# Patient Record
Sex: MALE | Race: WHITE | NOT HISPANIC OR LATINO | Employment: UNEMPLOYED | URBAN - METROPOLITAN AREA
[De-identification: names, ages, dates, MRNs, and addresses within clinical notes are randomized per-mention and may not be internally consistent; named-entity substitution may affect disease eponyms.]

---

## 2021-02-18 PROBLEM — G43.909 MIGRAINE: Status: ACTIVE | Noted: 2021-02-18

## 2021-02-18 PROBLEM — R41.82 ALTERED MENTAL STATUS: Status: ACTIVE | Noted: 2021-02-18

## 2021-02-18 PROBLEM — Z87.898 HISTORY OF SEIZURE: Status: ACTIVE | Noted: 2021-02-18

## 2021-02-18 PROBLEM — D72.829 LEUKOCYTOSIS: Status: ACTIVE | Noted: 2021-02-18

## 2021-02-19 PROBLEM — D72.829 LEUKOCYTOSIS: Status: RESOLVED | Noted: 2021-02-18 | Resolved: 2021-02-19

## 2022-04-04 ENCOUNTER — HOSPITAL ENCOUNTER (OUTPATIENT)
Facility: HOSPITAL | Age: 52
Discharge: HOME | End: 2022-04-05
Attending: EMERGENCY MEDICINE | Admitting: STUDENT IN AN ORGANIZED HEALTH CARE EDUCATION/TRAINING PROGRAM
Payer: MEDICAID

## 2022-04-04 DIAGNOSIS — R07.9 CHEST PAIN: ICD-10-CM

## 2022-04-04 DIAGNOSIS — K56.609 SBO (SMALL BOWEL OBSTRUCTION): ICD-10-CM

## 2022-04-04 DIAGNOSIS — R18.8 ASCITES: ICD-10-CM

## 2022-04-04 DIAGNOSIS — C43.9 METASTATIC MELANOMA: Primary | ICD-10-CM

## 2022-04-04 PROBLEM — E87.1 HYPONATREMIA: Status: ACTIVE | Noted: 2022-04-04

## 2022-04-04 PROBLEM — N17.9 AKI (ACUTE KIDNEY INJURY): Status: ACTIVE | Noted: 2022-04-04

## 2022-04-04 LAB
ALBUMIN SERPL BCP-MCNC: 3.4 G/DL (ref 3.5–5.2)
ALP SERPL-CCNC: 61 U/L (ref 55–135)
ALT SERPL W/O P-5'-P-CCNC: 15 U/L (ref 10–44)
ANION GAP SERPL CALC-SCNC: 15 MMOL/L (ref 8–16)
AST SERPL-CCNC: 40 U/L (ref 10–40)
BASOPHILS # BLD AUTO: 0.12 K/UL (ref 0–0.2)
BASOPHILS NFR BLD: 0.7 % (ref 0–1.9)
BILIRUB SERPL-MCNC: 0.8 MG/DL (ref 0.1–1)
BUN SERPL-MCNC: 27 MG/DL (ref 6–20)
CALCIUM SERPL-MCNC: 9.7 MG/DL (ref 8.7–10.5)
CHLORIDE SERPL-SCNC: 89 MMOL/L (ref 95–110)
CO2 SERPL-SCNC: 24 MMOL/L (ref 23–29)
CREAT SERPL-MCNC: 1.6 MG/DL (ref 0.5–1.4)
CRP SERPL-MCNC: 123.1 MG/L (ref 0–8.2)
CTP QC/QA: YES
DIFFERENTIAL METHOD: ABNORMAL
EOSINOPHIL # BLD AUTO: 0 K/UL (ref 0–0.5)
EOSINOPHIL NFR BLD: 0.2 % (ref 0–8)
ERYTHROCYTE [DISTWIDTH] IN BLOOD BY AUTOMATED COUNT: 14 % (ref 11.5–14.5)
EST. GFR  (AFRICAN AMERICAN): 56.4 ML/MIN/1.73 M^2
EST. GFR  (NON AFRICAN AMERICAN): 48.8 ML/MIN/1.73 M^2
GLUCOSE SERPL-MCNC: 120 MG/DL (ref 70–110)
HCT VFR BLD AUTO: 43.9 % (ref 40–54)
HGB BLD-MCNC: 15.4 G/DL (ref 14–18)
IMM GRANULOCYTES # BLD AUTO: 0.14 K/UL (ref 0–0.04)
IMM GRANULOCYTES NFR BLD AUTO: 0.8 % (ref 0–0.5)
INR PPP: 1 (ref 0.8–1.2)
INR PPP: 1.1 (ref 0.8–1.2)
LYMPHOCYTES # BLD AUTO: 1 K/UL (ref 1–4.8)
LYMPHOCYTES NFR BLD: 5.5 % (ref 18–48)
MCH RBC QN AUTO: 26.8 PG (ref 27–31)
MCHC RBC AUTO-ENTMCNC: 35.1 G/DL (ref 32–36)
MCV RBC AUTO: 77 FL (ref 82–98)
MONOCYTES # BLD AUTO: 1 K/UL (ref 0.3–1)
MONOCYTES NFR BLD: 5.5 % (ref 4–15)
NEUTROPHILS # BLD AUTO: 15.7 K/UL (ref 1.8–7.7)
NEUTROPHILS NFR BLD: 87.3 % (ref 38–73)
NRBC BLD-RTO: 0 /100 WBC
PLATELET # BLD AUTO: 638 K/UL (ref 150–450)
PMV BLD AUTO: 9.9 FL (ref 9.2–12.9)
POTASSIUM SERPL-SCNC: 5.4 MMOL/L (ref 3.5–5.1)
PROCALCITONIN SERPL IA-MCNC: 0.67 NG/ML
PROT SERPL-MCNC: 6.3 G/DL (ref 6–8.4)
PROTHROMBIN TIME: 10.8 SEC (ref 9–12.5)
PROTHROMBIN TIME: 11.4 SEC (ref 9–12.5)
RBC # BLD AUTO: 5.74 M/UL (ref 4.6–6.2)
SARS-COV-2 RDRP RESP QL NAA+PROBE: NEGATIVE
SODIUM SERPL-SCNC: 128 MMOL/L (ref 136–145)
WBC # BLD AUTO: 17.91 K/UL (ref 3.9–12.7)

## 2022-04-04 PROCEDURE — 63600175 PHARM REV CODE 636 W HCPCS

## 2022-04-04 PROCEDURE — 86140 C-REACTIVE PROTEIN: CPT

## 2022-04-04 PROCEDURE — 36415 COLL VENOUS BLD VENIPUNCTURE: CPT | Performed by: STUDENT IN AN ORGANIZED HEALTH CARE EDUCATION/TRAINING PROGRAM

## 2022-04-04 PROCEDURE — 76604 PR US CHEST / UPPER BACK: ICD-10-PCS | Mod: 26,,, | Performed by: EMERGENCY MEDICINE

## 2022-04-04 PROCEDURE — 85025 COMPLETE CBC W/AUTO DIFF WBC: CPT | Performed by: STUDENT IN AN ORGANIZED HEALTH CARE EDUCATION/TRAINING PROGRAM

## 2022-04-04 PROCEDURE — U0002 COVID-19 LAB TEST NON-CDC: HCPCS | Performed by: STUDENT IN AN ORGANIZED HEALTH CARE EDUCATION/TRAINING PROGRAM

## 2022-04-04 PROCEDURE — 99285 EMERGENCY DEPT VISIT HI MDM: CPT | Mod: 25

## 2022-04-04 PROCEDURE — 63600175 PHARM REV CODE 636 W HCPCS: Performed by: STUDENT IN AN ORGANIZED HEALTH CARE EDUCATION/TRAINING PROGRAM

## 2022-04-04 PROCEDURE — 84145 PROCALCITONIN (PCT): CPT | Performed by: STUDENT IN AN ORGANIZED HEALTH CARE EDUCATION/TRAINING PROGRAM

## 2022-04-04 PROCEDURE — 63600175 PHARM REV CODE 636 W HCPCS: Performed by: EMERGENCY MEDICINE

## 2022-04-04 PROCEDURE — 93308 TTE F-UP OR LMTD: CPT | Mod: 26,,, | Performed by: EMERGENCY MEDICINE

## 2022-04-04 PROCEDURE — 80053 COMPREHEN METABOLIC PANEL: CPT | Performed by: STUDENT IN AN ORGANIZED HEALTH CARE EDUCATION/TRAINING PROGRAM

## 2022-04-04 PROCEDURE — 96365 THER/PROPH/DIAG IV INF INIT: CPT

## 2022-04-04 PROCEDURE — 96372 THER/PROPH/DIAG INJ SC/IM: CPT | Mod: 59

## 2022-04-04 PROCEDURE — 96361 HYDRATE IV INFUSION ADD-ON: CPT

## 2022-04-04 PROCEDURE — 76604 US EXAM CHEST: CPT | Mod: 26,,, | Performed by: EMERGENCY MEDICINE

## 2022-04-04 PROCEDURE — 96375 TX/PRO/DX INJ NEW DRUG ADDON: CPT

## 2022-04-04 PROCEDURE — G0378 HOSPITAL OBSERVATION PER HR: HCPCS

## 2022-04-04 PROCEDURE — 93308 PR ECHO HEART XTHORACIC,LIMITED: ICD-10-PCS | Mod: 26,,, | Performed by: EMERGENCY MEDICINE

## 2022-04-04 PROCEDURE — 99285 PR EMERGENCY DEPT VISIT,LEVEL V: ICD-10-PCS | Mod: 25,CS,, | Performed by: EMERGENCY MEDICINE

## 2022-04-04 PROCEDURE — 25000003 PHARM REV CODE 250: Performed by: INTERNAL MEDICINE

## 2022-04-04 PROCEDURE — 87040 BLOOD CULTURE FOR BACTERIA: CPT | Performed by: STUDENT IN AN ORGANIZED HEALTH CARE EDUCATION/TRAINING PROGRAM

## 2022-04-04 PROCEDURE — 99219 PR INITIAL OBSERVATION CARE,LEVL II: CPT | Mod: ,,, | Performed by: STUDENT IN AN ORGANIZED HEALTH CARE EDUCATION/TRAINING PROGRAM

## 2022-04-04 PROCEDURE — 99219 PR INITIAL OBSERVATION CARE,LEVL II: ICD-10-PCS | Mod: ,,, | Performed by: STUDENT IN AN ORGANIZED HEALTH CARE EDUCATION/TRAINING PROGRAM

## 2022-04-04 PROCEDURE — 85610 PROTHROMBIN TIME: CPT | Mod: 91

## 2022-04-04 PROCEDURE — 63600175 PHARM REV CODE 636 W HCPCS: Performed by: INTERNAL MEDICINE

## 2022-04-04 PROCEDURE — 76705 ECHO EXAM OF ABDOMEN: CPT | Mod: 26,,, | Performed by: EMERGENCY MEDICINE

## 2022-04-04 PROCEDURE — 76705 PR US, ABDOMEN LIMITED: ICD-10-PCS | Mod: 26,,, | Performed by: EMERGENCY MEDICINE

## 2022-04-04 PROCEDURE — 99285 EMERGENCY DEPT VISIT HI MDM: CPT | Mod: 25,CS,, | Performed by: EMERGENCY MEDICINE

## 2022-04-04 PROCEDURE — 85610 PROTHROMBIN TIME: CPT | Performed by: STUDENT IN AN ORGANIZED HEALTH CARE EDUCATION/TRAINING PROGRAM

## 2022-04-04 RX ORDER — HYDROMORPHONE HYDROCHLORIDE 1 MG/ML
1 INJECTION, SOLUTION INTRAMUSCULAR; INTRAVENOUS; SUBCUTANEOUS
Status: DISPENSED | OUTPATIENT
Start: 2022-04-04 | End: 2022-04-05

## 2022-04-04 RX ORDER — ENOXAPARIN SODIUM 100 MG/ML
40 INJECTION SUBCUTANEOUS EVERY 24 HOURS
Status: DISCONTINUED | OUTPATIENT
Start: 2022-04-04 | End: 2022-04-05 | Stop reason: HOSPADM

## 2022-04-04 RX ORDER — NALOXONE HCL 0.4 MG/ML
0.02 VIAL (ML) INJECTION
Status: DISCONTINUED | OUTPATIENT
Start: 2022-04-04 | End: 2022-04-05 | Stop reason: HOSPADM

## 2022-04-04 RX ORDER — ACETAMINOPHEN 325 MG/1
650 TABLET ORAL EVERY 8 HOURS PRN
Status: DISCONTINUED | OUTPATIENT
Start: 2022-04-04 | End: 2022-04-05 | Stop reason: HOSPADM

## 2022-04-04 RX ORDER — IBUPROFEN 200 MG
24 TABLET ORAL
Status: DISCONTINUED | OUTPATIENT
Start: 2022-04-04 | End: 2022-04-05 | Stop reason: HOSPADM

## 2022-04-04 RX ORDER — OXYCODONE HYDROCHLORIDE 10 MG/1
10 TABLET ORAL EVERY 6 HOURS PRN
Status: DISCONTINUED | OUTPATIENT
Start: 2022-04-04 | End: 2022-04-05 | Stop reason: HOSPADM

## 2022-04-04 RX ORDER — MORPHINE SULFATE 4 MG/ML
8 INJECTION, SOLUTION INTRAMUSCULAR; INTRAVENOUS
Status: COMPLETED | OUTPATIENT
Start: 2022-04-04 | End: 2022-04-04

## 2022-04-04 RX ORDER — ONDANSETRON 8 MG/1
8 TABLET, ORALLY DISINTEGRATING ORAL EVERY 8 HOURS PRN
Status: DISCONTINUED | OUTPATIENT
Start: 2022-04-04 | End: 2022-04-05 | Stop reason: HOSPADM

## 2022-04-04 RX ORDER — IBUPROFEN 200 MG
16 TABLET ORAL
Status: DISCONTINUED | OUTPATIENT
Start: 2022-04-04 | End: 2022-04-05 | Stop reason: HOSPADM

## 2022-04-04 RX ORDER — LORAZEPAM 2 MG/ML
1 INJECTION INTRAMUSCULAR
Status: COMPLETED | OUTPATIENT
Start: 2022-04-04 | End: 2022-04-04

## 2022-04-04 RX ORDER — SODIUM CHLORIDE 0.9 % (FLUSH) 0.9 %
10 SYRINGE (ML) INJECTION EVERY 12 HOURS PRN
Status: DISCONTINUED | OUTPATIENT
Start: 2022-04-04 | End: 2022-04-05 | Stop reason: HOSPADM

## 2022-04-04 RX ORDER — TALC
6 POWDER (GRAM) TOPICAL NIGHTLY PRN
Status: DISCONTINUED | OUTPATIENT
Start: 2022-04-04 | End: 2022-04-05 | Stop reason: HOSPADM

## 2022-04-04 RX ORDER — GLUCAGON 1 MG
1 KIT INJECTION
Status: DISCONTINUED | OUTPATIENT
Start: 2022-04-04 | End: 2022-04-05 | Stop reason: HOSPADM

## 2022-04-04 RX ORDER — HYDROMORPHONE HYDROCHLORIDE 1 MG/ML
1 INJECTION, SOLUTION INTRAMUSCULAR; INTRAVENOUS; SUBCUTANEOUS EVERY 8 HOURS PRN
Status: DISCONTINUED | OUTPATIENT
Start: 2022-04-04 | End: 2022-04-05 | Stop reason: HOSPADM

## 2022-04-04 RX ORDER — ONDANSETRON 2 MG/ML
4 INJECTION INTRAMUSCULAR; INTRAVENOUS
Status: COMPLETED | OUTPATIENT
Start: 2022-04-04 | End: 2022-04-04

## 2022-04-04 RX ADMIN — HYDROMORPHONE HYDROCHLORIDE 1 MG: 1 INJECTION, SOLUTION INTRAMUSCULAR; INTRAVENOUS; SUBCUTANEOUS at 07:04

## 2022-04-04 RX ADMIN — LORAZEPAM 1 MG: 2 INJECTION INTRAMUSCULAR; INTRAVENOUS at 01:04

## 2022-04-04 RX ADMIN — ONDANSETRON 4 MG: 2 INJECTION INTRAMUSCULAR; INTRAVENOUS at 01:04

## 2022-04-04 RX ADMIN — ENOXAPARIN SODIUM 40 MG: 100 INJECTION SUBCUTANEOUS at 07:04

## 2022-04-04 RX ADMIN — MORPHINE SULFATE 8 MG: 4 INJECTION INTRAVENOUS at 01:04

## 2022-04-04 RX ADMIN — MORPHINE SULFATE 45 MG: 30 TABLET, FILM COATED, EXTENDED RELEASE ORAL at 10:04

## 2022-04-04 RX ADMIN — CEFTRIAXONE 2 G: 2 INJECTION, SOLUTION INTRAVENOUS at 10:04

## 2022-04-04 RX ADMIN — SODIUM CHLORIDE, SODIUM LACTATE, POTASSIUM CHLORIDE, AND CALCIUM CHLORIDE 1000 ML: .6; .31; .03; .02 INJECTION, SOLUTION INTRAVENOUS at 02:04

## 2022-04-04 NOTE — ASSESSMENT & PLAN NOTE
Likely 2/2 poor perfusion, concerned for HRS    - treat primary problem  - daily CMP  - strict I/O  - renal dosing medications  - avoid nephrotoxic agents

## 2022-04-04 NOTE — ED PROVIDER NOTES
"Encounter Date: 4/4/2022       History     Chief Complaint   Patient presents with    Abdominal Pain     Abd pain.  Pt with "fluid on stomach".  Pt with hx melanoma with mets to abd and liver on treatment at [facility].  Pt states pain increasing and abd more swollen.      50-year-old male with a known past medical history of malignant melanoma with metastases  to right axillary lymph nodes, bilateral lungs, and liver with unknown primary lesion and seizure disorder presenting to the ED with complaint of ascites. Patient states that he was [place] [facility] [place] but is very unhappy with his care and would like to establish care at [facility]. He reports he has needed paracentesis multiple times including 4 days ago for abdominal ascites. He was [place] [facility] [place] ago but reports they did not discharge him with any pain meds despite saying they would. He was supposed to be on a pain regimen of dilaudid, oxycodone and fentanyl patches. He had a paracentesis appt scheduled today but reports that when he went [place] [facility] they couldn't find the appt and he was refused. He was also told that he would have his immunotherapy switched but was not told when or which medications he would be switched to. Patient reports worsening ascites and significant abdominal pain. He reports nausea and vomiting as a result of the pain. He also endorses constipation. He denies fevers, chills, erythema, hematuria, hematemesis, chest pain, SOB, unilateral weakness, numbness.         Review of patient's allergies indicates:  No Known Allergies  Past Medical History:   Diagnosis Date    Seizures      No past surgical history on file.  No family history on file.  Social History     Tobacco Use    Smoking status: Current Every Day Smoker    Smokeless tobacco: Never Used   Substance Use Topics    Alcohol use: Yes    Drug use: Yes     Types: Methamphetamines, Marijuana     Review of Systems   Constitutional: " Negative for chills and fever.   HENT: Negative for congestion and rhinorrhea.    Eyes: Negative for pain and visual disturbance.   Respiratory: Negative for cough and shortness of breath.    Cardiovascular: Negative for chest pain and palpitations.   Gastrointestinal: Positive for abdominal distention, abdominal pain, constipation, nausea and vomiting. Negative for blood in stool.   Genitourinary: Negative for difficulty urinating and dysuria.   Musculoskeletal: Negative for gait problem and joint swelling.   Skin: Negative for color change and rash.   Neurological: Negative for weakness, numbness and headaches.       Physical Exam     Initial Vitals [04/04/22 1150]   BP Pulse Resp Temp SpO2   121/74 82 (!) 22 97.4 °F (36.3 °C) 96 %      MAP       --         Physical Exam    Nursing note and vitals reviewed.  Constitutional: He appears well-nourished. He is not diaphoretic. No distress.   HENT:   Head: Normocephalic and atraumatic.   Mouth/Throat: Oropharynx is clear and moist.   Eyes: Conjunctivae and EOM are normal.   Neck: Neck supple.   Normal range of motion.  Cardiovascular: Normal rate, regular rhythm and normal heart sounds.   Pulmonary/Chest: Breath sounds normal. He has no wheezes. He has no rhonchi. He has no rales.   Abdominal: He exhibits distension. There is abdominal tenderness.   Diffuse tenderness. +Fluid wave. Non-peritonitic.     Musculoskeletal:         General: No tenderness. Normal range of motion.      Cervical back: Normal range of motion and neck supple.     Neurological: He is alert and oriented to person, place, and time. He has normal strength. No sensory deficit.   Skin: Skin is warm and dry. Capillary refill takes less than 2 seconds.         ED Course   Procedures  Labs Reviewed   CBC W/ AUTO DIFFERENTIAL - Abnormal; Notable for the following components:       Result Value    WBC 17.91 (*)     MCV 77 (*)     MCH 26.8 (*)     Platelets 638 (*)     Immature Granulocytes 0.8 (*)     Gran  # (ANC) 15.7 (*)     Immature Grans (Abs) 0.14 (*)     Gran % 87.3 (*)     Lymph % 5.5 (*)     All other components within normal limits   COMPREHENSIVE METABOLIC PANEL - Abnormal; Notable for the following components:    Sodium 128 (*)     Potassium 5.4 (*)     Chloride 89 (*)     Glucose 120 (*)     BUN 27 (*)     Creatinine 1.6 (*)     Albumin 3.4 (*)     eGFR if  56.4 (*)     eGFR if non  48.8 (*)     All other components within normal limits   PROTIME-INR   URINALYSIS, REFLEX TO URINE CULTURE   SARS-COV-2 RDRP GENE    Narrative:     This test utilizes isothermal nucleic acid amplification   technology to detect the SARS-CoV-2 RdRp nucleic acid segment.   The analytical sensitivity (limit of detection) is 125 genome   equivalents/mL.   A POSITIVE result implies infection with the SARS-CoV-2 virus;   the patient is presumed to be contagious.     A NEGATIVE result means that SARS-CoV-2 nucleic acids are not   present above the limit of detection. A NEGATIVE result should be   treated as presumptive. It does not rule out the possibility of   COVID-19 and should not be the sole basis for treatment decisions.   If COVID-19 is strongly suspected based on clinical and exposure   history, re-testing using an alternate molecular assay should be   considered.   This test is only for use under the Food and Drug   Administration s Emergency Use Authorization (EUA).   Commercial kits are provided by [employer].   Performance characteristics of the EUA have been independently   verified by [facility] Department of   Pathology and Laboratory Medicine.   _________________________________________________________________   The authorized Fact Sheet for Healthcare Providers and the authorized Fact   Sheet for Patients of the ID NOW COVID-19 are available on the FDA   website:     https://www.fda.gov/media/777954/download  https://www.fda.gov/media/110362/download                  Imaging Results    None          Medications   lactated ringers bolus 1,000 mL (1,000 mLs Intravenous New Bag 4/4/22 1455)   HYDROmorphone injection 1 mg (has no administration in time range)   ondansetron injection 4 mg (4 mg Intravenous Given 4/4/22 1303)   morphine injection 8 mg (8 mg Intravenous Given 4/4/22 1303)   lorazepam injection 1 mg (1 mg Intravenous Given 4/4/22 1318)     Medical Decision Making:   History:   Old Medical Records: I decided to obtain old medical records.  Initial Assessment:   50-year-old male with a known past medical history of malignant melanoma with metastases  to right axillary lymph nodes, bilateral lungs, and liver with unknown primary lesion and seizure disorder presenting to the ED with complaint of ascites.   Differential Diagnosis:   Ascites 2/2 malignancy vs cirrhosis  Electrolyte derangement  Clinical Tests:   Lab Tests: Ordered and Reviewed  ED Management:  Patient is hemodynamically stable. Morphine and Zofran given for symptom management. On reassessment, patient is in significant pain. Labs notable for PAULA, hyponatremia, mild hyperkalemia and leukocytosis. 1L IVF bolus given. With further chart review, labs are all similar to patient's labs two days ago when he was discharged home OSH. Hem-onc consulted and agree to see patient. KUB ordered to evaluate stool burden given patient's history of constipation. Patient will be admitted to hem-onc service.             Attending Attestation:   Physician Attestation Statement for Resident:  As the supervising MD   Physician Attestation Statement: I have personally seen and examined this patient.   I agree with the above history. -: Patient with a recent diagnosis of January 21st of the year with a malignant melanoma.  Initially worked up at [facility].  According to the mother in a melanoma has metastasized to the lung and abdomen.  They are quite frustrated and wanted transfer care here to [facility].  The  patient presents for evaluation of abdominal pain.  He has had no fever he has had some vomiting he has had bowel movements but occasionally is constipated.  He has had multiple paracentesis.   As the supervising MD I agree with the above PE.   -: Alert, very frustrated.  Anxious and hyperventilating.  He is directable when I asked him to quit raising his voice.  I told him that and use that that he is frustrated with the lack of coordination of his care as he perceives in however we are here to help him.  He is tachycardic on my exam at a heart rate of 1 away.  His abdominal exam shows hyperactive bowel sounds tympanic to percussion.  Will go ahead and repeat some lab work on him him give him a IV analgesic medication as well as some Ativan.  Will consult Heme-Onc.   As the supervising MD I agree with the above treatment, course, plan, and disposition.                ED Course as of 04/04/22 1733   Mon Apr 04, 2022   1443 Hemoglobin: 15.4 [DS]      ED Course User Index  [DS] Caryn Castillo MD             Clinical Impression:   Final diagnoses:  [K56.609] SBO (small bowel obstruction)  [R18.8] Ascites          ED Disposition Condition    Observation               Caryn Castillo MD  Resident  04/04/22 4058

## 2022-04-04 NOTE — ASSESSMENT & PLAN NOTE
Malignant melanoma with metastases to right axillary lymph nodes, bilateral lungs, and liver. Has followed up at [facility] for immunotherapy.     - f/u with primary oncologist at [facility]

## 2022-04-04 NOTE — ASSESSMENT & PLAN NOTE
Likely 2/2 metastatic melanoma. Has had multiple paracentesis with [facility]. Last one was on 4/1. At ED, VSS, afebrile, u/s at bedside showed accumulated peritoneal fluid. Total bili, AST/ALT, ALP and PT/INR were all normal. Chance of SBP is low but will r/o    - consulted IR for paracentesis  - f/u body fluid study  - f/u CRP and procal  - daily CMP   - pain control

## 2022-04-04 NOTE — H&P
[facility] Dept  Hematology/Oncology  H&P    Patient Name: Joseluis Garner  MRN: [id number]  Admission Date: 4/4/2022  Code Status: Full Code   Attending Provider: Barron Sharif DO  Primary Care Physician: Primary Doctor No  Principal Problem:Ascites    Subjective:     HPI: 51 yo M with PMHx of malignant melanoma with metastases to right axillary lymph nodes, bilateral lungs, and liver with unknown primary lesion and seizure disorder presenting with ascites associated with severe abdominal pain. Patient stated he needed paracentesis multiple times with the last one was on 4/1/22 and had 6L out. Pt has been treated for cancer with immunotherapy at [facility] but would like to establish care at [facility] instead. He stated was discharged from [facility] on 4/2/22 and did not receive any pain meds despite being told to. He was supposed to be on a pain regimen of dilaudid, oxycodone and fentanyl patches. He had a paracentesis appt scheduled today with [facility] but was refused because the appt was not found. He stated was told that his immunotherapy would be switched but was not told when or which medications he would be switched to. Patient reported worsening ascites and significant abdominal pain associated with nausea and vomiting. He also complained about constipation. He denied fevers, chills, erythema, hematuria, hematemesis, chest pain, SOB, unilateral weakness, or numbness.     At ED, his VSS, WBC 17.91, Na 128, K 5.4, Cr 1.6 (baseline ~0.8), bedside abdominal u/s showed intraperitoneal fluid. He received IVF, morphine, zofran and ativan at ED.       Oncology Treatment Plan:   [Could not find a treatment plan. This SmartLink may be configured incorrectly. Contact a  for help.]    Medications:  Continuous Infusions:  Scheduled Meds:   enoxaparin  40 mg Subcutaneous Daily    HYDROmorphone  1 mg Intravenous ED 1 Time    morphine  45 mg Oral Q12H     PRN Meds:acetaminophen, dextrose 10%, dextrose  10%, glucagon (human recombinant), glucose, glucose, HYDROmorphone, melatonin, naloxone, ondansetron, oxyCODONE, sodium chloride 0.9%     Review of patient's allergies indicates:  No Known Allergies     Past Medical History:   Diagnosis Date    Seizures      No past surgical history on file.  Family History    None       Tobacco Use    Smoking status: Current Every Day Smoker    Smokeless tobacco: Never Used   Substance and Sexual Activity    Alcohol use: Yes    Drug use: Yes     Types: Methamphetamines, Marijuana    Sexual activity: Not on file       Review of Systems   Constitutional:  Negative for chills and fever.   HENT:  Negative for trouble swallowing.    Respiratory:  Negative for cough and shortness of breath.    Cardiovascular:  Negative for chest pain.   Gastrointestinal:  Positive for abdominal distention, abdominal pain, constipation and nausea.   Genitourinary:  Negative for hematuria.   Neurological:  Negative for weakness, numbness and headaches.   Objective:     Vital Signs (Most Recent):  Temp: 97.4 °F (36.3 °C) (04/04/22 1150)  Pulse: 83 (04/04/22 1617)  Resp: (!) 26 (04/04/22 1617)  BP: 123/83 (04/04/22 1617)  SpO2: 96 % (04/04/22 1617)   Vital Signs (24h Range):  Temp:  [97.4 °F (36.3 °C)] 97.4 °F (36.3 °C)  Pulse:  [82-92] 83  Resp:  [18-26] 26  SpO2:  [91 %-100 %] 96 %  BP: (119-162)/(74-88) 123/83     Weight: 70.3 kg (155 lb)  Body mass index is 21.02 kg/m².  Body surface area is 1.89 meters squared.    No intake or output data in the 24 hours ending 04/04/22 1658    Physical Exam  Vitals and nursing note reviewed.   Constitutional:       Appearance: He is ill-appearing.   Eyes:      Extraocular Movements: Extraocular movements intact.      Pupils: Pupils are equal, round, and reactive to light.   Cardiovascular:      Rate and Rhythm: Normal rate and regular rhythm.      Heart sounds: Normal heart sounds.   Pulmonary:      Effort: Pulmonary effort is normal.      Breath sounds: Normal  breath sounds.   Abdominal:      General: There is distension.      Tenderness: There is abdominal tenderness.   Musculoskeletal:         General: Swelling present.   Neurological:      General: No focal deficit present.      Mental Status: He is alert and oriented to person, place, and time.   Psychiatric:      Comments: agitated       Significant Labs:   All pertinent labs from the last 24 hours have been reviewed.    Diagnostic Results:  I have reviewed all pertinent imaging results/findings within the past 24 hours.    Assessment/Plan:     * Ascites  Likely 2/2 metastatic melanoma. Has had multiple paracentesis with [facility] [place]. Last one was on 4/1. At ED, VSS, afebrile, u/s at bedside showed accumulated peritoneal fluid. Total bili, AST/ALT, ALP and PT/INR were all normal. Chance of SBP is low but will r/o    - consulted IR for paracentesis  - f/u body fluid study  - f/u CRP and procal  - daily CMP   - pain control        PAULA (acute kidney injury)  Likely 2/2 poor perfusion, concerned for HRS    - treat primary problem  - daily CMP  - strict I/O  - renal dosing medications  - avoid nephrotoxic agents    Hyponatremia  - daily CMP  - IVF as needed      Metastatic melanoma  Malignant melanoma with metastases to right axillary lymph nodes, bilateral lungs, and liver. Has followed up at [facility] for immunotherapy.     - f/u with primary oncologist at [facility]        Sammi Brown MD PhD  Hematology/Oncology  [facility] Dept

## 2022-04-04 NOTE — SUBJECTIVE & OBJECTIVE
Oncology Treatment Plan:   [Could not find a treatment plan. This SmartLink may be configured incorrectly. Contact a  for help.]    Medications:  Continuous Infusions:  Scheduled Meds:   enoxaparin  40 mg Subcutaneous Daily    HYDROmorphone  1 mg Intravenous ED 1 Time    morphine  45 mg Oral Q12H     PRN Meds:acetaminophen, dextrose 10%, dextrose 10%, glucagon (human recombinant), glucose, glucose, HYDROmorphone, melatonin, naloxone, ondansetron, oxyCODONE, sodium chloride 0.9%     Review of patient's allergies indicates:  No Known Allergies     Past Medical History:   Diagnosis Date    Seizures      No past surgical history on file.  Family History    None       Tobacco Use    Smoking status: Current Every Day Smoker    Smokeless tobacco: Never Used   Substance and Sexual Activity    Alcohol use: Yes    Drug use: Yes     Types: Methamphetamines, Marijuana    Sexual activity: Not on file       Review of Systems   Constitutional:  Negative for chills and fever.   HENT:  Negative for trouble swallowing.    Respiratory:  Negative for cough and shortness of breath.    Cardiovascular:  Negative for chest pain.   Gastrointestinal:  Positive for abdominal distention, abdominal pain, constipation and nausea.   Genitourinary:  Negative for hematuria.   Neurological:  Negative for weakness, numbness and headaches.   Objective:     Vital Signs (Most Recent):  Temp: 97.4 °F (36.3 °C) (04/04/22 1150)  Pulse: 83 (04/04/22 1617)  Resp: (!) 26 (04/04/22 1617)  BP: 123/83 (04/04/22 1617)  SpO2: 96 % (04/04/22 1617)   Vital Signs (24h Range):  Temp:  [97.4 °F (36.3 °C)] 97.4 °F (36.3 °C)  Pulse:  [82-92] 83  Resp:  [18-26] 26  SpO2:  [91 %-100 %] 96 %  BP: (119-162)/(74-88) 123/83     Weight: 70.3 kg (155 lb)  Body mass index is 21.02 kg/m².  Body surface area is 1.89 meters squared.    No intake or output data in the 24 hours ending 04/04/22 1658    Physical Exam  Vitals and nursing note reviewed.    Constitutional:       Appearance: He is ill-appearing.   Eyes:      Extraocular Movements: Extraocular movements intact.      Pupils: Pupils are equal, round, and reactive to light.   Cardiovascular:      Rate and Rhythm: Normal rate and regular rhythm.      Heart sounds: Normal heart sounds.   Pulmonary:      Effort: Pulmonary effort is normal.      Breath sounds: Normal breath sounds.   Abdominal:      General: There is distension.      Tenderness: There is abdominal tenderness.   Musculoskeletal:         General: Swelling present.   Neurological:      General: No focal deficit present.      Mental Status: He is alert and oriented to person, place, and time.   Psychiatric:      Comments: agitated       Significant Labs:   All pertinent labs from the last 24 hours have been reviewed.    Diagnostic Results:  I have reviewed all pertinent imaging results/findings within the past 24 hours.

## 2022-04-05 VITALS
HEIGHT: 72 IN | DIASTOLIC BLOOD PRESSURE: 78 MMHG | OXYGEN SATURATION: 96 % | SYSTOLIC BLOOD PRESSURE: 130 MMHG | BODY MASS INDEX: 20.99 KG/M2 | RESPIRATION RATE: 18 BRPM | TEMPERATURE: 98 F | WEIGHT: 155 LBS | HEART RATE: 85 BPM

## 2022-04-05 DIAGNOSIS — C43.9 METASTATIC MELANOMA: Primary | ICD-10-CM

## 2022-04-05 LAB
ALBUMIN FLD-MCNC: 2.6 G/DL
ALBUMIN SERPL BCP-MCNC: 2.8 G/DL (ref 3.5–5.2)
ALP SERPL-CCNC: 62 U/L (ref 55–135)
ALT SERPL W/O P-5'-P-CCNC: 11 U/L (ref 10–44)
ANION GAP SERPL CALC-SCNC: 9 MMOL/L (ref 8–16)
APPEARANCE FLD: ABNORMAL
AST SERPL-CCNC: 28 U/L (ref 10–40)
BASOPHILS # BLD AUTO: 0.13 K/UL (ref 0–0.2)
BASOPHILS NFR BLD: 0.8 % (ref 0–1.9)
BILIRUB DIRECT SERPL-MCNC: 0.2 MG/DL (ref 0.1–0.3)
BILIRUB SERPL-MCNC: 0.5 MG/DL (ref 0.1–1)
BILIRUB UR QL STRIP: NEGATIVE
BODY FLD TYPE: ABNORMAL
BODY FLUID SOURCE, LDH: NORMAL
BUN SERPL-MCNC: 22 MG/DL (ref 6–20)
CALCIUM SERPL-MCNC: 9 MG/DL (ref 8.7–10.5)
CHLORIDE SERPL-SCNC: 90 MMOL/L (ref 95–110)
CLARITY UR REFRACT.AUTO: ABNORMAL
CO2 SERPL-SCNC: 28 MMOL/L (ref 23–29)
COLOR FLD: YELLOW
COLOR UR AUTO: YELLOW
CREAT SERPL-MCNC: 1 MG/DL (ref 0.5–1.4)
DIFFERENTIAL METHOD: ABNORMAL
EOSINOPHIL # BLD AUTO: 0.1 K/UL (ref 0–0.5)
EOSINOPHIL NFR BLD: 0.7 % (ref 0–8)
ERYTHROCYTE [DISTWIDTH] IN BLOOD BY AUTOMATED COUNT: 14.1 % (ref 11.5–14.5)
EST. GFR  (AFRICAN AMERICAN): >60 ML/MIN/1.73 M^2
EST. GFR  (NON AFRICAN AMERICAN): >60 ML/MIN/1.73 M^2
GLUCOSE SERPL-MCNC: 112 MG/DL (ref 70–110)
GLUCOSE UR QL STRIP: NEGATIVE
GRAM STN SPEC: NORMAL
GRAM STN SPEC: NORMAL
HCT VFR BLD AUTO: 44.7 % (ref 40–54)
HGB BLD-MCNC: 15 G/DL (ref 14–18)
HGB UR QL STRIP: NEGATIVE
IMM GRANULOCYTES # BLD AUTO: 0.14 K/UL (ref 0–0.04)
IMM GRANULOCYTES NFR BLD AUTO: 0.8 % (ref 0–0.5)
KETONES UR QL STRIP: ABNORMAL
LDH FLD L TO P-CCNC: 1264 U/L
LEUKOCYTE ESTERASE UR QL STRIP: NEGATIVE
LYMPHOCYTES # BLD AUTO: 1.3 K/UL (ref 1–4.8)
LYMPHOCYTES NFR BLD: 7.5 % (ref 18–48)
LYMPHOCYTES NFR FLD MANUAL: 26 %
MAGNESIUM SERPL-MCNC: 2.2 MG/DL (ref 1.6–2.6)
MCH RBC QN AUTO: 26.7 PG (ref 27–31)
MCHC RBC AUTO-ENTMCNC: 33.6 G/DL (ref 32–36)
MCV RBC AUTO: 80 FL (ref 82–98)
MONOCYTES # BLD AUTO: 1.1 K/UL (ref 0.3–1)
MONOCYTES NFR BLD: 6.2 % (ref 4–15)
MONOS+MACROS NFR FLD MANUAL: 14 %
NEUTROPHILS # BLD AUTO: 14.4 K/UL (ref 1.8–7.7)
NEUTROPHILS NFR BLD: 84 % (ref 38–73)
NEUTROPHILS NFR FLD MANUAL: 25 %
NITRITE UR QL STRIP: NEGATIVE
NRBC BLD-RTO: 0 /100 WBC
OTHER CELLS FLD MANUAL: 35 %
PH UR STRIP: 5 [PH] (ref 5–8)
PHOSPHATE SERPL-MCNC: 3.9 MG/DL (ref 2.7–4.5)
PLATELET # BLD AUTO: 537 K/UL (ref 150–450)
PMV BLD AUTO: 9.9 FL (ref 9.2–12.9)
POTASSIUM SERPL-SCNC: 4.6 MMOL/L (ref 3.5–5.1)
PROT FLD-MCNC: 4 G/DL
PROT SERPL-MCNC: 5.5 G/DL (ref 6–8.4)
PROT UR QL STRIP: NEGATIVE
RBC # BLD AUTO: 5.61 M/UL (ref 4.6–6.2)
SODIUM SERPL-SCNC: 127 MMOL/L (ref 136–145)
SP GR UR STRIP: 1.01 (ref 1–1.03)
SPECIMEN SOURCE: NORMAL
SPECIMEN SOURCE: NORMAL
URN SPEC COLLECT METH UR: ABNORMAL
WBC # BLD AUTO: 17.13 K/UL (ref 3.9–12.7)
WBC # FLD: 1036 /CU MM

## 2022-04-05 PROCEDURE — 88305 TISSUE EXAM BY PATHOLOGIST: CPT | Mod: 26,,, | Performed by: PATHOLOGY

## 2022-04-05 PROCEDURE — 25000003 PHARM REV CODE 250

## 2022-04-05 PROCEDURE — 96376 TX/PRO/DX INJ SAME DRUG ADON: CPT

## 2022-04-05 PROCEDURE — 87205 SMEAR GRAM STAIN: CPT | Performed by: STUDENT IN AN ORGANIZED HEALTH CARE EDUCATION/TRAINING PROGRAM

## 2022-04-05 PROCEDURE — 88341 IMHCHEM/IMCYTCHM EA ADD ANTB: CPT | Performed by: PATHOLOGY

## 2022-04-05 PROCEDURE — 88341 PR IHC OR ICC EACH ADD'L SINGLE ANTIBODY  STAINPR: ICD-10-PCS | Mod: 26,,, | Performed by: PATHOLOGY

## 2022-04-05 PROCEDURE — 80053 COMPREHEN METABOLIC PANEL: CPT

## 2022-04-05 PROCEDURE — 84100 ASSAY OF PHOSPHORUS: CPT

## 2022-04-05 PROCEDURE — 82248 BILIRUBIN DIRECT: CPT

## 2022-04-05 PROCEDURE — 85025 COMPLETE CBC W/AUTO DIFF WBC: CPT

## 2022-04-05 PROCEDURE — 88342 IMHCHEM/IMCYTCHM 1ST ANTB: CPT | Performed by: PATHOLOGY

## 2022-04-05 PROCEDURE — 87070 CULTURE OTHR SPECIMN AEROBIC: CPT | Performed by: STUDENT IN AN ORGANIZED HEALTH CARE EDUCATION/TRAINING PROGRAM

## 2022-04-05 PROCEDURE — 63600175 PHARM REV CODE 636 W HCPCS: Performed by: INTERNAL MEDICINE

## 2022-04-05 PROCEDURE — 88342 IMHCHEM/IMCYTCHM 1ST ANTB: CPT | Mod: 26,,, | Performed by: PATHOLOGY

## 2022-04-05 PROCEDURE — 83615 LACTATE (LD) (LDH) ENZYME: CPT | Performed by: STUDENT IN AN ORGANIZED HEALTH CARE EDUCATION/TRAINING PROGRAM

## 2022-04-05 PROCEDURE — 88341 IMHCHEM/IMCYTCHM EA ADD ANTB: CPT | Mod: 26,,, | Performed by: PATHOLOGY

## 2022-04-05 PROCEDURE — 83735 ASSAY OF MAGNESIUM: CPT

## 2022-04-05 PROCEDURE — 81003 URINALYSIS AUTO W/O SCOPE: CPT | Performed by: STUDENT IN AN ORGANIZED HEALTH CARE EDUCATION/TRAINING PROGRAM

## 2022-04-05 PROCEDURE — 87075 CULTR BACTERIA EXCEPT BLOOD: CPT | Performed by: STUDENT IN AN ORGANIZED HEALTH CARE EDUCATION/TRAINING PROGRAM

## 2022-04-05 PROCEDURE — G0378 HOSPITAL OBSERVATION PER HR: HCPCS

## 2022-04-05 PROCEDURE — 25000003 PHARM REV CODE 250: Performed by: PHYSICIAN ASSISTANT

## 2022-04-05 PROCEDURE — 88112 CYTOPATH CELL ENHANCE TECH: CPT | Mod: 26,,, | Performed by: PATHOLOGY

## 2022-04-05 PROCEDURE — 36415 COLL VENOUS BLD VENIPUNCTURE: CPT

## 2022-04-05 PROCEDURE — 88305 TISSUE EXAM BY PATHOLOGIST: ICD-10-PCS | Mod: 26,,, | Performed by: PATHOLOGY

## 2022-04-05 PROCEDURE — 99226 PR SUBSEQUENT OBSERVATION CARE,LEVEL III: CPT | Mod: ,,, | Performed by: STUDENT IN AN ORGANIZED HEALTH CARE EDUCATION/TRAINING PROGRAM

## 2022-04-05 PROCEDURE — 88342 CHG IMMUNOCYTOCHEMISTRY: ICD-10-PCS | Mod: 26,,, | Performed by: PATHOLOGY

## 2022-04-05 PROCEDURE — 89051 BODY FLUID CELL COUNT: CPT | Performed by: STUDENT IN AN ORGANIZED HEALTH CARE EDUCATION/TRAINING PROGRAM

## 2022-04-05 PROCEDURE — 88112 CYTOPATH CELL ENHANCE TECH: CPT | Performed by: PATHOLOGY

## 2022-04-05 PROCEDURE — 82042 OTHER SOURCE ALBUMIN QUAN EA: CPT | Performed by: STUDENT IN AN ORGANIZED HEALTH CARE EDUCATION/TRAINING PROGRAM

## 2022-04-05 PROCEDURE — 88112 PR  CYTOPATH, CELL ENHANCE TECH: ICD-10-PCS | Mod: 26,,, | Performed by: PATHOLOGY

## 2022-04-05 PROCEDURE — 99226 PR SUBSEQUENT OBSERVATION CARE,LEVEL III: ICD-10-PCS | Mod: ,,, | Performed by: STUDENT IN AN ORGANIZED HEALTH CARE EDUCATION/TRAINING PROGRAM

## 2022-04-05 PROCEDURE — 25000003 PHARM REV CODE 250: Performed by: INTERNAL MEDICINE

## 2022-04-05 PROCEDURE — 84157 ASSAY OF PROTEIN OTHER: CPT | Performed by: STUDENT IN AN ORGANIZED HEALTH CARE EDUCATION/TRAINING PROGRAM

## 2022-04-05 PROCEDURE — 88305 TISSUE EXAM BY PATHOLOGIST: CPT | Performed by: PATHOLOGY

## 2022-04-05 RX ORDER — MORPHINE SULFATE 30 MG/1
60 TABLET, FILM COATED, EXTENDED RELEASE ORAL EVERY 12 HOURS
Status: DISCONTINUED | OUTPATIENT
Start: 2022-04-05 | End: 2022-04-05 | Stop reason: HOSPADM

## 2022-04-05 RX ORDER — CIPROFLOXACIN 500 MG/1
500 TABLET ORAL EVERY 12 HOURS
Qty: 10 TABLET | Refills: 0 | Status: SHIPPED | OUTPATIENT
Start: 2022-04-05 | End: 2022-04-10

## 2022-04-05 RX ORDER — FENTANYL 25 UG/1
1 PATCH TRANSDERMAL ONCE
COMMUNITY
End: 2022-05-04 | Stop reason: SDUPTHER

## 2022-04-05 RX ORDER — OXYCODONE HYDROCHLORIDE 20 MG/1
20 TABLET ORAL EVERY 4 HOURS PRN
Qty: 30 TABLET | Refills: 0 | Status: SHIPPED | OUTPATIENT
Start: 2022-04-05 | End: 2022-04-11

## 2022-04-05 RX ORDER — LEVETIRACETAM 500 MG/1
1000 TABLET ORAL 2 TIMES DAILY
Status: DISCONTINUED | OUTPATIENT
Start: 2022-04-05 | End: 2022-04-05 | Stop reason: HOSPADM

## 2022-04-05 RX ORDER — LIDOCAINE HYDROCHLORIDE 20 MG/ML
INJECTION, SOLUTION EPIDURAL; INFILTRATION; INTRACAUDAL; PERINEURAL
Status: COMPLETED
Start: 2022-04-05 | End: 2022-04-05

## 2022-04-05 RX ORDER — MORPHINE SULFATE 15 MG/1
15 TABLET, FILM COATED, EXTENDED RELEASE ORAL ONCE
Status: COMPLETED | OUTPATIENT
Start: 2022-04-05 | End: 2022-04-05

## 2022-04-05 RX ORDER — LEVETIRACETAM 1000 MG/1
1000 TABLET ORAL 2 TIMES DAILY
Status: ON HOLD | COMMUNITY
Start: 2021-06-28 | End: 2022-08-21 | Stop reason: HOSPADM

## 2022-04-05 RX ORDER — ONDANSETRON 8 MG/1
8 TABLET, ORALLY DISINTEGRATING ORAL EVERY 8 HOURS PRN
Qty: 20 TABLET | Refills: 1 | Status: SHIPPED | OUTPATIENT
Start: 2022-04-05 | End: 2022-05-31 | Stop reason: SDUPTHER

## 2022-04-05 RX ORDER — OXYCODONE HYDROCHLORIDE 15 MG/1
20 TABLET ORAL EVERY 6 HOURS PRN
Status: ON HOLD | COMMUNITY
End: 2022-04-05 | Stop reason: SDUPTHER

## 2022-04-05 RX ORDER — LIDOCAINE HYDROCHLORIDE 20 MG/ML
INJECTION, SOLUTION INFILTRATION; PERINEURAL CODE/TRAUMA/SEDATION MEDICATION
Status: COMPLETED | OUTPATIENT
Start: 2022-04-05 | End: 2022-04-05

## 2022-04-05 RX ADMIN — LEVETIRACETAM 1000 MG: 500 TABLET, FILM COATED ORAL at 11:04

## 2022-04-05 RX ADMIN — MORPHINE SULFATE 15 MG: 15 TABLET, EXTENDED RELEASE ORAL at 11:04

## 2022-04-05 RX ADMIN — LIDOCAINE HYDROCHLORIDE 5 ML: 20 INJECTION, SOLUTION INFILTRATION; PERINEURAL at 09:04

## 2022-04-05 RX ADMIN — OXYCODONE HYDROCHLORIDE 10 MG: 10 TABLET ORAL at 12:04

## 2022-04-05 RX ADMIN — HYDROMORPHONE HYDROCHLORIDE 1 MG: 1 INJECTION, SOLUTION INTRAMUSCULAR; INTRAVENOUS; SUBCUTANEOUS at 02:04

## 2022-04-05 RX ADMIN — OXYCODONE HYDROCHLORIDE 10 MG: 10 TABLET ORAL at 08:04

## 2022-04-05 RX ADMIN — HYDROMORPHONE HYDROCHLORIDE 1 MG: 1 INJECTION, SOLUTION INTRAMUSCULAR; INTRAVENOUS; SUBCUTANEOUS at 04:04

## 2022-04-05 RX ADMIN — MORPHINE SULFATE 45 MG: 30 TABLET, FILM COATED, EXTENDED RELEASE ORAL at 08:04

## 2022-04-05 RX ADMIN — LIDOCAINE HYDROCHLORIDE: 20 INJECTION, SOLUTION EPIDURAL; INFILTRATION; INTRACAUDAL at 08:04

## 2022-04-05 NOTE — PLAN OF CARE
Paracentesis done. Removed 5000 ml. Patient awake and alert, no distress noted, respirations even and unlabored. Report called to EZE Daniels on 8th floor.  Vitals:    04/05/22 0957   BP: 120/84   Pulse: 80   Resp: 16   Temp:

## 2022-04-05 NOTE — DISCHARGE SUMMARY
Jesse Ramírez - Oncology ([facility])  Hematology/Oncology  Discharge Summary      Patient Name: Joseluis Garner  MRN: [id number]  Admission Date: 4/4/2022  Hospital Length of Stay: 0 days  Discharge Date and Time:  04/05/2022 4:49 PM  Attending Physician: Gwyn Navarro MD   Discharging Provider: Sammi Brown MD  Primary Care Provider: Primary Doctor No    HPI: 49 yo M with PMHx of malignant melanoma with metastases to right axillary lymph nodes, bilateral lungs, and liver with unknown primary lesion and seizure disorder presenting with ascites associated with severe abdominal pain. Patient stated he needed paracentesis multiple times with the last one was on 4/1/22 and had 6L out. Pt [place] [facility] [place] would like to establish care at [facility] instead. He stated [place] [facility] [place] and did not receive any pain meds despite being told to. He was supposed to be on a pain regimen of dilaudid, oxycodone and fentanyl patches. He had a paracentesis app[place] today [place] [facility] but was refused because the appt was not found. He stated was told that his immunotherapy would be switched but was not told when or which medications he would be switched to. Patient reported worsening ascites and significant abdominal pain associated with nausea and vomiting. He also complained about constipation. He denied fevers, chills, erythema, hematuria, hematemesis, chest pain, SOB, unilateral weakness, or numbness.     At ED, his VSS, WBC 17.91, Na 128, K 5.4, Cr 1.6 (baseline ~0.8), bedside abdominal u/s showed intraperitoneal fluid. He received IVF, morphine, zofran and ativan at ED.      * No surgery found *     Hospital Course: U/s abdomen showed ascites and potential cirrhosis. IR paracentesis performed on 4/5, fluid study suggested SBP. Pt's medically stable to be discharged with PO abx, oncology and IR follow up.      Goals of Care Treatment Preferences:  Code Status: Full  Code      Consults:   Consults (From admission, onward)        Status Ordering Provider     Inpatient consult to Hematology/Oncology  Once        Provider:  (Not yet assigned)    Completed CONRADO LOCKE          Significant Diagnostic Studies: Labs: All labs within the past 24 hours have been reviewed    Pending Diagnostic Studies:     Procedure Component Value Units Date/Time    Cytology, Fluid/Wash/Brush [[id number]] Collected: 04/05/22 0932    Order Status: Sent Lab Status: In process Updated: 04/05/22 0933        Final Active Diagnoses:    Diagnosis Date Noted POA    PRINCIPAL PROBLEM:  Ascites [R18.8] 04/04/2022 Unknown    Metastatic melanoma [C79.9] 04/04/2022 Unknown    Hyponatremia [E87.1] 04/04/2022 Unknown    PAULA (acute kidney injury) [N17.9] 04/04/2022 Unknown    History of seizure [Z87.898] 02/18/2021 Not Applicable      Problems Resolved During this Admission:      Discharged Condition: stable    Disposition: Home or Self Care    Follow Up:    Patient Instructions:      IR Paracentesis without imaging   Standing Status: Future Standing Exp. Date: 04/05/23   Scheduling Instructions: Please schedule in 1 week.     Order Specific Question Answer Comments   Has the patient had a prior contrast or iodine reaction? No    Is the patient currently on any blood thinners like Aspirin, Coumadin, or Plavix? No    Is the patient on any Metformin drug, such as Glucophage or Glucovance? No    May the Radiologist modify the order per protocol to meet the clinical needs of the patient? Yes    Release to patient Immediate      Ambulatory referral/consult to Interventional Radiology   Standing Status: Future   Referral Priority: Routine Referral Type: Consultation   Referral Reason: Specialty Services Required   Requested Specialty: Interventional Radiology   Number of Visits Requested: 1     Ambulatory referral/consult to Hematology / Oncology   Standing Status: Future   Referral Priority: Routine Referral Type:  Consultation   Referral Reason: Specialty Services Required   Requested Specialty: Hematology and Oncology   Number of Visits Requested: 1     Medications:  Reconciled Home Medications:      Medication List      START taking these medications    ciprofloxacin HCl 500 MG tablet  Commonly known as: CIPRO  Take 1 tablet (500 mg total) by mouth every 12 (twelve) hours. for 5 days     ondansetron 8 MG Tbdl  Commonly known as: ZOFRAN-ODT  Take 1 tablet (8 mg total) by mouth every 8 (eight) hours as needed.        CONTINUE taking these medications    fentaNYL 25 mcg/hr  Commonly known as: DURAGESIC  Place 1 patch onto the skin once.     * levETIRAcetam 750 MG Tab  Commonly known as: KEPPRA  Take 1 tablet (750 mg total) by mouth every 12 (twelve) hours.     * levETIRAcetam 1000 MG tablet  Commonly known as: KEPPRA  Take 1,000 mg by mouth 2 (two) times a day.     oxyCODONE 15 MG Tab  Commonly known as: ROXICODONE  Take 20 mg by mouth every 6 (six) hours as needed for Pain. Daily amount 80 mg     trametinib 2 mg Tab  Commonly known as: MEKINIST  Take 2 mg by mouth.         * This list has 2 medication(s) that are the same as other medications prescribed for you. Read the directions carefully, and ask your doctor or other care provider to review them with you.                Sammi Brown MD PhD  Hematology/Oncology  [facility] - Oncology ([facility])

## 2022-04-05 NOTE — ASSESSMENT & PLAN NOTE
Likely 2/2 poor perfusion, concerned for HRS    - treat primary problem  - daily CMP  - strict I/O  - renal dosing medications  - avoid nephrotoxic agents  - resolved

## 2022-04-05 NOTE — ASSESSMENT & PLAN NOTE
Likely 2/2 metastatic melanoma. [place] [facility] Last one was on 4/1. At ED, VSS, afebrile, u/s at bedside showed accumulated peritoneal fluid. Total bili, AST/ALT, ALP and PT/INR were all normal. Chance of SBP is low but will r/o    - consulted IR for paracentesis, performed on 4/5  - continue with rocephin  - f/u body fluid study  - f/u CRP and procal  - daily CMP   - pain control

## 2022-04-05 NOTE — PROGRESS NOTES
Jesse Ramírez - Oncology ([facility])  Hematology/Oncology  Progress Note    Patient Name: Joseluis Garner  Admission Date: 4/4/2022  Hospital Length of Stay: 0 days  Code Status: Full Code     Subjective:     HPI:  49 yo M with PMHx of malignant melanoma with metastases to right axillary lymph nodes, bilateral lungs, and liver with unknown primary lesion and seizure disorder presenting with ascites associated with severe abdominal pain. Patient stated he needed paracentesis multiple times with the last one was on 4/1/22 and had 6L out. Pt has been treated for cancer with immunotherapy at [facility] but would like to establish care at [facility] instead. He stated was discharged from [facility] on 4/2/22 and did not receive any pain meds despite being told to. He was supposed to be on a pain regimen of dilaudid, oxycodone and fentanyl patches. He had a paracentesis appt scheduled today with [facility] but was refused because the appt was not found. He stated was told that his immunotherapy would be switched but was not told when or which medications he would be switched to. Patient reported worsening ascites and significant abdominal pain associated with nausea and vomiting. He also complained about constipation. He denied fevers, chills, erythema, hematuria, hematemesis, chest pain, SOB, unilateral weakness, or numbness.     At ED, his VSS, WBC 17.91, Na 128, K 5.4, Cr 1.6 (baseline ~0.8), bedside abdominal u/s showed intraperitoneal fluid. He received IVF, morphine, zofran and ativan at ED.      Interval History:     NAEON. Pt's feeling largely improved this am. Pain under controled. IR paracentesis performed, f/u fluid study. Tolerated food. Started empirical abx due to elevated CRP. Pt desired to switch his oncology service [place] [facility].     Oncology Treatment Plan:   [Could not find a treatment plan. This SmartLink may be configured incorrectly. Contact a  for help.]    Medications:  Continuous Infusions:  Scheduled  Meds:   cefTRIAXone (ROCEPHIN) IVPB  2 g Intravenous Q24H    enoxaparin  40 mg Subcutaneous Daily    levETIRAcetam  1,000 mg Oral BID    morphine  60 mg Oral Q12H     PRN Meds:acetaminophen, dextrose 10%, dextrose 10%, glucagon (human recombinant), glucose, glucose, HYDROmorphone, melatonin, naloxone, ondansetron, oxyCODONE, sodium chloride 0.9%     Review of Systems   Constitutional:  Negative for chills and fever.   HENT:  Negative for trouble swallowing.    Respiratory:  Negative for cough and shortness of breath.    Cardiovascular:  Negative for chest pain.   Gastrointestinal:  Positive for abdominal distention, abdominal pain and constipation. Negative for nausea.   Genitourinary:  Negative for hematuria.   Neurological:  Negative for weakness, numbness and headaches.   Objective:     Vital Signs (Most Recent):  Temp: 97 °F (36.1 °C) (04/05/22 1202)  Pulse: 80 (04/05/22 1202)  Resp: 18 (04/05/22 1202)  BP: 124/89 (04/05/22 1202)  SpO2: (!) 94 % (04/05/22 1202)   Vital Signs (24h Range):  Temp:  [96.5 °F (35.8 °C)-98.3 °F (36.8 °C)] 97 °F (36.1 °C)  Pulse:  [80-97] 80  Resp:  [16-26] 18  SpO2:  [91 %-100 %] 94 %  BP: (119-162)/(79-96) 124/89     Weight: 70.3 kg (155 lb)  Body mass index is 21.02 kg/m².  Body surface area is 1.89 meters squared.      Intake/Output Summary (Last 24 hours) at 4/5/2022 1301  Last data filed at 4/5/2022 0950  Gross per 24 hour   Intake --   Output 5500 ml   Net -5500 ml       Physical Exam  Vitals and nursing note reviewed.   Constitutional:       General: He is not in acute distress.  Eyes:      Extraocular Movements: Extraocular movements intact.      Pupils: Pupils are equal, round, and reactive to light.   Cardiovascular:      Rate and Rhythm: Normal rate and regular rhythm.      Heart sounds: Normal heart sounds.   Pulmonary:      Effort: Pulmonary effort is normal.      Breath sounds: Normal breath sounds.   Abdominal:      General: There is distension.      Tenderness:  There is abdominal tenderness.   Musculoskeletal:         General: No swelling.      Right lower leg: No edema.      Left lower leg: No edema.      Comments: Large hard lymphedema inferior to R axillar    Neurological:      General: No focal deficit present.      Mental Status: He is alert and oriented to person, place, and time.       Significant Labs:   All pertinent labs from the last 24 hours have been reviewed.    Diagnostic Results:  I have reviewed all pertinent imaging results/findings within the past 24 hours.    Assessment/Plan:     * Ascites  Likely 2/2 metastatic melanoma. [place] [facility] Last one was on 4/1. At ED, VSS, afebrile, u/s at bedside showed accumulated peritoneal fluid. Total bili, AST/ALT, ALP and PT/INR were all normal. Chance of SBP is low but will r/o    - consulted IR for paracentesis, performed on 4/5  - continue with rocephin  - f/u body fluid study  - f/u CRP and procal  - daily CMP   - pain control          PAULA (acute kidney injury)  Likely 2/2 poor perfusion, concerned for HRS    - treat primary problem  - daily CMP  - strict I/O  - renal dosing medications  - avoid nephrotoxic agents  - resolved    Hyponatremia  - daily CMP  - IVF as needed      Metastatic melanoma  Malignant melanoma with metastases to right axillary lymph nodes, bilateral lungs, and liver. [place] [facility] [place]. Pt strongly requested [place] [facility]    - will arrange outpatient f[place] [facility]    History of seizure  - continue with home nieves Brown MD PhD  Hematology/Oncology  [facility] - Oncology ([facility])

## 2022-04-05 NOTE — HOSPITAL COURSE
U/s abdomen showed ascites and potential cirrhosis. IR paracentesis performed on 4/5, fluid study suggested SBP. Pt's medically stable to be discharged with PO abx, oncology and IR follow up.

## 2022-04-05 NOTE — PROCEDURES
Radiology Post-Procedure Note    Pre Op Diagnosis: Ascites  Post Op Diagnosis: Same    Procedure: Ultrasound Guided Paracentesis    Procedure performed by: Meme Hope PA-C    Written Informed Consent Obtained: Yes  Specimen Removed: YES clear, yellow  Estimated Blood Loss: Minimal    Findings:   Successful paracentesis.  RLQ.  Albumin administered PRN per protocol.    Patient tolerated procedure well.    Meme Hope PA-C  Interventional Radiology  Clinic [phone]

## 2022-04-05 NOTE — PLAN OF CARE
Patient awake and alert, no distress noted, respirations even and unlabored. Allergies reviewed. Waiting for eval and consent.  Vitals:    04/05/22 0903   BP: (!) 142/91   Pulse: 90   Resp: 18   Temp:

## 2022-04-05 NOTE — H&P
Inpatient Radiology Pre-procedure Note    History of Present Illness:  Joseluis Garner is a 51 y.o. male who presents for ultrasound guided paracentesis.  Admission H&P reviewed.  Past Medical History:   Diagnosis Date    Seizures      No past surgical history on file.    Review of Systems:   As documented in primary team H&P    Home Meds:   Prior to Admission medications    Medication Sig Start Date End Date Taking? Authorizing Provider   levETIRAcetam (KEPPRA) 1000 MG tablet Take 1,000 mg by mouth 2 (two) times a day. 6/28/21  Yes Historical Provider     Scheduled Meds:    cefTRIAXone (ROCEPHIN) IVPB  2 g Intravenous Q24H    enoxaparin  40 mg Subcutaneous Daily    levETIRAcetam  1,000 mg Oral BID    LIDOcaine (PF) 20 mg/mL (2%)        morphine  15 mg Oral Once    morphine  60 mg Oral Q12H     Continuous Infusions:   PRN Meds:acetaminophen, dextrose 10%, dextrose 10%, glucagon (human recombinant), glucose, glucose, HYDROmorphone, melatonin, naloxone, ondansetron, oxyCODONE, sodium chloride 0.9%  Anticoagulants/Antiplatelets: no anticoagulation    Allergies: Review of patient's allergies indicates:  No Known Allergies  Sedation Hx: have not been any systemic reactions    Vitals:  Temp: 97.9 °F (36.6 °C) (04/05/22 0735)  Pulse: 90 (04/05/22 0903)  Resp: 18 (04/05/22 0903)  BP: (!) 142/91 (04/05/22 0903)  SpO2: 96 % (04/05/22 0903)     Physical Exam:  ASA: 3  Mallampati: n/a    General: no acute distress  Mental Status: alert and oriented to person, place and time  HEENT: normocephalic, atraumatic  Chest: unlabored breathing  Heart: regular heart rate  Abdomen: nondistended  Extremity: moves all extremities    Plan: ultrasound guided paracentesis  Sedation Plan: local    Meme Hope PA-C  Interventional Radiology  Clinic [phone]

## 2022-04-05 NOTE — SUBJECTIVE & OBJECTIVE
Interval History:     NAEON. Pt's feeling largely improved this am. Pain under controled. IR paracentesis performed, f/u fluid study. Tolerated food. Started empirical abx due to elevated CRP. Pt desired to switch his oncology [place] [facility].     Oncology Treatment Plan:   [Could not find a treatment plan. This SmartLink may be configured incorrectly. Contact a  for help.]    Medications:  Continuous Infusions:  Scheduled Meds:   cefTRIAXone (ROCEPHIN) IVPB  2 g Intravenous Q24H    enoxaparin  40 mg Subcutaneous Daily    levETIRAcetam  1,000 mg Oral BID    morphine  60 mg Oral Q12H     PRN Meds:acetaminophen, dextrose 10%, dextrose 10%, glucagon (human recombinant), glucose, glucose, HYDROmorphone, melatonin, naloxone, ondansetron, oxyCODONE, sodium chloride 0.9%     Review of Systems   Constitutional:  Negative for chills and fever.   HENT:  Negative for trouble swallowing.    Respiratory:  Negative for cough and shortness of breath.    Cardiovascular:  Negative for chest pain.   Gastrointestinal:  Positive for abdominal distention, abdominal pain and constipation. Negative for nausea.   Genitourinary:  Negative for hematuria.   Neurological:  Negative for weakness, numbness and headaches.   Objective:     Vital Signs (Most Recent):  Temp: 97 °F (36.1 °C) (04/05/22 1202)  Pulse: 80 (04/05/22 1202)  Resp: 18 (04/05/22 1202)  BP: 124/89 (04/05/22 1202)  SpO2: (!) 94 % (04/05/22 1202)   Vital Signs (24h Range):  Temp:  [96.5 °F (35.8 °C)-98.3 °F (36.8 °C)] 97 °F (36.1 °C)  Pulse:  [80-97] 80  Resp:  [16-26] 18  SpO2:  [91 %-100 %] 94 %  BP: (119-162)/(79-96) 124/89     Weight: 70.3 kg (155 lb)  Body mass index is 21.02 kg/m².  Body surface area is 1.89 meters squared.      Intake/Output Summary (Last 24 hours) at 4/5/2022 1301  Last data filed at 4/5/2022 0950  Gross per 24 hour   Intake --   Output 5500 ml   Net -5500 ml       Physical Exam  Vitals and nursing note reviewed.   Constitutional:        General: He is not in acute distress.  Eyes:      Extraocular Movements: Extraocular movements intact.      Pupils: Pupils are equal, round, and reactive to light.   Cardiovascular:      Rate and Rhythm: Normal rate and regular rhythm.      Heart sounds: Normal heart sounds.   Pulmonary:      Effort: Pulmonary effort is normal.      Breath sounds: Normal breath sounds.   Abdominal:      General: There is distension.      Tenderness: There is abdominal tenderness.   Musculoskeletal:         General: No swelling.      Right lower leg: No edema.      Left lower leg: No edema.      Comments: Large hard lymphedema inferior to R axillar    Neurological:      General: No focal deficit present.      Mental Status: He is alert and oriented to person, place, and time.       Significant Labs:   All pertinent labs from the last 24 hours have been reviewed.    Diagnostic Results:  I have reviewed all pertinent imaging results/findings within the past 24 hours.

## 2022-04-05 NOTE — PLAN OF CARE
Patient is alert and oriented x 4 with call light and personal belongings within reach. Patient has been educated to call for assistance with ADL's if needed. Patient's pain level is a 10 and MD is adjusting but due to history may take awhile to get under control.     Patient went to IR today for Paracentesis and they pulled off 5L.    Attending's Plan:   Ascites   - consulted IR for paracentesis, performed on 4/5  - continue with rocephin  - f/u body fluid study  - f/u CRP and procal  - daily CMP   - pain control    ELIZ:  TBD

## 2022-04-05 NOTE — ASSESSMENT & PLAN NOTE
Malignant melanoma with metastases to right axillary lymph nodes, bilateral lungs, and liver. Has [place] up at [facility] [place]. Pt strongly requested switch oncology service to [facility].    - will arrange outpatient f/u with [facility]

## 2022-04-05 NOTE — NURSING
Patient arrived to unit AOX4, mother at the bedside. complaining of pain requesting pain med. All vital signs signs. Patient oriented to the unit bed and call light. Bed locked and in low position. Initial assessment completed. All safety measures in  Place. Medicated for pain. Instructed patient to call for help as needed. Will continue to monitor.

## 2022-04-06 DIAGNOSIS — C43.9 METASTATIC MELANOMA: Primary | ICD-10-CM

## 2022-04-06 DIAGNOSIS — E03.9 HYPOTHYROIDISM, UNSPECIFIED TYPE: ICD-10-CM

## 2022-04-06 LAB — PATH INTERP FLD-IMP: NORMAL

## 2022-04-06 NOTE — NURSING
I have went over AVS with patient and mom at bedside. Patient is being discharged home. Awaiting transport to front door.

## 2022-04-06 NOTE — PLAN OF CARE
START ON PATHWAY REGIMEN - Melanoma and Other Skin Cancers    [id number]        Nivolumab (Opdivo)       Ipilimumab (Yervoy)       Nivolumab (Opdivo)           Additional Orders: Serious immune-mediated adverse events can occur with   ipilimumab and nivolumab. Please monitor your patient and refer to the linked   immune-mediated adverse reaction management materials for more information.    **Always confirm dose/schedule in your pharmacy ordering system**    Patient Characteristics:  Melanoma, Cutaneous/Unknown Primary, Distant Metastases or Unresectable Local   Recurrence, Unresectable, Symptomatic, First Line, BRAF V600 Activating Mutation   Positive  Disease Classification: Melanoma  Disease Subtype: Cutaneous  BRAF V600 Mutation Status: BRAF V600 Activating Mutation Positive  Therapeutic Status: Distant Metastases  Metastatic Disease Type: Symptomatic  Line of Therapy: First Line  Intent of Therapy:  Non-Curative / Palliative Intent, Discussed with Patient

## 2022-04-06 NOTE — DISCHARGE INSTRUCTIONS
Please go to all follow up appointments.   Please follow up with Primary Care Physician in 1 to 2 weeks.

## 2022-04-07 ENCOUNTER — TELEPHONE (OUTPATIENT)
Dept: HEMATOLOGY/ONCOLOGY | Facility: CLINIC | Age: 52
End: 2022-04-07
Payer: MEDICAID

## 2022-04-07 NOTE — TELEPHONE ENCOUNTER
----- Message from Clayton Wise MD sent at 4/6/2022  9:44 PM CDT -----  Thanks so much Adria.  Just give him follow up with me for C3 3 weeks after.    ----- Message -----  From: Adria Rivera MD  Sent: 4/6/2022   8:50 PM CDT  To: Kitty Segura NP, Clayton Wise MD, #    Sure thing, I can see him next Thursday.     ----- Message -----  From: Clayton Wise MD  Sent: 4/6/2022   4:12 PM CDT  To: Kitty Segura NP, Chalo Simms MD, #    I think [facility] has Miguel, so that would work well.    Adria, if this patient's ipi nivo gets approved before your clinic next Thursday, could you see him and consent?  I can take over for subsequent visits.  He has stage IV BRAF mutated melanoma s/p 1 cycle of Ipi/Nivo 3/2.  He is late for next cycle, so I wanted to get him C2 ASAP.  Would be good if you see him as new consult to make sure everything [facility] was doing makes sense.    Thanks  Herberth      ----- Message -----  From: Marquita Hernandez, RN  Sent: 4/6/2022   3:26 PM CDT  To: Kitty Segura NP, Clayton Wise MD, #    Fellow options next week are [facility] on Wed or Miguel on Thurs    ----- Message -----  From: Clayton Wise MD  Sent: 4/6/2022   3:12 PM CDT  To: Kitty Segura NP, Marquita Hernandez, RN, #    Yea Monday morning 4/18 has a few new patient slots.      The problem is he had cycle 1 of ipi nivo 3/2 at [facility], so was due a week ago.  He wants to change care to [facility].  If it gets approved earlier, we should see him for consent and give it ASAP.  Should be scheduled with a fellow for that first visit if it's approved quickly.    ----- Message -----  From: Marquita Hernandez RN  Sent: 4/6/2022   2:16 PM CDT  To: Kitty Segura NP, Clayton Wise MD, #    Is it okay to schedule in [facility] time? You have availability sooner there.  ----- Message -----  From: Clayton Wise MD  Sent: 4/6/2022   1:55 PM CDT  To: Kitty Segura NP, Marquita Hernandez RN, #    Ayesha,    Please let me know about  below.  I will place treatment plan to start auth.    Herberth    ----- Message -----  From: Clayton Wise MD  Sent: 4/6/2022   1:36 PM CDT  To: Marquita Hernandez, RN, Gwyn Navarro MD, #    If he tolerated Ipi Nivo ok, I would finish out 4 cycles of induction.  Could you all enter ipi nivo plan and complete cycle 1 to start auth here?  Once approved, we should get him into see Kitty or a fellow for consent and treatment if I do not have availability.    ----- Message -----  From: Gwyn Navarro MD  Sent: 4/6/2022   1:02 PM CDT  To: Clayton Wise MD, Marquita Hernandez, RN, #    He had one dose 3/2 or 3/3  BRAF V600E mutation positive  -E  ----- Message -----  From: Clayton Wise MD  Sent: 4/6/2022   9:30 AM CDT  To: Marquita Hernandez, RN, Gwyn Navarro MD, #    We can put him in next available.  I am out a lot the next few weeks, so if he needs to be seen before late April, could a fellow see him in clinic for first inpatient follow up visit?    When was his last ipi nivo?  Any side effects?  What is BRAF status?    Herberth    ----- Message -----  From: Lopez Ríos MD  Sent: 4/5/2022   5:31 PM CDT  To: Clayton Wise MD, Gwyn Navarro MD    Hello Dr. Wise,       Hope you are doing well    Mr. Garner was hospitalized at our unit for metastatic melanoma with malignant ascites was receiving his care at [facility] but wants to transfer to [facility]. He had one infusion of ipi/nivo in march 2022. Would you be able to see him in your clinic soon?     Thank you     Best,  Lopez Ríos MD  Hematology and Medical Oncology fellow

## 2022-04-08 LAB — BACTERIA SPEC AEROBE CULT: NO GROWTH

## 2022-04-10 LAB
BACTERIA BLD CULT: NORMAL
BACTERIA BLD CULT: NORMAL

## 2022-04-11 LAB
FINAL PATHOLOGIC DIAGNOSIS: ABNORMAL
Lab: ABNORMAL

## 2022-04-12 ENCOUNTER — TELEPHONE (OUTPATIENT)
Dept: HEMATOLOGY/ONCOLOGY | Facility: CLINIC | Age: 52
End: 2022-04-12
Payer: MEDICAID

## 2022-04-12 DIAGNOSIS — C43.9 METASTATIC MELANOMA: Primary | ICD-10-CM

## 2022-04-12 LAB — BACTERIA SPEC ANAEROBE CULT: NORMAL

## 2022-04-12 NOTE — PROGRESS NOTES
MEDICAL ONCOLOGY - NEW PATIENT VISIT    Reason for visit:  metastatic melanoma to the lungs, axillary lymph nodes, liver, peritoneal carcinomatosis.     Best Contact Phone Number(s): [phone] (home)      Cancer/Stage/TNM:   Cancer Staging  Metastatic melanoma  Staging form: Melanoma of the Skin, AJCC 8th Edition  - Clinical stage from 1/28/2022: Stage IV (cTX, cNX, cM1) - Signed by Adria Rivera MD on 4/12/2022       Oncology History   Metastatic melanoma   1/28/2022 Cancer Staged    Staging form: Melanoma of the Skin, AJCC 8th Edition  - Clinical stage from 1/28/2022: Stage IV (cTX, cNX, cM1)     4/4/2022 Initial Diagnosis    Metastatic melanoma     4/6/2022 -  Chemotherapy    Treatment Summary   Plan Name: OP NIVOLUMAB 1 MG/KG IPILIMUMAB 3MG/KG FOLLOWED BY NIVOLUMAB 480MG Q4W  Treatment Goal: Control  Status: Active  Start Date: 4/6/2022 (Planned)  End Date: 3/8/2023 (Planned)  Provider: Clayton Wise MD  Chemotherapy: ipilimumab (YERVOY) 3 mg/kg = 211 mg in sodium chloride 0.9% 100 mL chemo infusion, 3 mg/kg, Intravenous, Clinic/HOD 1 time, 0 of 4 cycles  nivolumab 70 mg in sodium chloride 0.9% 107 mL infusion, 1 mg/kg, Intravenous, Clinic/HOD 1 time, 0 of 14 cycles          HPI:   51 y.o. male with history of CAD s/p PCI, seizures, ?substance abuse, stage IV malignant melanoma who presents today to establish care at [facility].   He presented initially with enlarging right axillary lymph nodes that have grown progressively in size since April 2021. He presented to urgent care and was referred for US which showed multiple enlarged masses in the right axilla most likely reflecting abnormal lymph nodes concerning for malignancy.   He was subsequently referred to dermatology who performed a complete exam of the skin that was negative for suspicious cutaneous lesions as well as punch biopsy of one of the lymph node on 01/04/22. Pathology came back as malignant melanoma.   BRAF mutation: positive  for a V600E mutation.   He subsequently underwent a PET/CT on 01/28/22 which showed multiple hypermetabolic soft tissue masses within the right axillary region, metastatic lesions to the lungs and abdomen (VI segment of the liver measuring a max SUV of 6.99, 1.6 x 1.9 cm nodular soft tissue density with a max SUV  7.64 was seen adjacent to the colon within the right lower quadrant. Brain MRI was negative for metastatic disease.  LDH was 307 U/L  He saw Oncology at [facility] on 01/31/22 with plan to start First line Nivolumab / ipilimumab. He received C1 on 03/03/22.     He was admitted on 03/18 at [facility] for abdominal pain, and distension. He underwent therapeutic paracentesis with removal of 3L, and was discharged on 03/22. CT abdomen during that admission showed soft tissue attenuation throughout the peritoneum consistent with innumerable peritoneal implants.     He was readmitted the following day 03/23 for re accumulation and had another paracentesis with removal of 4.7L.  Unfortunately, he had rapid re accumulation of his ascites and was readmitted again between 03/30 and 04/02 and underwent paracentesis. He was planned to receive pleur-X catheter as outpatient.     He presented again to [facility] on 04/04 with worsening abdominal distension and pain. He underwent therapeutic paracentesis with removal of 5L. Cytology was positive for malignant cells. He was discharged on 04/05 on Cipro for SBP coverage.       Interval history:   Since his discharge, he notes worsening abdominal distension. He had progressive nausea and vomiting and has not been able to tolerate any diet. He notes losing about 35 lgs in the last 2 months. He uses zofran for nausea which is not helping.   He is seen today with his mother. He appears uncomfortable, and had 2 episodes of retching and vomiting in clinic. He denies recent fever, or chills.       History has been obtained by chart review and discussion with the patient.    ROS:   Review of Systems    Constitutional: Positive for malaise/fatigue and weight loss. Negative for chills, diaphoresis and fever.   HENT: Negative.    Eyes: Negative.    Respiratory: Negative for cough and shortness of breath.    Cardiovascular: Negative for chest pain and leg swelling.   Gastrointestinal: Positive for abdominal pain, nausea and vomiting.   Genitourinary: Negative.    Musculoskeletal: Negative.    Skin: Negative for itching and rash.   Neurological: Negative.    Endo/Heme/Allergies: Negative.    Psychiatric/Behavioral: Negative.        Past Medical History:   Past Medical History:   Diagnosis Date    Seizures         Past Surgical History:   History reviewed. No pertinent surgical history.     Family History:   History reviewed. No pertinent family history.     Social History:   Social History     Tobacco Use    Smoking status: Current Every Day Smoker    Smokeless tobacco: Never Used   Substance Use Topics    Alcohol use: Yes        I have reviewed and updated the patient's past medical, surgical, family and social histories.    Allergies:   Review of patient's allergies indicates:  No Known Allergies     Medications:   Current Outpatient Medications   Medication Sig Dispense Refill    fentaNYL (DURAGESIC) 25 mcg/hr Place 1 patch onto the skin once.      levETIRAcetam (KEPPRA) 1000 MG tablet Take 1,000 mg by mouth 2 (two) times a day.      ondansetron (ZOFRAN-ODT) 8 MG TbDL Dissolve 1 tablet (8 mg total) by mouth every 8 (eight) hours as needed. 20 tablet 1    oxyCODONE (ROXICODONE) 20 mg Tab immediate release tablet Take 20 mg by mouth every 6 (six) hours as needed.      trametinib (MEKINIST) 2 mg Tab Take 2 mg by mouth.       No current facility-administered medications for this visit.        Physical Exam:   BP (!) 175/119 (BP Location: Right arm, Patient Position: Sitting, BP Method: Medium (Automatic))   Pulse 107   Resp (!) 22   Ht 6' (1.829 m)   Wt 72.2 kg (159 lb 2.8 oz)   SpO2 98%   BMI 21.59  kg/m²      ECOG Performance status: 2            Physical Exam  Constitutional:       General: He is not in acute distress.     Appearance: He is underweight. He is ill-appearing.   HENT:      Head: Normocephalic and atraumatic.   Cardiovascular:      Rate and Rhythm: Normal rate.      Heart sounds: No murmur heard.    No gallop.   Pulmonary:      Effort: No respiratory distress.      Breath sounds: No wheezing or rales.   Chest:   Breasts:      Right: Axillary adenopathy (2 Large masses in the R axilla ) present.       Abdominal:      General: There is distension.      Tenderness: There is no abdominal tenderness.       Musculoskeletal:      Right lower leg: No edema.      Left lower leg: No edema.   Lymphadenopathy:      Upper Body:      Right upper body: Axillary adenopathy (2 Large masses in the R axilla ) present.   Skin:     Coloration: Skin is pale.           Labs:   Recent Results (from the past 48 hour(s))   TSH    Collection Time: 04/14/22  9:00 AM   Result Value Ref Range    TSH 8.834 (H) 0.400 - 4.000 uIU/mL   CBC auto differential    Collection Time: 04/14/22  9:00 AM   Result Value Ref Range    WBC 19.30 (H) 3.90 - 12.70 K/uL    RBC 5.79 4.60 - 6.20 M/uL    Hemoglobin 15.1 14.0 - 18.0 g/dL    Hematocrit 46.5 40.0 - 54.0 %    MCV 80 (L) 82 - 98 fL    MCH 26.1 (L) 27.0 - 31.0 pg    MCHC 32.5 32.0 - 36.0 g/dL    RDW 14.1 11.5 - 14.5 %    Platelets 619 (H) 150 - 450 K/uL    MPV 9.4 9.2 - 12.9 fL    Immature Granulocytes 0.6 (H) 0.0 - 0.5 %    Gran # (ANC) 16.6 (H) 1.8 - 7.7 K/uL    Immature Grans (Abs) 0.11 (H) 0.00 - 0.04 K/uL    Lymph # 1.2 1.0 - 4.8 K/uL    Mono # 1.2 (H) 0.3 - 1.0 K/uL    Eos # 0.1 0.0 - 0.5 K/uL    Baso # 0.11 0.00 - 0.20 K/uL    nRBC 0 0 /100 WBC    Gran % 86.0 (H) 38.0 - 73.0 %    Lymph % 6.1 (L) 18.0 - 48.0 %    Mono % 6.4 4.0 - 15.0 %    Eosinophil % 0.3 0.0 - 8.0 %    Basophil % 0.6 0.0 - 1.9 %    Differential Method Automated    CMP    Collection Time: 04/14/22  9:00 AM   Result  Value Ref Range    Sodium 123 (L) 136 - 145 mmol/L    Potassium 5.9 (H) 3.5 - 5.1 mmol/L    Chloride 88 (L) 95 - 110 mmol/L    CO2 24 23 - 29 mmol/L    Glucose 122 (H) 70 - 110 mg/dL    BUN 28 (H) 6 - 20 mg/dL    Creatinine 2.0 (H) 0.5 - 1.4 mg/dL    Calcium 9.5 8.7 - 10.5 mg/dL    Total Protein 6.2 6.0 - 8.4 g/dL    Albumin 2.6 (L) 3.5 - 5.2 g/dL    Total Bilirubin 0.5 0.1 - 1.0 mg/dL    Alkaline Phosphatase 77 55 - 135 U/L    AST 22 10 - 40 U/L    ALT 8 (L) 10 - 44 U/L    Anion Gap 11 8 - 16 mmol/L    eGFR if African American 43.1 (A) >60 mL/min/1.73 m^2    eGFR if non  37.3 (A) >60 mL/min/1.73 m^2   FREE T4    Collection Time: 04/14/22  9:00 AM   Result Value Ref Range    Free T4 0.87 0.71 - 1.51 ng/dL        Imaging:    CT abdomen pelvis 03/18/2022  FINDINGS:   01. LIVER: There is a 2.1 x 1.5 cm hypoattenuating lesion in the anterior left hepatic lobe (series 201, image 70). Portal vein is patent.   02. SPLEEN: Normal.   03. PANCREAS: Normal.   04. BILIARY TREE: The gallbladder is distended, otherwise within normal limits. Biliary tree is not dilated.   05. ADRENALS: Normal.   06. KIDNEYS: The kidneys enhance symmetrically. No evidence of calcification, hydronephrosis or solid renal mass.   07. LYMPHADENOPATHY/RETROPERITONEUM:The aorta is normal caliber with scattered atherosclerotic changes including major side branches. There are multiple nonenlarged retroperitoneal lymph nodes, for reference a para-aortic node is seen (series 201, image 61) which measures 0.7 cm in short axis dimension.   08. BOWEL: No bowel related abnormalities.   09. PELVIC VISCERA: Normal CT appearance of the pelvic viscera.   10. PELVIC LYMPH NODES: No lymphadenopathy.   11. PERITONEUM/ABDOMINAL WALL: There is soft tissue attenuation throughout the peritoneum consistent with innumerable peritoneal implants. Large volume ascites. There are multiple large heterogeneous enhancing masses within the right lateral chest wall  soft tissues. The largest measures 4.3 x 5.7 centimeters) series 201, image 1).   12. SKELETAL: No aggressive appearing lytic/blastic lesions. No acute osseous abnormality. Moderate degenerative changes of the thoracolumbar spine.   13. LUNG BASES: There is a 2.7 cm juxtapleural nodular density in the posterolateral right lower lobe (series 201, image 3). There is a 2.9 x 2.1 cm juxtapleural soft tissue mass within the posterior right lower lobe (series 201, image 17) as well as a 1.9 x 1.9 cm nodule within the anterior middle lobe (series 201, image 25). Multiple smaller nodular opacities are seen about the left lower lobe. There is a 2.0 x 1.8 cm juxtapleural nodule in the posterior left lower lobe (series 201, image 35). There is trace dependent subsegmental atelectasis. Heart size is normal. Trace pericardial effusion. A stent is visualized within the LAD.            Path:   Skin, right axilla, biopsy:  -  Malignant melanoma.     Note:  The tumor measures approximately 3 x 4 mm and involves the peripheral margins of the specimen. Given no visualized connection to the overlying epidermis, a metastasis or satellitosis of a larger nearby melanoma is a consideration. Clear cell sarcoma is a differential diagnosis.     Microscopic Description:  Skin with a poorly circumscribed dermal nodule of atypical melanocytes. The tumor cells are hyperchromatic with a high nuclear to cytoplasm ratio. The tumor cells are strongly positive for Sox10 stain. CD43 demonstrates scattered tumor infiltrating lymphocytes, but does not stain tumor cells. No lymphovascular invasion is seen with CD31 and D2-40 stains. All controls are adequate.     A PHH3 stain is pending with addendum to follow.     Mitotic counts:  3/mm2.  Ulceration:   Not identified.  Regression:  Not identified.  Tumor infiltrating lymphocytes:  Mild.  Angiolymphatic invasion:  Not identified.  Associated nevus:  Not identified.  Predominant cytology:  Epithelioid  cell.  Margin involvement:  Involved.    Assessment:       1. Metastatic melanoma    2. Malignant ascites    3. Cancer associated pain    4. History of seizure    5. ACP (advance care planning)          Plan:     # Metastatic melanoma to the lungs, liver, and peritoneum   51 year-old-male patient with metastatic melanoma to the axillary LNs, lungs, liver, and peritoneum, BRAF V600E mutant, who is status post C1 of first line immunotherapy with Nivolumab+Ipilimumab on 03/03. Unfortunately, since then he has developed peritoneal carcinomatosis leading to recurrent accumulation of malignant ascites. Over the past month, he required admission 4 times to the hospital for therapeutic paracentesis.   He is seen in clinic today. We have discussed the nature of his cancer, our treatment goals which are palliative in nature and to prolong his life.     He is very symptomatic today with worsening abdominal distension and notable worsening R axillary swelling. He cannot keep anything down, with persistent nausea and vomiting; likely mechanical from the distension.     With his rapidly progressive disease, we recommend switching him to targeted therapy with Encorafinib and Binimitinib. Samples and handouts were given to them today.     Labs reviewed from today; and are significant of leucocytosis with reactive thrombocytosis. PAULA, hyponatremia, hyperkalemia, and hypoalbuminemia     He will be sent to ER today and expect hospital admission to control his symptoms and get urgent paracentesis. Needs work-up also to exclude an infectious source.    ER staff and inpatient team made aware.     He will not start treatment until he is discharged from hospital.   He will follow-up with Dr. Wise after discharge     # Malignant ascites   - Abdomen is distended today on exam   - Red swollen nodule on the LLQ from previous paracentesis ?abscess. Recommend US of that area. Might require drainage   - Will need urgent paracentesis       #  Cancer associated pain   - Current pain regimen is;   Fentanyl patch 25 mcg/hr every 72 hours   Oxycodone (doesn't know the dose)   - Consult palliative care for pain and symptom management     # Seizures   - On Keppra 750 mg daily     #intactable nausea and vomiting   - Likely mechanical from abdominal distension   - only uses zofran at home.   - reassess after paracentesis, and adjust nausea medicine accordingly         The above was discussed and staffed with      Follow up: with Dr. Wise after discharge     The above information has been reviewed with the patient and all questions have been answered to their apparent satisfaction.  They understand that they can call the clinic with any questions.    Adria Rivera, PGY V  Hematology/Oncology  [facility]

## 2022-04-14 ENCOUNTER — OFFICE VISIT (OUTPATIENT)
Dept: HEMATOLOGY/ONCOLOGY | Facility: CLINIC | Age: 52
DRG: 375 | End: 2022-04-14
Payer: MEDICAID

## 2022-04-14 ENCOUNTER — HOSPITAL ENCOUNTER (INPATIENT)
Facility: HOSPITAL | Age: 52
LOS: 10 days | Discharge: HOME | DRG: 375 | End: 2022-04-24
Attending: EMERGENCY MEDICINE | Admitting: INTERNAL MEDICINE
Payer: MEDICAID

## 2022-04-14 VITALS
HEIGHT: 72 IN | OXYGEN SATURATION: 98 % | BODY MASS INDEX: 21.56 KG/M2 | WEIGHT: 159.19 LBS | SYSTOLIC BLOOD PRESSURE: 175 MMHG | HEART RATE: 107 BPM | RESPIRATION RATE: 22 BRPM | DIASTOLIC BLOOD PRESSURE: 119 MMHG

## 2022-04-14 DIAGNOSIS — G89.3 CANCER ASSOCIATED PAIN: ICD-10-CM

## 2022-04-14 DIAGNOSIS — C43.9 METASTATIC MELANOMA: Primary | ICD-10-CM

## 2022-04-14 DIAGNOSIS — R18.8 ASCITES: ICD-10-CM

## 2022-04-14 DIAGNOSIS — R18.0 MALIGNANT ASCITES: ICD-10-CM

## 2022-04-14 DIAGNOSIS — R94.31 QT PROLONGATION: ICD-10-CM

## 2022-04-14 DIAGNOSIS — Z87.898 HISTORY OF SEIZURE: ICD-10-CM

## 2022-04-14 DIAGNOSIS — R07.9 CHEST PAIN: ICD-10-CM

## 2022-04-14 DIAGNOSIS — E87.5 HYPERKALEMIA: ICD-10-CM

## 2022-04-14 DIAGNOSIS — Z71.89 ACP (ADVANCE CARE PLANNING): ICD-10-CM

## 2022-04-14 DIAGNOSIS — N17.9 AKI (ACUTE KIDNEY INJURY): ICD-10-CM

## 2022-04-14 PROBLEM — R11.2 INTRACTABLE NAUSEA AND VOMITING: Status: ACTIVE | Noted: 2022-04-14

## 2022-04-14 LAB
ALBUMIN FLD-MCNC: 2.1 G/DL
ANION GAP SERPL CALC-SCNC: 12 MMOL/L (ref 8–16)
ANION GAP SERPL CALC-SCNC: 8 MMOL/L (ref 8–16)
APPEARANCE FLD: CLEAR
BODY FLD TYPE: ABNORMAL
BODY FLUID SOURCE, LDH: NORMAL
BUN BLD-MCNC: 39 MG/DL (ref 4–21)
BUN SERPL-MCNC: 26 MG/DL (ref 6–20)
BUN SERPL-MCNC: 29 MG/DL (ref 6–20)
CALCIUM BLD-MCNC: 1.02 MMOL/L
CALCIUM BLD-MCNC: 1.15 MMOL/L
CALCIUM SERPL-MCNC: 8.5 MG/DL (ref 8.7–10.5)
CALCIUM SERPL-MCNC: 9.2 MG/DL (ref 8.7–10.5)
CHLORIDE SERPL-SCNC: 86 MMOL/L (ref 95–110)
CHLORIDE SERPL-SCNC: 87 MMOL/L (ref 95–110)
CHLORIDE SERPL-SCNC: 87 MMOL/L (ref 99–108)
CHLORIDE: 89 MMOL/L
CO2 SERPL-SCNC: 25 MMOL/L (ref 23–29)
CO2 SERPL-SCNC: 27 MMOL/L (ref 23–29)
CO2 TOTAL: 27
CO2 TOTAL: 29
COLOR FLD: YELLOW
CREAT SERPL-MCNC: 1.8 MG/DL (ref 0.5–1.4)
CREAT SERPL-MCNC: 1.9 MG/DL (ref 0.5–1.4)
CREATININE: 2.2 MG/DL
CREATININE: 2.3 MG/DL
CRP SERPL-MCNC: 148.2 MG/L (ref 0–8.2)
CTP QC/QA: YES
ERYTHROCYTE [SEDIMENTATION RATE] IN BLOOD BY WESTERGREN METHOD: 28 MM/HR (ref 0–23)
EST. GFR  (AFRICAN AMERICAN): 45.8 ML/MIN/1.73 M^2
EST. GFR  (AFRICAN AMERICAN): 48.9 ML/MIN/1.73 M^2
EST. GFR  (NON AFRICAN AMERICAN): 39.7 ML/MIN/1.73 M^2
EST. GFR  (NON AFRICAN AMERICAN): 42.3 ML/MIN/1.73 M^2
GLUCOSE SERPL-MCNC: 143 MG/DL (ref 70–110)
GLUCOSE SERPL-MCNC: 80 MG/DL (ref 70–110)
GLUCOSE: 126 MG/DL
GLUCOSE: 151 MG/DL
GRAM STN SPEC: NORMAL
GRAM STN SPEC: NORMAL
HCT VFR BLD AUTO: ABNORMAL %
HCT: 49
LDH FLD L TO P-CCNC: 1033 U/L
LYMPHOCYTES NFR FLD MANUAL: 22 %
MONOS+MACROS NFR FLD MANUAL: 2 %
NEUTROPHILS NFR FLD MANUAL: 42 %
OTHER CELLS FLD MANUAL: 34 %
POCT GLUCOSE: 111 MG/DL (ref 70–110)
POCT GLUCOSE: 145 MG/DL (ref 70–110)
POCT GLUCOSE: 160 MG/DL (ref 70–110)
POTASSIUM SERPL-SCNC: 5.5 MMOL/L (ref 3.4–5.3)
POTASSIUM SERPL-SCNC: 5.5 MMOL/L (ref 3.5–5.1)
POTASSIUM SERPL-SCNC: 5.9 MMOL/L (ref 3.5–5.1)
POTASSIUM SERPL-SCNC: 6.4 MMOL/L (ref 3.4–5.3)
PROCALCITONIN SERPL IA-MCNC: 1.1 NG/ML
PROT FLD-MCNC: 3.6 G/DL
SARS-COV-2 RDRP RESP QL NAA+PROBE: NEGATIVE
SODIUM BLD-SCNC: 121 MMOL/L (ref 137–147)
SODIUM BLD-SCNC: 122 MMOL/L (ref 137–147)
SODIUM SERPL-SCNC: 119 MMOL/L (ref 136–145)
SODIUM SERPL-SCNC: 126 MMOL/L (ref 136–145)
SPECIMEN SOURCE: NORMAL
SPECIMEN SOURCE: NORMAL
UREA NITROGEN (BUN): 38 MG/DL
WBC # FLD: 613 /CU MM

## 2022-04-14 PROCEDURE — 80048 BASIC METABOLIC PNL TOTAL CA: CPT | Performed by: PHYSICIAN ASSISTANT

## 2022-04-14 PROCEDURE — 96375 TX/PRO/DX INJ NEW DRUG ADDON: CPT

## 2022-04-14 PROCEDURE — 63600175 PHARM REV CODE 636 W HCPCS: Performed by: PHYSICIAN ASSISTANT

## 2022-04-14 PROCEDURE — 3080F DIAST BP >= 90 MM HG: CPT | Mod: CPTII,,, | Performed by: STUDENT IN AN ORGANIZED HEALTH CARE EDUCATION/TRAINING PROGRAM

## 2022-04-14 PROCEDURE — 3008F BODY MASS INDEX DOCD: CPT | Mod: CPTII,,, | Performed by: STUDENT IN AN ORGANIZED HEALTH CARE EDUCATION/TRAINING PROGRAM

## 2022-04-14 PROCEDURE — 99291 CRITICAL CARE FIRST HOUR: CPT

## 2022-04-14 PROCEDURE — 96361 HYDRATE IV INFUSION ADD-ON: CPT

## 2022-04-14 PROCEDURE — 86140 C-REACTIVE PROTEIN: CPT | Performed by: PHYSICIAN ASSISTANT

## 2022-04-14 PROCEDURE — 82042 OTHER SOURCE ALBUMIN QUAN EA: CPT | Performed by: PHYSICIAN ASSISTANT

## 2022-04-14 PROCEDURE — 87205 SMEAR GRAM STAIN: CPT | Performed by: PHYSICIAN ASSISTANT

## 2022-04-14 PROCEDURE — 99999 PR PBB SHADOW E&M-EST. PATIENT-LVL III: ICD-10-PCS | Mod: PBBFAC,,, | Performed by: STUDENT IN AN ORGANIZED HEALTH CARE EDUCATION/TRAINING PROGRAM

## 2022-04-14 PROCEDURE — 83615 LACTATE (LD) (LDH) ENZYME: CPT | Performed by: PHYSICIAN ASSISTANT

## 2022-04-14 PROCEDURE — 63600175 PHARM REV CODE 636 W HCPCS: Mod: JG

## 2022-04-14 PROCEDURE — 99999 PR PBB SHADOW E&M-EST. PATIENT-LVL III: CPT | Mod: PBBFAC,,, | Performed by: STUDENT IN AN ORGANIZED HEALTH CARE EDUCATION/TRAINING PROGRAM

## 2022-04-14 PROCEDURE — 87075 CULTR BACTERIA EXCEPT BLOOD: CPT | Performed by: PHYSICIAN ASSISTANT

## 2022-04-14 PROCEDURE — 99223 1ST HOSP IP/OBS HIGH 75: CPT | Mod: ,,, | Performed by: INTERNAL MEDICINE

## 2022-04-14 PROCEDURE — 99223 PR INITIAL HOSPITAL CARE,LEVL III: ICD-10-PCS | Mod: ,,, | Performed by: INTERNAL MEDICINE

## 2022-04-14 PROCEDURE — 80048 BASIC METABOLIC PNL TOTAL CA: CPT | Mod: 91,XB

## 2022-04-14 PROCEDURE — 99291 CRITICAL CARE FIRST HOUR: CPT | Mod: CS,,, | Performed by: PHYSICIAN ASSISTANT

## 2022-04-14 PROCEDURE — P9047 ALBUMIN (HUMAN), 25%, 50ML: HCPCS | Mod: JG

## 2022-04-14 PROCEDURE — 49083 ABD PARACENTESIS W/IMAGING: CPT

## 2022-04-14 PROCEDURE — 25000003 PHARM REV CODE 250: Performed by: EMERGENCY MEDICINE

## 2022-04-14 PROCEDURE — 84157 ASSAY OF PROTEIN OTHER: CPT | Performed by: PHYSICIAN ASSISTANT

## 2022-04-14 PROCEDURE — U0002 COVID-19 LAB TEST NON-CDC: HCPCS | Performed by: PHYSICIAN ASSISTANT

## 2022-04-14 PROCEDURE — 3008F PR BODY MASS INDEX (BMI) DOCUMENTED: ICD-10-PCS | Mod: CPTII,,, | Performed by: STUDENT IN AN ORGANIZED HEALTH CARE EDUCATION/TRAINING PROGRAM

## 2022-04-14 PROCEDURE — 25000003 PHARM REV CODE 250

## 2022-04-14 PROCEDURE — 99291 PR CRITICAL CARE, E/M 30-74 MINUTES: ICD-10-PCS | Mod: CS,,, | Performed by: PHYSICIAN ASSISTANT

## 2022-04-14 PROCEDURE — 80047 BASIC METABLC PNL IONIZED CA: CPT

## 2022-04-14 PROCEDURE — 93005 ELECTROCARDIOGRAM TRACING: CPT

## 2022-04-14 PROCEDURE — 3077F SYST BP >= 140 MM HG: CPT | Mod: CPTII,,, | Performed by: STUDENT IN AN ORGANIZED HEALTH CARE EDUCATION/TRAINING PROGRAM

## 2022-04-14 PROCEDURE — 93010 EKG 12-LEAD: ICD-10-PCS | Mod: ,,, | Performed by: INTERNAL MEDICINE

## 2022-04-14 PROCEDURE — 99215 OFFICE O/P EST HI 40 MIN: CPT | Mod: S$PBB,,, | Performed by: STUDENT IN AN ORGANIZED HEALTH CARE EDUCATION/TRAINING PROGRAM

## 2022-04-14 PROCEDURE — 96374 THER/PROPH/DIAG INJ IV PUSH: CPT

## 2022-04-14 PROCEDURE — 89051 BODY FLUID CELL COUNT: CPT | Performed by: PHYSICIAN ASSISTANT

## 2022-04-14 PROCEDURE — 3077F PR MOST RECENT SYSTOLIC BLOOD PRESSURE >= 140 MM HG: ICD-10-PCS | Mod: CPTII,,, | Performed by: STUDENT IN AN ORGANIZED HEALTH CARE EDUCATION/TRAINING PROGRAM

## 2022-04-14 PROCEDURE — 36415 COLL VENOUS BLD VENIPUNCTURE: CPT

## 2022-04-14 PROCEDURE — 3080F PR MOST RECENT DIASTOLIC BLOOD PRESSURE >= 90 MM HG: ICD-10-PCS | Mod: CPTII,,, | Performed by: STUDENT IN AN ORGANIZED HEALTH CARE EDUCATION/TRAINING PROGRAM

## 2022-04-14 PROCEDURE — 63600175 PHARM REV CODE 636 W HCPCS

## 2022-04-14 PROCEDURE — 20600001 HC STEP DOWN PRIVATE ROOM

## 2022-04-14 PROCEDURE — 87070 CULTURE OTHR SPECIMN AEROBIC: CPT | Performed by: PHYSICIAN ASSISTANT

## 2022-04-14 PROCEDURE — 25000003 PHARM REV CODE 250: Performed by: PHYSICIAN ASSISTANT

## 2022-04-14 PROCEDURE — 93010 ELECTROCARDIOGRAM REPORT: CPT | Mod: ,,, | Performed by: INTERNAL MEDICINE

## 2022-04-14 PROCEDURE — 84145 PROCALCITONIN (PCT): CPT | Performed by: PHYSICIAN ASSISTANT

## 2022-04-14 PROCEDURE — 99213 OFFICE O/P EST LOW 20 MIN: CPT | Mod: PBBFAC,25 | Performed by: STUDENT IN AN ORGANIZED HEALTH CARE EDUCATION/TRAINING PROGRAM

## 2022-04-14 PROCEDURE — 82962 GLUCOSE BLOOD TEST: CPT | Mod: 59

## 2022-04-14 PROCEDURE — 85652 RBC SED RATE AUTOMATED: CPT | Performed by: PHYSICIAN ASSISTANT

## 2022-04-14 PROCEDURE — 99215 PR OFFICE/OUTPT VISIT, EST, LEVL V, 40-54 MIN: ICD-10-PCS | Mod: S$PBB,,, | Performed by: STUDENT IN AN ORGANIZED HEALTH CARE EDUCATION/TRAINING PROGRAM

## 2022-04-14 RX ORDER — IBUPROFEN 200 MG
16 TABLET ORAL
Status: DISCONTINUED | OUTPATIENT
Start: 2022-04-14 | End: 2022-04-24 | Stop reason: HOSPADM

## 2022-04-14 RX ORDER — DROPERIDOL 2.5 MG/ML
1.25 INJECTION, SOLUTION INTRAMUSCULAR; INTRAVENOUS
Status: COMPLETED | OUTPATIENT
Start: 2022-04-14 | End: 2022-04-14

## 2022-04-14 RX ORDER — SODIUM CHLORIDE 9 MG/ML
INJECTION, SOLUTION INTRAVENOUS CONTINUOUS
Status: DISCONTINUED | OUTPATIENT
Start: 2022-04-14 | End: 2022-04-15

## 2022-04-14 RX ORDER — MORPHINE SULFATE 4 MG/ML
8 INJECTION, SOLUTION INTRAMUSCULAR; INTRAVENOUS
Status: DISCONTINUED | OUTPATIENT
Start: 2022-04-14 | End: 2022-04-14

## 2022-04-14 RX ORDER — HEPARIN SODIUM 5000 [USP'U]/ML
5000 INJECTION, SOLUTION INTRAVENOUS; SUBCUTANEOUS EVERY 8 HOURS
Status: DISCONTINUED | OUTPATIENT
Start: 2022-04-14 | End: 2022-04-19

## 2022-04-14 RX ORDER — SODIUM CHLORIDE 0.9 % (FLUSH) 0.9 %
10 SYRINGE (ML) INJECTION EVERY 12 HOURS PRN
Status: DISCONTINUED | OUTPATIENT
Start: 2022-04-14 | End: 2022-04-24 | Stop reason: HOSPADM

## 2022-04-14 RX ORDER — LEVETIRACETAM 500 MG/1
1000 TABLET ORAL 2 TIMES DAILY
Status: DISCONTINUED | OUTPATIENT
Start: 2022-04-14 | End: 2022-04-18

## 2022-04-14 RX ORDER — HYDROMORPHONE HYDROCHLORIDE 1 MG/ML
1 INJECTION, SOLUTION INTRAMUSCULAR; INTRAVENOUS; SUBCUTANEOUS
Status: COMPLETED | OUTPATIENT
Start: 2022-04-14 | End: 2022-04-14

## 2022-04-14 RX ORDER — OXYCODONE HYDROCHLORIDE 20 MG/1
20 TABLET ORAL EVERY 6 HOURS PRN
Status: ON HOLD | COMMUNITY
Start: 2022-04-06 | End: 2022-04-24 | Stop reason: SDUPTHER

## 2022-04-14 RX ORDER — ALBUMIN HUMAN 250 G/1000ML
12.5 SOLUTION INTRAVENOUS ONCE
Status: COMPLETED | OUTPATIENT
Start: 2022-04-14 | End: 2022-04-14

## 2022-04-14 RX ORDER — GLUCAGON 1 MG
1 KIT INJECTION
Status: DISCONTINUED | OUTPATIENT
Start: 2022-04-14 | End: 2022-04-24 | Stop reason: HOSPADM

## 2022-04-14 RX ORDER — NALOXONE HCL 0.4 MG/ML
0.02 VIAL (ML) INJECTION
Status: DISCONTINUED | OUTPATIENT
Start: 2022-04-14 | End: 2022-04-24 | Stop reason: HOSPADM

## 2022-04-14 RX ORDER — PROCHLORPERAZINE EDISYLATE 5 MG/ML
2.5 INJECTION INTRAMUSCULAR; INTRAVENOUS EVERY 6 HOURS PRN
Status: DISCONTINUED | OUTPATIENT
Start: 2022-04-14 | End: 2022-04-16

## 2022-04-14 RX ORDER — IBUPROFEN 200 MG
24 TABLET ORAL
Status: DISCONTINUED | OUTPATIENT
Start: 2022-04-14 | End: 2022-04-24 | Stop reason: HOSPADM

## 2022-04-14 RX ORDER — OXYCODONE HYDROCHLORIDE 10 MG/1
20 TABLET ORAL EVERY 6 HOURS PRN
Status: DISCONTINUED | OUTPATIENT
Start: 2022-04-14 | End: 2022-04-17

## 2022-04-14 RX ORDER — HYDROMORPHONE HYDROCHLORIDE 1 MG/ML
1.5 INJECTION, SOLUTION INTRAMUSCULAR; INTRAVENOUS; SUBCUTANEOUS
Status: DISCONTINUED | OUTPATIENT
Start: 2022-04-14 | End: 2022-04-14

## 2022-04-14 RX ADMIN — INSULIN HUMAN 5 UNITS: 100 INJECTION, SOLUTION PARENTERAL at 01:04

## 2022-04-14 RX ADMIN — HYDROMORPHONE HYDROCHLORIDE 1 MG: 1 INJECTION, SOLUTION INTRAMUSCULAR; INTRAVENOUS; SUBCUTANEOUS at 12:04

## 2022-04-14 RX ADMIN — DEXTROSE 250 ML: 10 SOLUTION INTRAVENOUS at 01:04

## 2022-04-14 RX ADMIN — SODIUM CHLORIDE: 0.9 INJECTION, SOLUTION INTRAVENOUS at 06:04

## 2022-04-14 RX ADMIN — ALBUMIN (HUMAN) 50 G: 25 SOLUTION INTRAVENOUS at 05:04

## 2022-04-14 RX ADMIN — CEFTRIAXONE 2 G: 2 INJECTION, SOLUTION INTRAVENOUS at 07:04

## 2022-04-14 RX ADMIN — SODIUM CHLORIDE 1000 ML: 0.9 INJECTION, SOLUTION INTRAVENOUS at 12:04

## 2022-04-14 RX ADMIN — LEVETIRACETAM 1000 MG: 500 TABLET, FILM COATED ORAL at 09:04

## 2022-04-14 RX ADMIN — DROPERIDOL 1.25 MG: 2.5 INJECTION, SOLUTION INTRAMUSCULAR; INTRAVENOUS at 12:04

## 2022-04-14 RX ADMIN — OXYCODONE HYDROCHLORIDE 20 MG: 10 TABLET ORAL at 06:04

## 2022-04-14 NOTE — H&P
Inpatient Radiology Pre-procedure Note    History of Present Illness:  Joseluis Garner is a 51 y.o. male with history of CAD s/p PCI, seizures, substance abuse, stage IV malignant melanoma, who presents for electrolyte abnormalities and ascites.    Admission H&P reviewed.    Past Medical History:   Diagnosis Date    Seizures      History reviewed. No pertinent surgical history.    Review of Systems:   As documented in primary team H&P    Home Meds:   Prior to Admission medications    Medication Sig Start Date End Date Taking? Authorizing Provider   binimetinib 15 mg Tab Take 45 mg by mouth 2 (two) times daily. 4/14/22   Clayton Wise MD   encorafenib 75 mg Cap Take 450 mg by mouth once daily. 4/14/22   Clayton Wise MD   fentaNYL (DURAGESIC) 25 mcg/hr Place 1 patch onto the skin once.    Historical Provider   levETIRAcetam (KEPPRA) 1000 MG tablet Take 1,000 mg by mouth 2 (two) times a day. 6/28/21   Historical Provider   ondansetron (ZOFRAN-ODT) 8 MG TbDL Dissolve 1 tablet (8 mg total) by mouth every 8 (eight) hours as needed. 4/5/22   Sammi Brown MD   oxyCODONE (ROXICODONE) 20 mg Tab immediate release tablet Take 20 mg by mouth every 6 (six) hours as needed. 4/6/22   Historical Provider   trametinib (MEKINIST) 2 mg Tab Take 2 mg by mouth.  4/14/22  Historical Provider     Scheduled Meds:    albumin human 25%  12.5 g Intravenous Once    cefTRIAXone (ROCEPHIN) IVPB  2 g Intravenous Q24H    heparin (porcine)  5,000 Units Subcutaneous Q8H    levETIRAcetam  1,000 mg Oral BID     Continuous Infusions:    sodium chloride 0.9%       PRN Meds:dextrose 10%, dextrose 10%, dextrose 10%, dextrose 10%, glucagon (human recombinant), glucose, glucose, naloxone, prochlorperazine, promethazine (PHENERGAN) IVPB, sodium chloride 0.9%  Anticoagulants/Antiplatelets: no anticoagulation    Allergies: Review of patient's allergies indicates:  No Known Allergies  Sedation Hx: have not been any systemic reactions    Labs:  No  results for input(s): INR in the last 168 hours.    Invalid input(s):  PT,  PTT    Recent Labs   Lab 04/14/22  0900   WBC 19.30*   HGB 15.1   HCT 46.5   MCV 80*   *      Recent Labs   Lab 04/14/22  0900 04/14/22  1236 04/14/22  1358 04/14/22  1401   *   < > 143* 151   *   < > 119* 122*   K 5.9*   < > 5.5* 5.5*   CL 88*   < > 86* 87*   CO2 24  --  25  --    BUN 28*   < > 29* 39*   CREATININE 2.0*   < > 1.9* 2.2   CALCIUM 9.5  --  8.5*  --    ALT 8*  --   --   --    AST 22  --   --   --    ALBUMIN 2.6*  --   --   --    BILITOT 0.5  --   --   --     < > = values in this interval not displayed.         Vitals:  Temp: 98 °F (36.7 °C) (04/14/22 1601)  Pulse: 78 (04/14/22 1601)  Resp: 20 (04/14/22 1601)  BP: (!) 142/92 (04/14/22 1601)  SpO2: 95 % (04/14/22 1601)     Physical Exam:  ASA: 3  Mallampati: 2    General: no acute distress  Mental Status: alert and oriented to person, place and time  HEENT: normocephalic, atraumatic  Chest: unlabored breathing  Heart: regular heart rate  Abdomen: distended  Extremity: moves all extremities    Plan: US-guided paracentesis  Sedation Plan: local    Rudy Otero MD PGY2  Department of Radiology  [facility]

## 2022-04-14 NOTE — ASSESSMENT & PLAN NOTE
Pt presenting with several days of intractable nausea and vomiting. Pt unable to keep anything, including water, down. Zofran with no improvement at home. Likely 2/2 severe hyponatremia and significant ascites.    -- therapeutic para as above  -- management of hyponatremia as above  -- IV compazine and IV phenergan

## 2022-04-14 NOTE — PLAN OF CARE
Pt received into IR Rm89 for paracentesis. Pt oriented to unit, and order of events. VS obtained. Patient AAOx3, no distress noted, respirations even and unlabored, will continue to monitor. Allergies reviewed. Waiting for eval and consent.  Vitals:    04/14/22 1332   BP:    Pulse: 88   Resp:    Temp:

## 2022-04-14 NOTE — Clinical Note
A pre-sedation assessment was completed by the physician immediately prior to sedation start. No sedation required for procedure. Local anesthetic only.

## 2022-04-14 NOTE — ASSESSMENT & PLAN NOTE
Pt presenting with severe hyponatremia to 119 in ED. Likely Hypervolemic hyponatremia s/o malignant ascites. Na on labs 10 days prior to admission 128. Pt presenting with severe intractable nausea and vomiting. No changes in mental status or seizures. Goal Na by 4/15 5pm 127    -- s/p therapeutic paracentesis on 4/14  --  F/u urine osm, urine Na  --  On NS gtt 75/hr

## 2022-04-14 NOTE — PROCEDURES
Radiology Post-Procedure Note    Pre Op Diagnosis: Ascites  Post Op Diagnosis: Same    Procedure: Ultrasound Guided Paracentesis    Procedure performed by: Rudy Otero MD    Written Informed Consent Obtained: Yes  Specimen Removed: YES elizabeth  Estimated Blood Loss: less than 50     Findings:   Successful US-guided paracentesis.  Albumin administered PRN per protocol.    Patient tolerated procedure well.    Rudy Otero MD PGY2  Department of Radiology  [facility]

## 2022-04-14 NOTE — SUBJECTIVE & OBJECTIVE
Oncology Treatment Plan:   OP NIVOLUMAB 1 MG/KG IPILIMUMAB 3MG/KG FOLLOWED BY NIVOLUMAB 480MG Q4W    Medications:  Continuous Infusions:   sodium chloride 0.9%       Scheduled Meds:   albumin human 25%  12.5 g Intravenous Once    cefTRIAXone (ROCEPHIN) IVPB  2 g Intravenous Q24H    heparin (porcine)  5,000 Units Subcutaneous Q8H    levETIRAcetam  1,000 mg Oral BID     PRN Meds:dextrose 10%, dextrose 10%, dextrose 10%, dextrose 10%, glucagon (human recombinant), glucose, glucose, naloxone, sodium chloride 0.9%     Review of patient's allergies indicates:  No Known Allergies     Past Medical History:   Diagnosis Date    Seizures      History reviewed. No pertinent surgical history.  Family History    None       Tobacco Use    Smoking status: Current Every Day Smoker    Smokeless tobacco: Never Used   Substance and Sexual Activity    Alcohol use: Yes    Drug use: Yes     Types: Methamphetamines, Marijuana    Sexual activity: Not on file       Review of Systems   Constitutional:  Positive for activity change, appetite change, fatigue and unexpected weight change. Negative for chills, diaphoresis and fever.   HENT:  Negative for congestion and sore throat.    Respiratory:  Negative for cough and shortness of breath.    Cardiovascular:  Negative for chest pain and palpitations.   Gastrointestinal:  Positive for abdominal distention, abdominal pain, nausea and vomiting. Negative for blood in stool.   Genitourinary:  Negative for dysuria and frequency.   Musculoskeletal:  Positive for myalgias. Negative for arthralgias.   Skin:  Negative for rash and wound.   Neurological:  Positive for weakness. Negative for dizziness, syncope, light-headedness and numbness.   Psychiatric/Behavioral:  Negative for confusion. The patient is not nervous/anxious.    Objective:     Vital Signs (Most Recent):  Temp: 98 °F (36.7 °C) (04/14/22 1601)  Pulse: 78 (04/14/22 1601)  Resp: 20 (04/14/22 1601)  BP: (!) 142/92 (04/14/22 1601)  SpO2: 95 %  (04/14/22 1601)   Vital Signs (24h Range):  Temp:  [97.5 °F (36.4 °C)-98 °F (36.7 °C)] 98 °F (36.7 °C)  Pulse:  [] 78  Resp:  [16-22] 20  SpO2:  [93 %-99 %] 95 %  BP: (133-175)/() 142/92     Weight: 77.1 kg (170 lb)  Body mass index is 23.06 kg/m².  Body surface area is 1.98 meters squared.      Intake/Output Summary (Last 24 hours) at 4/14/2022 1620  Last data filed at 4/14/2022 1330  Gross per 24 hour   Intake 1248.75 ml   Output --   Net 1248.75 ml       Physical Exam  Vitals and nursing note reviewed.   Constitutional:       General: He is in acute distress.      Appearance: He is normal weight. He is ill-appearing and toxic-appearing. He is not diaphoretic.   HENT:      Head: Normocephalic and atraumatic.      Nose: Nose normal. No congestion or rhinorrhea.      Mouth/Throat:      Mouth: Mucous membranes are dry.      Pharynx: Oropharynx is clear.   Eyes:      Extraocular Movements: Extraocular movements intact.      Pupils: Pupils are equal, round, and reactive to light.   Cardiovascular:      Rate and Rhythm: Normal rate and regular rhythm.      Pulses: Normal pulses.      Heart sounds: Normal heart sounds. No murmur heard.    No friction rub. No gallop.   Pulmonary:      Effort: Pulmonary effort is normal. No respiratory distress.      Breath sounds: Normal breath sounds.   Abdominal:      General: Bowel sounds are decreased. There is distension.      Palpations: There is shifting dullness and fluid wave.      Tenderness: There is abdominal tenderness. There is no guarding or rebound.   Musculoskeletal:         General: No swelling. Normal range of motion.      Cervical back: Normal range of motion and neck supple.   Skin:     General: Skin is warm and dry.   Neurological:      General: No focal deficit present.      Mental Status: He is alert and oriented to person, place, and time.   Psychiatric:         Mood and Affect: Mood normal.         Behavior: Behavior normal.       Significant Labs:    CBC:   Recent Labs   Lab 04/14/22  0900   WBC 19.30*   HGB 15.1   HCT 46.5   *    and CMP:   Recent Labs   Lab 04/14/22  0900 04/14/22  1236 04/14/22  1358 04/14/22  1401   * 121* 119* 122*   K 5.9* 6.4* 5.5* 5.5*   CL 88* 89 86* 87*   CO2 24  --  25  --    * 126 143* 151   BUN 28* 38 29* 39*   CREATININE 2.0* 2.3 1.9* 2.2   CALCIUM 9.5  --  8.5*  --    PROT 6.2  --   --   --    ALBUMIN 2.6*  --   --   --    BILITOT 0.5  --   --   --    ALKPHOS 77  --   --   --    AST 22  --   --   --    ALT 8*  --   --   --    ANIONGAP 11  --  8  --    EGFRNONAA 37.3*  --  39.7*  --        Diagnostic Results:  I have reviewed all pertinent imaging results/findings within the past 24 hours.

## 2022-04-14 NOTE — ED NOTES
Patient identifiers for Joseluis Garner 51 y.o. male checked and correct.  Chief Complaint   Patient presents with    Generalized Body Aches     Metastatic Melanoma patient with pain all over sent here for IVF and admission     Past Medical History:   Diagnosis Date    Seizures      Allergies reported: Review of patient's allergies indicates:  No Known Allergies      LOC: Patient is awake, alert, and aware of environment with an appropriate affect. Patient is oriented x 4 and speaking appropriately.  APPEARANCE: Patient resting comfortably and in no acute distress. Patient is clean and well groomed, patient's clothing is properly fastened.  HEENT:   SKIN: The skin is warm and dry. Patient has normal skin turgor and moist mucus membranes.   MUSKULOSKELETAL: Patient is moving all extremities well, no obvious deformities noted. Pulses intact.   RESPIRATORY: Airway is open and patent. Respirations are spontaneous and non-labored with normal effort and rate.  CARDIAC: Patient has a normal rate and rhythm. NSR on cardiac monitor. No peripheral edema noted.   ABDOMEN: Distension noted. Patient endorses nausea; no active vomiting at this time.   NEUROLOGICAL: pupils 3mm, PERRL. Facial expression is symmetrical. Hand grasps are equal bilaterally. Normal sensation in all extremities when touched with finger.

## 2022-04-14 NOTE — HPI
51 y.o. male with history of CAD s/p PCI, seizures, substance abuse, stage IV malignant melanoma presenting from oncology clinic with multiple electrolyte abnormalities and worsening ascites. Pt has hx of malignant melanoma first diagnosed 1/2022. He is s/p 1 round of tx on 3/3/2022, with  plans to start binimetinib and enorafenib in  the near future. However, patient's clinical status has been deteriorating over the past few weeks. He was admitted on 03/18 at [facility] for abdominal pain, and distension. He underwent therapeutic paracentesis with removal of 3L, and was discharged on 03/22. CT abdomen during that admission showed soft tissue attenuation throughout the peritoneum consistent with innumerable peritoneal implants.  He was then readmitted the following day 03/23 for re accumulation and had another paracentesis with removal of 4.7L. Pt was again readmitted 03/30 to 04/02 and underwent paracentesis. He was planned to receive pleur-X catheter as outpatient. He presented again to [facility] on 04/04 with worsening abdominal distension and pain. He underwent therapeutic paracentesis with removal of 5L. Cytology was positive for malignant cells. He was discharged on 04/05 on Cipro for SBP coverage.      Since his discharge, he notes worsening abdominal distension. He had progressive nausea and vomiting and has not been able to tolerate any diet including water. He has tried zofran at home which has not helped. He denies any recent fever, chills, cough, sore throat. He was evaluated in  oncology clinic this morning  with his mother, during which time he had several episodes of vomiting. Labs obtained during clinic were concerning for leukocytosis, hyperkalemia, and hyponatremia. He was sent to the ED for admission for management of ascites, and other current comorbid conditions.     In the ED, Pt afebrile, /85, HR 80s,  ALEJANDRO. CBC notable for  WBC 19, Plt 619. K  5.9, Na 119.  Alb 2.6. Cr 2.0 from baseline 1.0. TSH  8.8, though free T4 wnl. Procal elevated to 1.1. EKG with no acute changes, no  T wave abnormalities.

## 2022-04-14 NOTE — ASSESSMENT & PLAN NOTE
He presented initially with enlarging right axillary lymph nodes that have grown progressively in size since April 2021. He presented to urgent care and was referred for US which showed multiple enlarged masses in the right axilla most likely reflecting abnormal lymph nodes concerning for malignancy.   He was subsequently referred to dermatology who performed a complete exam of the skin that was negative for suspicious cutaneous lesions as well as punch biopsy of one of the lymph node on 01/04/22. Pathology came back as malignant melanoma.   BRAF mutation: positive for a V600E mutation.   He subsequently underwent a PET/CT on 01/28/22 which showed multiple hypermetabolic soft tissue masses within the right axillary region, metastatic lesions to the lungs and abdomen (VI segment of the liver measuring a max SUV of 6.99, 1.6 x 1.9 cm nodular soft tissue density with a max SUV  7.64 was seen adjacent to the colon within the right lower quadrant. Brain MRI was negative for metastatic disease.  LDH was 307 U/L  He saw Oncology at [facility] on 01/31/22 with plan to start First line Nivolumab / ipilimumab. He received C1 on 03/03/22.     -- pt scheduled to start binimetinib and encorafenib prior to admission.

## 2022-04-14 NOTE — ASSESSMENT & PLAN NOTE
Pt with potassium 5.9 on CMP in ED. K 6.4 on istat. EKG without any acute changes. Pt shifted in ED with repeat BMP showing K 5.5. Unclear etiology of acute hyperkalemia, though patient does have concurrent PAULA.     -- daily bmp  -- avoid potential potassium-increasing medications

## 2022-04-14 NOTE — H&P
[facility] Emergency Dept  Hematology/Oncology  H&P    Patient Name: Joseluis Garner  MRN: [id number]  Admission Date: 4/14/2022  Code Status: Full Code   Attending Provider: Rita Turpin MD  Primary Care Physician: Primary Doctor No  Principal Problem:Ascites    Subjective:     HPI: 51 y.o. male with history of CAD s/p PCI, seizures, substance abuse, stage IV malignant melanoma presenting from oncology clinic with multiple electrolyte abnormalities and worsening ascites. Pt has hx of malignant melanoma first diagnosed 1/2022. He is s/p 1 round of tx on 3/3/2022, with  plans to start binimetinib and enorafenib in  the near future. However, patient's clinical status has been deteriorating over the past few weeks. He was admitted on 03/18 at [facility] for abdominal pain, and distension. He underwent therapeutic paracentesis with removal of 3L, and was discharged on 03/22. CT abdomen during that admission showed soft tissue attenuation throughout the peritoneum consistent with innumerable peritoneal implants.  He was then readmitted the following day 03/23 for re accumulation and had another paracentesis with removal of 4.7L. Pt was again readmitted 03/30 to 04/02 and underwent paracentesis. He was planned to receive pleur-X catheter as outpatient. [place] [facility] [place] with worsening abdominal distension and pain. He underwent therapeutic paracentesis with removal of 5L. Cytology was positive for malignant cells. He was discharged on 04/05 on Cipro for SBP coverage.      Since his discharge, he notes worsening abdominal distension. He had progressive nausea and vomiting and has not been able to tolerate any diet including water. He has tried zofran at home which has not helped. He denies any recent fever, chills, cough, sore throat. He was evaluated in  oncology clinic this morning  with his mother, during which time he had several episodes of vomiting. Labs obtained during clinic were concerning for  leukocytosis, hyperkalemia, and hyponatremia. He was sent to the ED for admission for management of ascites, and other current comorbid conditions.     In the ED, Pt afebrile, /85, HR 80s,  ALEJANDRO. CBC notable for  WBC 19, Plt 619. K  5.9, Na 119.  Alb 2.6. Cr 2.0 from baseline 1.0. TSH 8.8, though free T4 wnl. Procal elevated to 1.1. EKG with no acute changes, no  T wave abnormalities.         Oncology Treatment Plan:   OP NIVOLUMAB 1 MG/KG IPILIMUMAB 3MG/KG FOLLOWED BY NIVOLUMAB 480MG Q4W    Medications:  Continuous Infusions:   sodium chloride 0.9%       Scheduled Meds:   albumin human 25%  12.5 g Intravenous Once    cefTRIAXone (ROCEPHIN) IVPB  2 g Intravenous Q24H    heparin (porcine)  5,000 Units Subcutaneous Q8H    levETIRAcetam  1,000 mg Oral BID     PRN Meds:dextrose 10%, dextrose 10%, dextrose 10%, dextrose 10%, glucagon (human recombinant), glucose, glucose, naloxone, sodium chloride 0.9%     Review of patient's allergies indicates:  No Known Allergies     Past Medical History:   Diagnosis Date    Seizures      History reviewed. No pertinent surgical history.  Family History    None       Tobacco Use    Smoking status: Current Every Day Smoker    Smokeless tobacco: Never Used   Substance and Sexual Activity    Alcohol use: Yes    Drug use: Yes     Types: Methamphetamines, Marijuana    Sexual activity: Not on file       Review of Systems   Constitutional:  Positive for activity change, appetite change, fatigue and unexpected weight change. Negative for chills, diaphoresis and fever.   HENT:  Negative for congestion and sore throat.    Respiratory:  Negative for cough and shortness of breath.    Cardiovascular:  Negative for chest pain and palpitations.   Gastrointestinal:  Positive for abdominal distention, abdominal pain, nausea and vomiting. Negative for blood in stool.   Genitourinary:  Negative for dysuria and frequency.   Musculoskeletal:  Positive for myalgias. Negative for  arthralgias.   Skin:  Negative for rash and wound.   Neurological:  Positive for weakness. Negative for dizziness, syncope, light-headedness and numbness.   Psychiatric/Behavioral:  Negative for confusion. The patient is not nervous/anxious.    Objective:     Vital Signs (Most Recent):  Temp: 98 °F (36.7 °C) (04/14/22 1601)  Pulse: 78 (04/14/22 1601)  Resp: 20 (04/14/22 1601)  BP: (!) 142/92 (04/14/22 1601)  SpO2: 95 % (04/14/22 1601)   Vital Signs (24h Range):  Temp:  [97.5 °F (36.4 °C)-98 °F (36.7 °C)] 98 °F (36.7 °C)  Pulse:  [] 78  Resp:  [16-22] 20  SpO2:  [93 %-99 %] 95 %  BP: (133-175)/() 142/92     Weight: 77.1 kg (170 lb)  Body mass index is 23.06 kg/m².  Body surface area is 1.98 meters squared.      Intake/Output Summary (Last 24 hours) at 4/14/2022 1620  Last data filed at 4/14/2022 1330  Gross per 24 hour   Intake 1248.75 ml   Output --   Net 1248.75 ml       Physical Exam  Vitals and nursing note reviewed.   Constitutional:       General: He is in acute distress.      Appearance: He is normal weight. He is ill-appearing and toxic-appearing. He is not diaphoretic.   HENT:      Head: Normocephalic and atraumatic.      Nose: Nose normal. No congestion or rhinorrhea.      Mouth/Throat:      Mouth: Mucous membranes are dry.      Pharynx: Oropharynx is clear.   Eyes:      Extraocular Movements: Extraocular movements intact.      Pupils: Pupils are equal, round, and reactive to light.   Cardiovascular:      Rate and Rhythm: Normal rate and regular rhythm.      Pulses: Normal pulses.      Heart sounds: Normal heart sounds. No murmur heard.    No friction rub. No gallop.   Pulmonary:      Effort: Pulmonary effort is normal. No respiratory distress.      Breath sounds: Normal breath sounds.   Abdominal:      General: Bowel sounds are decreased. There is distension.      Palpations: There is shifting dullness and fluid wave.      Tenderness: There is abdominal tenderness. There is no guarding or  rebound.   Musculoskeletal:         General: No swelling. Normal range of motion.      Cervical back: Normal range of motion and neck supple.   Skin:     General: Skin is warm and dry.   Neurological:      General: No focal deficit present.      Mental Status: He is alert and oriented to person, place, and time.   Psychiatric:         Mood and Affect: Mood normal.         Behavior: Behavior normal.       Significant Labs:   CBC:   Recent Labs   Lab 04/14/22  0900   WBC 19.30*   HGB 15.1   HCT 46.5   *    and CMP:   Recent Labs   Lab 04/14/22  0900 04/14/22  1236 04/14/22  1358 04/14/22  1401   * 121* 119* 122*   K 5.9* 6.4* 5.5* 5.5*   CL 88* 89 86* 87*   CO2 24  --  25  --    * 126 143* 151   BUN 28* 38 29* 39*   CREATININE 2.0* 2.3 1.9* 2.2   CALCIUM 9.5  --  8.5*  --    PROT 6.2  --   --   --    ALBUMIN 2.6*  --   --   --    BILITOT 0.5  --   --   --    ALKPHOS 77  --   --   --    AST 22  --   --   --    ALT 8*  --   --   --    ANIONGAP 11  --  8  --    EGFRNONAA 37.3*  --  39.7*  --        Diagnostic Results:  I have reviewed all pertinent imaging results/findings within the past 24 hours.    Assessment/Plan:     * Ascites  50 y/o M hx of malignant melanoma presents from clinic with symptomatic malignant ascites. Pt has had multiple admissions over the past month for therapeutic paracenteses in the setting of malignant ascites. His last para was 4/4, 10 days prior to admission. Patient will likely need scheduled weekly therapeutic josiah in the future to prevent hospitalization.     Plan:  -- will undergo diagnostic and therapeutic paracentesis with IR on admission  -- f/u para studies  -- covering with Ceftriaxone 2g q24h for SBP given concurrent leukocytosis    Intractable nausea and vomiting  Pt presenting with several days of intractable nausea and vomiting. Pt unable to keep anything, including water, down. Zofran with no improvement at home. Likely 2/2 severe hyponatremia and significant  ascites.    -- therapeutic para as above  -- management of hyponatremia as above  -- IV compazine and IV phenergan      Hyperkalemia  Pt with potassium 5.9 on CMP in ED. K 6.4 on istat. EKG without any acute changes. Pt shifted in ED with repeat BMP showing K 5.5. Unclear etiology of acute hyperkalemia, though patient does have concurrent PAULA.     -- daily bmp  -- avoid potential potassium-increasing medications    PAULA (acute kidney injury)  Pt with Cr 2.0 on admission up from baseline of 1.0. Likely prerenal s/o poor PO intake, vomiting, and ascites.     -- f/u lytes  -- daily bmp  -- avoid nephrotoxic medications  -- renally dose meds  -- receiving NS infusion    Hyponatremia  Pt presenting with severe hyponatremia to 119 in ED. Likely Hypervolemic hyponatremia s/o malignant ascites. Na on labs 10 days prior to admission 128. Pt presenting with severe intractable nausea and vomiting. No changes in mental status or seizures. Goal Na by 4/15 5pm 127    -- s/p therapeutic paracentesis on 4/14  --  F/u urine osm, urine Na  --  On NS gtt 75/hr      Metastatic melanoma  He presented initially with enlarging right axillary lymph nodes that have grown progressively in size since April 2021. He presented to urgent care and was referred for US which showed multiple enlarged masses in the right axilla most likely reflecting abnormal lymph nodes concerning for malignancy.   He was subsequently referred to dermatology who performed a complete exam of the skin that was negative for suspicious cutaneous lesions as well as punch biopsy of one of the lymph node on 01/04/22. Pathology came back as malignant melanoma.   BRAF mutation: positive for a V600E mutation.   He subsequently underwent a PET/CT on 01/28/22 which showed multiple hypermetabolic soft tissue masses within the right axillary region, metastatic lesions to the lungs and abdomen (VI segment of the liver measuring a max SUV of 6.99, 1.6 x 1.9 cm nodular soft tissue  density with a max SUV  7.64 was seen adjacent to the colon within the right lower quadrant. Brain MRI was negative for metastatic disease.  LDH was 307 U/L  He saw Oncology at [facility] on 01/31/22 with plan to start First line Nivolumab / ipilimumab. He received C1 on 03/03/22.     -- pt scheduled to start binimetinib and encorafenib prior to admission.      History of seizure  --  Continue home Keppra 1000 BID         Roel Campos MD   Internal Medicine PGY-1  Hematology/Oncology  [facility] - Emergency Dept

## 2022-04-14 NOTE — ASSESSMENT & PLAN NOTE
Pt with Cr 2.0 on admission up from baseline of 1.0. Likely prerenal s/o poor PO intake, vomiting, and ascites.     -- f/u lytes  -- daily bmp  -- avoid nephrotoxic medications  -- renally dose meds  -- receiving NS infusion

## 2022-04-14 NOTE — ASSESSMENT & PLAN NOTE
52 y/o M hx of malignant melanoma presents from clinic with symptomatic malignant ascites. Pt has had multiple admissions over the past month for therapeutic paracenteses in the setting of malignant ascites. His last para was 4/4, 10 days prior to admission. Patient will likely need scheduled weekly therapeutic josiah in the future to prevent hospitalization.     Plan:  -- will undergo diagnostic and therapeutic paracentesis with IR on admission  -- f/u para studies  -- covering with Ceftriaxone 2g q24h for SBP given concurrent leukocytosis

## 2022-04-15 LAB
ALBUMIN SERPL BCP-MCNC: 2.6 G/DL (ref 3.5–5.2)
ALBUMIN SERPL BCP-MCNC: 2.6 G/DL (ref 3.5–5.2)
ALP SERPL-CCNC: 56 U/L (ref 55–135)
ALP SERPL-CCNC: 59 U/L (ref 55–135)
ALT SERPL W/O P-5'-P-CCNC: 6 U/L (ref 10–44)
ALT SERPL W/O P-5'-P-CCNC: 6 U/L (ref 10–44)
ANION GAP SERPL CALC-SCNC: 10 MMOL/L (ref 8–16)
ANION GAP SERPL CALC-SCNC: 11 MMOL/L (ref 8–16)
ANION GAP SERPL CALC-SCNC: 11 MMOL/L (ref 8–16)
ANION GAP SERPL CALC-SCNC: 14 MMOL/L (ref 8–16)
ANION GAP SERPL CALC-SCNC: 9 MMOL/L (ref 8–16)
AST SERPL-CCNC: 17 U/L (ref 10–40)
AST SERPL-CCNC: 17 U/L (ref 10–40)
BASOPHILS # BLD AUTO: 0.1 K/UL (ref 0–0.2)
BASOPHILS NFR BLD: 0.6 % (ref 0–1.9)
BILIRUB SERPL-MCNC: 0.4 MG/DL (ref 0.1–1)
BILIRUB SERPL-MCNC: 0.4 MG/DL (ref 0.1–1)
BUN SERPL-MCNC: 22 MG/DL (ref 6–20)
BUN SERPL-MCNC: 24 MG/DL (ref 6–20)
BUN SERPL-MCNC: 25 MG/DL (ref 6–20)
BUN SERPL-MCNC: 26 MG/DL (ref 6–20)
CALCIUM SERPL-MCNC: 8.2 MG/DL (ref 8.7–10.5)
CALCIUM SERPL-MCNC: 8.3 MG/DL (ref 8.7–10.5)
CALCIUM SERPL-MCNC: 8.3 MG/DL (ref 8.7–10.5)
CALCIUM SERPL-MCNC: 8.5 MG/DL (ref 8.7–10.5)
CALCIUM SERPL-MCNC: 8.6 MG/DL (ref 8.7–10.5)
CALCIUM SERPL-MCNC: 8.6 MG/DL (ref 8.7–10.5)
CALCIUM SERPL-MCNC: 8.7 MG/DL (ref 8.7–10.5)
CHLORIDE SERPL-SCNC: 88 MMOL/L (ref 95–110)
CHLORIDE SERPL-SCNC: 89 MMOL/L (ref 95–110)
CHLORIDE SERPL-SCNC: 91 MMOL/L (ref 95–110)
CO2 SERPL-SCNC: 18 MMOL/L (ref 23–29)
CO2 SERPL-SCNC: 23 MMOL/L (ref 23–29)
CO2 SERPL-SCNC: 23 MMOL/L (ref 23–29)
CO2 SERPL-SCNC: 24 MMOL/L (ref 23–29)
CO2 SERPL-SCNC: 25 MMOL/L (ref 23–29)
CO2 SERPL-SCNC: 25 MMOL/L (ref 23–29)
CO2 SERPL-SCNC: 26 MMOL/L (ref 23–29)
CREAT SERPL-MCNC: 1.3 MG/DL (ref 0.5–1.4)
CREAT SERPL-MCNC: 1.4 MG/DL (ref 0.5–1.4)
CREAT SERPL-MCNC: 1.6 MG/DL (ref 0.5–1.4)
DIFFERENTIAL METHOD: ABNORMAL
EOSINOPHIL # BLD AUTO: 0.1 K/UL (ref 0–0.5)
EOSINOPHIL NFR BLD: 0.4 % (ref 0–8)
ERYTHROCYTE [DISTWIDTH] IN BLOOD BY AUTOMATED COUNT: 14.4 % (ref 11.5–14.5)
EST. GFR  (AFRICAN AMERICAN): 56.4 ML/MIN/1.73 M^2
EST. GFR  (AFRICAN AMERICAN): >60 ML/MIN/1.73 M^2
EST. GFR  (NON AFRICAN AMERICAN): 48.8 ML/MIN/1.73 M^2
EST. GFR  (NON AFRICAN AMERICAN): 57.4 ML/MIN/1.73 M^2
EST. GFR  (NON AFRICAN AMERICAN): >60 ML/MIN/1.73 M^2
GLUCOSE SERPL-MCNC: 114 MG/DL (ref 70–110)
GLUCOSE SERPL-MCNC: 119 MG/DL (ref 70–110)
GLUCOSE SERPL-MCNC: 132 MG/DL (ref 70–110)
GLUCOSE SERPL-MCNC: 85 MG/DL (ref 70–110)
GLUCOSE SERPL-MCNC: 85 MG/DL (ref 70–110)
GLUCOSE SERPL-MCNC: 93 MG/DL (ref 70–110)
GLUCOSE SERPL-MCNC: 99 MG/DL (ref 70–110)
HCT VFR BLD AUTO: 47.4 % (ref 40–54)
HGB BLD-MCNC: 15.4 G/DL (ref 14–18)
IMM GRANULOCYTES # BLD AUTO: 0.14 K/UL (ref 0–0.04)
IMM GRANULOCYTES NFR BLD AUTO: 0.8 % (ref 0–0.5)
LYMPHOCYTES # BLD AUTO: 1.1 K/UL (ref 1–4.8)
LYMPHOCYTES NFR BLD: 6.1 % (ref 18–48)
MAGNESIUM SERPL-MCNC: 2.1 MG/DL (ref 1.6–2.6)
MCH RBC QN AUTO: 26.4 PG (ref 27–31)
MCHC RBC AUTO-ENTMCNC: 32.5 G/DL (ref 32–36)
MCV RBC AUTO: 81 FL (ref 82–98)
MONOCYTES # BLD AUTO: 1.2 K/UL (ref 0.3–1)
MONOCYTES NFR BLD: 6.6 % (ref 4–15)
NEUTROPHILS # BLD AUTO: 15.1 K/UL (ref 1.8–7.7)
NEUTROPHILS NFR BLD: 85.5 % (ref 38–73)
NRBC BLD-RTO: 0 /100 WBC
PLATELET # BLD AUTO: 500 K/UL (ref 150–450)
PMV BLD AUTO: 10.4 FL (ref 9.2–12.9)
POCT GLUCOSE: 103 MG/DL (ref 70–110)
POCT GLUCOSE: 107 MG/DL (ref 70–110)
POTASSIUM SERPL-SCNC: 5 MMOL/L (ref 3.5–5.1)
POTASSIUM SERPL-SCNC: 5.2 MMOL/L (ref 3.5–5.1)
POTASSIUM SERPL-SCNC: 5.5 MMOL/L (ref 3.5–5.1)
POTASSIUM SERPL-SCNC: 5.5 MMOL/L (ref 3.5–5.1)
POTASSIUM SERPL-SCNC: 6.3 MMOL/L (ref 3.5–5.1)
PROT SERPL-MCNC: 5.2 G/DL (ref 6–8.4)
PROT SERPL-MCNC: 5.3 G/DL (ref 6–8.4)
RBC # BLD AUTO: 5.83 M/UL (ref 4.6–6.2)
SODIUM SERPL-SCNC: 121 MMOL/L (ref 136–145)
SODIUM SERPL-SCNC: 122 MMOL/L (ref 136–145)
SODIUM SERPL-SCNC: 123 MMOL/L (ref 136–145)
SODIUM SERPL-SCNC: 123 MMOL/L (ref 136–145)
SODIUM SERPL-SCNC: 124 MMOL/L (ref 136–145)
WBC # BLD AUTO: 17.63 K/UL (ref 3.9–12.7)

## 2022-04-15 PROCEDURE — 36415 COLL VENOUS BLD VENIPUNCTURE: CPT | Performed by: INTERNAL MEDICINE

## 2022-04-15 PROCEDURE — 25000003 PHARM REV CODE 250: Performed by: INTERNAL MEDICINE

## 2022-04-15 PROCEDURE — 85025 COMPLETE CBC W/AUTO DIFF WBC: CPT

## 2022-04-15 PROCEDURE — 36415 COLL VENOUS BLD VENIPUNCTURE: CPT

## 2022-04-15 PROCEDURE — 63600175 PHARM REV CODE 636 W HCPCS: Performed by: STUDENT IN AN ORGANIZED HEALTH CARE EDUCATION/TRAINING PROGRAM

## 2022-04-15 PROCEDURE — 20600001 HC STEP DOWN PRIVATE ROOM

## 2022-04-15 PROCEDURE — 99223 PR INITIAL HOSPITAL CARE,LEVL III: ICD-10-PCS | Mod: ,,, | Performed by: INTERNAL MEDICINE

## 2022-04-15 PROCEDURE — 80053 COMPREHEN METABOLIC PANEL: CPT | Mod: 91

## 2022-04-15 PROCEDURE — 25000003 PHARM REV CODE 250

## 2022-04-15 PROCEDURE — 99233 PR SUBSEQUENT HOSPITAL CARE,LEVL III: ICD-10-PCS | Mod: ,,, | Performed by: INTERNAL MEDICINE

## 2022-04-15 PROCEDURE — 25000003 PHARM REV CODE 250: Performed by: STUDENT IN AN ORGANIZED HEALTH CARE EDUCATION/TRAINING PROGRAM

## 2022-04-15 PROCEDURE — 99223 1ST HOSP IP/OBS HIGH 75: CPT | Mod: ,,, | Performed by: INTERNAL MEDICINE

## 2022-04-15 PROCEDURE — 99233 SBSQ HOSP IP/OBS HIGH 50: CPT | Mod: ,,, | Performed by: INTERNAL MEDICINE

## 2022-04-15 PROCEDURE — 63600175 PHARM REV CODE 636 W HCPCS

## 2022-04-15 PROCEDURE — 83735 ASSAY OF MAGNESIUM: CPT

## 2022-04-15 PROCEDURE — 94761 N-INVAS EAR/PLS OXIMETRY MLT: CPT

## 2022-04-15 PROCEDURE — 80048 BASIC METABOLIC PNL TOTAL CA: CPT | Mod: 91,XB

## 2022-04-15 PROCEDURE — 80048 BASIC METABOLIC PNL TOTAL CA: CPT | Mod: 91,XB | Performed by: INTERNAL MEDICINE

## 2022-04-15 PROCEDURE — 80053 COMPREHEN METABOLIC PANEL: CPT | Performed by: INTERNAL MEDICINE

## 2022-04-15 RX ORDER — CALCIUM GLUCONATE 20 MG/ML
1 INJECTION, SOLUTION INTRAVENOUS ONCE
Status: COMPLETED | OUTPATIENT
Start: 2022-04-15 | End: 2022-04-15

## 2022-04-15 RX ORDER — HYDROMORPHONE HYDROCHLORIDE 1 MG/ML
0.5 INJECTION, SOLUTION INTRAMUSCULAR; INTRAVENOUS; SUBCUTANEOUS EVERY 4 HOURS PRN
Status: DISCONTINUED | OUTPATIENT
Start: 2022-04-15 | End: 2022-04-17

## 2022-04-15 RX ORDER — FENTANYL 25 UG/1
1 PATCH TRANSDERMAL
Status: DISCONTINUED | OUTPATIENT
Start: 2022-04-15 | End: 2022-04-24 | Stop reason: HOSPADM

## 2022-04-15 RX ORDER — SODIUM CHLORIDE 9 MG/ML
INJECTION, SOLUTION INTRAVENOUS CONTINUOUS
Status: ACTIVE | OUTPATIENT
Start: 2022-04-15 | End: 2022-04-15

## 2022-04-15 RX ORDER — CALCIUM GLUCONATE 20 MG/ML
1 INJECTION, SOLUTION INTRAVENOUS EVERY 10 MIN PRN
Status: DISCONTINUED | OUTPATIENT
Start: 2022-04-15 | End: 2022-04-24 | Stop reason: HOSPADM

## 2022-04-15 RX ADMIN — HEPARIN SODIUM 5000 UNITS: 5000 INJECTION INTRAVENOUS; SUBCUTANEOUS at 07:04

## 2022-04-15 RX ADMIN — CEFTRIAXONE 2 G: 2 INJECTION, SOLUTION INTRAVENOUS at 04:04

## 2022-04-15 RX ADMIN — FENTANYL 1 PATCH: 25 PATCH, EXTENDED RELEASE TRANSDERMAL at 01:04

## 2022-04-15 RX ADMIN — DEXTROSE 250 ML: 10 SOLUTION INTRAVENOUS at 11:04

## 2022-04-15 RX ADMIN — HYDROMORPHONE HYDROCHLORIDE 0.5 MG: 1 INJECTION, SOLUTION INTRAMUSCULAR; INTRAVENOUS; SUBCUTANEOUS at 05:04

## 2022-04-15 RX ADMIN — HEPARIN SODIUM 5000 UNITS: 5000 INJECTION INTRAVENOUS; SUBCUTANEOUS at 12:04

## 2022-04-15 RX ADMIN — PROMETHAZINE HYDROCHLORIDE 12.5 MG: 25 INJECTION INTRAMUSCULAR; INTRAVENOUS at 02:04

## 2022-04-15 RX ADMIN — OXYCODONE HYDROCHLORIDE 20 MG: 10 TABLET ORAL at 05:04

## 2022-04-15 RX ADMIN — PROCHLORPERAZINE EDISYLATE 2.5 MG: 5 INJECTION INTRAMUSCULAR; INTRAVENOUS at 09:04

## 2022-04-15 RX ADMIN — PROCHLORPERAZINE EDISYLATE 2.5 MG: 5 INJECTION INTRAMUSCULAR; INTRAVENOUS at 05:04

## 2022-04-15 RX ADMIN — OXYCODONE HYDROCHLORIDE 20 MG: 10 TABLET ORAL at 12:04

## 2022-04-15 RX ADMIN — LEVETIRACETAM 1000 MG: 500 TABLET, FILM COATED ORAL at 07:04

## 2022-04-15 RX ADMIN — SODIUM ZIRCONIUM CYCLOSILICATE 10 G: 10 POWDER, FOR SUSPENSION ORAL at 11:04

## 2022-04-15 RX ADMIN — OXYCODONE HYDROCHLORIDE 20 MG: 10 TABLET ORAL at 07:04

## 2022-04-15 RX ADMIN — HYDROMORPHONE HYDROCHLORIDE 0.5 MG: 1 INJECTION, SOLUTION INTRAMUSCULAR; INTRAVENOUS; SUBCUTANEOUS at 04:04

## 2022-04-15 RX ADMIN — HYDROMORPHONE HYDROCHLORIDE 0.5 MG: 1 INJECTION, SOLUTION INTRAMUSCULAR; INTRAVENOUS; SUBCUTANEOUS at 08:04

## 2022-04-15 RX ADMIN — SODIUM CHLORIDE 1000 ML: 0.9 INJECTION, SOLUTION INTRAVENOUS at 01:04

## 2022-04-15 RX ADMIN — HEPARIN SODIUM 5000 UNITS: 5000 INJECTION INTRAVENOUS; SUBCUTANEOUS at 10:04

## 2022-04-15 RX ADMIN — HEPARIN SODIUM 5000 UNITS: 5000 INJECTION INTRAVENOUS; SUBCUTANEOUS at 01:04

## 2022-04-15 RX ADMIN — CALCIUM GLUCONATE 1 G: 20 INJECTION, SOLUTION INTRAVENOUS at 10:04

## 2022-04-15 RX ADMIN — HYDROMORPHONE HYDROCHLORIDE 0.5 MG: 1 INJECTION, SOLUTION INTRAMUSCULAR; INTRAVENOUS; SUBCUTANEOUS at 02:04

## 2022-04-15 RX ADMIN — LEVETIRACETAM 1000 MG: 500 TABLET, FILM COATED ORAL at 08:04

## 2022-04-15 RX ADMIN — HYDROMORPHONE HYDROCHLORIDE 0.5 MG: 1 INJECTION, SOLUTION INTRAMUSCULAR; INTRAVENOUS; SUBCUTANEOUS at 09:04

## 2022-04-15 RX ADMIN — INSULIN HUMAN 10 UNITS: 100 INJECTION, SOLUTION PARENTERAL at 11:04

## 2022-04-15 NOTE — ASSESSMENT & PLAN NOTE
He presented initially with enlarging right axillary lymph nodes that have grown progressively in size since April 2021. He presented to urgent care and was referred for US which showed multiple enlarged masses in the right axilla most likely reflecting abnormal lymph nodes concerning for malignancy.   He was subsequently referred to dermatology who performed a complete exam of the skin that was negative for suspicious cutaneous lesions as well as punch biopsy of one of the lymph node on 01/04/22. Pathology came back as malignant melanoma.   BRAF mutation: positive for a V600E mutation.   He subsequently underwent a PET/CT on 01/28/22 which showed multiple hypermetabolic soft tissue masses within the right axillary region, metastatic lesions to the lungs and abdomen (VI segment of the liver measuring a max SUV of 6.99, 1.6 x 1.9 cm nodular soft tissue density with a max SUV  7.64 was seen adjacent to the colon within the right lower quadrant. Brain MRI was negative for metastatic disease.  LDH was 307 U/L  He saw Oncology at [facility] on 01/31/22 with plan to start First line Nivolumab / ipilimumab. He received C1 on 03/03/22.     -- pt scheduled to start binimetinib and encorafenib prior to admission.  -- will restart upon discharge  -- obtaining CT chest abdomen and pelvis without IV contrast to assess for metastatic disease s/o PAULA

## 2022-04-15 NOTE — ASSESSMENT & PLAN NOTE
Pt with potassium 5.9 on CMP in ED. K 6.4 on istat. EKG without any acute changes. Pt shifted in ED with repeat BMP showing K 5.5. Unclear etiology of acute hyperkalemia, though patient does have concurrent PAULA. K consistently elevated at 5.5 through 4/15    -- serial bmp  -- avoid potential potassium-increasing medications

## 2022-04-15 NOTE — ED PROVIDER NOTES
Encounter Date: 4/14/2022       History     Chief Complaint   Patient presents with    Generalized Body Aches     Metastatic Melanoma patient with pain all over sent here for IVF and admission     51-year-old male with metastatic melanoma on chemotherapy, malignant ascites, seizures presents from oncology clinic for intractable pain and nausea as well as hyperkalemia noted on outpatient lab work.  He reports chronic, persistent diffuse abdominal pain and worsening abdominal distension.  He has been taking oxycodone and using fentanyl patch at home without significant improvement.  Also reports persistent vomiting and inability to tolerate p.o. despite taking Zofran.  He denies fevers/chills, changes in bowel movements, chest pain, shortness of breath or urinary symptoms.  Reports that he recently completed a course of ciprofloxacin which he was given as prophylaxis for SBP.        Review of patient's allergies indicates:  No Known Allergies  Past Medical History:   Diagnosis Date    Seizures      History reviewed. No pertinent surgical history.  History reviewed. No pertinent family history.  Social History     Tobacco Use    Smoking status: Current Every Day Smoker    Smokeless tobacco: Never Used   Substance Use Topics    Alcohol use: Yes    Drug use: Yes     Types: Methamphetamines, Marijuana     Review of Systems   Constitutional: Positive for fatigue. Negative for fever.   HENT: Negative for sore throat.    Respiratory: Negative for shortness of breath.    Cardiovascular: Negative for chest pain.   Gastrointestinal: Positive for abdominal distention, abdominal pain, nausea and vomiting. Negative for anal bleeding, blood in stool, constipation, diarrhea and rectal pain.   Genitourinary: Negative for dysuria.   Musculoskeletal: Negative for back pain.   Skin: Negative for rash.   Neurological: Negative for weakness.   Hematological: Does not bruise/bleed easily.       Physical Exam     Initial Vitals  [04/14/22 1122]   BP Pulse Resp Temp SpO2   (!) 140/95 90 18 97.5 °F (36.4 °C) 99 %      MAP       --         Physical Exam    Nursing note and vitals reviewed.  Constitutional: He appears well-developed and well-nourished. He is not diaphoretic. He appears cachectic. No distress.   HENT:   Head: Normocephalic and atraumatic.   Eyes: EOM are normal. Pupils are equal, round, and reactive to light.   Neck: Neck supple.   Normal range of motion.  Cardiovascular: Normal rate, regular rhythm, normal heart sounds and intact distal pulses. Exam reveals no gallop and no friction rub.    No murmur heard.  Pulmonary/Chest: Breath sounds normal. No respiratory distress. He has no wheezes. He has no rhonchi. He has no rales. He exhibits no tenderness.   Abdominal: Abdomen is soft. Bowel sounds are normal. He exhibits distension and fluid wave. He exhibits no mass. There is abdominal tenderness. There is no rebound and no guarding.   Musculoskeletal:         General: Normal range of motion.      Cervical back: Normal range of motion and neck supple.     Neurological: He is alert and oriented to person, place, and time.   Skin: Skin is warm and dry.   Psychiatric: He has a normal mood and affect.         ED Course   Critical Care    Date/Time: 4/14/2022 1:16 PM  Performed by: Eve Ramesh PA-C  Authorized by: Rita Turpin MD   Direct patient critical care time: 10 minutes  Additional history critical care time: 5 minutes  Ordering / reviewing critical care time: 5 minutes  Documentation critical care time: 5 minutes  Consulting other physicians critical care time: 5 minutes  Consult with family critical care time: 0 minutes  Other critical care time: 5 minutes  Total critical care time (exclusive of procedural time) : 35 minutes  Critical care time was exclusive of separately billable procedures and treating other patients and teaching time.  Critical care was necessary to treat or prevent imminent or  life-threatening deterioration of the following conditions: metabolic crisis and renal failure.  Critical care was time spent personally by me on the following activities: blood draw for specimens, development of treatment plan with patient or surrogate, interpretation of cardiac output measurements, evaluation of patient's response to treatment, examination of patient, obtaining history from patient or surrogate, ordering and performing treatments and interventions, ordering and review of laboratory studies, re-evaluation of patient's condition and review of old charts.        Labs Reviewed   SEDIMENTATION RATE - Abnormal; Notable for the following components:       Result Value    Sed Rate 28 (*)     All other components within normal limits   C-REACTIVE PROTEIN - Abnormal; Notable for the following components:    .2 (*)     All other components within normal limits   PROCALCITONIN - Abnormal; Notable for the following components:    Procalcitonin 1.10 (*)     All other components within normal limits   BASIC METABOLIC PANEL - Abnormal; Notable for the following components:    Sodium 119 (*)     Potassium 5.5 (*)     Chloride 86 (*)     Glucose 143 (*)     BUN 29 (*)     Creatinine 1.9 (*)     Calcium 8.5 (*)     eGFR if  45.8 (*)     eGFR if non  39.7 (*)     All other components within normal limits   ISTAT CHEM8 - Abnormal; Notable for the following components:    Sodium 121 (*)     Potassium 6.4 (*)     All other components within normal limits   POCT GLUCOSE - Abnormal; Notable for the following components:    POCT Glucose 145 (*)     All other components within normal limits   ISTAT CHEM8 - Abnormal; Notable for the following components:    Sodium 122 (*)     Potassium 5.5 (*)     Chloride 87 (*)     BUN 39 (*)     All other components within normal limits   POCT GLUCOSE - Abnormal; Notable for the following components:    POCT Glucose 160 (*)     All other components  within normal limits   SODIUM, URINE, RANDOM   OSMOLALITY, URINE RANDOM   CREATININE, URINE, RANDOM   SARS-COV-2 RDRP GENE   POCT GLUCOSE MONITORING CONTINUOUS     EKG Readings: (Independently Interpreted)   Initial Reading: No STEMI. Previous EKG: Compared with most recent EKG Previous EKG Date: 4/4/2022. Rhythm: Normal Sinus Rhythm. Heart Rate: 80. Ectopy: No Ectopy. ST Segments: Normal ST Segments. T Waves: Normal. Clinical Impression: Normal Sinus Rhythm Other Impression: No hyperacute T-waves     ECG Results          EKG 12-lead (Final result)  Result time 04/14/22 13:25:24    Final result by Interface, Lab In [facility] (04/14/22 13:25:24)                 Narrative:    Test Reason : E87.5,    Vent. Rate : 080 BPM     Atrial Rate : 084 BPM     P-R Int : 178 ms          QRS Dur : 092 ms      QT Int : 348 ms       P-R-T Axes : 060 059 043 degrees     QTc Int : 402 ms    Program found technically poor ECG  Normal sinus rhythm  Low anterior forces  Abnormal ECG  When compared with ECG of 04-APR-2022 12:17,  No significant change was found  Confirmed by SRIRAM THOMPSON MD ([id number]) on 4/14/2022 1:25:12 PM    Referred By: [email or web address]   SELF           Confirmed By:SRIRAM THOMPSON MD                            Imaging Results          IR Paracentesis without imaging (Final result)  Result time 04/14/22 17:46:54    Final result by Juan Jose Hernández MD (04/14/22 17:46:54)                 Impression:      Ultrasound-guided paracentesis with drainage of 7000 mL of elizabeth fluid.  Albumin administered as per protocol.    _______________________________________________________________    Electronically signed by resident: Rudy Otero  Date:    04/14/2022  Time:    17:39    Electronically signed by: Juan Jose Hernández MD  Date:    04/14/2022  Time:    17:46             Narrative:    EXAMINATION:  Ultrasound-guided paracentesis    Procedural Personnel    Attending physician(s): Juan Jose Hernández MD    Fellow physician(s): None    Resident physician(s):  Rudy Otero MD    Advanced practice provider(s): None    Pre-procedure diagnosis: Ascites    Post-procedure diagnosis: Same    Complications: No immediate complications.    TECHNIQUE:  - Ultrasound-guided paracentesis    FINDINGS:  Pre-procedure    Consent: Informed consent for the procedure was obtained and time-out was performed prior to the procedure.    Preparation: The site was prepared and draped using maximal sterile barrier technique including cutaneous antisepsis.    Anesthesia/sedation    Level of anesthesia/sedation: No sedation    Anesthesia/sedation administered by: Not applicable    Total intra-service sedation time (minutes): 0    Limited abdominal ultrasound    Limited abdominal ultrasound was performed.    Moderate ascites. A safe window for paracentesis was identified.    Paracentesis    Local anesthesia was administered. The peritoneal cavity was accessed and fluid return confirmed position. Ascites was drained.    Paracentesis access technique: Real-time ultrasound guidance    Catheter placed: 5F one-step    Closure    The catheter was removed. A sterile bandage was applied.    Post-drainage ultrasound: Not performed    Additional Details    Additional description of procedure: None    Equipment details: None    Specimens removed: Abdominal fluid    Estimated blood loss (mL): Less than 10    Standardized report: [email or web address]    Attestation    Signer name: Juan Jose Hernández MD    I attest that I supervised the procedure and was immediately available. I reviewed the stored images and agree with the report as written.                                 Medications   dextrose 10% bolus 125 mL (has no administration in time range)   dextrose 10% bolus 250 mL (has no administration in time range)   sodium chloride 0.9% flush 10 mL (has no administration in time range)   naloxone 0.4 mg/mL injection 0.02 mg (has no administration in time range)   glucose chewable tablet 16 g (has no administration in  time range)   glucose chewable tablet 24 g (has no administration in time range)   glucagon (human recombinant) injection 1 mg (has no administration in time range)   dextrose 10% bolus 125 mL (has no administration in time range)   dextrose 10% bolus 250 mL (has no administration in time range)   heparin (porcine) injection 5,000 Units (has no administration in time range)   cefTRIAXone (ROCEPHIN) 2 g/50 mL D5W IVPB (0 g Intravenous Stopped 4/14/22 2027)   0.9%  NaCl infusion ( Intravenous New Bag 4/14/22 1819)   levETIRAcetam tablet 1,000 mg (1,000 mg Oral Given 4/14/22 2106)   prochlorperazine injection Soln 2.5 mg (has no administration in time range)   promethazine (PHENERGAN) 12.5 mg in dextrose 5 % 50 mL IVPB (has no administration in time range)   oxyCODONE immediate release tablet Tab 20 mg (20 mg Oral Given 4/14/22 1817)   sodium chloride 0.9% bolus 1,000 mL (0 mLs Intravenous Stopped 4/14/22 1325)   droperidoL injection 1.25 mg (1.25 mg Intravenous Given 4/14/22 1226)   HYDROmorphone injection 1 mg (1 mg Intravenous Given 4/14/22 1234)   insulin regular injection 5 Units (5 Units Intravenous Given 4/14/22 1317)   dextrose 10% bolus 250 mL (0 g Intravenous Stopped 4/14/22 1330)   albumin human 25% bottle 12.5 g (0 g Intravenous Stopped 4/14/22 1809)     Medical Decision Making:   History:   Old Medical Records: I decided to obtain old medical records.  Old Records Summarized: records from clinic visits.       <> Summary of Records: Patient had lab work drawn earlier today notable for hyperkalemia with potassium of 5.9, leukocytosis and mild hyponatremia.  Initial Assessment:   51-year-old male presenting for intractable nausea, vomiting and abdominal pain associated with malignant ascites.  He is mildly hypertensive 140/95 with otherwise normal vitals.  He appears ill but is in no acute distress.  Differential Diagnosis:   Dehydration  Electrolyte derangement  Dysrhythmia  PAULA   Although I think there is a  good chance that his pain is related to his metastatic disease, I do think that SBP is a possibility he has no fever but did have leukocytosis earlier today.  Independently Interpreted Test(s):   I have ordered and independently interpreted EKG Reading(s) - see prior notes  Clinical Tests:   Lab Tests: Ordered and Reviewed  Medical Tests: Ordered and Reviewed  ED Management:  Will check labs, EKG, give fluid bolus, droperidol for nausea, Dilaudid for pain and reassess.    I-STAT shows worsening hyperkalemia, shifted with insulin and glucose.  Significant hyponatremia, worse when compared to earlier today.  Inflammatory markers and procalcitonin are elevated.  Initial plan to do diagnostic paracentesis to rule out SBP in the ED, however I discussed this with Interventional Radiology and patient was able to be scheduled for both a diagnostic and therapeutic paracentesis very quickly which I think benefits the patient instead of having 2 separate paracenteses performed.  He will be admitted to [facility] for further management.  Patient family comfortable with admission.  I discussed this patient with my supervising physician who also evaluated the patient.  Other:   I have discussed this case with another health care provider.             ED Course as of 04/14/22 2209   Thu Apr 14, 2022   1248 Potassium(!): 6.4  Potassium is significantly elevated, unclear if due to hemolysis but given prior hyperkalemia noted on labs this morning, will shift and continue to hydrate.  No EKG changes at this time. [CC]   1311 CRP(!): 148.2  Significantly elevated, similar when compared to prior [CC]   1404 Potassium(!): 5.5  Still mildly elevated but down trending [CC]   1447 I discussed this patient with [facility], your request that paracentesis is done in the ED as concern about delaying antibiotics. [CC]   1442 I discussed this patient with Interventional Radiology, patient would not be able to have your stasis performed until  tomorrow.  Will do diagnostic tap in the emergency department. [CC]   5195 I again discussed this patient with Interventional Radiology, patient will actually be able to have a paracentesis performed in the next hour and a half.  I discussed this with Oncology, I suspect this actually 30 performed more quickly than in the ED.  Will plan for IR paracentesis than start antibiotics immediately after the procedure to preemptively treat for SBP [CC]      ED Course User Index  [CC] Eve Ramesh PA-C             Clinical Impression:   Final diagnoses:  [E87.5] Hyperkalemia  Malignant Ascites  Hyponatremia        ED Disposition Condition    Admit               Eve Ramesh PA-C  04/14/22 [id number]

## 2022-04-15 NOTE — ASSESSMENT & PLAN NOTE
Pt presenting with several days of intractable nausea and vomiting. Pt unable to keep anything, including water, down. Zofran with no improvement at home. Likely 2/2 severe hyponatremia and significant ascites. Improved significantly with anti emetics and para.     -- therapeutic para as above  -- management of hyponatremia as above  -- IV compazine and IV phenergan

## 2022-04-15 NOTE — ASSESSMENT & PLAN NOTE
50 y/o M hx of malignant melanoma presents from clinic with symptomatic malignant ascites. Pt has had multiple admissions over the past month for therapeutic paracenteses in the setting of malignant ascites. His last para was 4/4, 10 days prior to admission. Patient will likely need scheduled weekly therapeutic josiah in the future to prevent hospitalization. S/p para on4/15 with 7L removed and albumin administered.     Plan:  -- Cell counts not c/w SBP, though continuing  To cover with Ceftriaxone 2g q24h for given concurrent leukocytosis  -- schedule weekly para at discharge

## 2022-04-15 NOTE — ASSESSMENT & PLAN NOTE
Pt presenting with severe hyponatremia to 119 in ED. Likely Hypervolemic hyponatremia s/o malignant ascites. Na on labs 10 days prior to admission 128. Pt presenting with severe intractable nausea and vomiting. No changes in mental status or seizures. Sxs improving with improvement in Na. Goal Na by 4/16 5pm 132    -- s/p therapeutic paracentesis on 4/14  --  F/u urine osm, urine Na  --  On NS gtt 100/hr

## 2022-04-15 NOTE — ASSESSMENT & PLAN NOTE
Patient with creatinine 2.0 on admission from baseline 1.0 in the context of reduced oral intake and vomiting. Suspect likely prerenal given improvement with IV fluids.    Plan:  - continue IV fluids  - renal diet  - avoid nephrotoxic agents and renally dose medications

## 2022-04-15 NOTE — SUBJECTIVE & OBJECTIVE
Past Medical History:   Diagnosis Date    Seizures        History reviewed. No pertinent surgical history.    Review of patient's allergies indicates:  No Known Allergies  Current Facility-Administered Medications   Medication Frequency    0.9%  NaCl infusion Continuous    cefTRIAXone (ROCEPHIN) 2 g/50 mL D5W IVPB Q24H    dextrose 10% bolus 125 mL PRN    dextrose 10% bolus 125 mL PRN    dextrose 10% bolus 250 mL PRN    dextrose 10% bolus 250 mL PRN    glucagon (human recombinant) injection 1 mg PRN    glucose chewable tablet 16 g PRN    glucose chewable tablet 24 g PRN    heparin (porcine) injection 5,000 Units Q8H    HYDROmorphone injection 0.5 mg Q4H PRN    levETIRAcetam tablet 1,000 mg BID    naloxone 0.4 mg/mL injection 0.02 mg PRN    oxyCODONE immediate release tablet Tab 20 mg Q6H PRN    prochlorperazine injection Soln 2.5 mg Q6H PRN    promethazine (PHENERGAN) 12.5 mg in dextrose 5 % 50 mL IVPB Q6H PRN    sodium chloride 0.9% flush 10 mL Q12H PRN     Family History    None       Tobacco Use    Smoking status: Current Every Day Smoker    Smokeless tobacco: Never Used   Substance and Sexual Activity    Alcohol use: Yes    Drug use: Yes     Types: Methamphetamines, Marijuana    Sexual activity: Not on file     Review of Systems   Constitutional:  Positive for appetite change and fatigue. Negative for chills, diaphoresis and fever.   HENT:  Negative for congestion and sore throat.    Respiratory:  Negative for cough and shortness of breath.    Cardiovascular:  Negative for chest pain and palpitations.   Gastrointestinal:  Positive for abdominal pain and nausea. Negative for abdominal distention, blood in stool and vomiting.   Genitourinary:  Negative for dysuria and frequency.   Musculoskeletal:  Positive for myalgias. Negative for arthralgias.   Skin:  Negative for rash and wound.   Neurological:  Positive for weakness. Negative for dizziness, syncope, light-headedness and numbness.   Psychiatric/Behavioral:   Negative for confusion. The patient is not nervous/anxious.    Objective:     Vital Signs (Most Recent):  Temp: 96.6 °F (35.9 °C) (04/15/22 0730)  Pulse: 89 (04/15/22 1011)  Resp: 18 (04/15/22 1011)  BP: 120/86 (04/15/22 0730)  SpO2: 96 % (04/15/22 1011)  O2 Device (Oxygen Therapy): room air (04/15/22 1011) Vital Signs (24h Range):  Temp:  [96.6 °F (35.9 °C)-98.2 °F (36.8 °C)] 96.6 °F (35.9 °C)  Pulse:  [76-92] 89  Resp:  [16-20] 18  SpO2:  [93 %-99 %] 96 %  BP: (116-161)/() 120/86     Weight: 76.4 kg (168 lb 6.9 oz) (76.4) (04/14/22 1810)  Body mass index is 22.84 kg/m².  Body surface area is 1.97 meters squared.    I/O last 3 completed shifts:  In: 1488.8 [P.O.:240; IV Piggyback:1248.8]  Out: 7000 [Other:7000]    Physical Exam  Vitals and nursing note reviewed.   Constitutional:       General: He is in acute distress.      Appearance: He is normal weight. He is ill-appearing and toxic-appearing. He is not diaphoretic.   HENT:      Head: Normocephalic and atraumatic.      Nose: Nose normal. No congestion or rhinorrhea.      Mouth/Throat:      Mouth: Mucous membranes are dry.      Pharynx: Oropharynx is clear.   Eyes:      Extraocular Movements: Extraocular movements intact.      Pupils: Pupils are equal, round, and reactive to light.   Cardiovascular:      Rate and Rhythm: Normal rate and regular rhythm.      Pulses: Normal pulses.      Heart sounds: Normal heart sounds. No murmur heard.    No friction rub. No gallop.   Pulmonary:      Effort: Pulmonary effort is normal. No respiratory distress.      Breath sounds: Normal breath sounds.   Abdominal:      General: Bowel sounds are decreased. There is no distension.      Tenderness: There is abdominal tenderness (RLQ surrounding para insertion). There is no guarding or rebound.   Musculoskeletal:         General: No swelling. Normal range of motion.      Cervical back: Normal range of motion and neck supple.   Skin:     General: Skin is warm and dry.    Neurological:      General: No focal deficit present.      Mental Status: He is alert and oriented to person, place, and time.   Psychiatric:         Mood and Affect: Mood normal.         Behavior: Behavior normal.       Significant Labs:  All labs within the past 24 hours have been reviewed.    Significant Imaging:  All labs within the past 24 hours have been reviewed.

## 2022-04-15 NOTE — PROGRESS NOTES
Jesse Ramírez - Oncology ([facility])  Hematology/Oncology  Progress Note    Patient Name: Joseluis Garner  Admission Date: 4/14/2022  Hospital Length of Stay: 1 days  Code Status: Full Code     Subjective:     HPI:  51 y.o. male with history of CAD s/p PCI, seizures, substance abuse, stage IV malignant melanoma presenting from oncology clinic with multiple electrolyte abnormalities and worsening ascites. Pt has hx of malignant melanoma first diagnosed 1/2022. He is s/p 1 round of tx on 3/3/2022, with  plans to start binimetinib and enorafenib in  the near future. However, patient's clinical status has been deteriorating over the past few weeks. He was admitted on 03/18 at [facility] for abdominal pain, and distension. He underwent therapeutic paracentesis with removal of 3L, and was discharged on 03/22. CT abdomen during that admission showed soft tissue attenuation throughout the peritoneum consistent with innumerable peritoneal implants.  He was then readmitted the following day 03/23 for re accumulation and had another paracentesis with removal of 4.7L. Pt was again readmitted 03/30 to 04/02 and underwent paracentesis. He was planned to receive pleur-X catheter as outpatient. He presented again to [facility] on 04/04 with worsening abdominal distension and pain. He underwent therapeutic paracentesis with removal of 5L. Cytology was positive for malignant cells. He was discharged on 04/05 on Cipro for SBP coverage.      Since his discharge, he notes worsening abdominal distension. He had progressive nausea and vomiting and has not been able to tolerate any diet including water. He has tried zofran at home which has not helped. He denies any recent fever, chills, cough, sore throat. He was evaluated in  oncology clinic this morning  with his mother, during which time he had several episodes of vomiting. Labs obtained during clinic were concerning for leukocytosis, hyperkalemia, and hyponatremia. He was sent to the ED for admission  for management of ascites, and other current comorbid conditions.     In the ED, Pt afebrile, /85, HR 80s,  ALEJANDRO. CBC notable for  WBC 19, Plt 619. K  5.9, Na 119.  Alb 2.6. Cr 2.0 from baseline 1.0. TSH 8.8, though free T4 wnl. Procal elevated to 1.1. EKG with no acute changes, no  T wave abnormalities.        Interval History: NAEON. Pt states his para yesterday was very painful, and he has pain around the injection site. Pain is improving with pain regimen. Na improving. Will      Oncology Treatment Plan:   OP NIVOLUMAB 1 MG/KG IPILIMUMAB 3MG/KG FOLLOWED BY NIVOLUMAB 480MG Q4W    Medications:  Continuous Infusions:   sodium chloride 0.9% 100 mL/hr at 04/15/22 0515     Scheduled Meds:   cefTRIAXone (ROCEPHIN) IVPB  2 g Intravenous Q24H    heparin (porcine)  5,000 Units Subcutaneous Q8H    levETIRAcetam  1,000 mg Oral BID     PRN Meds:dextrose 10%, dextrose 10%, dextrose 10%, dextrose 10%, glucagon (human recombinant), glucose, glucose, HYDROmorphone, naloxone, oxyCODONE, prochlorperazine, promethazine (PHENERGAN) IVPB, sodium chloride 0.9%     Review of Systems   Constitutional:  Positive for appetite change and fatigue. Negative for chills, diaphoresis and fever.   HENT:  Negative for congestion and sore throat.    Respiratory:  Negative for cough and shortness of breath.    Cardiovascular:  Negative for chest pain and palpitations.   Gastrointestinal:  Positive for abdominal pain and nausea. Negative for abdominal distention, blood in stool and vomiting.   Genitourinary:  Negative for dysuria and frequency.   Musculoskeletal:  Positive for myalgias. Negative for arthralgias.   Skin:  Negative for rash and wound.   Neurological:  Positive for weakness. Negative for dizziness, syncope, light-headedness and numbness.   Psychiatric/Behavioral:  Negative for confusion. The patient is not nervous/anxious.    Objective:     Vital Signs (Most Recent):  Temp: 96.6 °F (35.9 °C) (04/15/22 0730)  Pulse: 82  (04/15/22 0730)  Resp: 18 (04/15/22 0730)  BP: 120/86 (04/15/22 0730)  SpO2: 95 % (04/15/22 0730) Vital Signs (24h Range):  Temp:  [96.6 °F (35.9 °C)-98.2 °F (36.8 °C)] 96.6 °F (35.9 °C)  Pulse:  [] 82  Resp:  [16-22] 18  SpO2:  [93 %-99 %] 95 %  BP: (116-175)/() 120/86     Weight: 76.4 kg (168 lb 6.9 oz) (76.4)  Body mass index is 22.84 kg/m².  Body surface area is 1.97 meters squared.      Intake/Output Summary (Last 24 hours) at 4/15/2022 0853  Last data filed at 4/15/2022 0709  Gross per 24 hour   Intake 2444.88 ml   Output 7000 ml   Net -4555.12 ml       Physical Exam  Vitals and nursing note reviewed.   Constitutional:       General: He is in acute distress.      Appearance: He is normal weight. He is ill-appearing and toxic-appearing. He is not diaphoretic.   HENT:      Head: Normocephalic and atraumatic.      Nose: Nose normal. No congestion or rhinorrhea.      Mouth/Throat:      Mouth: Mucous membranes are dry.      Pharynx: Oropharynx is clear.   Eyes:      Extraocular Movements: Extraocular movements intact.      Pupils: Pupils are equal, round, and reactive to light.   Cardiovascular:      Rate and Rhythm: Normal rate and regular rhythm.      Pulses: Normal pulses.      Heart sounds: Normal heart sounds. No murmur heard.    No friction rub. No gallop.   Pulmonary:      Effort: Pulmonary effort is normal. No respiratory distress.      Breath sounds: Normal breath sounds.   Abdominal:      General: Bowel sounds are decreased. There is no distension.      Tenderness: There is abdominal tenderness (RLQ surrounding para insertion). There is no guarding or rebound.   Musculoskeletal:         General: No swelling. Normal range of motion.      Cervical back: Normal range of motion and neck supple.   Skin:     General: Skin is warm and dry.   Neurological:      General: No focal deficit present.      Mental Status: He is alert and oriented to person, place, and time.   Psychiatric:         Mood and  Affect: Mood normal.         Behavior: Behavior normal.       Significant Labs:   CBC:   Recent Labs   Lab 04/14/22  0900 04/15/22  0303   WBC 19.30* 17.63*   HGB 15.1 15.4   HCT 46.5 47.4   * 500*    and CMP:   Recent Labs   Lab 04/14/22  0900 04/14/22  1236 04/15/22  0129 04/15/22  0303 04/15/22  1005 04/15/22  1131   *   < > 122* 124*  124* 123* 121*   K 5.9*   < > 5.0 5.2*  5.2* 5.5* 5.5*   CL 88*   < > 89* 88*  88* 89* 89*   CO2 24   < > 23 25  25 24 23   *   < > 99 85  85 132* 119*   BUN 28*   < > 25* 25*  25* 24* 22*   CREATININE 2.0*   < > 1.4 1.3  1.3 1.4 1.3   CALCIUM 9.5   < > 8.5* 8.6*  8.6* 8.2* 8.3*   PROT 6.2  --  5.2* 5.3*  --   --    ALBUMIN 2.6*  --  2.6* 2.6*  --   --    BILITOT 0.5  --  0.4 0.4  --   --    ALKPHOS 77  --  56 59  --   --    AST 22  --  17 17  --   --    ALT 8*  --  6* 6*  --   --    ANIONGAP 11   < > 10 11  11 10 9   EGFRNONAA 37.3*   < > 57.4* >60.0  >60.0 57.4* >60.0    < > = values in this interval not displayed.       Diagnostic Results:  I have reviewed all pertinent imaging results/findings within the past 24 hours.  IR Paracentesis without imaging   Final Result      Ultrasound-guided paracentesis with drainage of 7000 mL of elizabeth fluid.  Albumin administered as per protocol.      _______________________________________________________________      Electronically signed by resident: Rudy Otero   Date:    04/14/2022   Time:    17:39      Electronically signed by: Juan Jose Hernández MD   Date:    04/14/2022   Time:    17:46      CT Chest Abdomen Pelvis Without Contrast (XPD)    (Results Pending)         Assessment/Plan:     * Ascites  50 y/o M hx of malignant melanoma presents from clinic with symptomatic malignant ascites. Pt has had multiple admissions over the past month for therapeutic paracenteses in the setting of malignant ascites. His last para was 4/4, 10 days prior to admission. Patient will likely need scheduled weekly therapeutic josiah in  the future to prevent hospitalization. S/p para on4/15 with 7L removed and albumin administered.     Plan:  -- Cell counts not c/w SBP, though continuing  To cover with Ceftriaxone 2g q24h for given concurrent leukocytosis  -- schedule weekly para at discharge    Intractable nausea and vomiting  Pt presenting with several days of intractable nausea and vomiting. Pt unable to keep anything, including water, down. Zofran with no improvement at home. Likely 2/2 severe hyponatremia and significant ascites. Improved significantly with anti emetics and para.     -- therapeutic para as above  -- management of hyponatremia as above  -- IV compazine and IV phenergan      Hyperkalemia  Pt with potassium 5.9 on CMP in ED. K 6.4 on istat. EKG without any acute changes. Pt shifted in ED with repeat BMP showing K 5.5. Unclear etiology of acute hyperkalemia, though patient does have concurrent PAULA. K consistently elevated at 5.5 through 4/15    -- serial bmp  -- avoid potential potassium-increasing medications    PAULA (acute kidney injury)  Pt with Cr 2.0 on admission up from baseline of 1.0. Likely prerenal s/o poor PO intake, vomiting, and ascites. Cr improved to 1.3 with IVF and para.     -- f/u lytes  -- daily bmp  -- avoid nephrotoxic medications  -- renally dose meds  -- receiving NS infusion    Hyponatremia  Pt presenting with severe hyponatremia to 119 in ED. Likely Hypervolemic hyponatremia s/o malignant ascites. Na on labs 10 days prior to admission 128. Pt presenting with severe intractable nausea and vomiting. No changes in mental status or seizures. Sxs improving with improvement in Na. Goal Na by 4/16 5pm 132    -- s/p therapeutic paracentesis on 4/14  --  F/u urine osm, urine Na  --  On NS gtt 100/hr      Metastatic melanoma  He presented initially with enlarging right axillary lymph nodes that have grown progressively in size since April 2021. He presented to urgent care and was referred for US which showed multiple  enlarged masses in the right axilla most likely reflecting abnormal lymph nodes concerning for malignancy.   He was subsequently referred to dermatology who performed a complete exam of the skin that was negative for suspicious cutaneous lesions as well as punch biopsy of one of the lymph node on 01/04/22. Pathology came back as malignant melanoma.   BRAF mutation: positive for a V600E mutation.   He subsequently underwent a PET/CT on 01/28/22 which showed multiple hypermetabolic soft tissue masses within the right axillary region, metastatic lesions to the lungs and abdomen (VI segment of the liver measuring a max SUV of 6.99, 1.6 x 1.9 cm nodular soft tissue density with a max SUV  7.64 was seen adjacent to the colon within the right lower quadrant. Brain MRI was negative for metastatic disease.  LDH was 307 U/L  He saw Oncology at [facility] on 01/31/22 with plan to start First line Nivolumab / ipilimumab. He received C1 on 03/03/22.     -- pt scheduled to start binimetinib and encorafenib prior to admission.  -- will restart upon discharge  -- obtaining CT chest abdomen and pelvis without IV contrast to assess for metastatic disease s/o PAULA      History of seizure  --  Continue home Keppra 1000 BID              Roel Campos MD   Internal Medicine PGY-1  Hematology/Oncology  [facility] - Oncology ([facility])

## 2022-04-15 NOTE — CONSULTS
Jesse Ramírez - Oncology ([facility])  Nephrology  Consult Note    Patient Name: Joseluis Garner  MRN: [id number]  Admission Date: 4/14/2022  Hospital Length of Stay: 1 days  Attending Provider: Meghan Rios MD   Primary Care Physician: Primary Doctor No  Principal Problem:Ascites    Inpatient consult to Nephrology  Consult performed by: Jimbo Cabral MD  Consult ordered by: Roel Campos MD        Subjective:     HPI: Joseluis Garner is a 51 year old male with CAD, seizure disorder, stage IV melanoma who presents with hyponatremia, hyperkalemia, and worsening ascites. Patent was noted to have sodium 119, potassium of 5.9, and creatinine of 2.0 from baseline 1.0, on admission. Patient being treated with nivolumab and iplimumab. Notably, describes nausea and vomiting with reduced oral intake. He received a 7L paracentesis with albumin replacement. Additionally, patient was given 1 L NS bolus and NS continue infusion. Initially, sodium improved to 126 then down trended to 123. Potassium now 5.5. Renal function improving with IV fluids - creatinine 1.4. Nephrology consulted for hyponatremia, hyperkalemia, and acute kidney injury.      Past Medical History:   Diagnosis Date    Seizures        History reviewed. No pertinent surgical history.    Review of patient's allergies indicates:  No Known Allergies  Current Facility-Administered Medications   Medication Frequency    0.9%  NaCl infusion Continuous    cefTRIAXone (ROCEPHIN) 2 g/50 mL D5W IVPB Q24H    dextrose 10% bolus 125 mL PRN    dextrose 10% bolus 125 mL PRN    dextrose 10% bolus 250 mL PRN    dextrose 10% bolus 250 mL PRN    glucagon (human recombinant) injection 1 mg PRN    glucose chewable tablet 16 g PRN    glucose chewable tablet 24 g PRN    heparin (porcine) injection 5,000 Units Q8H    HYDROmorphone injection 0.5 mg Q4H PRN    levETIRAcetam tablet 1,000 mg BID    naloxone 0.4 mg/mL injection 0.02 mg PRN    oxyCODONE immediate release tablet  Tab 20 mg Q6H PRN    prochlorperazine injection Soln 2.5 mg Q6H PRN    promethazine (PHENERGAN) 12.5 mg in dextrose 5 % 50 mL IVPB Q6H PRN    sodium chloride 0.9% flush 10 mL Q12H PRN     Family History    None       Tobacco Use    Smoking status: Current Every Day Smoker    Smokeless tobacco: Never Used   Substance and Sexual Activity    Alcohol use: Yes    Drug use: Yes     Types: Methamphetamines, Marijuana    Sexual activity: Not on file     Review of Systems   Constitutional:  Positive for appetite change and fatigue. Negative for chills, diaphoresis and fever.   HENT:  Negative for congestion and sore throat.    Respiratory:  Negative for cough and shortness of breath.    Cardiovascular:  Negative for chest pain and palpitations.   Gastrointestinal:  Positive for abdominal pain and nausea. Negative for abdominal distention, blood in stool and vomiting.   Genitourinary:  Negative for dysuria and frequency.   Musculoskeletal:  Positive for myalgias. Negative for arthralgias.   Skin:  Negative for rash and wound.   Neurological:  Positive for weakness. Negative for dizziness, syncope, light-headedness and numbness.   Psychiatric/Behavioral:  Negative for confusion. The patient is not nervous/anxious.    Objective:     Vital Signs (Most Recent):  Temp: 96.6 °F (35.9 °C) (04/15/22 0730)  Pulse: 89 (04/15/22 1011)  Resp: 18 (04/15/22 1011)  BP: 120/86 (04/15/22 0730)  SpO2: 96 % (04/15/22 1011)  O2 Device (Oxygen Therapy): room air (04/15/22 1011) Vital Signs (24h Range):  Temp:  [96.6 °F (35.9 °C)-98.2 °F (36.8 °C)] 96.6 °F (35.9 °C)  Pulse:  [76-92] 89  Resp:  [16-20] 18  SpO2:  [93 %-99 %] 96 %  BP: (116-161)/() 120/86     Weight: 76.4 kg (168 lb 6.9 oz) (76.4) (04/14/22 1810)  Body mass index is 22.84 kg/m².  Body surface area is 1.97 meters squared.    I/O last 3 completed shifts:  In: 1488.8 [P.O.:240; IV Piggyback:1248.8]  Out: 7000 [Other:7000]    Physical Exam  Vitals and nursing note  reviewed.   Constitutional:       General: He is in acute distress.      Appearance: He is normal weight. He is ill-appearing and toxic-appearing. He is not diaphoretic.   HENT:      Head: Normocephalic and atraumatic.      Nose: Nose normal. No congestion or rhinorrhea.      Mouth/Throat:      Mouth: Mucous membranes are dry.      Pharynx: Oropharynx is clear.   Eyes:      Extraocular Movements: Extraocular movements intact.      Pupils: Pupils are equal, round, and reactive to light.   Cardiovascular:      Rate and Rhythm: Normal rate and regular rhythm.      Pulses: Normal pulses.      Heart sounds: Normal heart sounds. No murmur heard.    No friction rub. No gallop.   Pulmonary:      Effort: Pulmonary effort is normal. No respiratory distress.      Breath sounds: Normal breath sounds.   Abdominal:      General: Bowel sounds are decreased. There is no distension.      Tenderness: There is abdominal tenderness (RLQ surrounding para insertion). There is no guarding or rebound.   Musculoskeletal:         General: No swelling. Normal range of motion.      Cervical back: Normal range of motion and neck supple.   Skin:     General: Skin is warm and dry.   Neurological:      General: No focal deficit present.      Mental Status: He is alert and oriented to person, place, and time.   Psychiatric:         Mood and Affect: Mood normal.         Behavior: Behavior normal.       Significant Labs:  All labs within the past 24 hours have been reviewed.    Significant Imaging:  All labs within the past 24 hours have been reviewed.    Assessment/Plan:     PAULA (acute kidney injury)  Patient with creatinine 2.0 on admission from baseline 1.0 in the context of reduced oral intake and vomiting. Suspect likely prerenal given improvement with IV fluids.    Plan:  - continue IV fluids  - renal diet  - avoid nephrotoxic agents and renally dose medications    Hyponatremia  Hyperkalemia  Patent was noted to have sodium 119, potassium of  5.9, on admission in the setting of reduced oral intake and vomiting. Likely hypovolemic hyponatremia on admission. Patient was given 1 L NS bolus and albumin following paracentesis, followed by NS infusion. Initially, sodium improved to 126 then down trended to 123. On assessment patient seems volume down still. Suspect hyperkalemia related to acute kidney injury and reduced urine output. Additionally, given concurrent hyponatremia and hyperkalemia there is concern for adrenal pathology either metastasis or related to immunotherapy. For now, would rule out hypovolemic hyponatremia.    Sodium trend 119>126>123    Plan:  - complete urine osm, serum osm, urine sodium and potassium  - CT AP pending  - 1 L NS over 3 hours, repeat sodium - if improved would give an additional liter  - BMP q6  - strict intake and output          Thank you for your consult. I will follow-up with patient. Please contact us if you have any additional questions.    Jimbo Cabral MD  Nephrology  [facility] - Oncology ([facility])

## 2022-04-15 NOTE — ASSESSMENT & PLAN NOTE
Pt with Cr 2.0 on admission up from baseline of 1.0. Likely prerenal s/o poor PO intake, vomiting, and ascites. Cr improved to 1.3 with IVF and para.     -- f/u lytes  -- daily bmp  -- avoid nephrotoxic medications  -- renally dose meds  -- receiving NS infusion

## 2022-04-15 NOTE — ASSESSMENT & PLAN NOTE
Hyperkalemia  Patent was noted to have sodium 119, potassium of 5.9, on admission in the setting of reduced oral intake and vomiting. Likely hypovolemic hyponatremia on admission. Patient was given 1 L NS bolus and albumin following paracentesis, followed by NS infusion. Initially, sodium improved to 126 then down trended to 123. On assessment patient seems volume down still. Suspect hyperkalemia related to acute kidney injury and reduced urine output. Additionally, given concurrent hyponatremia and hyperkalemia there is concern for adrenal pathology either metastasis or related to immunotherapy. For now, would rule out hypovolemic hyponatremia.    Sodium trend 119>126>123    Plan:  - complete urine osm, serum osm, urine sodium and potassium  - CT AP pending  - 1 L NS over 3 hours, repeat sodium - if improved would give an additional liter  - BMP q6  - strict intake and output

## 2022-04-15 NOTE — HOSPITAL COURSE
Patient admitted for workup and management of worsening ascites, intractable nausea and vomiting, hyperkalemia, and hyponatremia. Pt started on  ctx for SBP prophylaxis given ascites and concurrent leukocytosis. He underwent urgent para with IR and 7L taken off. Para labs not consistent with SBP. K shifted with improvement in hyperkalemia. Na improving with NS infusion. Pain controlled with oxy PO and fentanyl patch. Nephro consulted for assistance given multiple electrolyte abnormalities. On 4/16 patient with worsening hyponatremia and hyperkalemia as well as intractible nausea and vomiting. KUB and CT-CAP demonstrating possible partial SBO vs ileus. K  shifted and pt given 2L NS for hypovolemic hyponatremia. NGT placed 4/17 with significant output. Overnight on 4/17 pt removing  NGT multiple times, requiring ativan for agitation. Overnight on 4/18 pt wanting to leave AMA but while leaving hospital changed his mind and returned to room. Pt with worsening mental status and increasing agitation 4/18-4/19. Pt requiring significant sedation with multiple medications as well as hard restraints. Psychiatry consulted. Unclear  etiology of behavioral changes, favor brain mets vs substance withdrawal. Pt with no recent alcohol use per mom, UDS +opiate and THC. Psychiatry recommended scheduled Depakote and Zyprexa PRN. MRI demonstrating sub-centimeter frontal lobe lesions concerning for metastatic disease. Pt to start on home melanoma treatment 4/21. Para ordered, likely 4/23. PAULA worsening, likely hypervolemic s/o worsening ascites. Pt received para with 9L pulled and alb administered. Pt with subsequently markedly improved kidney function.  On 4/24 pt with Cr at baseline, labs improving, symptoms improved, and pt stable for discharge to home with close onc follow up and weekly outpatient josiah.

## 2022-04-15 NOTE — HPI
Joseluis Garner is a 51 year old male with CAD, seizure disorder, stage IV melanoma who presents with hyponatremia, hyperkalemia, and worsening ascites. Patent was noted to have sodium 119, potassium of 5.9, and creatinine of 2.0 from baseline 1.0, on admission. Patient being treated with nivolumab and iplimumab. Notably, describes nausea and vomiting with reduced oral intake. He received a 7L paracentesis with albumin replacement. Additionally, patient was given 1 L NS bolus and NS continue infusion. Initially, sodium improved to 126 then down trended to 123. Potassium now 5.5. Renal function improving with IV fluids - creatinine 1.4. Nephrology consulted for hyponatremia, hyperkalemia, and acute kidney injury.

## 2022-04-15 NOTE — SUBJECTIVE & OBJECTIVE
Interval History: NAEON. Pt states his para yesterday was very painful, and he has pain around the injection site. Pain is improving with pain regimen. Na improving. Will      Oncology Treatment Plan:   OP NIVOLUMAB 1 MG/KG IPILIMUMAB 3MG/KG FOLLOWED BY NIVOLUMAB 480MG Q4W    Medications:  Continuous Infusions:   sodium chloride 0.9% 100 mL/hr at 04/15/22 0515     Scheduled Meds:   cefTRIAXone (ROCEPHIN) IVPB  2 g Intravenous Q24H    heparin (porcine)  5,000 Units Subcutaneous Q8H    levETIRAcetam  1,000 mg Oral BID     PRN Meds:dextrose 10%, dextrose 10%, dextrose 10%, dextrose 10%, glucagon (human recombinant), glucose, glucose, HYDROmorphone, naloxone, oxyCODONE, prochlorperazine, promethazine (PHENERGAN) IVPB, sodium chloride 0.9%     Review of Systems   Constitutional:  Positive for appetite change and fatigue. Negative for chills, diaphoresis and fever.   HENT:  Negative for congestion and sore throat.    Respiratory:  Negative for cough and shortness of breath.    Cardiovascular:  Negative for chest pain and palpitations.   Gastrointestinal:  Positive for abdominal pain and nausea. Negative for abdominal distention, blood in stool and vomiting.   Genitourinary:  Negative for dysuria and frequency.   Musculoskeletal:  Positive for myalgias. Negative for arthralgias.   Skin:  Negative for rash and wound.   Neurological:  Positive for weakness. Negative for dizziness, syncope, light-headedness and numbness.   Psychiatric/Behavioral:  Negative for confusion. The patient is not nervous/anxious.    Objective:     Vital Signs (Most Recent):  Temp: 96.6 °F (35.9 °C) (04/15/22 0730)  Pulse: 82 (04/15/22 0730)  Resp: 18 (04/15/22 0730)  BP: 120/86 (04/15/22 0730)  SpO2: 95 % (04/15/22 0730) Vital Signs (24h Range):  Temp:  [96.6 °F (35.9 °C)-98.2 °F (36.8 °C)] 96.6 °F (35.9 °C)  Pulse:  [] 82  Resp:  [16-22] 18  SpO2:  [93 %-99 %] 95 %  BP: (116-175)/() 120/86     Weight: 76.4 kg (168 lb 6.9 oz)  (76.4)  Body mass index is 22.84 kg/m².  Body surface area is 1.97 meters squared.      Intake/Output Summary (Last 24 hours) at 4/15/2022 0853  Last data filed at 4/15/2022 0709  Gross per 24 hour   Intake 2444.88 ml   Output 7000 ml   Net -4555.12 ml       Physical Exam  Vitals and nursing note reviewed.   Constitutional:       General: He is in acute distress.      Appearance: He is normal weight. He is ill-appearing and toxic-appearing. He is not diaphoretic.   HENT:      Head: Normocephalic and atraumatic.      Nose: Nose normal. No congestion or rhinorrhea.      Mouth/Throat:      Mouth: Mucous membranes are dry.      Pharynx: Oropharynx is clear.   Eyes:      Extraocular Movements: Extraocular movements intact.      Pupils: Pupils are equal, round, and reactive to light.   Cardiovascular:      Rate and Rhythm: Normal rate and regular rhythm.      Pulses: Normal pulses.      Heart sounds: Normal heart sounds. No murmur heard.    No friction rub. No gallop.   Pulmonary:      Effort: Pulmonary effort is normal. No respiratory distress.      Breath sounds: Normal breath sounds.   Abdominal:      General: Bowel sounds are decreased. There is no distension.      Tenderness: There is abdominal tenderness (RLQ surrounding para insertion). There is no guarding or rebound.   Musculoskeletal:         General: No swelling. Normal range of motion.      Cervical back: Normal range of motion and neck supple.   Skin:     General: Skin is warm and dry.   Neurological:      General: No focal deficit present.      Mental Status: He is alert and oriented to person, place, and time.   Psychiatric:         Mood and Affect: Mood normal.         Behavior: Behavior normal.       Significant Labs:   CBC:   Recent Labs   Lab 04/14/22  0900 04/15/22  0303   WBC 19.30* 17.63*   HGB 15.1 15.4   HCT 46.5 47.4   * 500*    and CMP:   Recent Labs   Lab 04/14/22  0900 04/14/22  1236 04/15/22  0129 04/15/22  0303 04/15/22  1005  04/15/22  1131   *   < > 122* 124*  124* 123* 121*   K 5.9*   < > 5.0 5.2*  5.2* 5.5* 5.5*   CL 88*   < > 89* 88*  88* 89* 89*   CO2 24   < > 23 25  25 24 23   *   < > 99 85  85 132* 119*   BUN 28*   < > 25* 25*  25* 24* 22*   CREATININE 2.0*   < > 1.4 1.3  1.3 1.4 1.3   CALCIUM 9.5   < > 8.5* 8.6*  8.6* 8.2* 8.3*   PROT 6.2  --  5.2* 5.3*  --   --    ALBUMIN 2.6*  --  2.6* 2.6*  --   --    BILITOT 0.5  --  0.4 0.4  --   --    ALKPHOS 77  --  56 59  --   --    AST 22  --  17 17  --   --    ALT 8*  --  6* 6*  --   --    ANIONGAP 11   < > 10 11  11 10 9   EGFRNONAA 37.3*   < > 57.4* >60.0  >60.0 57.4* >60.0    < > = values in this interval not displayed.       Diagnostic Results:  I have reviewed all pertinent imaging results/findings within the past 24 hours.  IR Paracentesis without imaging   Final Result      Ultrasound-guided paracentesis with drainage of 7000 mL of elizabeth fluid.  Albumin administered as per protocol.      _______________________________________________________________      Electronically signed by resident: Rudy Otero   Date:    04/14/2022   Time:    17:39      Electronically signed by: Juan Jose Hernández MD   Date:    04/14/2022   Time:    17:46      CT Chest Abdomen Pelvis Without Contrast (XPD)    (Results Pending)

## 2022-04-16 LAB
ALBUMIN SERPL BCP-MCNC: 2.2 G/DL (ref 3.5–5.2)
ALP SERPL-CCNC: 64 U/L (ref 55–135)
ALT SERPL W/O P-5'-P-CCNC: 7 U/L (ref 10–44)
ANION GAP SERPL CALC-SCNC: 11 MMOL/L (ref 8–16)
ANION GAP SERPL CALC-SCNC: 12 MMOL/L (ref 8–16)
ANION GAP SERPL CALC-SCNC: 13 MMOL/L (ref 8–16)
ANION GAP SERPL CALC-SCNC: 14 MMOL/L (ref 8–16)
AST SERPL-CCNC: 21 U/L (ref 10–40)
BASOPHILS # BLD AUTO: 0.06 K/UL (ref 0–0.2)
BASOPHILS NFR BLD: 0.3 % (ref 0–1.9)
BILIRUB SERPL-MCNC: 0.5 MG/DL (ref 0.1–1)
BUN SERPL-MCNC: 22 MG/DL (ref 6–20)
BUN SERPL-MCNC: 24 MG/DL (ref 6–20)
BUN SERPL-MCNC: 25 MG/DL (ref 6–20)
BUN SERPL-MCNC: 25 MG/DL (ref 6–20)
CALCIUM SERPL-MCNC: 8 MG/DL (ref 8.7–10.5)
CALCIUM SERPL-MCNC: 8.5 MG/DL (ref 8.7–10.5)
CALCIUM SERPL-MCNC: 8.5 MG/DL (ref 8.7–10.5)
CALCIUM SERPL-MCNC: 8.6 MG/DL (ref 8.7–10.5)
CHLORIDE SERPL-SCNC: 87 MMOL/L (ref 95–110)
CHLORIDE SERPL-SCNC: 88 MMOL/L (ref 95–110)
CHLORIDE SERPL-SCNC: 89 MMOL/L (ref 95–110)
CHLORIDE SERPL-SCNC: 92 MMOL/L (ref 95–110)
CO2 SERPL-SCNC: 19 MMOL/L (ref 23–29)
CO2 SERPL-SCNC: 20 MMOL/L (ref 23–29)
CO2 SERPL-SCNC: 20 MMOL/L (ref 23–29)
CO2 SERPL-SCNC: 22 MMOL/L (ref 23–29)
CREAT SERPL-MCNC: 1.1 MG/DL (ref 0.5–1.4)
CREAT SERPL-MCNC: 1.2 MG/DL (ref 0.5–1.4)
CREAT SERPL-MCNC: 1.3 MG/DL (ref 0.5–1.4)
CREAT SERPL-MCNC: 1.4 MG/DL (ref 0.5–1.4)
CREAT UR-MCNC: 169 MG/DL (ref 23–375)
DIFFERENTIAL METHOD: ABNORMAL
EOSINOPHIL # BLD AUTO: 0 K/UL (ref 0–0.5)
EOSINOPHIL NFR BLD: 0.1 % (ref 0–8)
ERYTHROCYTE [DISTWIDTH] IN BLOOD BY AUTOMATED COUNT: 14.2 % (ref 11.5–14.5)
EST. GFR  (AFRICAN AMERICAN): >60 ML/MIN/1.73 M^2
EST. GFR  (NON AFRICAN AMERICAN): 57.4 ML/MIN/1.73 M^2
EST. GFR  (NON AFRICAN AMERICAN): >60 ML/MIN/1.73 M^2
GLUCOSE SERPL-MCNC: 107 MG/DL (ref 70–110)
GLUCOSE SERPL-MCNC: 125 MG/DL (ref 70–110)
GLUCOSE SERPL-MCNC: 95 MG/DL (ref 70–110)
GLUCOSE SERPL-MCNC: 97 MG/DL (ref 70–110)
HCT VFR BLD AUTO: 46 % (ref 40–54)
HGB BLD-MCNC: 15.3 G/DL (ref 14–18)
IMM GRANULOCYTES # BLD AUTO: 0.17 K/UL (ref 0–0.04)
IMM GRANULOCYTES NFR BLD AUTO: 0.8 % (ref 0–0.5)
LYMPHOCYTES # BLD AUTO: 0.8 K/UL (ref 1–4.8)
LYMPHOCYTES NFR BLD: 3.9 % (ref 18–48)
MAGNESIUM SERPL-MCNC: 2 MG/DL (ref 1.6–2.6)
MCH RBC QN AUTO: 26.7 PG (ref 27–31)
MCHC RBC AUTO-ENTMCNC: 33.3 G/DL (ref 32–36)
MCV RBC AUTO: 80 FL (ref 82–98)
MONOCYTES # BLD AUTO: 0.9 K/UL (ref 0.3–1)
MONOCYTES NFR BLD: 4.4 % (ref 4–15)
NEUTROPHILS # BLD AUTO: 18.2 K/UL (ref 1.8–7.7)
NEUTROPHILS NFR BLD: 90.5 % (ref 38–73)
NRBC BLD-RTO: 0 /100 WBC
OSMOLALITY UR: 475 MOSM/KG (ref 50–1200)
PLATELET # BLD AUTO: 479 K/UL (ref 150–450)
PMV BLD AUTO: 9.7 FL (ref 9.2–12.9)
POCT GLUCOSE: 107 MG/DL (ref 70–110)
POTASSIUM SERPL-SCNC: 4.9 MMOL/L (ref 3.5–5.1)
POTASSIUM SERPL-SCNC: 5.9 MMOL/L (ref 3.5–5.1)
POTASSIUM UR-SCNC: 58 MMOL/L (ref 15–95)
PROT SERPL-MCNC: 4.7 G/DL (ref 6–8.4)
RBC # BLD AUTO: 5.73 M/UL (ref 4.6–6.2)
SODIUM SERPL-SCNC: 121 MMOL/L (ref 136–145)
SODIUM SERPL-SCNC: 121 MMOL/L (ref 136–145)
SODIUM SERPL-SCNC: 122 MMOL/L (ref 136–145)
SODIUM SERPL-SCNC: 123 MMOL/L (ref 136–145)
SODIUM UR-SCNC: <10 MMOL/L (ref 20–250)
WBC # BLD AUTO: 20.18 K/UL (ref 3.9–12.7)

## 2022-04-16 PROCEDURE — 36410 VNPNXR 3YR/> PHY/QHP DX/THER: CPT

## 2022-04-16 PROCEDURE — 80048 BASIC METABOLIC PNL TOTAL CA: CPT | Mod: XB | Performed by: INTERNAL MEDICINE

## 2022-04-16 PROCEDURE — 84300 ASSAY OF URINE SODIUM: CPT

## 2022-04-16 PROCEDURE — 25000003 PHARM REV CODE 250

## 2022-04-16 PROCEDURE — 82570 ASSAY OF URINE CREATININE: CPT

## 2022-04-16 PROCEDURE — 25500020 PHARM REV CODE 255: Performed by: SURGERY

## 2022-04-16 PROCEDURE — 36415 COLL VENOUS BLD VENIPUNCTURE: CPT | Performed by: INTERNAL MEDICINE

## 2022-04-16 PROCEDURE — 63600175 PHARM REV CODE 636 W HCPCS

## 2022-04-16 PROCEDURE — 25000242 PHARM REV CODE 250 ALT 637 W/ HCPCS

## 2022-04-16 PROCEDURE — 80053 COMPREHEN METABOLIC PANEL: CPT

## 2022-04-16 PROCEDURE — C1751 CATH, INF, PER/CENT/MIDLINE: HCPCS

## 2022-04-16 PROCEDURE — 85025 COMPLETE CBC W/AUTO DIFF WBC: CPT

## 2022-04-16 PROCEDURE — 80048 BASIC METABOLIC PNL TOTAL CA: CPT | Mod: 91,XB | Performed by: INTERNAL MEDICINE

## 2022-04-16 PROCEDURE — 99233 SBSQ HOSP IP/OBS HIGH 50: CPT | Mod: ,,, | Performed by: INTERNAL MEDICINE

## 2022-04-16 PROCEDURE — 63600175 PHARM REV CODE 636 W HCPCS: Performed by: STUDENT IN AN ORGANIZED HEALTH CARE EDUCATION/TRAINING PROGRAM

## 2022-04-16 PROCEDURE — 99233 PR SUBSEQUENT HOSPITAL CARE,LEVL III: ICD-10-PCS | Mod: ,,, | Performed by: INTERNAL MEDICINE

## 2022-04-16 PROCEDURE — 83935 ASSAY OF URINE OSMOLALITY: CPT

## 2022-04-16 PROCEDURE — 83735 ASSAY OF MAGNESIUM: CPT

## 2022-04-16 PROCEDURE — 20600001 HC STEP DOWN PRIVATE ROOM

## 2022-04-16 PROCEDURE — 94640 AIRWAY INHALATION TREATMENT: CPT

## 2022-04-16 PROCEDURE — 84133 ASSAY OF URINE POTASSIUM: CPT

## 2022-04-16 RX ORDER — ONDANSETRON 2 MG/ML
8 INJECTION INTRAMUSCULAR; INTRAVENOUS EVERY 6 HOURS PRN
Status: DISCONTINUED | OUTPATIENT
Start: 2022-04-16 | End: 2022-04-17

## 2022-04-16 RX ORDER — ALBUTEROL SULFATE 2.5 MG/.5ML
10 SOLUTION RESPIRATORY (INHALATION) ONCE
Status: COMPLETED | OUTPATIENT
Start: 2022-04-16 | End: 2022-04-16

## 2022-04-16 RX ORDER — LORAZEPAM 2 MG/ML
0.5 INJECTION INTRAMUSCULAR ONCE AS NEEDED
Status: COMPLETED | OUTPATIENT
Start: 2022-04-16 | End: 2022-04-16

## 2022-04-16 RX ORDER — SODIUM CHLORIDE 9 MG/ML
INJECTION, SOLUTION INTRAVENOUS CONTINUOUS
Status: DISCONTINUED | OUTPATIENT
Start: 2022-04-16 | End: 2022-04-19

## 2022-04-16 RX ADMIN — HYDROMORPHONE HYDROCHLORIDE 0.5 MG: 1 INJECTION, SOLUTION INTRAMUSCULAR; INTRAVENOUS; SUBCUTANEOUS at 01:04

## 2022-04-16 RX ADMIN — ONDANSETRON 8 MG: 2 INJECTION INTRAMUSCULAR; INTRAVENOUS at 04:04

## 2022-04-16 RX ADMIN — HEPARIN SODIUM 5000 UNITS: 5000 INJECTION INTRAVENOUS; SUBCUTANEOUS at 09:04

## 2022-04-16 RX ADMIN — IOHEXOL 15 ML: 350 INJECTION, SOLUTION INTRAVENOUS at 04:04

## 2022-04-16 RX ADMIN — HEPARIN SODIUM 5000 UNITS: 5000 INJECTION INTRAVENOUS; SUBCUTANEOUS at 05:04

## 2022-04-16 RX ADMIN — LEVETIRACETAM 1000 MG: 500 TABLET, FILM COATED ORAL at 08:04

## 2022-04-16 RX ADMIN — DEXTROSE 250 ML: 10 SOLUTION INTRAVENOUS at 01:04

## 2022-04-16 RX ADMIN — INSULIN HUMAN 10 UNITS: 100 INJECTION, SOLUTION PARENTERAL at 01:04

## 2022-04-16 RX ADMIN — HYDROMORPHONE HYDROCHLORIDE 0.5 MG: 1 INJECTION, SOLUTION INTRAMUSCULAR; INTRAVENOUS; SUBCUTANEOUS at 05:04

## 2022-04-16 RX ADMIN — OXYCODONE HYDROCHLORIDE 20 MG: 10 TABLET ORAL at 08:04

## 2022-04-16 RX ADMIN — HYDROMORPHONE HYDROCHLORIDE 0.5 MG: 1 INJECTION, SOLUTION INTRAMUSCULAR; INTRAVENOUS; SUBCUTANEOUS at 06:04

## 2022-04-16 RX ADMIN — SODIUM CHLORIDE: 0.9 INJECTION, SOLUTION INTRAVENOUS at 04:04

## 2022-04-16 RX ADMIN — HEPARIN SODIUM 5000 UNITS: 5000 INJECTION INTRAVENOUS; SUBCUTANEOUS at 01:04

## 2022-04-16 RX ADMIN — CEFTRIAXONE 2 G: 2 INJECTION, SOLUTION INTRAVENOUS at 04:04

## 2022-04-16 RX ADMIN — HYDROMORPHONE HYDROCHLORIDE 0.5 MG: 1 INJECTION, SOLUTION INTRAMUSCULAR; INTRAVENOUS; SUBCUTANEOUS at 09:04

## 2022-04-16 RX ADMIN — HYDROMORPHONE HYDROCHLORIDE 0.5 MG: 1 INJECTION, SOLUTION INTRAMUSCULAR; INTRAVENOUS; SUBCUTANEOUS at 10:04

## 2022-04-16 RX ADMIN — PROCHLORPERAZINE EDISYLATE 2.5 MG: 5 INJECTION INTRAMUSCULAR; INTRAVENOUS at 10:04

## 2022-04-16 RX ADMIN — SODIUM CHLORIDE 1000 ML: 0.9 INJECTION, SOLUTION INTRAVENOUS at 10:04

## 2022-04-16 RX ADMIN — SODIUM CHLORIDE 1000 ML: 0.9 INJECTION, SOLUTION INTRAVENOUS at 03:04

## 2022-04-16 RX ADMIN — PROCHLORPERAZINE EDISYLATE 2.5 MG: 5 INJECTION INTRAMUSCULAR; INTRAVENOUS at 03:04

## 2022-04-16 RX ADMIN — ALBUTEROL SULFATE 10 MG: 2.5 SOLUTION RESPIRATORY (INHALATION) at 12:04

## 2022-04-16 RX ADMIN — IOHEXOL 15 ML: 350 INJECTION, SOLUTION INTRAVENOUS at 03:04

## 2022-04-16 RX ADMIN — LORAZEPAM 0.5 MG: 2 INJECTION INTRAMUSCULAR; INTRAVENOUS at 04:04

## 2022-04-16 NOTE — ASSESSMENT & PLAN NOTE
Pt presenting with several days of intractable nausea and vomiting. Pt unable to keep anything, including water, down. Zofran with no improvement at home. Likely 2/2 hyponatremia and ileus. KUB and CT demonstrating ileus vs partial SBO.     -- therapeutic para as above  -- management of hyponatremia as above  -- IV compazine and zofran  -- NGT and NPO

## 2022-04-16 NOTE — PROGRESS NOTES
[facility] - Oncology ([facility])  Hematology/Oncology  Progress Note    Patient Name: Joseluis Garner  Admission Date: 4/14/2022  Hospital Length of Stay: 2 days  Code Status: Full Code     Subjective:     HPI:  51 y.o. male with history of CAD s/p PCI, seizures, substance abuse, stage IV malignant melanoma presenting from oncology clinic with multiple electrolyte abnormalities and worsening ascites. Pt has hx of malignant melanoma first diagnosed 1/2022. He is s/p 1 round of tx on 3/3/2022, with  plans to start binimetinib and enorafenib in  the near future. However, patient's clinical status has been deteriorating over the past few weeks. He was admitted on 03/18 at [facility] for abdominal pain, and distension. He underwent therapeutic paracentesis with removal of 3L, and was discharged on 03/22. CT abdomen during that admission showed soft tissue attenuation throughout the peritoneum consistent with innumerable peritoneal implants.  He was then readmitted the following day 03/23 for re accumulation and had another paracentesis with removal of 4.7L. Pt was again readmitted 03/30 to 04/02 and underwent paracentesis. He was planned to receive pleur-X catheter as outpatient. He presented [facility] [place] [facility] [place]04 with worsening abdominal distension and pain. He underwent therapeutic paracentesis with removal of 5L. Cytology was positive for malignant cells. He was discharged on 04/05 on Cipro for SBP coverage.      Since his discharge, he notes worsening abdominal distension. He had progressive nausea and vomiting and has not been able to tolerate any diet including water. He has tried zofran at home which has not helped. He denies any recent fever, chills, cough, sore throat. He was evaluated in  oncology clinic this morning  with his mother, during which time he had several episodes of vomiting. Labs obtained during clinic were concerning for leukocytosis, hyperkalemia, and hyponatremia. He was sent to the ED for admission  for management of ascites, and other current comorbid conditions.     In the ED, Pt afebrile, /85, HR 80s,  ALEJANDRO. CBC notable for  WBC 19, Plt 619. K  5.9, Na 119.  Alb 2.6. Cr 2.0 from baseline 1.0. TSH 8.8, though free T4 wnl. Procal elevated to 1.1. EKG with no acute changes, no  T wave abnormalities.        Interval History: NAEON. Pt with continued nausea, vomiting, and abdominal pain overnight and this morning. Anti-emetics not improving sxs. Unable to take anything PO. Concern for SBO vs ileus. Pt without BM x3-4 days.    Oncology Treatment Plan:   OP NIVOLUMAB 1 MG/KG IPILIMUMAB 3MG/KG FOLLOWED BY NIVOLUMAB 480MG Q4W    Medications:  Continuous Infusions:  Scheduled Meds:   cefTRIAXone (ROCEPHIN) IVPB  2 g Intravenous Q24H    fentaNYL  1 patch Transdermal Q72H    heparin (porcine)  5,000 Units Subcutaneous Q8H    levETIRAcetam  1,000 mg Oral BID    sodium chloride 0.9%  1,000 mL Intravenous Once     PRN Meds:[COMPLETED] calcium gluconate IVPB **AND** calcium gluconate IVPB, dextrose 10%, dextrose 10%, dextrose 10%, dextrose 10%, dextrose 10%, glucagon (human recombinant), glucose, glucose, HYDROmorphone, naloxone, ondansetron, oxyCODONE, promethazine (PHENERGAN) IVPB, sodium chloride 0.9%     Review of Systems   Constitutional:  Positive for appetite change and fatigue. Negative for chills, diaphoresis and fever.   HENT:  Negative for congestion and sore throat.    Respiratory:  Negative for cough and shortness of breath.    Cardiovascular:  Negative for chest pain and palpitations.   Gastrointestinal:  Positive for abdominal pain, constipation and nausea. Negative for abdominal distention, blood in stool and vomiting.   Genitourinary:  Negative for dysuria and frequency.   Musculoskeletal:  Positive for myalgias. Negative for arthralgias.   Skin:  Negative for rash and wound.   Neurological:  Positive for weakness. Negative for dizziness, syncope, light-headedness and numbness.    Psychiatric/Behavioral:  Negative for confusion. The patient is not nervous/anxious.    Objective:     Vital Signs (Most Recent):  Temp: 98.1 °F (36.7 °C) (04/16/22 1200)  Pulse: 89 (04/16/22 1222)  Resp: 19 (04/16/22 1357)  BP: (!) 128/95 (04/16/22 1200)  SpO2: 95 % (04/16/22 1200)   Vital Signs (24h Range):  Temp:  [96.6 °F (35.9 °C)-98.9 °F (37.2 °C)] 98.1 °F (36.7 °C)  Pulse:  [] 89  Resp:  [17-19] 19  SpO2:  [95 %-98 %] 95 %  BP: (120-141)/(90-95) 128/95     Weight: 76.4 kg (168 lb 6.9 oz) (76.4)  Body mass index is 22.84 kg/m².  Body surface area is 1.97 meters squared.      Intake/Output Summary (Last 24 hours) at 4/16/2022 1440  Last data filed at 4/16/2022 0625  Gross per 24 hour   Intake 731.17 ml   Output 200 ml   Net 531.17 ml       Physical Exam  Vitals and nursing note reviewed.   Constitutional:       General: He is in acute distress (due to pain).      Appearance: He is normal weight. He is ill-appearing. He is not toxic-appearing or diaphoretic.   HENT:      Head: Normocephalic and atraumatic.      Nose: Nose normal. No congestion or rhinorrhea.      Mouth/Throat:      Mouth: Mucous membranes are dry.      Pharynx: Oropharynx is clear.   Eyes:      Extraocular Movements: Extraocular movements intact.      Pupils: Pupils are equal, round, and reactive to light.   Cardiovascular:      Rate and Rhythm: Normal rate and regular rhythm.      Pulses: Normal pulses.      Heart sounds: Normal heart sounds. No murmur heard.    No friction rub. No gallop.   Pulmonary:      Effort: Pulmonary effort is normal. No respiratory distress.      Breath sounds: Normal breath sounds.   Abdominal:      General: Bowel sounds are decreased. There is distension.      Tenderness: There is abdominal tenderness (RLQ surrounding para insertion). There is no guarding or rebound.   Musculoskeletal:         General: No swelling. Normal range of motion.      Cervical back: Normal range of motion and neck supple.   Skin:      General: Skin is warm and dry.   Neurological:      General: No focal deficit present.      Mental Status: He is alert and oriented to person, place, and time.   Psychiatric:         Mood and Affect: Mood normal.         Behavior: Behavior normal.       Significant Labs:   CBC:   Recent Labs   Lab 04/15/22  0303 04/16/22  0909   WBC 17.63* 20.18*   HGB 15.4 15.3   HCT 47.4 46.0   * 479*    and CMP:   Recent Labs   Lab 04/15/22  0129 04/15/22  0303 04/15/22  1005 04/15/22  2145 04/16/22  0007 04/16/22  0909   * 124*  124*   < > 123* 123* 121*  121*   K 5.0 5.2*  5.2*   < > 6.3* 4.9  4.9 5.9*  5.9*  5.9*  5.9*   CL 89* 88*  88*   < > 91* 89* 87*  88*   CO2 23 25  25   < > 18* 22* 20*  20*   GLU 99 85  85   < > 114* 125* 95  97   BUN 25* 25*  25*   < > 25* 24* 25*  25*   CREATININE 1.4 1.3  1.3   < > 1.4 1.4 1.2  1.3   CALCIUM 8.5* 8.6*  8.6*   < > 8.3* 8.6* 8.5*  8.5*   PROT 5.2* 5.3*  --   --   --  4.7*   ALBUMIN 2.6* 2.6*  --   --   --  2.2*   BILITOT 0.4 0.4  --   --   --  0.5   ALKPHOS 56 59  --   --   --  64   AST 17 17  --   --   --  21   ALT 6* 6*  --   --   --  7*   ANIONGAP 10 11  11   < > 14 12 14  13   EGFRNONAA 57.4* >60.0  >60.0   < > 57.4* 57.4* >60.0  >60.0    < > = values in this interval not displayed.       Diagnostic Results:  I have reviewed and interpreted all pertinent imaging results/findings within the past 24 hours.    X-Ray Abdomen AP 1 View   Final Result      As above.         Electronically signed by: Jhon Goetz   Date:    04/16/2022   Time:    13:17      CT Chest Abdomen Pelvis Without Contrast (XPD)   Final Result      In this patient with a reported history of metastatic melanoma, there is diffuse metastatic disease involving the right axilla, bilateral lungs, and peritoneal metastasis as described in detail above.  Additional subcutaneous soft tissue nodule which could represent metastatic focus.      Mild wedging of L2 superior endplate suggestive  of mild compression fracture.  Recommend correlation with outside imaging.      Diverticulosis.  No evidence of diverticulitis.      Hepatomegaly.      Additional findings as above.      Electronically signed by resident: Baltazar Purcell   Date:    04/16/2022   Time:    09:17      Electronically signed by: Joseluis Arias   Date:    04/16/2022   Time:    12:24      IR Paracentesis without imaging   Final Result      Ultrasound-guided paracentesis with drainage of 7000 mL of elizabeth fluid.  Albumin administered as per protocol.      _______________________________________________________________      Electronically signed by resident: Rudy Otero   Date:    04/14/2022   Time:    17:39      Electronically signed by: Juan Jose Hernández MD   Date:    04/14/2022   Time:    17:46            Assessment/Plan:     * Ascites  50 y/o M hx of malignant melanoma presents from clinic with symptomatic malignant ascites. Pt has had multiple admissions over the past month for therapeutic paracenteses in the setting of malignant ascites. His last para was 4/4, 10 days prior to admission. Patient will likely need scheduled weekly therapeutic josiah in the future to prevent hospitalization. S/p para on4/15 with 7L removed and albumin administered.     Plan:  -- Cell counts not c/w SBP, though continuing to cover with Ceftriaxone 2g q24h for given concurrent leukocytosis  -- schedule weekly para at discharge    Intractable nausea and vomiting  Pt presenting with several days of intractable nausea and vomiting. Pt unable to keep anything, including water, down. Zofran with no improvement at home. Likely 2/2 hyponatremia and ileus. KUB and CT demonstrating ileus vs partial SBO.     -- therapeutic para as above  -- management of hyponatremia as above  -- IV compazine and zofran  -- NGT and NPO      Hyperkalemia  Pt with potassium 5.9 on CMP in ED. K 6.4 on istat. EKG without any acute changes. Pt shifted in ED with repeat BMP showing K 5.5.  Unclear etiology of acute hyperkalemia, though patient does have concurrent PAULA. K consistently elevated, now 5.9 on 4/16. S/p shift again on  4/16.     -- serial bmp  -- avoid potential potassium-increasing medications    PAULA (acute kidney injury)  Pt with Cr 2.0 on admission up from baseline of 1.0. Likely prerenal s/o poor PO intake, vomiting, and ascites. Cr improved to 1.3 with IVF and para. Stable at 1.3 for the past 2 days. Nephro following.      -- daily bmp  -- avoid nephrotoxic medications  -- renally dose meds    Hyponatremia  Pt presenting with severe hyponatremia to 119 in ED. Likely hypovolemic hyponatremia s/o poor PO  And N/V. Na on labs 10 days prior to admission 128. Pt presenting with severe intractable nausea and vomiting. No changes in mental status or seizures. Sxs improving with improvement in Na. Na fluctuating given poor PO, now 121 on 4/16. Nephro following, pt receiving intermittent NS boluses. S/p continuous IVF.     -- s/p therapeutic paracentesis on 4/14  --  Midline placed for better access        Metastatic melanoma  He presented initially with enlarging right axillary lymph nodes that have grown progressively in size since April 2021. He presented to urgent care and was referred for US which showed multiple enlarged masses in the right axilla most likely reflecting abnormal lymph nodes concerning for malignancy.   He was subsequently referred to dermatology who performed a complete exam of the skin that was negative for suspicious cutaneous lesions as well as punch biopsy of one of the lymph node on 01/04/22. Pathology came back as malignant melanoma.   BRAF mutation: positive for a V600E mutation.   He subsequently underwent a PET/CT on 01/28/22 which showed multiple hypermetabolic soft tissue masses within the right axillary region, metastatic lesions to the lungs and abdomen (VI segment of the liver measuring a max SUV of 6.99, 1.6 x 1.9 cm nodular soft tissue density with a max  SUV  7.64 was seen adjacent to the colon within the right lower quadrant. Brain MRI was negative for metastatic disease.  LDH was 307 U/L  He saw Oncology at [facility] on 01/31/22 with plan to start First line Nivolumab / ipilimumab. He received C1 on 03/03/22.     -- pt scheduled to start binimetinib and encorafenib prior to admission.  -- will restart upon discharge      History of seizure  --  Continue home Keppra 1000 BID              Roel Campos MD   Internal Medicine PGY-1  Hematology/Oncology  [facility] - Oncology ([facility])

## 2022-04-16 NOTE — PROGRESS NOTES
Patient with very low urine sodium - pointing towards intravascular depletion. Would give another 2 x 1 liter bolus of NS today.

## 2022-04-16 NOTE — ASSESSMENT & PLAN NOTE
52 y/o M hx of malignant melanoma presents from clinic with symptomatic malignant ascites. Pt has had multiple admissions over the past month for therapeutic paracenteses in the setting of malignant ascites. His last para was 4/4, 10 days prior to admission. Patient will likely need scheduled weekly therapeutic josiah in the future to prevent hospitalization. S/p para on4/15 with 7L removed and albumin administered.     Plan:  -- Cell counts not c/w SBP, though continuing to cover with Ceftriaxone 2g q24h for given concurrent leukocytosis  -- schedule weekly para at discharge

## 2022-04-16 NOTE — ASSESSMENT & PLAN NOTE
Pt presenting with severe hyponatremia to 119 in ED. Likely hypovolemic hyponatremia s/o poor PO  And N/V. Na on labs 10 days prior to admission 128. Pt presenting with severe intractable nausea and vomiting. No changes in mental status or seizures. Sxs improving with improvement in Na. Na fluctuating given poor PO, now 121 on 4/16. Nephro following, pt receiving intermittent NS boluses. S/p continuous IVF.     -- s/p therapeutic paracentesis on 4/14  --  Midline placed for better access

## 2022-04-16 NOTE — PLAN OF CARE
Pt involved in plan of care and communicating needs throughout shift. Compazine and Zofran given for nausea with moderate relief. Up in room and to bathroom independently. IV Dilaudid given for pain every four hours with moderate relief. Potassium elevated - dextrose and 10u of insulin given per order. BG before giving dextrose/insulin 113, 15 minutes after , and one hour after . 2L NS bolus given for hyponatremia. NG placed; Xray taken. Pt remaining free from falls or injury throughout shift; bed locked and in lowest position; call light within reach.  Pt instructed to call for assistance as needed.  Q1H rounding done on pt. WCTM.

## 2022-04-16 NOTE — ASSESSMENT & PLAN NOTE
Pt with Cr 2.0 on admission up from baseline of 1.0. Likely prerenal s/o poor PO intake, vomiting, and ascites. Cr improved to 1.3 with IVF and para. Stable at 1.3 for the past 2 days. Nephro following.      -- daily bmp  -- avoid nephrotoxic medications  -- renally dose meds

## 2022-04-16 NOTE — ASSESSMENT & PLAN NOTE
Pt with potassium 5.9 on CMP in ED. K 6.4 on istat. EKG without any acute changes. Pt shifted in ED with repeat BMP showing K 5.5. Unclear etiology of acute hyperkalemia, though patient does have concurrent PAULA. K consistently elevated, now 5.9 on 4/16. S/p shift again on  4/16.     -- serial bmp  -- avoid potential potassium-increasing medications

## 2022-04-16 NOTE — ASSESSMENT & PLAN NOTE
He presented initially with enlarging right axillary lymph nodes that have grown progressively in size since April 2021. He presented to urgent care and was referred for US which showed multiple enlarged masses in the right axilla most likely reflecting abnormal lymph nodes concerning for malignancy.   He was subsequently referred to dermatology who performed a complete exam of the skin that was negative for suspicious cutaneous lesions as well as punch biopsy of one of the lymph node on 01/04/22. Pathology came back as malignant melanoma.   BRAF mutation: positive for a V600E mutation.   He subsequently underwent a PET/CT on 01/28/22 which showed multiple hypermetabolic soft tissue masses within the right axillary region, metastatic lesions to the lungs and abdomen (VI segment of the liver measuring a max SUV of 6.99, 1.6 x 1.9 cm nodular soft tissue density with a max SUV  7.64 was seen adjacent to the colon within the right lower quadrant. Brain MRI was negative for metastatic disease.  LDH was 307 U/L  He saw Oncology at [facility] on 01/31/22 with plan to start First line Nivolumab / ipilimumab. He received C1 on 03/03/22.     -- pt scheduled to start binimetinib and encorafenib prior to admission.  -- will restart upon discharge

## 2022-04-17 LAB
ALBUMIN SERPL BCP-MCNC: 2.3 G/DL (ref 3.5–5.2)
ALP SERPL-CCNC: 71 U/L (ref 55–135)
ALT SERPL W/O P-5'-P-CCNC: 10 U/L (ref 10–44)
ANION GAP SERPL CALC-SCNC: 11 MMOL/L (ref 8–16)
ANION GAP SERPL CALC-SCNC: 15 MMOL/L (ref 8–16)
ANION GAP SERPL CALC-SCNC: 16 MMOL/L (ref 8–16)
AST SERPL-CCNC: 29 U/L (ref 10–40)
BASOPHILS # BLD AUTO: 0.08 K/UL (ref 0–0.2)
BASOPHILS NFR BLD: 0.4 % (ref 0–1.9)
BILIRUB SERPL-MCNC: 0.4 MG/DL (ref 0.1–1)
BUN SERPL-MCNC: 24 MG/DL (ref 6–20)
BUN SERPL-MCNC: 25 MG/DL (ref 6–20)
BUN SERPL-MCNC: 25 MG/DL (ref 6–20)
CALCIUM SERPL-MCNC: 8.3 MG/DL (ref 8.7–10.5)
CALCIUM SERPL-MCNC: 8.5 MG/DL (ref 8.7–10.5)
CALCIUM SERPL-MCNC: 8.8 MG/DL (ref 8.7–10.5)
CHLORIDE SERPL-SCNC: 89 MMOL/L (ref 95–110)
CHLORIDE SERPL-SCNC: 91 MMOL/L (ref 95–110)
CHLORIDE SERPL-SCNC: 92 MMOL/L (ref 95–110)
CO2 SERPL-SCNC: 19 MMOL/L (ref 23–29)
CO2 SERPL-SCNC: 21 MMOL/L (ref 23–29)
CO2 SERPL-SCNC: 24 MMOL/L (ref 23–29)
CREAT SERPL-MCNC: 1.2 MG/DL (ref 0.5–1.4)
CREAT SERPL-MCNC: 1.2 MG/DL (ref 0.5–1.4)
CREAT SERPL-MCNC: 1.3 MG/DL (ref 0.5–1.4)
DIFFERENTIAL METHOD: ABNORMAL
EOSINOPHIL # BLD AUTO: 0 K/UL (ref 0–0.5)
EOSINOPHIL NFR BLD: 0.1 % (ref 0–8)
ERYTHROCYTE [DISTWIDTH] IN BLOOD BY AUTOMATED COUNT: 15.1 % (ref 11.5–14.5)
EST. GFR  (AFRICAN AMERICAN): >60 ML/MIN/1.73 M^2
EST. GFR  (NON AFRICAN AMERICAN): >60 ML/MIN/1.73 M^2
GGT SERPL-CCNC: 16 U/L (ref 8–55)
GLUCOSE SERPL-MCNC: 108 MG/DL (ref 70–110)
GLUCOSE SERPL-MCNC: 115 MG/DL (ref 70–110)
GLUCOSE SERPL-MCNC: 91 MG/DL (ref 70–110)
HCT VFR BLD AUTO: 46.1 % (ref 40–54)
HGB BLD-MCNC: 15.6 G/DL (ref 14–18)
IMM GRANULOCYTES # BLD AUTO: 0.15 K/UL (ref 0–0.04)
IMM GRANULOCYTES NFR BLD AUTO: 0.7 % (ref 0–0.5)
LYMPHOCYTES # BLD AUTO: 0.6 K/UL (ref 1–4.8)
LYMPHOCYTES NFR BLD: 2.5 % (ref 18–48)
MAGNESIUM SERPL-MCNC: 1.9 MG/DL (ref 1.6–2.6)
MCH RBC QN AUTO: 26.6 PG (ref 27–31)
MCHC RBC AUTO-ENTMCNC: 33.8 G/DL (ref 32–36)
MCV RBC AUTO: 79 FL (ref 82–98)
MONOCYTES # BLD AUTO: 0.8 K/UL (ref 0.3–1)
MONOCYTES NFR BLD: 3.3 % (ref 4–15)
NEUTROPHILS # BLD AUTO: 20.9 K/UL (ref 1.8–7.7)
NEUTROPHILS NFR BLD: 93 % (ref 38–73)
NRBC BLD-RTO: 0 /100 WBC
OSMOLALITY SERPL: 271 MOSM/KG (ref 280–300)
PLATELET # BLD AUTO: 549 K/UL (ref 150–450)
PLATELET BLD QL SMEAR: ABNORMAL
PMV BLD AUTO: 9.8 FL (ref 9.2–12.9)
POCT GLUCOSE: 79 MG/DL (ref 70–110)
POTASSIUM SERPL-SCNC: 5 MMOL/L (ref 3.5–5.1)
POTASSIUM SERPL-SCNC: 5.1 MMOL/L (ref 3.5–5.1)
POTASSIUM SERPL-SCNC: 5.1 MMOL/L (ref 3.5–5.1)
PROT SERPL-MCNC: 5.6 G/DL (ref 6–8.4)
RBC # BLD AUTO: 5.86 M/UL (ref 4.6–6.2)
SODIUM SERPL-SCNC: 124 MMOL/L (ref 136–145)
SODIUM SERPL-SCNC: 126 MMOL/L (ref 136–145)
SODIUM SERPL-SCNC: 128 MMOL/L (ref 136–145)
WBC # BLD AUTO: 22.44 K/UL (ref 3.9–12.7)

## 2022-04-17 PROCEDURE — 80053 COMPREHEN METABOLIC PANEL: CPT

## 2022-04-17 PROCEDURE — 25000003 PHARM REV CODE 250

## 2022-04-17 PROCEDURE — 85025 COMPLETE CBC W/AUTO DIFF WBC: CPT

## 2022-04-17 PROCEDURE — 83735 ASSAY OF MAGNESIUM: CPT

## 2022-04-17 PROCEDURE — 80048 BASIC METABOLIC PNL TOTAL CA: CPT | Mod: XB | Performed by: INTERNAL MEDICINE

## 2022-04-17 PROCEDURE — 36415 COLL VENOUS BLD VENIPUNCTURE: CPT

## 2022-04-17 PROCEDURE — 99233 PR SUBSEQUENT HOSPITAL CARE,LEVL III: ICD-10-PCS | Mod: ,,, | Performed by: INTERNAL MEDICINE

## 2022-04-17 PROCEDURE — 99233 SBSQ HOSP IP/OBS HIGH 50: CPT | Mod: ,,, | Performed by: INTERNAL MEDICINE

## 2022-04-17 PROCEDURE — 82977 ASSAY OF GGT: CPT

## 2022-04-17 PROCEDURE — 20600001 HC STEP DOWN PRIVATE ROOM

## 2022-04-17 PROCEDURE — 25000003 PHARM REV CODE 250: Performed by: STUDENT IN AN ORGANIZED HEALTH CARE EDUCATION/TRAINING PROGRAM

## 2022-04-17 PROCEDURE — 63600175 PHARM REV CODE 636 W HCPCS

## 2022-04-17 PROCEDURE — 83930 ASSAY OF BLOOD OSMOLALITY: CPT

## 2022-04-17 PROCEDURE — 63600175 PHARM REV CODE 636 W HCPCS: Performed by: STUDENT IN AN ORGANIZED HEALTH CARE EDUCATION/TRAINING PROGRAM

## 2022-04-17 RX ORDER — LORAZEPAM 2 MG/ML
1 INJECTION INTRAMUSCULAR ONCE
Status: COMPLETED | OUTPATIENT
Start: 2022-04-17 | End: 2022-04-17

## 2022-04-17 RX ORDER — LORAZEPAM 2 MG/ML
0.05 CONCENTRATE ORAL ONCE
Status: COMPLETED | OUTPATIENT
Start: 2022-04-17 | End: 2022-04-17

## 2022-04-17 RX ORDER — HALOPERIDOL 5 MG/ML
2 INJECTION INTRAMUSCULAR EVERY 4 HOURS PRN
Status: DISCONTINUED | OUTPATIENT
Start: 2022-04-17 | End: 2022-04-17

## 2022-04-17 RX ORDER — LIDOCAINE HYDROCHLORIDE 20 MG/ML
15 SOLUTION OROPHARYNGEAL EVERY 4 HOURS PRN
Status: DISCONTINUED | OUTPATIENT
Start: 2022-04-17 | End: 2022-04-24 | Stop reason: HOSPADM

## 2022-04-17 RX ORDER — HYDROMORPHONE HYDROCHLORIDE 1 MG/ML
0.5 INJECTION, SOLUTION INTRAMUSCULAR; INTRAVENOUS; SUBCUTANEOUS
Status: DISCONTINUED | OUTPATIENT
Start: 2022-04-17 | End: 2022-04-23

## 2022-04-17 RX ORDER — LORAZEPAM 2 MG/ML
2 INJECTION INTRAMUSCULAR EVERY 4 HOURS PRN
Status: DISCONTINUED | OUTPATIENT
Start: 2022-04-17 | End: 2022-04-19

## 2022-04-17 RX ORDER — AMOXICILLIN AND CLAVULANATE POTASSIUM 875; 125 MG/1; MG/1
1 TABLET, FILM COATED ORAL EVERY 12 HOURS
Qty: 6 TABLET | Refills: 0 | Status: SHIPPED | OUTPATIENT
Start: 2022-04-18 | End: 2022-04-18 | Stop reason: HOSPADM

## 2022-04-17 RX ORDER — PROCHLORPERAZINE EDISYLATE 5 MG/ML
2.5 INJECTION INTRAMUSCULAR; INTRAVENOUS EVERY 6 HOURS PRN
Status: DISCONTINUED | OUTPATIENT
Start: 2022-04-17 | End: 2022-04-18

## 2022-04-17 RX ADMIN — HALOPERIDOL LACTATE 2 MG: 5 INJECTION, SOLUTION INTRAMUSCULAR at 03:04

## 2022-04-17 RX ADMIN — HYDROMORPHONE HYDROCHLORIDE 0.5 MG: 1 INJECTION, SOLUTION INTRAMUSCULAR; INTRAVENOUS; SUBCUTANEOUS at 02:04

## 2022-04-17 RX ADMIN — ONDANSETRON 8 MG: 2 INJECTION INTRAMUSCULAR; INTRAVENOUS at 11:04

## 2022-04-17 RX ADMIN — PROMETHAZINE HYDROCHLORIDE 12.5 MG: 25 INJECTION INTRAMUSCULAR; INTRAVENOUS at 07:04

## 2022-04-17 RX ADMIN — LIDOCAINE HYDROCHLORIDE 15 ML: 20 SOLUTION ORAL; TOPICAL at 11:04

## 2022-04-17 RX ADMIN — OXYCODONE HYDROCHLORIDE 20 MG: 10 TABLET ORAL at 04:04

## 2022-04-17 RX ADMIN — ONDANSETRON 8 MG: 2 INJECTION INTRAMUSCULAR; INTRAVENOUS at 05:04

## 2022-04-17 RX ADMIN — SODIUM CHLORIDE 1000 ML: 0.9 INJECTION, SOLUTION INTRAVENOUS at 08:04

## 2022-04-17 RX ADMIN — HEPARIN SODIUM 5000 UNITS: 5000 INJECTION INTRAVENOUS; SUBCUTANEOUS at 11:04

## 2022-04-17 RX ADMIN — LORAZEPAM 3.8 MG: 2 LIQUID ORAL at 12:04

## 2022-04-17 RX ADMIN — HYDROMORPHONE HYDROCHLORIDE 0.5 MG: 1 INJECTION, SOLUTION INTRAMUSCULAR; INTRAVENOUS; SUBCUTANEOUS at 08:04

## 2022-04-17 RX ADMIN — HEPARIN SODIUM 5000 UNITS: 5000 INJECTION INTRAVENOUS; SUBCUTANEOUS at 02:04

## 2022-04-17 RX ADMIN — ONDANSETRON 8 MG: 2 INJECTION INTRAMUSCULAR; INTRAVENOUS at 01:04

## 2022-04-17 RX ADMIN — HEPARIN SODIUM 5000 UNITS: 5000 INJECTION INTRAVENOUS; SUBCUTANEOUS at 05:04

## 2022-04-17 RX ADMIN — HYDROMORPHONE HYDROCHLORIDE 0.5 MG: 1 INJECTION, SOLUTION INTRAMUSCULAR; INTRAVENOUS; SUBCUTANEOUS at 11:04

## 2022-04-17 RX ADMIN — LORAZEPAM 1 MG: 2 INJECTION INTRAMUSCULAR; INTRAVENOUS at 12:04

## 2022-04-17 RX ADMIN — LEVETIRACETAM 1000 MG: 500 TABLET, FILM COATED ORAL at 08:04

## 2022-04-17 RX ADMIN — HYDROMORPHONE HYDROCHLORIDE 0.5 MG: 1 INJECTION, SOLUTION INTRAMUSCULAR; INTRAVENOUS; SUBCUTANEOUS at 06:04

## 2022-04-17 RX ADMIN — CEFTRIAXONE 2 G: 2 INJECTION, SOLUTION INTRAVENOUS at 04:04

## 2022-04-17 NOTE — ASSESSMENT & PLAN NOTE
Pt with Cr 2.0 on admission up from baseline of 1.0. Likely prerenal s/o poor PO intake, vomiting, and ascites. Cr improved to 1.3 with IVF and para. Now appears back to baseline. Nephro following.      -- daily bmp  -- avoid nephrotoxic medications  -- renally dose meds

## 2022-04-17 NOTE — PROGRESS NOTES
Jesse Ramírez - Oncology ([facility])  Hematology/Oncology  Progress Note    Patient Name: Joseluis Garner  Admission Date: 4/14/2022  Hospital Length of Stay: 3 days  Code Status: Full Code     Subjective:     HPI:  51 y.o. male with history of CAD s/p PCI, seizures, substance abuse, stage IV malignant melanoma presenting from oncology clinic with multiple electrolyte abnormalities and worsening ascites. Pt has hx of malignant melanoma first diagnosed 1/2022. He is s/p 1 round of tx on 3/3/2022, with  plans to start binimetinib and enorafenib in  the near future. However, patient's clinical status has been deteriorating over the past few weeks. He was admitted on 03/18 at [facility] for abdominal pain, and distension. He underwent therapeutic paracentesis with removal of 3L, and was discharged on 03/22. CT abdomen during that admission showed soft tissue attenuation throughout the peritoneum consistent with innumerable peritoneal implants.  He was then readmitted the following day 03/23 for re accumulation and had another paracentesis with removal of 4.7L. Pt was again readmitted 03/30 to 04/02 and underwent paracentesis. He was planned to receive pleur-X catheter as outpatient. [facility] with worsening abdominal distension and pain. He underwent therapeutic paracentesis with removal of 5L. Cytology was positive for malignant cells. He was discharged on 04/05 on Cipro for SBP coverage.      Since his discharge, he notes worsening abdominal distension. He had progressive nausea and vomiting and has not been able to tolerate any diet including water. He has tried zofran at home which has not helped. He denies any recent fever, chills, cough, sore throat. He was evaluated in  oncology clinic this morning  with his mother, during which time he had several episodes of vomiting. Labs obtained during clinic were concerning for leukocytosis, hyperkalemia, and hyponatremia. He was sent to the ED for admission  for management of ascites, and other current comorbid conditions.     In the ED, Pt afebrile, /85, HR 80s,  ALEJANDRO. CBC notable for  WBC 19, Plt 619. K  5.9, Na 119.  Alb 2.6. Cr 2.0 from baseline 1.0. TSH 8.8, though free T4 wnl. Procal elevated to 1.1. EKG with no acute changes, no  T wave abnormalities.        Interval History: Pt removed NGT overnight. This morning  pt with significant N/V after removing tube. No BM or passing gas.     Oncology Treatment Plan:   OP NIVOLUMAB 1 MG/KG IPILIMUMAB 3MG/KG FOLLOWED BY NIVOLUMAB 480MG Q4W    Medications:  Continuous Infusions:   sodium chloride 0.9% 75 mL/hr at 04/17/22 0000     Scheduled Meds:   cefTRIAXone (ROCEPHIN) IVPB  2 g Intravenous Q24H    fentaNYL  1 patch Transdermal Q72H    heparin (porcine)  5,000 Units Subcutaneous Q8H    levETIRAcetam  1,000 mg Oral BID    lorazepam  1 mg Intravenous Once     PRN Meds:[COMPLETED] calcium gluconate IVPB **AND** calcium gluconate IVPB, dextrose 10%, dextrose 10%, dextrose 10%, dextrose 10%, dextrose 10%, glucagon (human recombinant), glucose, glucose, HYDROmorphone, LIDOcaine HCl 2%, naloxone, ondansetron, oxyCODONE, promethazine (PHENERGAN) IVPB, sodium chloride 0.9%     Review of Systems   Constitutional:  Positive for appetite change and fatigue. Negative for chills, diaphoresis and fever.   HENT:  Negative for congestion and sore throat.    Respiratory:  Negative for cough and shortness of breath.    Cardiovascular:  Negative for chest pain and palpitations.   Gastrointestinal:  Positive for abdominal pain, constipation, nausea and vomiting. Negative for abdominal distention and blood in stool.   Genitourinary:  Negative for dysuria and frequency.   Musculoskeletal:  Positive for myalgias. Negative for arthralgias.   Skin:  Negative for rash and wound.   Neurological:  Positive for weakness. Negative for dizziness, syncope, light-headedness and numbness.   Psychiatric/Behavioral:  Positive for agitation.  Negative for confusion. The patient is nervous/anxious.    Objective:     Vital Signs (Most Recent):  Temp: 98.1 °F (36.7 °C) (04/17/22 1143)  Pulse: 104 (04/17/22 1143)  Resp: 19 (04/17/22 1143)  BP: (!) 152/95 (04/17/22 1143)  SpO2: 97 % (04/17/22 1143)   Vital Signs (24h Range):  Temp:  [97.5 °F (36.4 °C)-98.6 °F (37 °C)] 98.1 °F (36.7 °C)  Pulse:  [] 104  Resp:  [17-20] 19  SpO2:  [94 %-97 %] 97 %  BP: (135-157)/(95-98) 152/95     Weight: 76.4 kg (168 lb 6.9 oz) (76.4)  Body mass index is 22.84 kg/m².  Body surface area is 1.97 meters squared.      Intake/Output Summary (Last 24 hours) at 4/17/2022 1225  Last data filed at 4/17/2022 0450  Gross per 24 hour   Intake 2232.72 ml   Output 2000 ml   Net 232.72 ml       Physical Exam  Vitals and nursing note reviewed.   Constitutional:       General: He is in acute distress (due to pain).      Appearance: He is normal weight. He is ill-appearing. He is not toxic-appearing or diaphoretic.   HENT:      Head: Normocephalic and atraumatic.      Nose: Nose normal. No congestion or rhinorrhea.      Mouth/Throat:      Mouth: Mucous membranes are dry.      Pharynx: Oropharynx is clear.   Eyes:      Extraocular Movements: Extraocular movements intact.      Pupils: Pupils are equal, round, and reactive to light.   Cardiovascular:      Rate and Rhythm: Normal rate and regular rhythm.      Pulses: Normal pulses.      Heart sounds: Normal heart sounds. No murmur heard.    No friction rub. No gallop.   Pulmonary:      Effort: Pulmonary effort is normal. No respiratory distress.      Breath sounds: Normal breath sounds.   Abdominal:      General: Bowel sounds are decreased. There is distension.      Tenderness: There is abdominal tenderness (diffuse). There is no guarding or rebound.   Musculoskeletal:         General: No swelling. Normal range of motion.      Cervical back: Normal range of motion and neck supple.   Skin:     General: Skin is warm and dry.   Neurological:       General: No focal deficit present.      Mental Status: He is alert and oriented to person, place, and time.   Psychiatric:         Mood and Affect: Mood normal.         Behavior: Behavior normal.       Significant Labs:   CBC:   Recent Labs   Lab 04/16/22  0909   WBC 20.18*   HGB 15.3   HCT 46.0   *    and CMP:   Recent Labs   Lab 04/16/22  0909 04/16/22  1529 04/16/22  2344   *  121* 122* 124*   K 5.9*  5.9*  5.9*  5.9* 4.9  4.9 5.1   CL 87*  88* 92* 89*   CO2 20*  20* 19* 24   GLU 95  97 107 91   BUN 25*  25* 22* 24*   CREATININE 1.2  1.3 1.1 1.2   CALCIUM 8.5*  8.5* 8.0* 8.3*   PROT 4.7*  --   --    ALBUMIN 2.2*  --   --    BILITOT 0.5  --   --    ALKPHOS 64  --   --    AST 21  --   --    ALT 7*  --   --    ANIONGAP 14  13 11 11   EGFRNONAA >60.0  >60.0 >60.0 >60.0       Diagnostic Results:  I have reviewed all pertinent imaging results/findings within the past 24 hours.    X-ray Abdomen for NG Tube Placement (Nursing should notify Radiology after placement)   Final Result      As above.         Electronically signed by: Jhon Goetz   Date:    04/16/2022   Time:    17:19      X-Ray Abdomen AP 1 View   Final Result      As above.         Electronically signed by: Jhon Goetz   Date:    04/16/2022   Time:    13:17      CT Chest Abdomen Pelvis Without Contrast (XPD)   Final Result      In this patient with a reported history of metastatic melanoma, there is diffuse metastatic disease involving the right axilla, bilateral lungs, and peritoneal metastasis as described in detail above.  Additional subcutaneous soft tissue nodule which could represent metastatic focus.      Mild wedging of L2 superior endplate suggestive of mild compression fracture.  Recommend correlation with outside imaging.      Diverticulosis.  No evidence of diverticulitis.      Hepatomegaly.      Additional findings as above.      Electronically signed by resident: Baltazar Purcell   Date:    04/16/2022    Time:    09:17      Electronically signed by: Joseluis Arias   Date:    04/16/2022   Time:    12:24      IR Paracentesis without imaging   Final Result      Ultrasound-guided paracentesis with drainage of 7000 mL of elizabeth fluid.  Albumin administered as per protocol.      _______________________________________________________________      Electronically signed by resident: Rudy Otero   Date:    04/14/2022   Time:    17:39      Electronically signed by: Juan Jose Hernández MD   Date:    04/14/2022   Time:    17:46      X-Ray Abdomen AP 1 View    (Results Pending)         Assessment/Plan:     * Ascites  52 y/o M hx of malignant melanoma presents from clinic with symptomatic malignant ascites. Pt has had multiple admissions over the past month for therapeutic paracenteses in the setting of malignant ascites. His last para was 4/4, 10 days prior to admission. Patient will likely need scheduled weekly therapeutic josiah in the future to prevent hospitalization. S/p para on4/15 with 7L removed and albumin administered.     Plan:  -- Cell counts not c/w SBP, though continuing to cover with Ceftriaxone 2g q24h for given concurrent leukocytosis  -- schedule weekly para at discharge    Intractable nausea and vomiting  Pt presenting with several days of intractable nausea and vomiting. Pt unable to keep anything, including water, down. Zofran with no improvement at home. Likely 2/2 hyponatremia and ileus. KUB and CT demonstrating ileus vs partial SBO. NGT placed 4/17, pt keeps pulling out. Replaced x3. Given ativan to help with agitation.     -- therapeutic para as above  -- management of hyponatremia as above  -- IV compazine and zofran  -- NGT and NPO      Hyperkalemia  Pt with potassium 5.9 on CMP in ED. K 6.4 on istat. EKG without any acute changes. Pt shifted in ED with repeat BMP showing K 5.5. Unclear etiology of acute hyperkalemia, though patient does have concurrent PAULA. K consistently elevated, 5.9 on 4/16. S/p shift  again on 4/17 with improvement in K to 5.1.    -- serial bmp  -- avoid potential potassium-increasing medications  -- am cortisol and ACTH to assess adrenal function given persistent hyperK and hypoNa    PAULA (acute kidney injury)  Pt with Cr 2.0 on admission up from baseline of 1.0. Likely prerenal s/o poor PO intake, vomiting, and ascites. Cr improved to 1.3 with IVF and para. Now appears back to baseline. Nephro following.      -- daily bmp  -- avoid nephrotoxic medications  -- renally dose meds    Hyponatremia  Pt presenting with severe hyponatremia to 119 in ED. Likely hypovolemic hyponatremia s/o poor PO And N/V. Na on labs 10 days prior to admission 128. Pt presenting with severe intractable nausea and vomiting. No changes in mental status or seizures. Sxs improving with improvement in Na. Na fluctuating given poor PO, now 124 on 4/17. Nephro following, pt receiving intermittent NS boluses. Now on continuous IVF.     -- s/p therapeutic paracentesis on 4/14  --  Midline placed for better access  -- on NS gtt given NPO for SBO/ileus        Metastatic melanoma  He presented initially with enlarging right axillary lymph nodes that have grown progressively in size since April 2021. He presented to urgent care and was referred for US which showed multiple enlarged masses in the right axilla most likely reflecting abnormal lymph nodes concerning for malignancy.   He was subsequently referred to dermatology who performed a complete exam of the skin that was negative for suspicious cutaneous lesions as well as punch biopsy of one of the lymph node on 01/04/22. Pathology came back as malignant melanoma.   BRAF mutation: positive for a V600E mutation.   He subsequently underwent a PET/CT on 01/28/22 which showed multiple hypermetabolic soft tissue masses within the right axillary region, metastatic lesions to the lungs and abdomen (VI segment of the liver measuring a max SUV of 6.99, 1.6 x 1.9 cm nodular soft tissue  density with a max SUV  7.64 was seen adjacent to the colon within the right lower quadrant. Brain MRI was negative for metastatic disease.  LDH was 307 U/L  He saw Oncology at [facility] on 01/31/22 with plan to start First line Nivolumab / ipilimumab. He received C1 on 03/03/22.     -- pt scheduled to start binimetinib and encorafenib prior to admission.  -- will restart upon discharge      History of seizure  --  Continue home Keppra 1000 BID              Roel Campos MD   Internal Medicine PGY-1  Hematology/Oncology  [facility] - Oncology ([facility])

## 2022-04-17 NOTE — ASSESSMENT & PLAN NOTE
Hyperkalemia  Patent was noted to have sodium 119, potassium of 5.9, on admission in the setting of reduced oral intake and vomiting. Likely hypovolemic hyponatremia on admission. Patient was given 1 L NS bolus and albumin following paracentesis, followed by NS infusion. Initially, sodium improved to 126 then down trended to 123. On assessment patient seems volume down still. Suspect hyperkalemia related to acute kidney injury and reduced urine output. Additionally, given concurrent hyponatremia and hyperkalemia there is concern for adrenal pathology either metastasis or related to immunotherapy. For now, would rule out hypovolemic hyponatremia.    Fluctuating sodium and potassium - unclear if related to hypovolemia alone as patient has persistent losses from emesis.    Plan:  - repeat urine osm, serum osm, urine sodium and potassium  - continue IV fluids to match output from emesis/NG output/insensible losses  - follow up cortisol/ACTH  - BMP q6  - strict intake and output

## 2022-04-17 NOTE — ASSESSMENT & PLAN NOTE
Pt presenting with several days of intractable nausea and vomiting. Pt unable to keep anything, including water, down. Zofran with no improvement at home. Likely 2/2 hyponatremia and ileus. KUB and CT demonstrating ileus vs partial SBO. NGT placed 4/17, pt keeps pulling out. Replaced x3. Given ativan to help with agitation.     -- therapeutic para as above  -- management of hyponatremia as above  -- IV compazine and zofran  -- NGT and NPO

## 2022-04-17 NOTE — ASSESSMENT & PLAN NOTE
Pt presenting with severe hyponatremia to 119 in ED. Likely hypovolemic hyponatremia s/o poor PO And N/V. Na on labs 10 days prior to admission 128. Pt presenting with severe intractable nausea and vomiting. No changes in mental status or seizures. Sxs improving with improvement in Na. Na fluctuating given poor PO, now 124 on 4/17. Nephro following, pt receiving intermittent NS boluses. Now on continuous IVF.     -- s/p therapeutic paracentesis on 4/14  --  Midline placed for better access  -- on NS gtt given NPO for SBO/ileus

## 2022-04-17 NOTE — SUBJECTIVE & OBJECTIVE
Interval History: Pt removed NGT overnight. This morning  pt with significant N/V after removing tube. No BM or passing gas.     Oncology Treatment Plan:   OP NIVOLUMAB 1 MG/KG IPILIMUMAB 3MG/KG FOLLOWED BY NIVOLUMAB 480MG Q4W    Medications:  Continuous Infusions:   sodium chloride 0.9% 75 mL/hr at 04/17/22 0000     Scheduled Meds:   cefTRIAXone (ROCEPHIN) IVPB  2 g Intravenous Q24H    fentaNYL  1 patch Transdermal Q72H    heparin (porcine)  5,000 Units Subcutaneous Q8H    levETIRAcetam  1,000 mg Oral BID    lorazepam  1 mg Intravenous Once     PRN Meds:[COMPLETED] calcium gluconate IVPB **AND** calcium gluconate IVPB, dextrose 10%, dextrose 10%, dextrose 10%, dextrose 10%, dextrose 10%, glucagon (human recombinant), glucose, glucose, HYDROmorphone, LIDOcaine HCl 2%, naloxone, ondansetron, oxyCODONE, promethazine (PHENERGAN) IVPB, sodium chloride 0.9%     Review of Systems   Constitutional:  Positive for appetite change and fatigue. Negative for chills, diaphoresis and fever.   HENT:  Negative for congestion and sore throat.    Respiratory:  Negative for cough and shortness of breath.    Cardiovascular:  Negative for chest pain and palpitations.   Gastrointestinal:  Positive for abdominal pain, constipation, nausea and vomiting. Negative for abdominal distention and blood in stool.   Genitourinary:  Negative for dysuria and frequency.   Musculoskeletal:  Positive for myalgias. Negative for arthralgias.   Skin:  Negative for rash and wound.   Neurological:  Positive for weakness. Negative for dizziness, syncope, light-headedness and numbness.   Psychiatric/Behavioral:  Positive for agitation. Negative for confusion. The patient is nervous/anxious.    Objective:     Vital Signs (Most Recent):  Temp: 98.1 °F (36.7 °C) (04/17/22 1143)  Pulse: 104 (04/17/22 1143)  Resp: 19 (04/17/22 1143)  BP: (!) 152/95 (04/17/22 1143)  SpO2: 97 % (04/17/22 1143)   Vital Signs (24h Range):  Temp:  [97.5 °F (36.4 °C)-98.6 °F (37 °C)]  98.1 °F (36.7 °C)  Pulse:  [] 104  Resp:  [17-20] 19  SpO2:  [94 %-97 %] 97 %  BP: (135-157)/(95-98) 152/95     Weight: 76.4 kg (168 lb 6.9 oz) (76.4)  Body mass index is 22.84 kg/m².  Body surface area is 1.97 meters squared.      Intake/Output Summary (Last 24 hours) at 4/17/2022 1225  Last data filed at 4/17/2022 0450  Gross per 24 hour   Intake 2232.72 ml   Output 2000 ml   Net 232.72 ml       Physical Exam  Vitals and nursing note reviewed.   Constitutional:       General: He is in acute distress (due to pain).      Appearance: He is normal weight. He is ill-appearing. He is not toxic-appearing or diaphoretic.   HENT:      Head: Normocephalic and atraumatic.      Nose: Nose normal. No congestion or rhinorrhea.      Mouth/Throat:      Mouth: Mucous membranes are dry.      Pharynx: Oropharynx is clear.   Eyes:      Extraocular Movements: Extraocular movements intact.      Pupils: Pupils are equal, round, and reactive to light.   Cardiovascular:      Rate and Rhythm: Normal rate and regular rhythm.      Pulses: Normal pulses.      Heart sounds: Normal heart sounds. No murmur heard.    No friction rub. No gallop.   Pulmonary:      Effort: Pulmonary effort is normal. No respiratory distress.      Breath sounds: Normal breath sounds.   Abdominal:      General: Bowel sounds are decreased. There is distension.      Tenderness: There is abdominal tenderness (diffuse). There is no guarding or rebound.   Musculoskeletal:         General: No swelling. Normal range of motion.      Cervical back: Normal range of motion and neck supple.   Skin:     General: Skin is warm and dry.   Neurological:      General: No focal deficit present.      Mental Status: He is alert and oriented to person, place, and time.   Psychiatric:         Mood and Affect: Mood normal.         Behavior: Behavior normal.       Significant Labs:   CBC:   Recent Labs   Lab 04/16/22  0909   WBC 20.18*   HGB 15.3   HCT 46.0   *    and CMP:    Recent Labs   Lab 04/16/22  0909 04/16/22  1529 04/16/22  2344   *  121* 122* 124*   K 5.9*  5.9*  5.9*  5.9* 4.9  4.9 5.1   CL 87*  88* 92* 89*   CO2 20*  20* 19* 24   GLU 95  97 107 91   BUN 25*  25* 22* 24*   CREATININE 1.2  1.3 1.1 1.2   CALCIUM 8.5*  8.5* 8.0* 8.3*   PROT 4.7*  --   --    ALBUMIN 2.2*  --   --    BILITOT 0.5  --   --    ALKPHOS 64  --   --    AST 21  --   --    ALT 7*  --   --    ANIONGAP 14  13 11 11   EGFRNONAA >60.0  >60.0 >60.0 >60.0       Diagnostic Results:  I have reviewed all pertinent imaging results/findings within the past 24 hours.    X-ray Abdomen for NG Tube Placement (Nursing should notify Radiology after placement)   Final Result      As above.         Electronically signed by: Jhon Goetz   Date:    04/16/2022   Time:    17:19      X-Ray Abdomen AP 1 View   Final Result      As above.         Electronically signed by: Jhon Goetz   Date:    04/16/2022   Time:    13:17      CT Chest Abdomen Pelvis Without Contrast (XPD)   Final Result      In this patient with a reported history of metastatic melanoma, there is diffuse metastatic disease involving the right axilla, bilateral lungs, and peritoneal metastasis as described in detail above.  Additional subcutaneous soft tissue nodule which could represent metastatic focus.      Mild wedging of L2 superior endplate suggestive of mild compression fracture.  Recommend correlation with outside imaging.      Diverticulosis.  No evidence of diverticulitis.      Hepatomegaly.      Additional findings as above.      Electronically signed by resident: Baltazar Purcell   Date:    04/16/2022   Time:    09:17      Electronically signed by: Joseluis Arias   Date:    04/16/2022   Time:    12:24      IR Paracentesis without imaging   Final Result      Ultrasound-guided paracentesis with drainage of 7000 mL of elizabeth fluid.  Albumin administered as per protocol.       _______________________________________________________________      Electronically signed by resident: Rudy Otero   Date:    04/14/2022   Time:    17:39      Electronically signed by: Juan Jose Hernández MD   Date:    04/14/2022   Time:    17:46      X-Ray Abdomen AP 1 View    (Results Pending)

## 2022-04-17 NOTE — PROGRESS NOTES
Nurse called to bedside regarding pt NGT.  Pt stated that he rolled over and the NGT pulled out.  On assessment, NGT was completely out of nare and on the bed.  Nurse informed pt that it would have to be replace and pt refused for now.  Dr. Huertas notified about the removal and the pts refusal to replace for now, MD ok with holding off on insertion for now and will reassess later in the day.

## 2022-04-17 NOTE — ASSESSMENT & PLAN NOTE
He presented initially with enlarging right axillary lymph nodes that have grown progressively in size since April 2021. He presented to urgent care and was referred for US which showed multiple enlarged masses in the right axilla most likely reflecting abnormal lymph nodes concerning for malignancy.   He was subsequently referred to dermatology who performed a complete exam of the skin that was negative for suspicious cutaneous lesions as well as punch biopsy of one of the lymph node on 01/04/22. Pathology came back as malignant melanoma.   BRAF mutation: positive for a V600E mutation.   He subsequently underwent a PET/CT on 01/28/22 which showed multiple hypermetabolic soft tissue masses within the right axillary region, metastatic lesions to the lungs and abdomen (VI segment of the liver measuring a max SUV of 6.99, 1.6 x 1.9 cm nodular soft tissue density with a max SUV  7.64 was seen adjacent to the colon within the right lower quadrant. Brain MRI was negative for metastatic disease.  LDH was 307 U/L  He saw Oncology at [facility] on 01/31/22 with plan to start First line Nivolumab / ipilimumab. He received C1 on 03/03/22.     -- pt scheduled to start binimetinib and encorafenib prior to admission.  -- will restart upon discharge

## 2022-04-17 NOTE — SUBJECTIVE & OBJECTIVE
Interval History: Patient with worsening sodium and potassium despite IV fluids. SBO with NG tube placed. Significant emesis.    Review of patient's allergies indicates:  No Known Allergies  Current Facility-Administered Medications   Medication Frequency    0.9%  NaCl infusion Continuous    calcium gluconat 1 g in NS IVPB (premixed) Q10 Min PRN    cefTRIAXone (ROCEPHIN) 2 g/50 mL D5W IVPB Q24H    dextrose 10% bolus 125 mL PRN    dextrose 10% bolus 125 mL PRN    dextrose 10% bolus 125 mL PRN    dextrose 10% bolus 250 mL PRN    dextrose 10% bolus 250 mL PRN    fentaNYL 25 mcg/hr 1 patch Q72H    glucagon (human recombinant) injection 1 mg PRN    glucose chewable tablet 16 g PRN    glucose chewable tablet 24 g PRN    heparin (porcine) injection 5,000 Units Q8H    HYDROmorphone injection 0.5 mg Q4H PRN    levETIRAcetam tablet 1,000 mg BID    LIDOcaine HCl 2% oral solution 15 mL Q4H PRN    naloxone 0.4 mg/mL injection 0.02 mg PRN    ondansetron injection 8 mg Q6H PRN    oxyCODONE immediate release tablet Tab 20 mg Q6H PRN    promethazine (PHENERGAN) 12.5 mg in dextrose 5 % 50 mL IVPB Q6H PRN    sodium chloride 0.9% flush 10 mL Q12H PRN       Objective:     Vital Signs (Most Recent):  Temp: 97.5 °F (36.4 °C) (04/17/22 0711)  Pulse: 98 (04/17/22 0711)  Resp: 17 (04/17/22 0836)  BP: (!) 145/97 (04/17/22 0711)  SpO2: (!) 94 % (04/17/22 0711)  O2 Device (Oxygen Therapy): room air (04/17/22 0711)   Vital Signs (24h Range):  Temp:  [97.5 °F (36.4 °C)-98.6 °F (37 °C)] 97.5 °F (36.4 °C)  Pulse:  [] 98  Resp:  [17-20] 17  SpO2:  [94 %-96 %] 94 %  BP: (128-157)/(95-98) 145/97     Weight: 76.4 kg (168 lb 6.9 oz) (76.4) (04/14/22 1810)  Body mass index is 22.84 kg/m².  Body surface area is 1.97 meters squared.    I/O last 3 completed shifts:  In: 3323.9 [P.O.:960; I.V.:489.5; IV Piggyback:1874.4]  Out: 2200 [Urine:700; Drains:1500]    Physical Exam  Vitals and nursing note reviewed.   Constitutional:       General: He is in  acute distress.      Appearance: He is normal weight. He is ill-appearing and toxic-appearing. He is not diaphoretic.   HENT:      Head: Normocephalic and atraumatic.      Nose: Nose normal. No congestion or rhinorrhea.      Mouth/Throat:      Mouth: Mucous membranes are dry.      Pharynx: Oropharynx is clear.   Eyes:      Extraocular Movements: Extraocular movements intact.      Pupils: Pupils are equal, round, and reactive to light.   Cardiovascular:      Rate and Rhythm: Normal rate and regular rhythm.      Pulses: Normal pulses.      Heart sounds: Normal heart sounds. No murmur heard.    No friction rub. No gallop.   Pulmonary:      Effort: Pulmonary effort is normal. No respiratory distress.      Breath sounds: Normal breath sounds.   Abdominal:      General: Bowel sounds are decreased. There is no distension.      Tenderness: There is abdominal tenderness. There is no guarding or rebound.   Musculoskeletal:         General: No swelling. Normal range of motion.      Cervical back: Normal range of motion and neck supple.   Skin:     General: Skin is warm and dry.   Neurological:      General: No focal deficit present.      Mental Status: He is alert and oriented to person, place, and time.   Psychiatric:         Mood and Affect: Mood normal.         Behavior: Behavior normal.       Significant Labs:  All labs within the past 24 hours have been reviewed.     Significant Imaging:  All labs within the past 24 hours have been reviewed.

## 2022-04-17 NOTE — PROGRESS NOTES
[facility] - On[facility] ([facility])  Nephrology  Progress Note    Patient Name: Joseluis Garner  MRN: [id number]  Admission Date: 4/14/2022  Hospital Length of Stay: 3 days  Attending Provider: Meghan Rios MD   Primary Care Physician: Primary Doctor No  Principal Problem:Ascites    Subjective:     HPI: Joseluis Garner is a 51 year old male with CAD, seizure disorder, stage IV melanoma who presents with hyponatremia, hyperkalemia, and worsening ascites. Patent was noted to have sodium 119, potassium of 5.9, and creatinine of 2.0 from baseline 1.0, on admission. Patient being treated with nivolumab and iplimumab. Notably, describes nausea and vomiting with reduced oral intake. He received a 7L paracentesis with albumin replacement. Additionally, patient was given 1 L NS bolus and NS continue infusion. Initially, sodium improved to 126 then down trended to 123. Potassium now 5.5. Renal function improving with IV fluids - creatinine 1.4. Nephrology consulted for hyponatremia, hyperkalemia, and acute kidney injury.      Interval History: Patient with worsening sodium and potassium despite IV fluids. SBO with NG tube placed. Significant emesis.    Review of patient's allergies indicates:  No Known Allergies  Current Facility-Administered Medications   Medication Frequency    0.9%  NaCl infusion Continuous    calcium gluconat 1 g in NS IVPB (premixed) Q10 Min PRN    cefTRIAXone (ROCEPHIN) 2 g/50 mL D5W IVPB Q24H    dextrose 10% bolus 125 mL PRN    dextrose 10% bolus 125 mL PRN    dextrose 10% bolus 125 mL PRN    dextrose 10% bolus 250 mL PRN    dextrose 10% bolus 250 mL PRN    fentaNYL 25 mcg/hr 1 patch Q72H    glucagon (human recombinant) injection 1 mg PRN    glucose chewable tablet 16 g PRN    glucose chewable tablet 24 g PRN    heparin (porcine) injection 5,000 Units Q8H    HYDROmorphone injection 0.5 mg Q4H PRN    levETIRAcetam tablet 1,000 mg BID    LIDOcaine HCl 2% oral solution 15 mL Q4H PRN     naloxone 0.4 mg/mL injection 0.02 mg PRN    ondansetron injection 8 mg Q6H PRN    oxyCODONE immediate release tablet Tab 20 mg Q6H PRN    promethazine (PHENERGAN) 12.5 mg in dextrose 5 % 50 mL IVPB Q6H PRN    sodium chloride 0.9% flush 10 mL Q12H PRN       Objective:     Vital Signs (Most Recent):  Temp: 97.5 °F (36.4 °C) (04/17/22 0711)  Pulse: 98 (04/17/22 0711)  Resp: 17 (04/17/22 0836)  BP: (!) 145/97 (04/17/22 0711)  SpO2: (!) 94 % (04/17/22 0711)  O2 Device (Oxygen Therapy): room air (04/17/22 0711)   Vital Signs (24h Range):  Temp:  [97.5 °F (36.4 °C)-98.6 °F (37 °C)] 97.5 °F (36.4 °C)  Pulse:  [] 98  Resp:  [17-20] 17  SpO2:  [94 %-96 %] 94 %  BP: (128-157)/(95-98) 145/97     Weight: 76.4 kg (168 lb 6.9 oz) (76.4) (04/14/22 1810)  Body mass index is 22.84 kg/m².  Body surface area is 1.97 meters squared.    I/O last 3 completed shifts:  In: 3323.9 [P.O.:960; I.V.:489.5; IV Piggyback:1874.4]  Out: 2200 [Urine:700; Drains:1500]    Physical Exam  Vitals and nursing note reviewed.   Constitutional:       General: He is in acute distress.      Appearance: He is normal weight. He is ill-appearing and toxic-appearing. He is not diaphoretic.   HENT:      Head: Normocephalic and atraumatic.      Nose: Nose normal. No congestion or rhinorrhea.      Mouth/Throat:      Mouth: Mucous membranes are dry.      Pharynx: Oropharynx is clear.   Eyes:      Extraocular Movements: Extraocular movements intact.      Pupils: Pupils are equal, round, and reactive to light.   Cardiovascular:      Rate and Rhythm: Normal rate and regular rhythm.      Pulses: Normal pulses.      Heart sounds: Normal heart sounds. No murmur heard.    No friction rub. No gallop.   Pulmonary:      Effort: Pulmonary effort is normal. No respiratory distress.      Breath sounds: Normal breath sounds.   Abdominal:      General: Bowel sounds are decreased. There is no distension.      Tenderness: There is abdominal tenderness. There is no guarding  or rebound.   Musculoskeletal:         General: No swelling. Normal range of motion.      Cervical back: Normal range of motion and neck supple.   Skin:     General: Skin is warm and dry.   Neurological:      General: No focal deficit present.      Mental Status: He is alert and oriented to person, place, and time.   Psychiatric:         Mood and Affect: Mood normal.         Behavior: Behavior normal.       Significant Labs:  All labs within the past 24 hours have been reviewed.     Significant Imaging:  All labs within the past 24 hours have been reviewed.    Assessment/Plan:     Hyponatremia  Hyperkalemia  Patent was noted to have sodium 119, potassium of 5.9, on admission in the setting of reduced oral intake and vomiting. Likely hypovolemic hyponatremia on admission. Patient was given 1 L NS bolus and albumin following paracentesis, followed by NS infusion. Initially, sodium improved to 126 then down trended to 123. On assessment patient seems volume down still. Suspect hyperkalemia related to acute kidney injury and reduced urine output. Additionally, given concurrent hyponatremia and hyperkalemia there is concern for adrenal pathology either metastasis or related to immunotherapy. For now, would rule out hypovolemic hyponatremia.    Fluctuating sodium and potassium - unclear if related to hypovolemia alone as patient has persistent losses from emesis.    Plan:  - repeat urine osm, serum osm, urine sodium and potassium  - continue IV fluids to match output from emesis/NG output/insensible losses  - follow up cortisol/ACTH  - BMP q6  - strict intake and output          Thank you for your consult. I will follow-up with patient. Please contact us if you have any additional questions.    Jimbo Cabral MD  Nephrology  [facility] - Oncology ([facility])

## 2022-04-17 NOTE — ASSESSMENT & PLAN NOTE
Pt with potassium 5.9 on CMP in ED. K 6.4 on istat. EKG without any acute changes. Pt shifted in ED with repeat BMP showing K 5.5. Unclear etiology of acute hyperkalemia, though patient does have concurrent PAULA. K consistently elevated, 5.9 on 4/16. S/p shift again on 4/17 with improvement in K to 5.1.    -- serial bmp  -- avoid potential potassium-increasing medications  -- am cortisol and ACTH to assess adrenal function given persistent hyperK and hypoNa

## 2022-04-18 PROBLEM — K56.7 ILEUS: Status: ACTIVE | Noted: 2022-04-18

## 2022-04-18 LAB
ANION GAP SERPL CALC-SCNC: 15 MMOL/L (ref 8–16)
ANION GAP SERPL CALC-SCNC: 16 MMOL/L (ref 8–16)
ANION GAP SERPL CALC-SCNC: 23 MMOL/L (ref 8–16)
BACTERIA SPEC AEROBE CULT: NO GROWTH
BUN SERPL-MCNC: 28 MG/DL (ref 6–20)
BUN SERPL-MCNC: 30 MG/DL (ref 6–20)
BUN SERPL-MCNC: 31 MG/DL (ref 6–20)
BUN SERPL-MCNC: 39 MG/DL (ref 6–30)
CALCIUM SERPL-MCNC: 8.6 MG/DL (ref 8.7–10.5)
CALCIUM SERPL-MCNC: 8.8 MG/DL (ref 8.7–10.5)
CALCIUM SERPL-MCNC: 9 MG/DL (ref 8.7–10.5)
CHLORIDE SERPL-SCNC: 87 MMOL/L (ref 95–110)
CHLORIDE SERPL-SCNC: 90 MMOL/L (ref 95–110)
CHLORIDE SERPL-SCNC: 91 MMOL/L (ref 95–110)
CHLORIDE SERPL-SCNC: 92 MMOL/L (ref 95–110)
CO2 SERPL-SCNC: 17 MMOL/L (ref 23–29)
CO2 SERPL-SCNC: 22 MMOL/L (ref 23–29)
CO2 SERPL-SCNC: 24 MMOL/L (ref 23–29)
CORTIS SERPL-MCNC: 44.3 UG/DL (ref 4.3–22.4)
CREAT SERPL-MCNC: 1.4 MG/DL (ref 0.5–1.4)
CREAT SERPL-MCNC: 1.4 MG/DL (ref 0.5–1.4)
CREAT SERPL-MCNC: 1.5 MG/DL (ref 0.5–1.4)
CREAT SERPL-MCNC: 2.2 MG/DL (ref 0.5–1.4)
EST. GFR  (AFRICAN AMERICAN): >60 ML/MIN/1.73 M^2
EST. GFR  (NON AFRICAN AMERICAN): 52.8 ML/MIN/1.73 M^2
EST. GFR  (NON AFRICAN AMERICAN): 57.4 ML/MIN/1.73 M^2
EST. GFR  (NON AFRICAN AMERICAN): 57.4 ML/MIN/1.73 M^2
GLUCOSE SERPL-MCNC: 105 MG/DL (ref 70–110)
GLUCOSE SERPL-MCNC: 105 MG/DL (ref 70–110)
GLUCOSE SERPL-MCNC: 151 MG/DL (ref 70–110)
GLUCOSE SERPL-MCNC: 96 MG/DL (ref 70–110)
HCT VFR BLD CALC: 45 %PCV (ref 36–54)
PATH INTERP FLD-IMP: NORMAL
POC IONIZED CALCIUM: 1.15 MMOL/L (ref 1.06–1.42)
POC TCO2 (MEASURED): 29 MMOL/L (ref 23–29)
POTASSIUM BLD-SCNC: 5.5 MMOL/L (ref 3.5–5.1)
POTASSIUM SERPL-SCNC: 5.4 MMOL/L (ref 3.5–5.1)
POTASSIUM SERPL-SCNC: 5.5 MMOL/L (ref 3.5–5.1)
POTASSIUM SERPL-SCNC: 5.6 MMOL/L (ref 3.5–5.1)
SAMPLE: ABNORMAL
SODIUM BLD-SCNC: 122 MMOL/L (ref 136–145)
SODIUM SERPL-SCNC: 129 MMOL/L (ref 136–145)
SODIUM SERPL-SCNC: 129 MMOL/L (ref 136–145)
SODIUM SERPL-SCNC: 132 MMOL/L (ref 136–145)

## 2022-04-18 PROCEDURE — 99233 PR SUBSEQUENT HOSPITAL CARE,LEVL III: ICD-10-PCS | Mod: ,,, | Performed by: INTERNAL MEDICINE

## 2022-04-18 PROCEDURE — S0166 INJ OLANZAPINE 2.5MG: HCPCS

## 2022-04-18 PROCEDURE — 36415 COLL VENOUS BLD VENIPUNCTURE: CPT

## 2022-04-18 PROCEDURE — 99233 PR SUBSEQUENT HOSPITAL CARE,LEVL III: ICD-10-PCS | Mod: GC,,, | Performed by: INTERNAL MEDICINE

## 2022-04-18 PROCEDURE — 93005 ELECTROCARDIOGRAM TRACING: CPT

## 2022-04-18 PROCEDURE — 96156 HLTH BHV ASSMT/REASSESSMENT: CPT | Mod: ,,, | Performed by: PSYCHOLOGIST

## 2022-04-18 PROCEDURE — 93010 ELECTROCARDIOGRAM REPORT: CPT | Mod: ,,, | Performed by: INTERNAL MEDICINE

## 2022-04-18 PROCEDURE — 25000003 PHARM REV CODE 250

## 2022-04-18 PROCEDURE — 80048 BASIC METABOLIC PNL TOTAL CA: CPT | Performed by: INTERNAL MEDICINE

## 2022-04-18 PROCEDURE — 99233 SBSQ HOSP IP/OBS HIGH 50: CPT | Mod: ,,, | Performed by: INTERNAL MEDICINE

## 2022-04-18 PROCEDURE — 20600001 HC STEP DOWN PRIVATE ROOM

## 2022-04-18 PROCEDURE — 36415 COLL VENOUS BLD VENIPUNCTURE: CPT | Performed by: INTERNAL MEDICINE

## 2022-04-18 PROCEDURE — 82533 TOTAL CORTISOL: CPT

## 2022-04-18 PROCEDURE — 99233 SBSQ HOSP IP/OBS HIGH 50: CPT | Mod: GC,,, | Performed by: INTERNAL MEDICINE

## 2022-04-18 PROCEDURE — 80048 BASIC METABOLIC PNL TOTAL CA: CPT | Mod: 91 | Performed by: INTERNAL MEDICINE

## 2022-04-18 PROCEDURE — 93010 EKG 12-LEAD: ICD-10-PCS | Mod: ,,, | Performed by: INTERNAL MEDICINE

## 2022-04-18 PROCEDURE — 96156 PR ASSESS/REASSESSMENT, HEALTH BEHAVIOR: ICD-10-PCS | Mod: ,,, | Performed by: PSYCHOLOGIST

## 2022-04-18 PROCEDURE — 63600175 PHARM REV CODE 636 W HCPCS: Performed by: STUDENT IN AN ORGANIZED HEALTH CARE EDUCATION/TRAINING PROGRAM

## 2022-04-18 PROCEDURE — 82024 ASSAY OF ACTH: CPT

## 2022-04-18 PROCEDURE — 63600175 PHARM REV CODE 636 W HCPCS

## 2022-04-18 RX ORDER — LABETALOL 200 MG/1
200 TABLET, FILM COATED ORAL ONCE
Status: DISCONTINUED | OUTPATIENT
Start: 2022-04-18 | End: 2022-04-18

## 2022-04-18 RX ORDER — HALOPERIDOL 5 MG/ML
5 INJECTION INTRAMUSCULAR ONCE
Status: COMPLETED | OUTPATIENT
Start: 2022-04-18 | End: 2022-04-18

## 2022-04-18 RX ORDER — HALOPERIDOL 5 MG/ML
2 INJECTION INTRAMUSCULAR EVERY 4 HOURS PRN
Status: DISCONTINUED | OUTPATIENT
Start: 2022-04-18 | End: 2022-04-19

## 2022-04-18 RX ORDER — LEVETIRACETAM 500 MG/5ML
1000 INJECTION, SOLUTION, CONCENTRATE INTRAVENOUS EVERY 12 HOURS
Status: DISCONTINUED | OUTPATIENT
Start: 2022-04-18 | End: 2022-04-19

## 2022-04-18 RX ORDER — LORAZEPAM 2 MG/ML
2 INJECTION INTRAMUSCULAR ONCE
Status: COMPLETED | OUTPATIENT
Start: 2022-04-18 | End: 2022-04-18

## 2022-04-18 RX ORDER — PROCHLORPERAZINE EDISYLATE 5 MG/ML
5 INJECTION INTRAMUSCULAR; INTRAVENOUS EVERY 6 HOURS PRN
Status: DISCONTINUED | OUTPATIENT
Start: 2022-04-18 | End: 2022-04-22

## 2022-04-18 RX ORDER — OLANZAPINE 10 MG/2ML
5 INJECTION, POWDER, FOR SOLUTION INTRAMUSCULAR ONCE AS NEEDED
Status: COMPLETED | OUTPATIENT
Start: 2022-04-18 | End: 2022-04-18

## 2022-04-18 RX ORDER — DIVALPROEX SODIUM 250 MG/1
250 TABLET, DELAYED RELEASE ORAL ONCE
Status: COMPLETED | OUTPATIENT
Start: 2022-04-19 | End: 2022-04-18

## 2022-04-18 RX ORDER — DIPHENHYDRAMINE HYDROCHLORIDE 50 MG/ML
50 INJECTION INTRAMUSCULAR; INTRAVENOUS ONCE
Status: COMPLETED | OUTPATIENT
Start: 2022-04-18 | End: 2022-04-18

## 2022-04-18 RX ADMIN — LORAZEPAM 2 MG: 2 INJECTION INTRAMUSCULAR; INTRAVENOUS at 10:04

## 2022-04-18 RX ADMIN — HYDROMORPHONE HYDROCHLORIDE 0.5 MG: 1 INJECTION, SOLUTION INTRAMUSCULAR; INTRAVENOUS; SUBCUTANEOUS at 01:04

## 2022-04-18 RX ADMIN — OLANZAPINE 5 MG: 10 INJECTION, POWDER, LYOPHILIZED, FOR SOLUTION INTRAMUSCULAR at 05:04

## 2022-04-18 RX ADMIN — DIPHENHYDRAMINE HYDROCHLORIDE 50 MG: 50 INJECTION, SOLUTION INTRAMUSCULAR; INTRAVENOUS at 07:04

## 2022-04-18 RX ADMIN — HEPARIN SODIUM 5000 UNITS: 5000 INJECTION INTRAVENOUS; SUBCUTANEOUS at 02:04

## 2022-04-18 RX ADMIN — LORAZEPAM 2 MG: 2 INJECTION INTRAMUSCULAR; INTRAVENOUS at 07:04

## 2022-04-18 RX ADMIN — FENTANYL 1 PATCH: 25 PATCH, EXTENDED RELEASE TRANSDERMAL at 02:04

## 2022-04-18 RX ADMIN — CEFTRIAXONE 2 G: 2 INJECTION, SOLUTION INTRAVENOUS at 06:04

## 2022-04-18 RX ADMIN — LEVETIRACETAM 1000 MG: 500 INJECTION, SOLUTION INTRAVENOUS at 01:04

## 2022-04-18 RX ADMIN — LORAZEPAM 2 MG: 2 INJECTION INTRAMUSCULAR; INTRAVENOUS at 02:04

## 2022-04-18 RX ADMIN — DIVALPROEX SODIUM 250 MG: 250 TABLET, DELAYED RELEASE ORAL at 11:04

## 2022-04-18 RX ADMIN — HALOPERIDOL LACTATE 2 MG: 5 INJECTION, SOLUTION INTRAMUSCULAR at 06:04

## 2022-04-18 RX ADMIN — HYDROMORPHONE HYDROCHLORIDE 0.5 MG: 1 INJECTION, SOLUTION INTRAMUSCULAR; INTRAVENOUS; SUBCUTANEOUS at 08:04

## 2022-04-18 RX ADMIN — HYDROMORPHONE HYDROCHLORIDE 0.5 MG: 1 INJECTION, SOLUTION INTRAMUSCULAR; INTRAVENOUS; SUBCUTANEOUS at 04:04

## 2022-04-18 RX ADMIN — PROMETHAZINE HYDROCHLORIDE 12.5 MG: 25 INJECTION INTRAMUSCULAR; INTRAVENOUS at 08:04

## 2022-04-18 RX ADMIN — HALOPERIDOL 5 MG: 5 INJECTION INTRAMUSCULAR at 07:04

## 2022-04-18 RX ADMIN — HEPARIN SODIUM 5000 UNITS: 5000 INJECTION INTRAVENOUS; SUBCUTANEOUS at 05:04

## 2022-04-18 NOTE — SIGNIFICANT EVENT
I was called to Mr. Garner's room as patient had noted he wanted to leave AM. Patient notes he is unhappy with his care and wants to go home. I explained he is being treated for a peritoneal infection and bowel obstruction, and that leaving now can lead to worsening infection, septic shock, intestinal ischemia, kidney failure and possibly death. Patient stated he understood and was able to repeat everything back to me. He notes he will use enemas and manage his pain with his home medications. I will prescribe him 3 days of  Augmentin to complete his treatment for SBP. He agreed to take the antibiotics and notes he will come back to the hospital if needed.       Patient ultimately changed his mind and decided to stay. He is deferring his NG tube to be replaced.     Janice Pan, DO  Internal Medicine, PGY-2  [facility]

## 2022-04-18 NOTE — HPI
Joseluis Garner, a 51 y.o. male, for initial evaluation visit due to elevated distress screen.  Met with patient.    Chief Complaint/Reason for Encounter: adaptation to disease and treatment, AMS

## 2022-04-18 NOTE — ASSESSMENT & PLAN NOTE
Pt with concerns for ileus vs SBO on abdominal imaging on 4/16. Pt with chronic opioid use given cancer and abdominal mets. Pt made NPO and NGT placed with significant output. Repeat imaging with concerns for worsening ileus despite nonoperative treatment. Concerns for malignant obstruction. Gen surg consulted, though patient will likely not be operative candidate given high tumor burden.     -- NGT  -- NPO  -- f/u Gen surg recs

## 2022-04-18 NOTE — SUBJECTIVE & OBJECTIVE
Interval History: Patient with improving sodium but persistent hyperkalemia.    Review of patient's allergies indicates:  No Known Allergies  Current Facility-Administered Medications   Medication Frequency    0.9%  NaCl infusion Continuous    calcium gluconat 1 g in NS IVPB (premixed) Q10 Min PRN    cefTRIAXone (ROCEPHIN) 2 g/50 mL D5W IVPB Q24H    dextrose 10% bolus 125 mL PRN    dextrose 10% bolus 125 mL PRN    dextrose 10% bolus 125 mL PRN    dextrose 10% bolus 250 mL PRN    dextrose 10% bolus 250 mL PRN    fentaNYL 25 mcg/hr 1 patch Q72H    glucagon (human recombinant) injection 1 mg PRN    glucose chewable tablet 16 g PRN    glucose chewable tablet 24 g PRN    heparin (porcine) injection 5,000 Units Q8H    HYDROmorphone injection 0.5 mg Q3H PRN    levETIRAcetam injection 1,000 mg Q12H    LIDOcaine HCl 2% oral solution 15 mL Q4H PRN    lorazepam injection 2 mg Q4H PRN    naloxone 0.4 mg/mL injection 0.02 mg PRN    prochlorperazine injection Soln 5 mg Q6H PRN    promethazine (PHENERGAN) 12.5 mg in dextrose 5 % 50 mL IVPB Q6H PRN    sodium chloride 0.9% flush 10 mL Q12H PRN       Objective:     Vital Signs (Most Recent):  Temp: 96.8 °F (36 °C) (04/18/22 0756)  Pulse: 92 (92) (04/18/22 0756)  Resp: 18 (04/18/22 0842)  BP: (!) 169/99 (04/18/22 0756)  SpO2: 96 % (04/18/22 0756)  O2 Device (Oxygen Therapy): room air (04/18/22 0756)   Vital Signs (24h Range):  Temp:  [96.8 °F (36 °C)-98.9 °F (37.2 °C)] 96.8 °F (36 °C)  Pulse:  [] 92  Resp:  [17-20] 18  SpO2:  [64 %-100 %] 96 %  BP: (152-183)/() 169/99     Weight: 76.4 kg (168 lb 6.9 oz) (76.4) (04/14/22 1810)  Body mass index is 22.84 kg/m².  Body surface area is 1.97 meters squared.    I/O last 3 completed shifts:  In: 1872.7 [I.V.:489.5; IV Piggyback:1383.2]  Out: 3356 [Urine:400; Emesis/NG output:6; Drains:2950]    Physical Exam  Vitals and nursing note reviewed.   Constitutional:       General: He is in acute distress.      Appearance: He is normal  weight. He is ill-appearing and toxic-appearing. He is not diaphoretic.   HENT:      Head: Normocephalic and atraumatic.      Nose: Nose normal. No congestion or rhinorrhea.      Mouth/Throat:      Mouth: Mucous membranes are dry.      Pharynx: Oropharynx is clear.   Eyes:      Extraocular Movements: Extraocular movements intact.      Pupils: Pupils are equal, round, and reactive to light.   Cardiovascular:      Rate and Rhythm: Normal rate and regular rhythm.      Pulses: Normal pulses.      Heart sounds: Normal heart sounds. No murmur heard.    No friction rub. No gallop.   Pulmonary:      Effort: Pulmonary effort is normal. No respiratory distress.      Breath sounds: Normal breath sounds.   Abdominal:      General: Bowel sounds are decreased. There is no distension.      Tenderness: There is abdominal tenderness. There is no guarding or rebound.   Musculoskeletal:         General: No swelling. Normal range of motion.      Cervical back: Normal range of motion and neck supple.   Skin:     General: Skin is warm and dry.   Neurological:      General: No focal deficit present.      Mental Status: He is alert and oriented to person, place, and time.   Psychiatric:         Mood and Affect: Mood normal.         Behavior: Behavior normal.       Significant Labs:  All labs within the past 24 hours have been reviewed.     Significant Imaging:  All labs within the past 24 hours have been reviewed.

## 2022-04-18 NOTE — PLAN OF CARE
Pt involved in plan of care and communicating needs throughout shift. Pt very agitated throughout shift. Pt pulled NG tube out several times and got out of bed even after being instructed not to. Bed alarm on and Tele-sitter in room. Ativan, Dilaudid, and Haldol given with mild relief. Pt frustrated that he is unable to get up by himself and c/o of NG tube pain. Charge nurse alerted and helped with NG insertion and care of patient. Pt refused to wear Tele monitor. M.D. aware of patient behavior during shift.  All VSS; no acute events so far this shift.  Pt remaining free from injury throughout shift; bed locked and in lowest position; bed alarm on and tele-sitter in room, call light within reach.  Pt instructed to call for assistance as needed.  Q1H rounding done on pt. WCTM.

## 2022-04-18 NOTE — SUBJECTIVE & OBJECTIVE
Interval History: Pt agitated overnight, wanting to leave AMA, though changing his mind at the last minute and returning to his bed after NGT and IV  had been removed. See Significant Event note for more detail. This morning pt profusely vomiting prior to  placement of NGT. Ileus vs SBO  appears worse on repeat KUB. Will consult Gen surg.     Oncology Treatment Plan:   OP NIVOLUMAB 1 MG/KG IPILIMUMAB 3MG/KG FOLLOWED BY NIVOLUMAB 480MG Q4W    Medications:  Continuous Infusions:   sodium chloride 0.9% 125 mL/hr at 04/18/22 0530     Scheduled Meds:   cefTRIAXone (ROCEPHIN) IVPB  2 g Intravenous Q24H    fentaNYL  1 patch Transdermal Q72H    heparin (porcine)  5,000 Units Subcutaneous Q8H    levetiracetam IV  1,000 mg Intravenous Q12H     PRN Meds:[COMPLETED] calcium gluconate IVPB **AND** calcium gluconate IVPB, dextrose 10%, dextrose 10%, dextrose 10%, dextrose 10%, dextrose 10%, glucagon (human recombinant), glucose, glucose, HYDROmorphone, LIDOcaine HCl 2%, lorazepam, naloxone, prochlorperazine, promethazine (PHENERGAN) IVPB, sodium chloride 0.9%     Review of Systems   Constitutional:  Positive for appetite change and fatigue. Negative for chills, diaphoresis and fever.   HENT:  Negative for congestion and sore throat.    Respiratory:  Negative for cough and shortness of breath.    Cardiovascular:  Negative for chest pain and palpitations.   Gastrointestinal:  Positive for abdominal distention, abdominal pain, constipation, nausea and vomiting. Negative for blood in stool.   Genitourinary:  Negative for dysuria and frequency.   Musculoskeletal:  Positive for myalgias. Negative for arthralgias.   Skin:  Negative for rash and wound.   Neurological:  Positive for weakness. Negative for dizziness, syncope, light-headedness and numbness.   Psychiatric/Behavioral:  Positive for agitation. Negative for confusion. The patient is nervous/anxious.    Objective:     Vital Signs (Most Recent):  Temp: 96.8 °F (36 °C) (04/18/22  0756)  Pulse: 92 (92) (04/18/22 0756)  Resp: 18 (04/18/22 0842)  BP: (!) 169/99 (04/18/22 0756)  SpO2: 96 % (04/18/22 0756)   Vital Signs (24h Range):  Temp:  [96.8 °F (36 °C)-98.9 °F (37.2 °C)] 96.8 °F (36 °C)  Pulse:  [] 92  Resp:  [17-20] 18  SpO2:  [64 %-100 %] 96 %  BP: (157-183)/() 169/99     Weight: 76.4 kg (168 lb 6.9 oz) (76.4)  Body mass index is 22.84 kg/m².  Body surface area is 1.97 meters squared.      Intake/Output Summary (Last 24 hours) at 4/18/2022 1225  Last data filed at 4/17/2022 1800  Gross per 24 hour   Intake 0 ml   Output 1456 ml   Net -1456 ml       Physical Exam  Vitals and nursing note reviewed.   Constitutional:       General: He is in acute distress.      Appearance: He is normal weight. He is ill-appearing. He is not toxic-appearing or diaphoretic.   HENT:      Head: Normocephalic and atraumatic.      Nose: Nose normal. No congestion or rhinorrhea.      Mouth/Throat:      Mouth: Mucous membranes are dry.      Pharynx: Oropharynx is clear.   Eyes:      Extraocular Movements: Extraocular movements intact.      Pupils: Pupils are equal, round, and reactive to light.   Cardiovascular:      Rate and Rhythm: Normal rate and regular rhythm.      Pulses: Normal pulses.      Heart sounds: Normal heart sounds. No murmur heard.    No friction rub. No gallop.   Pulmonary:      Effort: Pulmonary effort is normal. No respiratory distress.      Breath sounds: Normal breath sounds.   Abdominal:      General: Bowel sounds are decreased. There is distension (mild).      Tenderness: There is abdominal tenderness. There is no guarding or rebound.   Musculoskeletal:         General: No swelling. Normal range of motion.      Cervical back: Normal range of motion and neck supple.   Skin:     General: Skin is warm and dry.   Neurological:      General: No focal deficit present.      Mental Status: He is alert and oriented to person, place, and time.   Psychiatric:      Comments: Pt appears anxious  and agitated.  Pt orientedx3.        Significant Labs:   CBC:   Recent Labs   Lab 04/17/22  1220   WBC 22.44*   HGB 15.6   HCT 46.1   *   , CMP:   Recent Labs   Lab 04/17/22  1220 04/17/22  1538 04/18/22  0023 04/18/22  0801   * 128* 129* 132*   K 5.1 5.0 5.4* 5.6*   CL 91* 92* 90* 92*   CO2 19* 21* 24 17*    115* 105 105   BUN 25* 25* 28* 30*   CREATININE 1.3 1.2 1.5* 1.4   CALCIUM 8.8 8.5* 9.0 8.8   PROT 5.6*  --   --   --    ALBUMIN 2.3*  --   --   --    BILITOT 0.4  --   --   --    ALKPHOS 71  --   --   --    AST 29  --   --   --    ALT 10  --   --   --    ANIONGAP 16 15 15 23*   EGFRNONAA >60.0 >60.0 52.8* 57.4*   , and Urine Studies: No results for input(s): COLORU, APPEARANCEUA, PHUR, SPECGRAV, PROTEINUA, GLUCUA, KETONESU, BILIRUBINUA, OCCULTUA, NITRITE, UROBILINOGEN, LEUKOCYTESUR, RBCUA, WBCUA, BACTERIA, SQUAMEPITHEL, HYALINECASTS in the last 48 hours.    Invalid input(s): WRIGHTSUR    Diagnostic Results:  I have reviewed all pertinent imaging results/findings within the past 24 hours.    X-ray Abdomen for NG Tube Placement (Nursing should notify Radiology after placement)   Final Result      1. Tip of the nasogastric tube in the expected location of the distal antrum or the pylorus of the stomach.  There is no perforation.   2. Left midlung zone pulmonary nodule as above.         Electronically signed by: Lamine Sim MD   Date:    04/18/2022   Time:    09:44      X-Ray Abdomen AP 1 View   Final Result      Ileus versus obstruction.         Electronically signed by: Darci Armstrong MD   Date:    04/18/2022   Time:    08:03      X-ray Abdomen for NG Tube Placement (Nursing should notify Radiology after placement)   Final Result      As above.         Electronically signed by: Jhon Goetz   Date:    04/16/2022   Time:    17:19      X-Ray Abdomen AP 1 View   Final Result      As above.         Electronically signed by: Jhon Goetz   Date:    04/16/2022   Time:    13:17      CT Chest Abdomen  Pelvis Without Contrast (XPD)   Final Result      In this patient with a reported history of metastatic melanoma, there is diffuse metastatic disease involving the right axilla, bilateral lungs, and peritoneal metastasis as described in detail above.  Additional subcutaneous soft tissue nodule which could represent metastatic focus.      Mild wedging of L2 superior endplate suggestive of mild compression fracture.  Recommend correlation with outside imaging.      Diverticulosis.  No evidence of diverticulitis.      Hepatomegaly.      Additional findings as above.      Electronically signed by resident: Baltazar Purcell   Date:    04/16/2022   Time:    09:17      Electronically signed by: Joseluis Arias   Date:    04/16/2022   Time:    12:24      IR Paracentesis without imaging   Final Result      Ultrasound-guided paracentesis with drainage of 7000 mL of elizabeth fluid.  Albumin administered as per protocol.      _______________________________________________________________      Electronically signed by resident: Rudy Otero   Date:    04/14/2022   Time:    17:39      Electronically signed by: Juan Jose Hernández MD   Date:    04/14/2022   Time:    17:46      US Retroperitoneal Complete    (Results Pending)

## 2022-04-18 NOTE — HPI
Joseluis Garner is a 52 yo male with history of CAD s/p PCI, seizures, substance abuse, stage IV malignant melanoma with peritoneal carcinomatosis who originally presented with worsening ascites. Has had worsening abdominal pain and distention over the past month, for which he was admitted for multiple times. He has had multiple therapeutic paracentesis with several liters removed. He was most recently admitted on 4/14. Reported worsening nausea and vomiting. Minimal PO intake on admission. Noted to have several electrolyte abnormalities. He had a CT scan on 4/16 which revealed large volume ascites and extensive omental thickening/nodularity concerning for metastatic disease. NGT placed 4/16. 1.5L output per NGT per day. No bowel movement in 3 days. Continues to pass flatus.     General surgery consulted for '52 y/o M hx of metastatic melanoma admit for worsening ascites and N/V, found to have worsening ileus vs SBO on abdominal imaging. Not improving with NGT and NPO.'

## 2022-04-18 NOTE — ASSESSMENT & PLAN NOTE
Pt presenting with several days of intractable nausea and vomiting. Pt unable to keep anything, including water, down. Zofran with no improvement at home. Likely 2/2 hyponatremia and ileus. KUB and CT demonstrating ileus vs partial SBO. NGT placed 4/17, pt keeps pulling out. Replaced x3. Given ativan to help with agitation and nausea.     -- therapeutic para as above  -- management of hyponatremia as above  -- IV compazine and zofran  -- NGT and NPO

## 2022-04-18 NOTE — RESPIRATORY THERAPY
RT enter the room to complete EKG. Nurse informed RT that the patient had signed an AMA. RT was told EKG was not needed anymore.

## 2022-04-18 NOTE — ASSESSMENT & PLAN NOTE
Hyperkalemia  Patent was noted to have sodium 119, potassium of 5.9, on admission in the setting of reduced oral intake and vomiting. Likely hypovolemic hyponatremia on admission. Patient was given 1 L NS bolus and albumin following paracentesis, followed by NS infusion. Initially, sodium improved to 126 then down trended to 123. On assessment patient seems volume down still. Suspect hyperkalemia related to acute kidney injury and reduced urine output. Additionally, given concurrent hyponatremia and hyperkalemia there is concern for adrenal pathology either metastasis or related to immunotherapy. For now, would rule out hypovolemic hyponatremia.    Sodium improving with IV fluid resuscitation but continues to have some degree of hyperkalemia. Hyperkalemia is unclear but could be multifactorial -  if there is urinary retention, high cell turnover, adrenal pathology, or pseudohyperkalemia from thrombocytosis.    Plan:  - repeat urine osm, serum osm, urine sodium and potassium  - continue IV fluids to match output from emesis/NG output/insensible losses  - follow up cortisol/ACTH  - retroperitoneal ultrasound ordered  - CK ordered  - [facility] q6  - strict intake and output

## 2022-04-18 NOTE — ASSESSMENT & PLAN NOTE
Altered mental status- Please order psychiatry consult.    Unable to perform comprehensive or cancer coping specific psychological evaluation. Will continue to follow patient until assessment can be completed.

## 2022-04-18 NOTE — SUBJECTIVE & OBJECTIVE
"Joseluis Garner, a 51 y.o. male, seen for initial evaluation. Met with patient.    Chief Complaint   Patient presents with    Generalized Body Aches     Metastatic Melanoma patient with pain all over sent here for IVF and admission       Patient Active Problem List   Diagnosis    Migraine    History of seizure    Altered mental status    Post-ictal confusion    Ascites    Metastatic melanoma    Hyponatremia    PAULA (acute kidney injury)    Hyperkalemia    Intractable nausea and vomiting    Ileus         Mental Status Exam: Joseluis Garner was seen at the request of the inpatient oncology team.  Mr. Garner was in bed in 4 point restraints at the time of visit.  The patient was agitated and confused throughout the visit. Upon my entrance, he was heatedly discussing his "$800 [employer] belt" that he was wearing with nursing staff (he was not wearing a belt at the time).      Orientation:  Patient was oriented to person and date, but not place (stated we were in [place] "at a [employer]") or situation (stated he was hospitalized due to "an injury at the restaurant"); Patient later did state, "Did you know I have Stage IV cancer?" but was unable to identify why he was in restraints ("I think it is to help them take blood from me."). He requested I provide him "a knife or scissors" for him to remove his restraints.  When asked about his attempts to leave AMA last night and earlier today he stated, "It is because of family problems" then went on a rambling diatribe about how "children do not get along with their parents anymore."    Patient was not able to name the current ("I think it is Sugar Ray") or former President ("maybe Junior Bradshaw"). When he was asked again to name the , he stated, "I don't really follow baseball."    Appearance: age appropriate, casually dressed, adequately groomed  Behavior/Cooperation: challenging/mildly combative, appropriate eye contact, " "interactive   Speech: rambling, tangential, non-responsive at time, not pressured, occasional slurring  Mood: dysphoric  Affect: agitated  Thought Content: did not appear to be responding to internal stimuli during the interview.   Memory: Grossly intact  Attention Span/Concentration: Attends only in brief intervals, responses often irrelevant  Estimate of Intelligence: average from verbal skills and history  Insight: patient has limited insight into own behavior and behavior of others  Judgment: the patient's behavior is inappropriate to circumstances    Patient declined to answer any questions further- stated, "I will only talk about it tomorrow at 7 am."     Psychiatric history as per prior psychiatric eval (2/18/21)  PAST PSYCHIATRIC HISTORY:   Previous Diagnoses:  Denies  Previous Medication Trials: none Denies  Previous Psychiatric Hospitalizations:  Denies  Previous Suicide Attempts:  Denies  History of Violence:  Denies  Violence Risk to Others over the past 6 months:  Denies  Violence Risk to Self over the past 6 months:  Denies  History of Psychological Trauma:  Denies  Outpatient psychiatrist:   Denies     Neurological History:   Hx of several concussions  Seizure disorder      Substance Abuse History:  Recreational Drugs: amphetamine, THC  Use of Alcohol: Hx per chart review, does not endorse currently, has hx of DUI  Rehab History: Denies  Tobacco Use: current smoker      Past Legal:  Hx of arrest and [facility], EtOH related        "

## 2022-04-18 NOTE — ASSESSMENT & PLAN NOTE
Pt presenting with severe hyponatremia to 119 in ED. Likely hypovolemic hyponatremia s/o poor PO And N/V. Na on labs 10 days prior to admission 128. Pt presenting with severe intractable nausea and vomiting. No changes in mental status or seizures. Sxs improving with improvement in Na. Nephro following, pt receiving intermittent NS boluses. Now on continuous IVF. Na 132 on 4/18    -- s/p therapeutic paracentesis on 4/14  --  Midline placed for better access; now removed on 4/17 as pt was about to leave ama   -- on NS gtt given NPO for SBO/ileus

## 2022-04-18 NOTE — PROGRESS NOTES
Pt fell when getting out of bed. Bed alarm was on and pt was instructed several times not to get out of bed by himself. Pt found on ground when nurse entered. Bed alarm was going off when nurse entered room. Pt was helped back into the bed. Vital signs taken and stable. M.D. notified. No injuries noted. Pt re-educated not to get out of bed by himself. Bed alarm turned on and Tele-sitter ordered. TAMMY.

## 2022-04-18 NOTE — PLAN OF CARE
In the afternoon of 4/18 patient became more confused and was responding to questions with bizarre answers. He was disoriented, agitated, and non-redirectable ultimately requiring mechanical restraints. Shortly after, he began to bite through his restraints and at his IV, and he became more agitated requiring Zyprexa. Psychologist Dr. Colvin was consulted earlier in the day due to patient's high distress score and incidentally evaluated the patient during this time. Per our conversation, patient was stating his was in [place] and could not identify why he was in restraints. He answered that the current [place] president is Brenda Cavanaugh, among other strange answers. Please see her note for full assessment. The patient had similar bizarre answers to my questions upon my personal evaluation.    I spoke with patient's mother briefly, she states that he does have a history of substance abuse. One or two days ago she suspects a friend brought him a vaping device and he was caught smoking marijuana, however she does not suspect any further drug activity. She states this behavior is abnormal for him.     Plan  - Four-point restraints and Zyprexa PRN  - PEC for grave disability and danger to self  - UDS, however patient has not urinated all day per charge RN  - Consult psychiatry given concern for possible drug toxicity/withdrawal vs brain pathology and PEC - appreciate recommendations  - MRI brain w/ w/o contrast given concern for brain metastasis - patient may require sedation during imaging    Chalo Herrera MD PGY-1  Department of Internal Medicine  [facility]

## 2022-04-18 NOTE — ASSESSMENT & PLAN NOTE
He presented initially with enlarging right axillary lymph nodes that have grown progressively in size since April 2021. He presented to urgent care and was referred for US which showed multiple enlarged masses in the right axilla most likely reflecting abnormal lymph nodes concerning for malignancy.   He was subsequently referred to dermatology who performed a complete exam of the skin that was negative for suspicious cutaneous lesions as well as punch biopsy of one of the lymph node on 01/04/22. Pathology came back as malignant melanoma.   BRAF mutation: positive for a V600E mutation.   He subsequently underwent a PET/CT on 01/28/22 which showed multiple hypermetabolic soft tissue masses within the right axillary region, metastatic lesions to the lungs and abdomen (VI segment of the liver measuring a max SUV of 6.99, 1.6 x 1.9 cm nodular soft tissue density with a max SUV  7.64 was seen adjacent to the colon within the right lower quadrant. Brain MRI was negative for metastatic disease.  LDH was 307 U/L  He saw Oncology at [facility] on 01/31/22 with plan to start First line Nivolumab / ipilimumab. He received C1 on 03/03/22.     -- pt scheduled to start binimetinib and encorafenib prior to admission.  -- will restart upon discharge

## 2022-04-18 NOTE — PROGRESS NOTES
[facility] - Oncology ([facility])  Hematology/Oncology  Progress Note    Patient Name: Joseluis Garner  Admission Date: 4/14/2022  Hospital Length of Stay: 4 days  Code Status: Full Code     Subjective:     HPI:  51 y.o. male with history of CAD s/p PCI, seizures, substance abuse, stage IV malignant melanoma presenting from oncology clinic with multiple electrolyte abnormalities and worsening ascites. Pt has hx of malignant melanoma first diagnosed 1/2022. He is s/p 1 round of tx on 3/3/2022, with  plans to start binimetinib and enorafenib in  the near future. However, patient's clinical status has been deteriorating over the past few weeks. He was admitted on 03/18 at [facility] for abdominal pain, and distension. He underwent therapeutic paracentesis with removal of 3L, and was discharged on 03/22. CT abdomen during that admission showed soft tissue attenuation throughout the peritoneum consistent with innumerable peritoneal implants.  He was then readmitted the following day 03/23 for re accumulation and had another paracentesis with removal of 4.7L. Pt was again readmitted 03/30 to 04/02 and underwent paracentesis. He was planned to receive pleur-X catheter as outpatient. He presented again to [facility] on 04/04 with worsening abdominal distension and pain. He underwent therapeutic paracentesis with removal of 5L. Cytology was positive for malignant cells. He was discharged on 04/05 on Cipro for SBP coverage.      Since his discharge, he notes worsening abdominal distension. He had progressive nausea and vomiting and has not been able to tolerate any diet including water. He has tried zofran at home which has not helped. He denies any recent fever, chills, cough, sore throat. He was evaluated in  oncology clinic this morning  with his mother, during which time he had several episodes of vomiting. Labs obtained during clinic were concerning for leukocytosis, hyperkalemia, and hyponatremia. He was sent to the ED for admission  for management of ascites, and other current comorbid conditions.     In the ED, Pt afebrile, /85, HR 80s,  ALEJANDRO. CBC notable for  WBC 19, Plt 619. K  5.9, Na 119.  Alb 2.6. Cr 2.0 from baseline 1.0. TSH 8.8, though free T4 wnl. Procal elevated to 1.1. EKG with no acute changes, no  T wave abnormalities.        Interval History: Pt agitated overnight, wanting to leave AMA, though changing his mind at the last minute and returning to his bed after NGT and IV  had been removed. See Significant Event note for more detail. This morning pt profusely vomiting prior to  placement of NGT. Ileus vs SBO  appears worse on repeat KUB. Will consult Gen surg.     Oncology Treatment Plan:   OP NIVOLUMAB 1 MG/KG IPILIMUMAB 3MG/KG FOLLOWED BY NIVOLUMAB 480MG Q4W    Medications:  Continuous Infusions:   sodium chloride 0.9% 125 mL/hr at 04/18/22 0530     Scheduled Meds:   cefTRIAXone (ROCEPHIN) IVPB  2 g Intravenous Q24H    fentaNYL  1 patch Transdermal Q72H    heparin (porcine)  5,000 Units Subcutaneous Q8H    levetiracetam IV  1,000 mg Intravenous Q12H     PRN Meds:[COMPLETED] calcium gluconate IVPB **AND** calcium gluconate IVPB, dextrose 10%, dextrose 10%, dextrose 10%, dextrose 10%, dextrose 10%, glucagon (human recombinant), glucose, glucose, HYDROmorphone, LIDOcaine HCl 2%, lorazepam, naloxone, prochlorperazine, promethazine (PHENERGAN) IVPB, sodium chloride 0.9%     Review of Systems   Constitutional:  Positive for appetite change and fatigue. Negative for chills, diaphoresis and fever.   HENT:  Negative for congestion and sore throat.    Respiratory:  Negative for cough and shortness of breath.    Cardiovascular:  Negative for chest pain and palpitations.   Gastrointestinal:  Positive for abdominal distention, abdominal pain, constipation, nausea and vomiting. Negative for blood in stool.   Genitourinary:  Negative for dysuria and frequency.   Musculoskeletal:  Positive for myalgias. Negative for arthralgias.    Skin:  Negative for rash and wound.   Neurological:  Positive for weakness. Negative for dizziness, syncope, light-headedness and numbness.   Psychiatric/Behavioral:  Positive for agitation. Negative for confusion. The patient is nervous/anxious.    Objective:     Vital Signs (Most Recent):  Temp: 96.8 °F (36 °C) (04/18/22 0756)  Pulse: 92 (92) (04/18/22 0756)  Resp: 18 (04/18/22 0842)  BP: (!) 169/99 (04/18/22 0756)  SpO2: 96 % (04/18/22 0756)   Vital Signs (24h Range):  Temp:  [96.8 °F (36 °C)-98.9 °F (37.2 °C)] 96.8 °F (36 °C)  Pulse:  [] 92  Resp:  [17-20] 18  SpO2:  [64 %-100 %] 96 %  BP: (157-183)/() 169/99     Weight: 76.4 kg (168 lb 6.9 oz) (76.4)  Body mass index is 22.84 kg/m².  Body surface area is 1.97 meters squared.      Intake/Output Summary (Last 24 hours) at 4/18/2022 1225  Last data filed at 4/17/2022 1800  Gross per 24 hour   Intake 0 ml   Output 1456 ml   Net -1456 ml       Physical Exam  Vitals and nursing note reviewed.   Constitutional:       General: He is in acute distress.      Appearance: He is normal weight. He is ill-appearing. He is not toxic-appearing or diaphoretic.   HENT:      Head: Normocephalic and atraumatic.      Nose: Nose normal. No congestion or rhinorrhea.      Mouth/Throat:      Mouth: Mucous membranes are dry.      Pharynx: Oropharynx is clear.   Eyes:      Extraocular Movements: Extraocular movements intact.      Pupils: Pupils are equal, round, and reactive to light.   Cardiovascular:      Rate and Rhythm: Normal rate and regular rhythm.      Pulses: Normal pulses.      Heart sounds: Normal heart sounds. No murmur heard.    No friction rub. No gallop.   Pulmonary:      Effort: Pulmonary effort is normal. No respiratory distress.      Breath sounds: Normal breath sounds.   Abdominal:      General: Bowel sounds are decreased. There is distension (mild).      Tenderness: There is abdominal tenderness. There is no guarding or rebound.   Musculoskeletal:          General: No swelling. Normal range of motion.      Cervical back: Normal range of motion and neck supple.   Skin:     General: Skin is warm and dry.   Neurological:      General: No focal deficit present.      Mental Status: He is alert and oriented to person, place, and time.   Psychiatric:      Comments: Pt appears anxious and agitated.  Pt orientedx3.        Significant Labs:   CBC:   Recent Labs   Lab 04/17/22  1220   WBC 22.44*   HGB 15.6   HCT 46.1   *   , CMP:   Recent Labs   Lab 04/17/22  1220 04/17/22  1538 04/18/22  0023 04/18/22  0801   * 128* 129* 132*   K 5.1 5.0 5.4* 5.6*   CL 91* 92* 90* 92*   CO2 19* 21* 24 17*    115* 105 105   BUN 25* 25* 28* 30*   CREATININE 1.3 1.2 1.5* 1.4   CALCIUM 8.8 8.5* 9.0 8.8   PROT 5.6*  --   --   --    ALBUMIN 2.3*  --   --   --    BILITOT 0.4  --   --   --    ALKPHOS 71  --   --   --    AST 29  --   --   --    ALT 10  --   --   --    ANIONGAP 16 15 15 23*   EGFRNONAA >60.0 >60.0 52.8* 57.4*   , and Urine Studies: No results for input(s): COLORU, APPEARANCEUA, PHUR, SPECGRAV, PROTEINUA, GLUCUA, KETONESU, BILIRUBINUA, OCCULTUA, NITRITE, UROBILINOGEN, LEUKOCYTESUR, RBCUA, WBCUA, BACTERIA, SQUAMEPITHEL, HYALINECASTS in the last 48 hours.    Invalid input(s): [facility]    Diagnostic Results:  I have reviewed all pertinent imaging results/findings within the past 24 hours.    X-ray Abdomen for NG Tube Placement (Nursing should notify Radiology after placement)   Final Result      1. Tip of the nasogastric tube in the expected location of the distal antrum or the pylorus of the stomach.  There is no perforation.   2. Left midlung zone pulmonary nodule as above.         Electronically signed by: Lamine Sim MD   Date:    04/18/2022   Time:    09:44      X-Ray Abdomen AP 1 View   Final Result      Ileus versus obstruction.         Electronically signed by: Darci Armstrong MD   Date:    04/18/2022   Time:    08:03      X-ray Abdomen for NG Tube Placement  (Nursing should notify Radiology after placement)   Final Result      As above.         Electronically signed by: Jhon Goetz   Date:    04/16/2022   Time:    17:19      X-Ray Abdomen AP 1 View   Final Result      As above.         Electronically signed by: Jhon Goetz   Date:    04/16/2022   Time:    13:17      CT Chest Abdomen Pelvis Without Contrast (XPD)   Final Result      In this patient with a reported history of metastatic melanoma, there is diffuse metastatic disease involving the right axilla, bilateral lungs, and peritoneal metastasis as described in detail above.  Additional subcutaneous soft tissue nodule which could represent metastatic focus.      Mild wedging of L2 superior endplate suggestive of mild compression fracture.  Recommend correlation with outside imaging.      Diverticulosis.  No evidence of diverticulitis.      Hepatomegaly.      Additional findings as above.      Electronically signed by resident: Baltazar Purcell   Date:    04/16/2022   Time:    09:17      Electronically signed by: Joseluis Arias   Date:    04/16/2022   Time:    12:24      IR Paracentesis without imaging   Final Result      Ultrasound-guided paracentesis with drainage of 7000 mL of elizabeth fluid.  Albumin administered as per protocol.      _______________________________________________________________      Electronically signed by resident: Rudy Otero   Date:    04/14/2022   Time:    17:39      Electronically signed by: Juan Jose Hernández MD   Date:    04/14/2022   Time:    17:46      US Retroperitoneal Complete    (Results Pending)         Assessment/Plan:     * Ascites  52 y/o M hx of malignant melanoma presents from clinic with symptomatic malignant ascites. Pt has had multiple admissions over the past month for therapeutic paracenteses in the setting of malignant ascites. His last para was 4/4, 10 days prior to admission. Patient will likely need scheduled weekly therapeutic josiah in the future to prevent  hospitalization. S/p para on4/15 with 7L removed and albumin administered.     Plan:  -- Cell counts not c/w SBP, though continuing to cover with Ceftriaxone 2g q24h x7 days for given concurrent leukocytosis  -- schedule weekly para at discharge    Ileus  Pt with concerns for ileus vs SBO on abdominal imaging on 4/16. Pt with chronic opioid use given cancer and abdominal mets. Pt made NPO and NGT placed with significant output. Repeat imaging with concerns for worsening ileus despite nonoperative treatment. Concerns for malignant obstruction. Gen surg consulted, though patient will likely not be operative candidate given high tumor burden.     -- NGT  -- NPO  -- f/u Gen surg recs    Intractable nausea and vomiting  Pt presenting with several days of intractable nausea and vomiting. Pt unable to keep anything, including water, down. Zofran with no improvement at home. Likely 2/2 hyponatremia and ileus. KUB and CT demonstrating ileus vs partial SBO. NGT placed 4/17, pt keeps pulling out. Replaced x3. Given ativan to help with agitation and nausea.     -- therapeutic para as above  -- management of hyponatremia as above  -- IV compazine and zofran  -- NGT and NPO      Hyperkalemia  Pt with potassium 5.9 on CMP in ED. K 6.4 on istat. EKG without any acute changes. Pt shifted in ED with repeat BMP showing K 5.5. Unclear etiology of acute hyperkalemia, though patient does have concurrent PAULA. K consistently elevated, 5.9 on 4/16. S/p shift again on 4/17 with improvement in K to 5.1. AM cortisol elevated to 44. Concern for potential adrenal insufficiency vs adrenal mets though no convincing evidence of lesions on CT.     -- serial bmp  -- avoid potential potassium-increasing medications  -- am cortisol and ACTH to assess adrenal function given persistent hyperK and hypoNa    PAULA (acute kidney injury)  Pt with Cr 2.0 on admission up from baseline of 1.0. Likely prerenal s/o poor PO intake, vomiting, and ascites. Cr improved  to 1.3 with IVF and para. Now 1.4 on 4/18. Nephro following.      -- daily bmp  -- avoid nephrotoxic medications  -- renally dose meds    Hyponatremia  Pt presenting with severe hyponatremia to 119 in ED. Likely hypovolemic hyponatremia s/o poor PO And N/V. Na on labs 10 days prior to admission 128. Pt presenting with severe intractable nausea and vomiting. No changes in mental status or seizures. Sxs improving with improvement in Na. Nephro following, pt receiving intermittent NS boluses. Now on continuous IVF. Na 132 on 4/18    -- s/p therapeutic paracentesis on 4/14  --  Midline placed for better access; now removed on 4/17 as pt was about to leave ama   -- on NS gtt given NPO for SBO/ileus        Metastatic melanoma  He presented initially with enlarging right axillary lymph nodes that have grown progressively in size since April 2021. He presented to urgent care and was referred for US which showed multiple enlarged masses in the right axilla most likely reflecting abnormal lymph nodes concerning for malignancy.   He was subsequently referred to dermatology who performed a complete exam of the skin that was negative for suspicious cutaneous lesions as well as punch biopsy of one of the lymph node on 01/04/22. Pathology came back as malignant melanoma.   BRAF mutation: positive for a V600E mutation.   He subsequently underwent a PET/CT on 01/28/22 which showed multiple hypermetabolic soft tissue masses within the right axillary region, metastatic lesions to the lungs and abdomen (VI segment of the liver measuring a max SUV of 6.99, 1.6 x 1.9 cm nodular soft tissue density with a max SUV  7.64 was seen adjacent to the colon within the right lower quadrant. Brain MRI was negative for metastatic disease.  LDH was 307 U/L  He saw Oncology at [facility] on 01/31/22 with plan to start First line Nivolumab / ipilimumab. He received C1 on 03/03/22.     -- pt scheduled to start binimetinib and encorafenib prior to  admission.  -- will restart upon discharge      History of seizure  --  Continue home Keppra 1000 BID              Roel Campos MD   Internal Medicine PGY-1  Hematology/Oncology  [facility] - Oncology ([facility])

## 2022-04-18 NOTE — PROGRESS NOTES
At approx 1600 patient was anxious and ready to go home; mom at bedside and she was trying to keep him in bed but he started talking loud with her and demanding to leaved. Patient was asked numerous times to get back in bed but refused. Patient was restrained as a last resort Mom was at bedside and stated she understand we have to restrain him but she is go leave and she did. Patient had telesitter, bed alarm, pulled out iv and pulled out NGT; we have tried redirection; MD's came back around 1430 to speak with ;him about the ngt and not pulling it out again and the significance of having it placed. MD's realized patient is confused.  Imaging ordered MRI of brain for suspected metastais to brain ordered for tomorrow due to kidney function.

## 2022-04-18 NOTE — ASSESSMENT & PLAN NOTE
50 yo male with history of CAD s/p PCI, seizures, substance abuse, stage IV malignant melanoma with peritoneal carcinomatosis and large volume ascites. General surgery consulted for ileus vs SBO.     -Passing flatus, no BMs in 3 days. NGT with non-bilious output. Recommend continued conservative management with NGT decompression.   -No transition point on CT scan. Based on clinical evaluation likely an ileus however this could be a malignant obstruction, for which we would not recommend surgery. Patient poor surgical candidate with metastatic disease and known peritoneal mets and ascites.   -Would recommend palliative consult, in patient with stage IV melanoma and worsening

## 2022-04-18 NOTE — ASSESSMENT & PLAN NOTE
50 y/o M hx of malignant melanoma presents from clinic with symptomatic malignant ascites. Pt has had multiple admissions over the past month for therapeutic paracenteses in the setting of malignant ascites. His last para was 4/4, 10 days prior to admission. Patient will likely need scheduled weekly therapeutic josiah in the future to prevent hospitalization. S/p para on4/15 with 7L removed and albumin administered.     Plan:  -- Cell counts not c/w SBP, though continuing to cover with Ceftriaxone 2g q24h x7 days for given concurrent leukocytosis  -- schedule weekly para at discharge

## 2022-04-18 NOTE — CONSULTS
[facility] ([facility])  General Surgery  Consult Note    Patient Name: Joseluis Garner  MRN: [id number]  Code Status: Full Code  Admission Date: 4/14/2022  Hospital Length of Stay: 4 days  Attending Physician: Meghan Rios MD  Primary Care Provider: Primary Doctor No    Patient information was obtained from patient and ER records.     Inpatient consult to General Surgery  Consult performed by: Danielle Ashley MD  Consult ordered by: Roel Campos MD        Subjective:     Principal Problem: Ascites    History of Present Illness: Joseluis Garner is a 50 yo male with history of CAD s/p PCI, seizures, substance abuse, stage IV malignant melanoma with peritoneal carcinomatosis who originally presented with worsening ascites. Has had worsening abdominal pain and distention over the past month, for which he was admitted for multiple times. He has had multiple therapeutic paracentesis with several liters removed. He was most recently admitted on 4/14. Reported worsening nausea and vomiting. Minimal PO intake on admission. Noted to have several electrolyte abnormalities. He had a CT scan on 4/16 which revealed large volume ascites and extensive omental thickening/nodularity concerning for metastatic disease. NGT placed 4/16. 1.5L output per NGT per day. No bowel movement in 3 days. Continues to pass flatus.     General surgery consulted for '50 y/o M hx of metastatic melanoma admit for worsening ascites and N/V, found to have worsening ileus vs SBO on abdominal imaging. Not improving with NGT and NPO.'      No current facility-administered medications on file prior to encounter.     Current Outpatient Medications on File Prior to Encounter   Medication Sig    binimetinib 15 mg Tab Take 45 mg by mouth 2 (two) times daily.    encorafenib 75 mg Cap Take 450 mg by mouth once daily.    fentaNYL (DURAGESIC) 25 mcg/hr Place 1 patch onto the skin once.    levETIRAcetam (KEPPRA) 1000 MG tablet Take 1,000 mg by mouth 2  (two) times a day.    ondansetron (ZOFRAN-ODT) 8 MG TbDL Dissolve 1 tablet (8 mg total) by mouth every 8 (eight) hours as needed.    oxyCODONE (ROXICODONE) 20 mg Tab immediate release tablet Take 20 mg by mouth every 6 (six) hours as needed.       Review of patient's allergies indicates:  No Known Allergies    Past Medical History:   Diagnosis Date    Seizures      History reviewed. No pertinent surgical history.  Family History    None       Tobacco Use    Smoking status: Current Every Day Smoker    Smokeless tobacco: Never Used   Substance and Sexual Activity    Alcohol use: Yes    Drug use: Yes     Types: Methamphetamines, Marijuana    Sexual activity: Not on file     Review of Systems   Constitutional:  Negative for activity change, appetite change, chills, fatigue and fever.   Gastrointestinal:  Positive for nausea and vomiting. Negative for abdominal distention and abdominal pain.   Genitourinary:  Negative for difficulty urinating.   Skin:  Negative for color change and wound.   Neurological:  Negative for dizziness and weakness.   Hematological:  Negative for adenopathy.   Objective:     Vital Signs (Most Recent):  Temp: 96.2 °F (35.7 °C) (04/18/22 1200)  Pulse: 99 (04/18/22 1200)  Resp: 18 (04/18/22 1423)  BP: (!) 147/108 (04/18/22 1200)  SpO2: 99 % (04/18/22 1200)   Vital Signs (24h Range):  Temp:  [96.2 °F (35.7 °C)-98.9 °F (37.2 °C)] 96.2 °F (35.7 °C)  Pulse:  [] 99  Resp:  [17-20] 18  SpO2:  [64 %-100 %] 99 %  BP: (147-183)/() 147/108     Weight: 76.4 kg (168 lb 6.9 oz) (76.4)  Body mass index is 22.84 kg/m².    Physical Exam  Vitals and nursing note reviewed.   Constitutional:       General: He is not in acute distress.     Appearance: Normal appearance. He is not ill-appearing, toxic-appearing or diaphoretic.   Neck:      Comments: NGT in place- clear output  Cardiovascular:      Rate and Rhythm: Normal rate and regular rhythm.   Pulmonary:      Effort: Pulmonary effort is normal. No  respiratory distress.   Abdominal:      General: Abdomen is flat. There is no distension.      Palpations: Abdomen is soft.      Tenderness: There is no abdominal tenderness.      Comments: Abdomen soft. Non tender.    Musculoskeletal:         General: Normal range of motion.   Skin:     General: Skin is warm and dry.      Capillary Refill: Capillary refill takes less than 2 seconds.      Coloration: Skin is not jaundiced.   Neurological:      General: No focal deficit present.      Mental Status: He is alert and oriented to person, place, and time.       Significant Labs:  I have reviewed all pertinent lab results within the past 24 hours.  CBC:   Recent Labs   Lab 04/17/22  1220   WBC 22.44*   RBC 5.86   HGB 15.6   HCT 46.1   *   MCV 79*   MCH 26.6*   MCHC 33.8     BMP:   Recent Labs   Lab 04/17/22  1220 04/17/22  1538 04/18/22  0801      < > 105   *   < > 132*   K 5.1   < > 5.6*   CL 91*   < > 92*   CO2 19*   < > 17*   BUN 25*   < > 30*   CREATININE 1.3   < > 1.4   CALCIUM 8.8   < > 8.8   MG 1.9  --   --     < > = values in this interval not displayed.     CMP:   Recent Labs   Lab 04/17/22  1220 04/17/22  1538 04/18/22  0801      < > 105   CALCIUM 8.8   < > 8.8   ALBUMIN 2.3*  --   --    PROT 5.6*  --   --    *   < > 132*   K 5.1   < > 5.6*   CO2 19*   < > 17*   CL 91*   < > 92*   BUN 25*   < > 30*   CREATININE 1.3   < > 1.4   ALKPHOS 71  --   --    ALT 10  --   --    AST 29  --   --    BILITOT 0.4  --   --     < > = values in this interval not displayed.     LFTs:   Recent Labs   Lab 04/17/22  1220   ALT 10   AST 29   ALKPHOS 71   BILITOT 0.4   PROT 5.6*   ALBUMIN 2.3*     Coagulation: No results for input(s): LABPROT, INR, APTT in the last 168 hours.  Cardiac markers: No results for input(s): CKMB, CPKMB, TROPONINT, TROPONINI, MYOGLOBIN in the last 168 hours.    Significant Diagnostics:  I have reviewed all pertinent imaging results/findings within the past 24  hours.      Assessment/Plan:     Metastatic melanoma  50 yo male with history of CAD s/p PCI, seizures, substance abuse, stage IV malignant melanoma with peritoneal carcinomatosis and large volume ascites. General surgery consulted for ileus vs SBO.     -Passing flatus, no BMs in 3 days. NGT with non-bilious output. Recommend continued conservative management with NGT decompression.   -No transition point on CT scan. Based on clinical evaluation likely an ileus however this could be a malignant obstruction, for which we would not recommend surgery. Patient poor surgical candidate with metastatic disease and known peritoneal mets and ascites.   -Would recommend palliative consult, in patient with stage IV melanoma and worsening         VTE Risk Mitigation (From admission, onward)           Ordered     heparin (porcine) injection 5,000 Units  Every 8 hours         04/14/22 1522     IP VTE HIGH RISK PATIENT  Once         04/14/22 1522     Place sequential compression device  Until discontinued         04/14/22 1522                    Thank you for your consult. I will sign off. Please contact us if you have any additional questions.    Danielle Ashley MD  General Surgery  [facility] - [facility]cology (Hospital)        I have personally performed a detailed history and physical examination on this patient. My findings are summarized in the resident's note included in the record.   If nausea and vomiting persist may need a venting G tube

## 2022-04-18 NOTE — ASSESSMENT & PLAN NOTE
Patient with creatinine 2.0 on admission from baseline 1.0 in the context of reduced oral intake and vomiting, now NG losses. Suspect likely prerenal given improvement with IV fluids.    Plan:  - continue IV fluids to match output  - strict intake/output  - avoid nephrotoxic agents and renally dose medications

## 2022-04-18 NOTE — PROGRESS NOTES
[facility] ([facility])  Nephrology  Progress Note    Patient Name: Joseluis Garner  MRN: [id number]  Admission Date: 4/14/2022  Hospital Length of Stay: 4 days  Attending Provider: Meghan Rios MD   Primary Care Physician: Primary Doctor No  Principal Problem:Ascites    Subjective:     HPI: Joseluis Garner is a 51 year old male with CAD, seizure disorder, stage IV melanoma who presents with hyponatremia, hyperkalemia, and worsening ascites. Patent was noted to have sodium 119, potassium of 5.9, and creatinine of 2.0 from baseline 1.0, on admission. Patient being treated with nivolumab and iplimumab. Notably, describes nausea and vomiting with reduced oral intake. He received a 7L paracentesis with albumin replacement. Additionally, patient was given 1 L NS bolus and NS continue infusion. Initially, sodium improved to 126 then down trended to 123. Potassium now 5.5. Renal function improving with IV fluids - creatinine 1.4. Nephrology consulted for hyponatremia, hyperkalemia, and acute kidney injury.      Interval History: Patient with improving sodium but persistent hyperkalemia.    Review of patient's allergies indicates:  No Known Allergies  Current Facility-Administered Medications   Medication Frequency    0.9%  NaCl infusion Continuous    calcium gluconat 1 g in NS IVPB (premixed) Q10 Min PRN    cefTRIAXone (ROCEPHIN) 2 g/50 mL D5W IVPB Q24H    dextrose 10% bolus 125 mL PRN    dextrose 10% bolus 125 mL PRN    dextrose 10% bolus 125 mL PRN    dextrose 10% bolus 250 mL PRN    dextrose 10% bolus 250 mL PRN    fentaNYL 25 mcg/hr 1 patch Q72H    glucagon (human recombinant) injection 1 mg PRN    glucose chewable tablet 16 g PRN    glucose chewable tablet 24 g PRN    heparin (porcine) injection 5,000 Units Q8H    HYDROmorphone injection 0.5 mg Q3H PRN    levETIRAcetam injection 1,000 mg Q12H    LIDOcaine HCl 2% oral solution 15 mL Q4H PRN    lorazepam injection 2 mg Q4H PRN    naloxone 0.4  mg/mL injection 0.02 mg PRN    prochlorperazine injection Soln 5 mg Q6H PRN    promethazine (PHENERGAN) 12.5 mg in dextrose 5 % 50 mL IVPB Q6H PRN    sodium chloride 0.9% flush 10 mL Q12H PRN       Objective:     Vital Signs (Most Recent):  Temp: 96.8 °F (36 °C) (04/18/22 0756)  Pulse: 92 (92) (04/18/22 0756)  Resp: 18 (04/18/22 0842)  BP: (!) 169/99 (04/18/22 0756)  SpO2: 96 % (04/18/22 0756)  O2 Device (Oxygen Therapy): room air (04/18/22 0756)   Vital Signs (24h Range):  Temp:  [96.8 °F (36 °C)-98.9 °F (37.2 °C)] 96.8 °F (36 °C)  Pulse:  [] 92  Resp:  [17-20] 18  SpO2:  [64 %-100 %] 96 %  BP: (152-183)/() 169/99     Weight: 76.4 kg (168 lb 6.9 oz) (76.4) (04/14/22 1810)  Body mass index is 22.84 kg/m².  Body surface area is 1.97 meters squared.    I/O last 3 completed shifts:  In: 1872.7 [I.V.:489.5; IV Piggyback:1383.2]  Out: 3356 [Urine:400; Emesis/NG output:6; Drains:2950]    Physical Exam  Vitals and nursing note reviewed.   Constitutional:       General: He is in acute distress.      Appearance: He is normal weight. He is ill-appearing and toxic-appearing. He is not diaphoretic.   HENT:      Head: Normocephalic and atraumatic.      Nose: Nose normal. No congestion or rhinorrhea.      Mouth/Throat:      Mouth: Mucous membranes are dry.      Pharynx: Oropharynx is clear.   Eyes:      Extraocular Movements: Extraocular movements intact.      Pupils: Pupils are equal, round, and reactive to light.   Cardiovascular:      Rate and Rhythm: Normal rate and regular rhythm.      Pulses: Normal pulses.      Heart sounds: Normal heart sounds. No murmur heard.    No friction rub. No gallop.   Pulmonary:      Effort: Pulmonary effort is normal. No respiratory distress.      Breath sounds: Normal breath sounds.   Abdominal:      General: Bowel sounds are decreased. There is no distension.      Tenderness: There is abdominal tenderness. There is no guarding or rebound.   Musculoskeletal:         General: No  swelling. Normal range of motion.      Cervical back: Normal range of motion and neck supple.   Skin:     General: Skin is warm and dry.   Neurological:      General: No focal deficit present.      Mental Status: He is alert and oriented to person, place, and time.   Psychiatric:         Mood and Affect: Mood normal.         Behavior: Behavior normal.       Significant Labs:  All labs within the past 24 hours have been reviewed.     Significant Imaging:  All labs within the past 24 hours have been reviewed.    Assessment/Plan:     PAULA (acute kidney injury)  Patient with creatinine 2.0 on admission from baseline 1.0 in the context of reduced oral intake and vomiting, now NG losses. Suspect likely prerenal given improvement with IV fluids.    Plan:  - continue IV fluids to match output  - strict intake/output  - avoid nephrotoxic agents and renally dose medications    Hyponatremia  Hyperkalemia  Patent was noted to have sodium 119, potassium of 5.9, on admission in the setting of reduced oral intake and vomiting. Likely hypovolemic hyponatremia on admission. Patient was given 1 L NS bolus and albumin following paracentesis, followed by NS infusion. Initially, sodium improved to 126 then down trended to 123. On assessment patient seems volume down still. Suspect hyperkalemia related to acute kidney injury and reduced urine output. Additionally, given concurrent hyponatremia and hyperkalemia there is concern for adrenal pathology either metastasis or related to immunotherapy. For now, would rule out hypovolemic hyponatremia.    Sodium improving with IV fluid resuscitation but continues to have some degree of hyperkalemia. Hyperkalemia is unclear but could be multifactorial -  if there is urinary retention, high cell turnover, adrenal pathology, or pseudohyperkalemia from thrombocytosis.    Plan:  - repeat urine osm, serum osm, urine sodium and potassium  - continue IV fluids to match output from emesis/NG  output/insensible losses  - follow up cortisol/ACTH  - retroperitoneal ultrasound ordered  - CK ordered  - BMP q8  - strict intake and output          Thank you for your consult. I will follow-up with patient. Please contact us if you have any additional questions.    Jimbo Cabral MD  Nephrology  [facility] ([facility])

## 2022-04-18 NOTE — ASSESSMENT & PLAN NOTE
Pt with Cr 2.0 on admission up from baseline of 1.0. Likely prerenal s/o poor PO intake, vomiting, and ascites. Cr improved to 1.3 with IVF and para. Now 1.4 on 4/18. Nephro following.      -- daily bmp  -- avoid nephrotoxic medications  -- renally dose meds

## 2022-04-18 NOTE — CONSULTS
"[facility] - Oncology ([facility])  Psychology  Progress Note  Psycho-Oncology Intake (PhD)    Patient Name: Joseluis Garner  MRN: [id number]    Patient Class: IP- Inpatient  Admission Date: 4/14/2022  Hospital Length of Stay: 4 days  Attending Physician: Meghan Rios MD  Primary Care Provider: Primary Doctor No  Length of Service (minutes): 30    Joseluis Garner, a 51 y.o. male, seen for initial evaluation. Met with patient.    Chief Complaint   Patient presents with    Generalized Body Aches     Metastatic Melanoma patient with pain all over sent here for IVF and admission       Patient Active Problem List   Diagnosis    Migraine    History of seizure    Altered mental status    Post-ictal confusion    Ascites    Metastatic melanoma    Hyponatremia    PAULA (acute kidney injury)    Hyperkalemia    Intractable nausea and vomiting    Ileus         Mental Status Exam: Joseluis Garner was seen at the request of the inpatient oncology team.  Mr. Garner was in bed in 4 point restraints at the time of visit.  The patient was agitated and confused throughout the visit. Upon my entrance, he was heatedly discussing his "$800 Elli belt" that he was wearing with nursing staff (he was not wearing a belt at the time).      Orientation:  Patient was oriented to person and date, but not place (stated we were in [place] "at a [employer]") or situation (stated he was hospitalized due to "an injury at the restaurant"); Patient later did state, "Did you know I have Stage IV cancer?" but was unable to identify why he was in restraints ("I think it is to help them take blood from me."). He requested I provide him "a knife or scissors" for him to remove his restraints.  When asked about his attempts to leave AMA last night and earlier today he stated, "It is because of family problems" then went on a rambling diatribe about how "children do not get along with their parents anymore."    Patient " "was not able to name the current ("I think it is Sugar Ray") or former President ("maybe Junior Bradshaw"). When he was asked again to name the , he stated, "I don't really follow baseball."    Appearance: age appropriate, casually dressed, adequately groomed  Behavior/Cooperation: challenging/mildly combative, appropriate eye contact, interactive   Speech: rambling, tangential, non-responsive at time, not pressured, occasional slurring  Mood: dysphoric  Affect: agitated  Thought Content: did not appear to be responding to internal stimuli during the interview.   Memory: Grossly intact  Attention Span/Concentration: Attends only in brief intervals, responses often irrelevant  Estimate of Intelligence: average from verbal skills and history  Insight: patient has limited insight into own behavior and behavior of others  Judgment: the patient's behavior is inappropriate to circumstances    Patient declined to answer any questions further- stated, "I will only talk about it tomorrow at 7 am."     Psychiatric history as per prior psychiatric eval (2/18/21)  PAST PSYCHIATRIC HISTORY:   Previous Diagnoses:  Denies  Previous Medication Trials: none Denies  Previous Psychiatric Hospitalizations:  Denies  Previous Suicide Attempts:  Denies  History of Violence:  Denies  Violence Risk to Others over the past 6 months:  Denies  Violence Risk to Self over the past 6 months:  Denies  History of Psychological Trauma:  Denies  Outpatient psychiatrist:   Denies     Neurological History:   Hx of several concussions  Seizure disorder      Substance Abuse History:  Recreational Drugs: amphetamine, THC  Use of Alcohol: Hx per chart review, does not endorse currently, has hx of DUI  Rehab History: Denies  Tobacco Use: current smoker      Past Legal:  Hx of arrest and detention, EtOH related          Assessment - Diagnosis - Goals:       ICD-10-CM ICD-9-CM   1. Hyperkalemia  E87.5 276.7   2. Chest pain  R07.9 786.50 "   3. Ascites  R18.8 789.59   4. QT prolongation  R94.31 794.31     Altered mental status  Altered mental status- Please order psychiatry consult.    Unable to perform comprehensive or cancer coping specific psychological evaluation. Will continue to follow patient until assessment can be completed.              Dawson Colvin, PhD  Clinical Psychologist

## 2022-04-18 NOTE — SUBJECTIVE & OBJECTIVE
No current facility-administered medications on file prior to encounter.     Current Outpatient Medications on File Prior to Encounter   Medication Sig    binimetinib 15 mg Tab Take 45 mg by mouth 2 (two) times daily.    encorafenib 75 mg Cap Take 450 mg by mouth once daily.    fentaNYL (DURAGESIC) 25 mcg/hr Place 1 patch onto the skin once.    levETIRAcetam (KEPPRA) 1000 MG tablet Take 1,000 mg by mouth 2 (two) times a day.    ondansetron (ZOFRAN-ODT) 8 MG TbDL Dissolve 1 tablet (8 mg total) by mouth every 8 (eight) hours as needed.    oxyCODONE (ROXICODONE) 20 mg Tab immediate release tablet Take 20 mg by mouth every 6 (six) hours as needed.       Review of patient's allergies indicates:  No Known Allergies    Past Medical History:   Diagnosis Date    Seizures      History reviewed. No pertinent surgical history.  Family History    None       Tobacco Use    Smoking status: Current Every Day Smoker    Smokeless tobacco: Never Used   Substance and Sexual Activity    Alcohol use: Yes    Drug use: Yes     Types: Methamphetamines, Marijuana    Sexual activity: Not on file     Review of Systems   Constitutional:  Negative for activity change, appetite change, chills, fatigue and fever.   Gastrointestinal:  Positive for nausea and vomiting. Negative for abdominal distention and abdominal pain.   Genitourinary:  Negative for difficulty urinating.   Skin:  Negative for color change and wound.   Neurological:  Negative for dizziness and weakness.   Hematological:  Negative for adenopathy.   Objective:     Vital Signs (Most Recent):  Temp: 96.2 °F (35.7 °C) (04/18/22 1200)  Pulse: 99 (04/18/22 1200)  Resp: 18 (04/18/22 1423)  BP: (!) 147/108 (04/18/22 1200)  SpO2: 99 % (04/18/22 1200)   Vital Signs (24h Range):  Temp:  [96.2 °F (35.7 °C)-98.9 °F (37.2 °C)] 96.2 °F (35.7 °C)  Pulse:  [] 99  Resp:  [17-20] 18  SpO2:  [64 %-100 %] 99 %  BP: (147-183)/() 147/108     Weight: 76.4 kg (168 lb 6.9 oz) (76.4)  Body mass  index is 22.84 kg/m².    Physical Exam  Vitals and nursing note reviewed.   Constitutional:       General: He is not in acute distress.     Appearance: Normal appearance. He is not ill-appearing, toxic-appearing or diaphoretic.   Neck:      Comments: NGT in place- clear output  Cardiovascular:      Rate and Rhythm: Normal rate and regular rhythm.   Pulmonary:      Effort: Pulmonary effort is normal. No respiratory distress.   Abdominal:      General: Abdomen is flat. There is no distension.      Palpations: Abdomen is soft.      Tenderness: There is no abdominal tenderness.      Comments: Abdomen soft. Non tender.    Musculoskeletal:         General: Normal range of motion.   Skin:     General: Skin is warm and dry.      Capillary Refill: Capillary refill takes less than 2 seconds.      Coloration: Skin is not jaundiced.   Neurological:      General: No focal deficit present.      Mental Status: He is alert and oriented to person, place, and time.       Significant Labs:  I have reviewed all pertinent lab results within the past 24 hours.  CBC:   Recent Labs   Lab 04/17/22  1220   WBC 22.44*   RBC 5.86   HGB 15.6   HCT 46.1   *   MCV 79*   MCH 26.6*   MCHC 33.8     BMP:   Recent Labs   Lab 04/17/22  1220 04/17/22  1538 04/18/22  0801      < > 105   *   < > 132*   K 5.1   < > 5.6*   CL 91*   < > 92*   CO2 19*   < > 17*   BUN 25*   < > 30*   CREATININE 1.3   < > 1.4   CALCIUM 8.8   < > 8.8   MG 1.9  --   --     < > = values in this interval not displayed.     CMP:   Recent Labs   Lab 04/17/22  1220 04/17/22  1538 04/18/22  0801      < > 105   CALCIUM 8.8   < > 8.8   ALBUMIN 2.3*  --   --    PROT 5.6*  --   --    *   < > 132*   K 5.1   < > 5.6*   CO2 19*   < > 17*   CL 91*   < > 92*   BUN 25*   < > 30*   CREATININE 1.3   < > 1.4   ALKPHOS 71  --   --    ALT 10  --   --    AST 29  --   --    BILITOT 0.4  --   --     < > = values in this interval not displayed.     LFTs:   Recent Labs    Lab 04/17/22  1220   ALT 10   AST 29   ALKPHOS 71   BILITOT 0.4   PROT 5.6*   ALBUMIN 2.3*     Coagulation: No results for input(s): LABPROT, INR, APTT in the last 168 hours.  Cardiac markers: No results for input(s): CKMB, CPKMB, TROPONINT, TROPONINI, MYOGLOBIN in the last 168 hours.    Significant Diagnostics:  I have reviewed all pertinent imaging results/findings within the past 24 hours.

## 2022-04-18 NOTE — ASSESSMENT & PLAN NOTE
Pt with potassium 5.9 on CMP in ED. K 6.4 on istat. EKG without any acute changes. Pt shifted in ED with repeat BMP showing K 5.5. Unclear etiology of acute hyperkalemia, though patient does have concurrent PAULA. K consistently elevated, 5.9 on 4/16. S/p shift again on 4/17 with improvement in K to 5.1. AM cortisol elevated to 44. Concern for potential adrenal insufficiency vs adrenal mets though no convincing evidence of lesions on CT.     -- serial bmp  -- avoid potential potassium-increasing medications  -- am cortisol and ACTH to assess adrenal function given persistent hyperK and hypoNa

## 2022-04-19 PROBLEM — R45.1 AGITATION: Status: ACTIVE | Noted: 2022-04-19

## 2022-04-19 LAB
ACTH PLAS-MCNC: 16 PG/ML (ref 0–46)
ALBUMIN SERPL BCP-MCNC: 2.5 G/DL (ref 3.5–5.2)
ALP SERPL-CCNC: 71 U/L (ref 55–135)
ALT SERPL W/O P-5'-P-CCNC: 22 U/L (ref 10–44)
AMMONIA PLAS-SCNC: 36 UMOL/L (ref 10–50)
AMPHET+METHAMPHET UR QL: NEGATIVE
AMPHET+METHAMPHET UR QL: NEGATIVE
ANION GAP SERPL CALC-SCNC: 17 MMOL/L (ref 8–16)
ANION GAP SERPL CALC-SCNC: 17 MMOL/L (ref 8–16)
ANION GAP SERPL CALC-SCNC: 22 MMOL/L (ref 8–16)
AST SERPL-CCNC: 53 U/L (ref 10–40)
BARBITURATES UR QL SCN>200 NG/ML: NEGATIVE
BARBITURATES UR QL SCN>200 NG/ML: NEGATIVE
BASOPHILS # BLD AUTO: 0.03 K/UL (ref 0–0.2)
BASOPHILS NFR BLD: 0.1 % (ref 0–1.9)
BENZODIAZ UR QL SCN>200 NG/ML: NEGATIVE
BENZODIAZ UR QL SCN>200 NG/ML: NEGATIVE
BILIRUB SERPL-MCNC: 0.4 MG/DL (ref 0.1–1)
BUN SERPL-MCNC: 33 MG/DL (ref 6–20)
BUN SERPL-MCNC: 34 MG/DL (ref 6–20)
BUN SERPL-MCNC: 35 MG/DL (ref 6–20)
BZE UR QL SCN: NEGATIVE
BZE UR QL SCN: NEGATIVE
CALCIUM SERPL-MCNC: 8.7 MG/DL (ref 8.7–10.5)
CALCIUM SERPL-MCNC: 8.9 MG/DL (ref 8.7–10.5)
CALCIUM SERPL-MCNC: 8.9 MG/DL (ref 8.7–10.5)
CANNABINOIDS UR QL SCN: ABNORMAL
CANNABINOIDS UR QL SCN: ABNORMAL
CHLORIDE SERPL-SCNC: 89 MMOL/L (ref 95–110)
CHLORIDE SERPL-SCNC: 90 MMOL/L (ref 95–110)
CHLORIDE SERPL-SCNC: 90 MMOL/L (ref 95–110)
CK SERPL-CCNC: 452 U/L (ref 20–200)
CO2 SERPL-SCNC: 21 MMOL/L (ref 23–29)
CO2 SERPL-SCNC: 24 MMOL/L (ref 23–29)
CO2 SERPL-SCNC: 24 MMOL/L (ref 23–29)
CREAT SERPL-MCNC: 1.5 MG/DL (ref 0.5–1.4)
CREAT SERPL-MCNC: 1.5 MG/DL (ref 0.5–1.4)
CREAT SERPL-MCNC: 1.6 MG/DL (ref 0.5–1.4)
CREAT UR-MCNC: 282 MG/DL (ref 23–375)
CREAT UR-MCNC: 282 MG/DL (ref 23–375)
DIFFERENTIAL METHOD: ABNORMAL
EOSINOPHIL # BLD AUTO: 0 K/UL (ref 0–0.5)
EOSINOPHIL NFR BLD: 0 % (ref 0–8)
ERYTHROCYTE [DISTWIDTH] IN BLOOD BY AUTOMATED COUNT: 14.3 % (ref 11.5–14.5)
EST. GFR  (AFRICAN AMERICAN): 56.4 ML/MIN/1.73 M^2
EST. GFR  (AFRICAN AMERICAN): >60 ML/MIN/1.73 M^2
EST. GFR  (AFRICAN AMERICAN): >60 ML/MIN/1.73 M^2
EST. GFR  (NON AFRICAN AMERICAN): 48.8 ML/MIN/1.73 M^2
EST. GFR  (NON AFRICAN AMERICAN): 52.8 ML/MIN/1.73 M^2
EST. GFR  (NON AFRICAN AMERICAN): 52.8 ML/MIN/1.73 M^2
ETHANOL UR-MCNC: <10 MG/DL
ETHANOL UR-MCNC: <10 MG/DL
GLUCOSE SERPL-MCNC: 81 MG/DL (ref 70–110)
GLUCOSE SERPL-MCNC: 96 MG/DL (ref 70–110)
GLUCOSE SERPL-MCNC: 99 MG/DL (ref 70–110)
HCT VFR BLD AUTO: 44.3 % (ref 40–54)
HGB BLD-MCNC: 14.6 G/DL (ref 14–18)
IMM GRANULOCYTES # BLD AUTO: 0.16 K/UL (ref 0–0.04)
IMM GRANULOCYTES NFR BLD AUTO: 0.7 % (ref 0–0.5)
LYMPHOCYTES # BLD AUTO: 0.7 K/UL (ref 1–4.8)
LYMPHOCYTES NFR BLD: 3.3 % (ref 18–48)
MAGNESIUM SERPL-MCNC: 2.4 MG/DL (ref 1.6–2.6)
MCH RBC QN AUTO: 27 PG (ref 27–31)
MCHC RBC AUTO-ENTMCNC: 33 G/DL (ref 32–36)
MCV RBC AUTO: 82 FL (ref 82–98)
METHADONE UR QL SCN>300 NG/ML: NEGATIVE
METHADONE UR QL SCN>300 NG/ML: NEGATIVE
MONOCYTES # BLD AUTO: 0.9 K/UL (ref 0.3–1)
MONOCYTES NFR BLD: 4.1 % (ref 4–15)
NEUTROPHILS # BLD AUTO: 20.2 K/UL (ref 1.8–7.7)
NEUTROPHILS NFR BLD: 91.8 % (ref 38–73)
NRBC BLD-RTO: 0 /100 WBC
OPIATES UR QL SCN: ABNORMAL
OPIATES UR QL SCN: ABNORMAL
OSMOLALITY UR: 690 MOSM/KG (ref 50–1200)
PCP UR QL SCN>25 NG/ML: NEGATIVE
PCP UR QL SCN>25 NG/ML: NEGATIVE
PHOSPHATE SERPL-MCNC: 4.9 MG/DL (ref 2.7–4.5)
PLATELET # BLD AUTO: 580 K/UL (ref 150–450)
PLATELET BLD QL SMEAR: ABNORMAL
PMV BLD AUTO: 10 FL (ref 9.2–12.9)
POTASSIUM SERPL-SCNC: 5.1 MMOL/L (ref 3.5–5.1)
POTASSIUM SERPL-SCNC: 5.1 MMOL/L (ref 3.5–5.1)
POTASSIUM SERPL-SCNC: 5.4 MMOL/L (ref 3.5–5.1)
POTASSIUM UR-SCNC: 86 MMOL/L (ref 15–95)
PROT SERPL-MCNC: 5.4 G/DL (ref 6–8.4)
RBC # BLD AUTO: 5.4 M/UL (ref 4.6–6.2)
SODIUM SERPL-SCNC: 130 MMOL/L (ref 136–145)
SODIUM SERPL-SCNC: 131 MMOL/L (ref 136–145)
SODIUM SERPL-SCNC: 133 MMOL/L (ref 136–145)
SODIUM UR-SCNC: <10 MMOL/L (ref 20–250)
TOXICOLOGY INFORMATION: ABNORMAL
TOXICOLOGY INFORMATION: ABNORMAL
WBC # BLD AUTO: 22.06 K/UL (ref 3.9–12.7)

## 2022-04-19 PROCEDURE — 84300 ASSAY OF URINE SODIUM: CPT

## 2022-04-19 PROCEDURE — 36415 COLL VENOUS BLD VENIPUNCTURE: CPT | Performed by: INTERNAL MEDICINE

## 2022-04-19 PROCEDURE — 99900035 HC TECH TIME PER 15 MIN (STAT)

## 2022-04-19 PROCEDURE — 80307 DRUG TEST PRSMV CHEM ANLYZR: CPT

## 2022-04-19 PROCEDURE — 90791 PR PSYCHIATRIC DIAGNOSTIC EVALUATION: ICD-10-PCS | Mod: SA,HB,, | Performed by: NURSE PRACTITIONER

## 2022-04-19 PROCEDURE — 36415 COLL VENOUS BLD VENIPUNCTURE: CPT | Performed by: STUDENT IN AN ORGANIZED HEALTH CARE EDUCATION/TRAINING PROGRAM

## 2022-04-19 PROCEDURE — 99233 SBSQ HOSP IP/OBS HIGH 50: CPT | Mod: GC,,, | Performed by: INTERNAL MEDICINE

## 2022-04-19 PROCEDURE — 80053 COMPREHEN METABOLIC PANEL: CPT

## 2022-04-19 PROCEDURE — 25000242 PHARM REV CODE 250 ALT 637 W/ HCPCS: Performed by: STUDENT IN AN ORGANIZED HEALTH CARE EDUCATION/TRAINING PROGRAM

## 2022-04-19 PROCEDURE — 25000003 PHARM REV CODE 250

## 2022-04-19 PROCEDURE — 11000001 HC ACUTE MED/SURG PRIVATE ROOM

## 2022-04-19 PROCEDURE — 80048 BASIC METABOLIC PNL TOTAL CA: CPT | Mod: 91,XB | Performed by: INTERNAL MEDICINE

## 2022-04-19 PROCEDURE — 90791 PSYCH DIAGNOSTIC EVALUATION: CPT | Mod: SA,HB,, | Performed by: NURSE PRACTITIONER

## 2022-04-19 PROCEDURE — 83735 ASSAY OF MAGNESIUM: CPT

## 2022-04-19 PROCEDURE — 84133 ASSAY OF URINE POTASSIUM: CPT

## 2022-04-19 PROCEDURE — 99233 PR SUBSEQUENT HOSPITAL CARE,LEVL III: ICD-10-PCS | Mod: GC,,, | Performed by: INTERNAL MEDICINE

## 2022-04-19 PROCEDURE — 99233 PR SUBSEQUENT HOSPITAL CARE,LEVL III: ICD-10-PCS | Mod: ,,, | Performed by: INTERNAL MEDICINE

## 2022-04-19 PROCEDURE — 36415 COLL VENOUS BLD VENIPUNCTURE: CPT

## 2022-04-19 PROCEDURE — 80048 BASIC METABOLIC PNL TOTAL CA: CPT | Performed by: INTERNAL MEDICINE

## 2022-04-19 PROCEDURE — 63600175 PHARM REV CODE 636 W HCPCS

## 2022-04-19 PROCEDURE — 84100 ASSAY OF PHOSPHORUS: CPT

## 2022-04-19 PROCEDURE — 82140 ASSAY OF AMMONIA: CPT

## 2022-04-19 PROCEDURE — 94640 AIRWAY INHALATION TREATMENT: CPT

## 2022-04-19 PROCEDURE — 99233 SBSQ HOSP IP/OBS HIGH 50: CPT | Mod: ,,, | Performed by: INTERNAL MEDICINE

## 2022-04-19 PROCEDURE — 82550 ASSAY OF CK (CPK): CPT | Performed by: STUDENT IN AN ORGANIZED HEALTH CARE EDUCATION/TRAINING PROGRAM

## 2022-04-19 PROCEDURE — 94761 N-INVAS EAR/PLS OXIMETRY MLT: CPT

## 2022-04-19 PROCEDURE — 83935 ASSAY OF URINE OSMOLALITY: CPT

## 2022-04-19 PROCEDURE — 85025 COMPLETE CBC W/AUTO DIFF WBC: CPT

## 2022-04-19 PROCEDURE — 25000003 PHARM REV CODE 250: Performed by: STUDENT IN AN ORGANIZED HEALTH CARE EDUCATION/TRAINING PROGRAM

## 2022-04-19 RX ORDER — LEVETIRACETAM 500 MG/1
1000 TABLET ORAL 2 TIMES DAILY
Status: DISCONTINUED | OUTPATIENT
Start: 2022-04-19 | End: 2022-04-24 | Stop reason: HOSPADM

## 2022-04-19 RX ORDER — FAMOTIDINE 20 MG/1
20 TABLET, FILM COATED ORAL 2 TIMES DAILY
Status: DISCONTINUED | OUTPATIENT
Start: 2022-04-19 | End: 2022-04-22

## 2022-04-19 RX ORDER — OXYCODONE HYDROCHLORIDE 5 MG/1
5 TABLET ORAL EVERY 4 HOURS PRN
Status: DISCONTINUED | OUTPATIENT
Start: 2022-04-19 | End: 2022-04-23

## 2022-04-19 RX ORDER — SODIUM CHLORIDE 9 MG/ML
INJECTION, SOLUTION INTRAVENOUS CONTINUOUS
Status: ACTIVE | OUTPATIENT
Start: 2022-04-19 | End: 2022-04-20

## 2022-04-19 RX ORDER — IPRATROPIUM BROMIDE AND ALBUTEROL SULFATE 2.5; .5 MG/3ML; MG/3ML
3 SOLUTION RESPIRATORY (INHALATION) EVERY 4 HOURS PRN
Status: DISCONTINUED | OUTPATIENT
Start: 2022-04-19 | End: 2022-04-24 | Stop reason: HOSPADM

## 2022-04-19 RX ORDER — HYDROXYZINE HYDROCHLORIDE 25 MG/1
25 TABLET, FILM COATED ORAL 3 TIMES DAILY PRN
Status: DISCONTINUED | OUTPATIENT
Start: 2022-04-19 | End: 2022-04-24 | Stop reason: HOSPADM

## 2022-04-19 RX ORDER — ENOXAPARIN SODIUM 100 MG/ML
40 INJECTION SUBCUTANEOUS EVERY 24 HOURS
Status: DISCONTINUED | OUTPATIENT
Start: 2022-04-19 | End: 2022-04-24 | Stop reason: HOSPADM

## 2022-04-19 RX ORDER — HALOPERIDOL 5 MG/ML
2 INJECTION INTRAMUSCULAR EVERY 4 HOURS PRN
Status: DISCONTINUED | OUTPATIENT
Start: 2022-04-19 | End: 2022-04-19

## 2022-04-19 RX ORDER — OLANZAPINE 10 MG/2ML
10 INJECTION, POWDER, FOR SOLUTION INTRAMUSCULAR EVERY 8 HOURS PRN
Status: DISCONTINUED | OUTPATIENT
Start: 2022-04-19 | End: 2022-04-24 | Stop reason: HOSPADM

## 2022-04-19 RX ADMIN — FAMOTIDINE 20 MG: 20 TABLET ORAL at 05:04

## 2022-04-19 RX ADMIN — DEXTROSE 250 MG: 50 INJECTION, SOLUTION INTRAVENOUS at 09:04

## 2022-04-19 RX ADMIN — HALOPERIDOL LACTATE 2 MG: 5 INJECTION, SOLUTION INTRAMUSCULAR at 09:04

## 2022-04-19 RX ADMIN — CEFTRIAXONE 2 G: 2 INJECTION, SOLUTION INTRAVENOUS at 06:04

## 2022-04-19 RX ADMIN — LEVETIRACETAM 1000 MG: 500 TABLET, FILM COATED ORAL at 09:04

## 2022-04-19 RX ADMIN — SODIUM CHLORIDE: 0.9 INJECTION, SOLUTION INTRAVENOUS at 08:04

## 2022-04-19 RX ADMIN — OXYCODONE 5 MG: 5 TABLET ORAL at 04:04

## 2022-04-19 RX ADMIN — OXYCODONE 5 MG: 5 TABLET ORAL at 11:04

## 2022-04-19 RX ADMIN — HYDROMORPHONE HYDROCHLORIDE 0.5 MG: 1 INJECTION, SOLUTION INTRAMUSCULAR; INTRAVENOUS; SUBCUTANEOUS at 05:04

## 2022-04-19 RX ADMIN — DEXTROSE 250 MG: 50 INJECTION, SOLUTION INTRAVENOUS at 05:04

## 2022-04-19 RX ADMIN — ENOXAPARIN SODIUM 40 MG: 100 INJECTION SUBCUTANEOUS at 05:04

## 2022-04-19 RX ADMIN — HYDROXYZINE HYDROCHLORIDE 25 MG: 25 TABLET, FILM COATED ORAL at 11:04

## 2022-04-19 RX ADMIN — OXYCODONE 5 MG: 5 TABLET ORAL at 10:04

## 2022-04-19 RX ADMIN — LEVETIRACETAM 1000 MG: 500 TABLET, FILM COATED ORAL at 11:04

## 2022-04-19 RX ADMIN — IPRATROPIUM BROMIDE AND ALBUTEROL SULFATE 3 ML: 2.5; .5 SOLUTION RESPIRATORY (INHALATION) at 08:04

## 2022-04-19 NOTE — ASSESSMENT & PLAN NOTE
Pt presenting with several days of intractable nausea and vomiting. Pt unable to keep anything, including water, down. Zofran with no improvement at home. Likely 2/2 hyponatremia and ileus. KUB and CT demonstrating ileus vs partial SBO. NGT placed 4/17, pt keeps pulling out. Replaced x3. On 4/19 pt with BM and improved nausea.     -- therapeutic para as above  -- management of hyponatremia as above  -- IV compazine and phenergan  -- improving on 4/19

## 2022-04-19 NOTE — SUBJECTIVE & OBJECTIVE
Patient History               Medical as of 4/19/2022       Past Medical History       Diagnosis Date Comments Source    Seizures -- -- Provider                          Surgical as of 4/19/2022    Past Surgical History: Patient provided no pertinent surgical history.               Family as of 4/19/2022    None               Tobacco Use as of 4/19/2022       Smoking Status Smoking Start Date Smoking Quit Date Packs/Day Years Used    Current Every Day Smoker -- -- -- --      Types Comments Smokeless Tobacco Status Smokeless Tobacco Quit Date Source    -- -- Never Used -- Provider                  Alcohol Use as of 4/19/2022       Alcohol Use Drinks/Week Alcohol/Week Comments Source    Yes   -- -- Provider                  Drug Use as of 4/19/2022       Drug Use Types Frequency Comments Source    Yes  Methamphetamines, Marijuana -- -- Provider                  Sexual Activity as of 4/19/2022       Sexually Active Birth Control Partners Comments Source    -- -- -- -- Provider                  Activities of Daily Living as of 4/19/2022    None               Social Documentation as of 4/19/2022    None               Occupational as of 4/19/2022    None               Socioeconomic as of 4/19/2022       Marital Status Spouse Name Number of Children Years Education Education Level Preferred Language Ethnicity Race Source    Single -- -- -- -- English Not  or /a White --                  Pertinent History       Question Response Comments    Lives with -- --    Place in Birth Order -- --    Lives in -- --    Number of Siblings -- --    Raised by -- --    Legal Involvement -- --    Childhood Trauma -- --    Criminal History of -- --    Financial Status -- --    Highest Level of Education -- --    Does patient have access to a firearm? -- --     Service -- --    Primary Leisure Activity -- --    Spirituality -- --          Past Medical History:   Diagnosis Date    Seizures      History reviewed. No  pertinent surgical history.  Family History    None       Tobacco Use    Smoking status: Current Every Day Smoker    Smokeless tobacco: Never Used   Substance and Sexual Activity    Alcohol use: Yes    Drug use: Yes     Types: Methamphetamines, Marijuana    Sexual activity: Not on file     Review of patient's allergies indicates:  No Known Allergies    No current facility-administered medications on file prior to encounter.     Current Outpatient Medications on File Prior to Encounter   Medication Sig    binimetinib 15 mg Tab Take 45 mg by mouth 2 (two) times daily.    encorafenib 75 mg Cap Take 450 mg by mouth once daily.    fentaNYL (DURAGESIC) 25 mcg/hr Place 1 patch onto the skin once.    levETIRAcetam (KEPPRA) 1000 MG tablet Take 1,000 mg by mouth 2 (two) times a day.    ondansetron (ZOFRAN-ODT) 8 MG TbDL Dissolve 1 tablet (8 mg total) by mouth every 8 (eight) hours as needed.    oxyCODONE (ROXICODONE) 20 mg Tab immediate release tablet Take 20 mg by mouth every 6 (six) hours as needed.     Psychotherapeutics (From admission, onward)                Start     Stop Route Frequency Ordered    04/19/22 1541  OLANZapine injection 10 mg         -- IM Every 8 hours PRN 04/19/22 1441          Review of Systems  Strengths and Liabilities: Strength: Patient has positive support network., Liability: Patient has poor health., Liability: Patient has poor judgment, Liability: Patient lacks coping skills.    Objective:     Vital Signs (Most Recent):  Temp: 96.5 °F (35.8 °C) (04/19/22 1533)  Pulse: 104 (04/19/22 1533)  Resp: 18 (04/19/22 1533)  BP: (!) 171/99 (04/19/22 1533)  SpO2: 96 % (04/19/22 1533)   Vital Signs (24h Range):  Temp:  [96.5 °F (35.8 °C)-97.5 °F (36.4 °C)] 96.5 °F (35.8 °C)  Pulse:  [104-105] 104  Resp:  [18] 18  SpO2:  [93 %-99 %] 96 %  BP: (131-171)/() 171/99     Height: 6' (182.9 cm)  Weight: 76.4 kg (168 lb 6.9 oz) (76.4)  Body mass index is 22.84 kg/m².    No intake or output data in the 24 hours  ending 04/19/22 9403    Physical Exam     Significant Labs: All pertinent labs within the past 24 hours have been reviewed.    Significant Imaging: I have reviewed all pertinent imaging results/findings within the past 24 hours.

## 2022-04-19 NOTE — ASSESSMENT & PLAN NOTE
He presented initially with enlarging right axillary lymph nodes that have grown progressively in size since April 2021. He presented to urgent care and was referred for US which showed multiple enlarged masses in the right axilla most likely reflecting abnormal lymph nodes concerning for malignancy.   He was subsequently referred to dermatology who performed a complete exam of the skin that was negative for suspicious cutaneous lesions as well as punch biopsy of one of the lymph node on 01/04/22. Pathology came back as malignant melanoma.   BRAF mutation: positive for a [id number] mutation.   He subsequently underwent a PET/CT on 01/28/22 which showed multiple hypermetabolic soft tissue masses within the right axillary region, metastatic lesions to the lungs and abdomen (VI segment of the liver measuring a max SUV of 6.99, 1.6 x 1.9 cm nodular soft tissue density with a max SUV  7.64 was seen adjacent to the colon within the right lower quadrant. Brain MRI was negative for metastatic disease.  LDH was 307 U/L  He saw Oncology at [facility] on 01/31/22 with plan to start First line Nivolumab / ipilimumab. He received C1 on 03/03/22.     -- pt scheduled to start binimetinib and encorafenib prior to admission.  -- will restart upon discharge

## 2022-04-19 NOTE — HPI
Per Hospital Medicine:  51 y.o. male with history of CAD s/p PCI, seizures, substance abuse, stage IV malignant melanoma presenting from oncology clinic with multiple electrolyte abnormalities and worsening ascites. Pt has hx of malignant melanoma first diagnosed 1/2022. He is s/p 1 round of tx on 3/3/2022, with  plans to start binimetinib and enorafenib in  the near future. However, patient's clinical status has been deteriorating over the past few weeks. He was admitted on 03/18 at [facility] for abdominal pain, and distension. He underwent therapeutic paracentesis with removal of 3L, and was discharged on 03/22. CT abdomen during that admission showed soft tissue attenuation throughout the peritoneum consistent with innumerable peritoneal implants.  He was then readmitted the following day 03/23 for re accumulation and had another paracentesis with removal of 4.7L. Pt was again readmitted 03/30 to 04/02 and underwent paracentesis. He was planned to receive pleur-X catheter as outpatient. He presented again to [facility] on 04/04 with worsening abdominal distension and pain. He underwent therapeutic paracentesis with removal of 5L. Cytology was positive for malignant cells. He was discharged on 04/05 on Cipro for SBP coverage. Since his discharge, he notes worsening abdominal distension. He had progressive nausea and vomiting and has not been able to tolerate any diet including water. He has tried zofran at home which has not helped. He denies any recent fever, chills, cough, sore throat. He was evaluated in  oncology clinic this morning  with his mother, during which time he had several episodes of vomiting. Labs obtained during clinic were concerning for leukocytosis, hyperkalemia, and hyponatremia. He was sent to the ED for admission for management of ascites, and other current comorbid conditions. In the ED, Pt afebrile, /85, HR 80s,  ALEJANDRO. CBC notable for  WBC 19, Plt 619. K  5.9, Na 119.  Alb 2.6. Cr 2.0 from  "baseline 1.0. TSH 8.8, though free T4 wnl. Procal elevated to 1.1. EKG with no acute changes, no  T wave abnormalities.  Pt with severe agitation and mental status changes overnight. Pt required ativan, haldol, zyprexa, benadryl, depakote for sedation. This morning pt very altered and agitated.  Pt oriented to self only. Rapid pressured tangential speech.     --------------  Initial Psychiatric Interview: 04/19/2022   Met with the patient while patient lying in his hospital bed. He seemed very restless and agitated. He was constantly trying to get out of his restraints. He presented as an alert and orientated x 3. When he was asked about the reason for his hospitalization he stated, "I have metastasized cancer in my lungs, heart, stomach, and my lymph". He reported feeling very depression and hopeless. He endorsed depressive symptoms of low mood, amotivation, anhedonia, decreased concentration, feelings of guilt, worthlessness, hopelessness, fatigue, poor sleep, poor appetite, and passive suicidal thoughts without a plan or intent. When he was asked about having suicidal thoughts he replied, "yes. This is not the life I want for me but I don't want to kill myself. I will try to keep myself comfortable and see what alternative they have for me. I don't want to die but I don't want to live like this". He denied previous suicide attempts. He reported feeling very anxious. He stated he started to have panic attacks about 2 weeks ago and so far he had 3 panic attacks. He stated he wants to leave the hospital and go home to live with his mother and stated his sister, and aunt will all help in taking care of him. He stated he lost his job recently as a  and moved to [place] from [place] recently to live with his mother due to cancer. He denied HI and no AVH. He denied being on antidepressant. He denied alcohol abuse but reported being in senior care for one DUI. He denied drug abuse but reported occasional " marijuana use and his last use was 2 weeks ago. HE stated when he was younger he tried methamphetamine but stated he has not used in a long time. He denied auditory or visual hallucinations.     Psychiatric Review Of Systems - Is patient experiencing or having changes in:  sleep: not sleeping  appetite: not eating  weight: lost 30 lbs in the past 2 weeks  energy/anergy: down.   interest/pleasure/anhedonia: yes  somatic symptoms: no  anxiety/panic: yes  guilty/hopelessness: yes  concentration: yes  S.I.B.s/risky behavior: no  Irritability: yes  Racing thoughts: yes  Impulsive behaviors: no  Paranoia:no  AVH:No  Suicidal thoughts/plan/intent: See HPI. No SI plan or intent

## 2022-04-19 NOTE — ASSESSMENT & PLAN NOTE
Pt with concerns for ileus vs SBO on abdominal imaging on 4/16. Pt with chronic opioid use given cancer and abdominal mets. Pt made NPO and NGT placed with significant output. Repeat imaging with concerns for worsening ileus despite nonoperative treatment. Concerns for malignant obstruction. Gen surg consulted, though patient will likely not be operative candidate given high tumor burden. On 4/19 Pt passed BM. Pt removed his NGT, though now without nausea. Will advance diet as tolerated.      -- clear liquid diet; can advance as tolerated  -- consider bowel regimen

## 2022-04-19 NOTE — ASSESSMENT & PLAN NOTE
Pt with potassium 5.9 on CMP in ED. K 6.4 on istat. EKG without any acute changes. Pt shifted in ED with repeat BMP showing K 5.5. Unclear etiology of acute hyperkalemia, though patient does have concurrent PAULA. K consistently elevated, 5.9 on 4/16. S/p shift again on 4/17 with improvement in K to 5.1. AM cortisol elevated to 44. Concern for potential adrenal insufficiency vs adrenal mets though no convincing evidence of lesions on CT. Am cortisol  Elevated.     -- serial bmp  -- avoid potential potassium-increasing medications  -- ACTH pending

## 2022-04-19 NOTE — SUBJECTIVE & OBJECTIVE
Interval History: Patient with improving sodium but persistent mild hyperkalemia. Worsening mental status over last 24-48 hours. No IV access currently.    Review of patient's allergies indicates:  No Known Allergies  Current Facility-Administered Medications   Medication Frequency    0.9%  NaCl infusion Continuous    calcium gluconat 1 g in NS IVPB (premixed) Q10 Min PRN    cefTRIAXone (ROCEPHIN) 2 g/50 mL D5W IVPB Q24H    dextrose 10% bolus 125 mL PRN    dextrose 10% bolus 250 mL PRN    enoxaparin injection 40 mg Daily    fentaNYL 25 mcg/hr 1 patch Q72H    glucagon (human recombinant) injection 1 mg PRN    glucose chewable tablet 16 g PRN    glucose chewable tablet 24 g PRN    haloperidol lactate injection 2 mg Q4H PRN    HYDROmorphone injection 0.5 mg Q3H PRN    hydrOXYzine HCL tablet 25 mg TID PRN    levETIRAcetam tablet 1,000 mg BID    LIDOcaine HCl 2% oral solution 15 mL Q4H PRN    naloxone 0.4 mg/mL injection 0.02 mg PRN    oxyCODONE immediate release tablet 5 mg Q4H PRN    prochlorperazine injection Soln 5 mg Q6H PRN    promethazine (PHENERGAN) 12.5 mg in dextrose 5 % 50 mL IVPB Q6H PRN    sodium chloride 0.9% flush 10 mL Q12H PRN       Objective:     Vital Signs (Most Recent):  Temp: 97.5 °F (36.4 °C) (04/19/22 0733)  Pulse: 104 (04/19/22 1124)  Resp: 18 (04/19/22 1147)  BP: (!) 142/104 (04/19/22 1124)  SpO2: (!) 93 % (04/19/22 1124)  O2 Device (Oxygen Therapy): room air (04/18/22 1200)   Vital Signs (24h Range):  Temp:  [96.8 °F (36 °C)-97.5 °F (36.4 °C)] 97.5 °F (36.4 °C)  Pulse:  [104-105] 104  Resp:  [18] 18  SpO2:  [93 %-99 %] 93 %  BP: (131-142)/() 142/104     Weight: 76.4 kg (168 lb 6.9 oz) (76.4) (04/14/22 1810)  Body mass index is 22.84 kg/m².  Body surface area is 1.97 meters squared.    No intake/output data recorded.    Physical Exam  Vitals and nursing note reviewed.   Constitutional:       General: He is not in acute distress.     Appearance: He is normal weight. He is ill-appearing and  toxic-appearing. He is not diaphoretic.   HENT:      Head: Normocephalic and atraumatic.      Nose: Nose normal. No congestion or rhinorrhea.      Mouth/Throat:      Mouth: Mucous membranes are dry.      Pharynx: Oropharynx is clear.   Eyes:      Extraocular Movements: Extraocular movements intact.      Pupils: Pupils are equal, round, and reactive to light.   Cardiovascular:      Rate and Rhythm: Normal rate and regular rhythm.      Pulses: Normal pulses.      Heart sounds: Normal heart sounds. No murmur heard.    No friction rub. No gallop.   Pulmonary:      Effort: Pulmonary effort is normal. No respiratory distress.      Breath sounds: Normal breath sounds.   Abdominal:      General: Bowel sounds are decreased. There is no distension.      Tenderness: There is abdominal tenderness. There is no guarding or rebound.   Musculoskeletal:         General: No swelling. Normal range of motion.      Cervical back: Normal range of motion and neck supple.   Skin:     General: Skin is warm and dry.   Neurological:      Mental Status: He is alert. He is disoriented.   Psychiatric:         Mood and Affect: Mood normal.         Behavior: Behavior normal.       Significant Labs:  All labs within the past 24 hours have been reviewed.     Significant Imaging:  All labs within the past 24 hours have been reviewed.

## 2022-04-19 NOTE — CONSULTS
[facility]  Psychiatry  Consult Note    Patient Name: Joseluis Garner  MRN: [id number]   Code Status: Full Code  Admission Date: 4/14/2022  Hospital Length of Stay: 5 days  Attending Physician: Meghan Rios MD  Primary Care Provider: Primary Doctor No    Current Legal Status: Physician's Emergency Certificate (PEC)    Patient information was obtained from patient, ER records and patient's chart.   Consults  Subjective:     Principal Problem:Ascites    Chief Complaint:  Agitation     HPI:   Per Hospital Note:  51 y.o. male with history of CAD s/p PCI, seizures, substance abuse, stage IV malignant melanoma presenting from oncology clinic with multiple electrolyte abnormalities and worsening ascites. Pt has hx of malignant melanoma first diagnosed 1/2022. He is s/p 1 round of tx on 3/3/2022, with  plans to start binimetinib and enorafenib in  the near future. However, patient's clinical status has been deteriorating over the past few weeks. He was admitted on 03/18 at [facility] for abdominal pain, and distension. He underwent therapeutic paracentesis with removal of 3L, and was discharged on 03/22. CT abdomen during that admission showed soft tissue attenuation throughout the peritoneum consistent with innumerable peritoneal implants.  He was then readmitted the following day 03/23 for re accumulation and had another paracentesis with removal of 4.7L. Pt was again readmitted 03/30 to 04/02 and underwent paracentesis. He was planned to receive pleur-X catheter as outpatient. He presented again to [facility] on 04/04 with worsening abdominal distension and pain. He underwent therapeutic paracentesis with removal of 5L. Cytology was positive for malignant cells. He was discharged on 04/05 on Cipro for SBP coverage.      Since his discharge, he notes worsening abdominal distension. He had progressive nausea and vomiting and has not been able to tolerate any diet including water. He has tried zofran at home which has not  "helped. He denies any recent fever, chills, cough, sore throat. He was evaluated in  oncology clinic this morning  with his mother, during which time he had several episodes of vomiting. Labs obtained during clinic were concerning for leukocytosis, hyperkalemia, and hyponatremia. He was sent to the ED for admission for management of ascites, and other current comorbid conditions.      In the ED, Pt afebrile, /85, HR 80s,  ALEJANDRO. CBC notable for  WBC 19, Plt 619. K  5.9, Na 119.  Alb 2.6. Cr 2.0 from baseline 1.0. TSH 8.8, though free T4 wnl. Procal elevated to 1.1. EKG with no acute changes, no  T wave abnormalities.          Interval History: Pt with severe agitation and mental status changes overnight. Pt required ativan, haldol, zyprexa, benadryl, depakote for sedation. This morning pt very altered and agitated.  Pt oriented to self only. Rapid pressured tangential speech.       On My Psychiatry Consult Interview: 04/19/2022  Met with the patient while patient lying in his hospital bed. He seemed very restless and agitated. He was constantly trying to get out of his restraints. He presented as an alert and orientated x 3. When he was asked about the reason for his hospitalization he stated, "I have metastasized cancer in my lungs, heart, stomach, and my lymph". He reported feeling very depression and hopeless. He endorsed depressive symptoms of low mood, amotivation, anhedonia, decreased concentration, feelings of guilt, worthlessness, hopelessness, fatigue, poor sleep, poor appetite, and passive suicidal thoughts without a plan or intent. When he was asked about having suicidal thoughts he replied, "yes. This is not the life I want for me but I don't want to kill myself. I will try to keep myself comfortable and see what alternative they have for me. I don't want to die but I don't want to live like this". He denied previous suicide attempts. He reported feeling very anxious. He stated he started to have " panic attacks about 2 weeks ago and so far he had 3 panic attacks. He stated he wants to leave the hospital and go home to live with his mother and stated his sister, and aunt will all help in taking care of him. He stated he lost his job recently as a  and moved to [place] from [place] recently to live with his mother due to cancer. He denied HI and no AVH. He denied being on antidepressant. He denied alcohol abuse but reported being in penitentiary for one DUI. He denied drug abuse but reported occasional marijuana use and his last use was 2 weeks ago. HE stated when he was younger he tried methamphetamine but stated he has not used in a long time. He denied auditory or visual hallucinations.     Psychiatric Review Of Systems - Is patient experiencing or having changes in:  sleep: not sleeping  appetite: not eating  weight: lost 30 lbs in the past 2 weeks  energy/anergy: down.   interest/pleasure/anhedonia: yes  somatic symptoms: no  anxiety/panic: yes  guilty/hopelessness: yes  concentration: yes  S.I.B.s/risky behavior: no  Irritability: yes  Racing thoughts: yes  Impulsive behaviors: no  Paranoia:no  AVH:No  Suicidal thoughts/plan/intent: See HPI. No SI plan or intent    Psychiatric Review Of Systems - Currently, the patient is endorsing and/or denying the following:  (patient's endorsements are BOLDED below; if not BOLDED, then patient denied):     Endorses Symptoms of Depression: diminished mood, low motivation, loss of interest/anhedonia, irritability, diminished energy, change in sleep, change in appetite, diminished concentration or cognition or indecisiveness, PMR, excessive guilt or hopelessness or worthlessness, suicidal ideations without a plan or intent     Endorses trouble with Sleep: initiation, maintenance, early morning awakening with inability to return to sleep     Endorses passive Suicidal ideations but no plan or intent: active/passive ideations, organized plans, future intentions      Denies Symptoms of psychosis: hallucinations, delusions, disorganized thinking, disorganized behavior or abnormal motor behavior, or negative symptoms (diminished emotional expression, avolition, anhedonia, alogia, asociality      Denies Symptoms of karan or hypomania: elevated, expansive, or irritable mood with increased energy or activity; with inflated self-esteem or grandiosity, decreased need for sleep, increased rate of speech, FOI or racing thoughts, distractibility, increased goal directed activity or PMA, risky/disinhibited behavior     Endorses Symptoms of anxiety: excessive worry/fear, more days than not, about numerous issues, difficult to control, with restlessness, fatigue, poor concentration, irritability, muscle tension, sleep disturbance; causes functionally impairing distress      Endorses Symptoms of Panic Disorder that started last 2 weeks ago: and had 3 panic attacks so far;recurrent panic attacks, precipitated or un-precipitated, source of worry and/or behavioral changes secondary; with or without agoraphobia     Denies Symptoms of PTSD: h/o trauma; re-experiencing/intrusive symptoms, avoidant behavior, negative alterations in cognition or mood, or hyperarousal symptoms; with or without dissociative symptoms      Denies Symptoms of OCD: obsessions or compulsions      Denies Symptoms of Eating Disorders: anorexia, bulimia or binging     Denies Substance abuse currently but reported occasional marijuana use but he stated that he tried methamphetamine in the past but nothing recent.: intoxication, withdrawal, tolerance, used in larger amounts or duration than intended, unsuccessful attempts to limit or quit, increased time engaging in or seeking out, cravings or strong desire to use, failure to fulfill obligations, negative consequences in social/interpersonal/occupational,/recreational areas, use in dangerous situations, medical or psychological consequences         Past Medical/Surgical  "History  Past Medical History:   Diagnosis Date    Seizures      History reviewed. No pertinent surgical history.    Past Psychiatric History:  Previous Medication Trials: no   Previous Psychiatric Hospitalizations: no   Previous Suicide Attempts: no   History of Violence: no  Outpatient Psychiatrist: no    Social History:  Marital Status: single  Children: 0   Employment Status/Info: unemployed  Education:  some college  Special Ed: no  Housing Status: Lives with his mother and "under hospice care"  History of phys/sexual abuse: no  Access to gun: no    Substance Abuse History:  Recreational Drugs: marijuana occasionally  Use of Alcohol: occasional, social use  Tobacco Use: no  Rehab History: no     Legal History:  Past Charges/Incarcerations: yes, "DUI" once  Pending charges: no     Family Psychiatric History: No    Mental Status Exam:  Appearance: disheveled  Level of Consciousness: alert and oriented to self, situation, and date  Behavior/Cooperation: psychomotor agitation, restless and fidgety   Psychomotor: agitation  Speech: rapid  Orientation: grossly intact, person, place, situation, time/date, day of week, month of year, year  Attention Span/Concentration: unable to spell "WORLD" backwards  Memory: poor unable to recall the 3 items in > 3 minutes  Mood: "depressed and anxious"  Affect: depressed, irritable, anxious,   Thought Process: goal-directed  Associations: goal-directed  Thought Content: normal, no suicidality, no homicidality, delusions, or paranoia  Fund of Knowledge: When he was asked to name the last 4 presidents of the [place] he stated" Jai is the president, Then he stated Alex Lujan, Dash, and Hardeep)  Abstraction: proverbs were abstract, similarities were abstract  Insight: fair per understating of his illness  Judgment: poor due to recent behavior of agitation, trying to get off his restraints, chewing of his soft restraints, and trying to leave the " hospital        Hospital Course: No notes on file         Patient History               Medical as of 4/19/2022       Past Medical History       Diagnosis Date Comments Source    Seizures -- -- Provider                          Surgical as of 4/19/2022    Past Surgical History: Patient provided no pertinent surgical history.               Family as of 4/19/2022    None               Tobacco Use as of 4/19/2022       Smoking Status Smoking Start Date Smoking Quit Date Packs/Day Years Used    Current Every Day Smoker -- -- -- --      Types Comments Smokeless Tobacco Status Smokeless Tobacco Quit Date Source    -- -- Never Used -- Provider                  Alcohol Use as of 4/19/2022       Alcohol Use Drinks/Week Alcohol/Week Comments Source    Yes   -- -- Provider                  Drug Use as of 4/19/2022       Drug Use Types Frequency Comments Source    Yes  Methamphetamines, Marijuana -- -- Provider                  Sexual Activity as of 4/19/2022       Sexually Active Birth Control Partners Comments Source    -- -- -- -- Provider                  Activities of Daily Living as of 4/19/2022    None               Social Documentation as of 4/19/2022    None               Occupational as of 4/19/2022    None               Socioeconomic as of 4/19/2022       Marital Status Spouse Name Number of Children Years Education Education Level Preferred Language Ethnicity Race Source    Single -- -- -- -- English Not  or /a White --                  Pertinent History       Question Response Comments    Lives with -- --    Place in Birth Order -- --    Lives in -- --    Number of Siblings -- --    Raised by -- --    Legal Involvement -- --    Childhood Trauma -- --    Criminal History of -- --    Financial Status -- --    Highest Level of Education -- --    Does patient have access to a firearm? -- --     Service -- --    Primary Leisure Activity -- --    Spirituality -- --          Past Medical History:    Diagnosis Date    Seizures      History reviewed. No pertinent surgical history.  Family History    None       Tobacco Use    Smoking status: Current Every Day Smoker    Smokeless tobacco: Never Used   Substance and Sexual Activity    Alcohol use: Yes    Drug use: Yes     Types: Methamphetamines, Marijuana    Sexual activity: Not on file     Review of patient's allergies indicates:  No Known Allergies    No current facility-administered medications on file prior to encounter.     Current Outpatient Medications on File Prior to Encounter   Medication Sig    binimetinib 15 mg Tab Take 45 mg by mouth 2 (two) times daily.    encorafenib 75 mg Cap Take 450 mg by mouth once daily.    fentaNYL (DURAGESIC) 25 mcg/hr Place 1 patch onto the skin once.    levETIRAcetam (KEPPRA) 1000 MG tablet Take 1,000 mg by mouth 2 (two) times a day.    ondansetron (ZOFRAN-ODT) 8 MG TbDL Dissolve 1 tablet (8 mg total) by mouth every 8 (eight) hours as needed.    oxyCODONE (ROXICODONE) 20 mg Tab immediate release tablet Take 20 mg by mouth every 6 (six) hours as needed.     Psychotherapeutics (From admission, onward)                Start     Stop Route Frequency Ordered    04/19/22 1541  OLANZapine injection 10 mg         -- IM Every 8 hours PRN 04/19/22 1441          Review of Systems  Strengths and Liabilities: Strength: Patient has positive support network., Liability: Patient has poor health., Liability: Patient has poor judgment, Liability: Patient lacks coping skills.    Objective:     Vital Signs (Most Recent):  Temp: 96.5 °F (35.8 °C) (04/19/22 1533)  Pulse: 104 (04/19/22 1533)  Resp: 18 (04/19/22 1533)  BP: (!) 171/99 (04/19/22 1533)  SpO2: 96 % (04/19/22 1533)   Vital Signs (24h Range):  Temp:  [96.5 °F (35.8 °C)-97.5 °F (36.4 °C)] 96.5 °F (35.8 °C)  Pulse:  [104-105] 104  Resp:  [18] 18  SpO2:  [93 %-99 %] 96 %  BP: (131-171)/() 171/99     Height: 6' (182.9 cm)  Weight: 76.4 kg (168 lb 6.9 oz) (76.4)  Body mass  index is 22.84 kg/m².    No intake or output data in the 24 hours ending 04/19/22 0359    Physical Exam     Significant Labs: All pertinent labs within the past 24 hours have been reviewed.    Significant Imaging: I have reviewed all pertinent imaging results/findings within the past 24 hours.    Assessment/Plan:     Agitation  Patient presented as agitated, very anxious, and depressed with severe psychomotor agitation. He reported feeling depressed, hopeless, and anxious. He reported poor sleep and poor appetite. He stated he is interested in medicine to help with sleep, anxiety, and depression. He also reported having passive suicidal thoughts without a plan or intent. He displayed poor judgement regarding his recent behavior on the unit of agitation and chewing off his restraints. Per chart patient was very aggressive last night with staff, pulled his IVs, and restraints and required an intervention with haldol, benadryl, ativan, and zyprexa.     Recommendations:  -Start zyprexa 10 mg po/im PRN q 8 hours for severe and non redirectable agitation   -Start Depacon  mg TID for severe agitation  -Avoid Benzodiazapine because it increases disinhibition and confusion  -Discontinue haldol 5 mg po/im, benadryl 50 mg po/im, cogentin 2 mg po/im for agitation  -Order EKG daily and monitor for QTC prolongation  -Continue PEC due to severe agitation and poor judgement. Patient is still waxing and waning and potential to become combative   -Reconsult as needed           Total Time:  60 minutes      Damaris Forman NP   Psychiatry  [facility]

## 2022-04-19 NOTE — ASSESSMENT & PLAN NOTE
Patient with creatinine 2.0 on admission from baseline 1.0 in the context of reduced oral intake, vomiting, and NG losses. Suspect likely prerenal given improvement with IV fluids.    Plan:  - continue IV fluids to match output when access available  - strict intake/output  - avoid nephrotoxic agents and renally dose medications

## 2022-04-19 NOTE — PROGRESS NOTES
[facility] Med Surg  Nephrology  Progress Note    Patient Name: Joseluis Garner  MRN: [id number]  Admission Date: 4/14/2022  Hospital Length of Stay: 5 days  Attending Provider: Meghan Rios MD   Primary Care Physician: Primary Doctor No  Principal Problem:Ascites    Subjective:     HPI: Joseluis Garner is a 51 year old male with CAD, seizure disorder, stage IV melanoma who presents with hyponatremia, hyperkalemia, and worsening ascites. Patent was noted to have sodium 119, potassium of 5.9, and creatinine of 2.0 from baseline 1.0, on admission. Patient being treated with nivolumab and iplimumab. Notably, describes nausea and vomiting with reduced oral intake. He received a 7L paracentesis with albumin replacement. Additionally, patient was given 1 L NS bolus and NS continue infusion. Initially, sodium improved to 126 then down trended to 123. Potassium now 5.5. Renal function improving with IV fluids - creatinine 1.4. Nephrology consulted for hyponatremia, hyperkalemia, and acute kidney injury.      Interval History: Patient with improving sodium but persistent mild hyperkalemia. Worsening mental status over last 24-48 hours. No IV access currently.    Review of patient's allergies indicates:  No Known Allergies  Current Facility-Administered Medications   Medication Frequency    0.9%  NaCl infusion Continuous    calcium gluconat 1 g in NS IVPB (premixed) Q10 Min PRN    cefTRIAXone (ROCEPHIN) 2 g/50 mL D5W IVPB Q24H    dextrose 10% bolus 125 mL PRN    dextrose 10% bolus 250 mL PRN    enoxaparin injection 40 mg Daily    fentaNYL 25 mcg/hr 1 patch Q72H    glucagon (human recombinant) injection 1 mg PRN    glucose chewable tablet 16 g PRN    glucose chewable tablet 24 g PRN    haloperidol lactate injection 2 mg Q4H PRN    HYDROmorphone injection 0.5 mg Q3H PRN    hydrOXYzine HCL tablet 25 mg TID PRN    levETIRAcetam tablet 1,000 mg BID    LIDOcaine HCl 2% oral solution 15 mL Q4H PRN    naloxone 0.4  mg/mL injection 0.02 mg PRN    oxyCODONE immediate release tablet 5 mg Q4H PRN    prochlorperazine injection Soln 5 mg Q6H PRN    promethazine (PHENERGAN) 12.5 mg in dextrose 5 % 50 mL IVPB Q6H PRN    sodium chloride 0.9% flush 10 mL Q12H PRN       Objective:     Vital Signs (Most Recent):  Temp: 97.5 °F (36.4 °C) (04/19/22 0733)  Pulse: 104 (04/19/22 1124)  Resp: 18 (04/19/22 1147)  BP: (!) 142/104 (04/19/22 1124)  SpO2: (!) 93 % (04/19/22 1124)  O2 Device (Oxygen Therapy): room air (04/18/22 1200)   Vital Signs (24h Range):  Temp:  [96.8 °F (36 °C)-97.5 °F (36.4 °C)] 97.5 °F (36.4 °C)  Pulse:  [104-105] 104  Resp:  [18] 18  SpO2:  [93 %-99 %] 93 %  BP: (131-142)/() 142/104     Weight: 76.4 kg (168 lb 6.9 oz) (76.4) (04/14/22 1810)  Body mass index is 22.84 kg/m².  Body surface area is 1.97 meters squared.    No intake/output data recorded.    Physical Exam  Vitals and nursing note reviewed.   Constitutional:       General: He is not in acute distress.     Appearance: He is normal weight. He is ill-appearing and toxic-appearing. He is not diaphoretic.   HENT:      Head: Normocephalic and atraumatic.      Nose: Nose normal. No congestion or rhinorrhea.      Mouth/Throat:      Mouth: Mucous membranes are dry.      Pharynx: Oropharynx is clear.   Eyes:      Extraocular Movements: Extraocular movements intact.      Pupils: Pupils are equal, round, and reactive to light.   Cardiovascular:      Rate and Rhythm: Normal rate and regular rhythm.      Pulses: Normal pulses.      Heart sounds: Normal heart sounds. No murmur heard.    No friction rub. No gallop.   Pulmonary:      Effort: Pulmonary effort is normal. No respiratory distress.      Breath sounds: Normal breath sounds.   Abdominal:      General: Bowel sounds are decreased. There is no distension.      Tenderness: There is abdominal tenderness. There is no guarding or rebound.   Musculoskeletal:         General: No swelling. Normal range of motion.       Cervical back: Normal range of motion and neck supple.   Skin:     General: Skin is warm and dry.   Neurological:      Mental Status: He is alert. He is disoriented.   Psychiatric:         Mood and Affect: Mood normal.         Behavior: Behavior normal.       Significant Labs:  All labs within the past 24 hours have been reviewed.     Significant Imaging:  All labs within the past 24 hours have been reviewed.    Assessment/Plan:     PAULA (acute kidney injury)  Patient with creatinine 2.0 on admission from baseline 1.0 in the context of reduced oral intake, vomiting, and NG losses. Suspect likely prerenal given improvement with IV fluids.    Plan:  - continue IV fluids to match output when access available  - strict intake/output  - avoid nephrotoxic agents and renally dose medications    Hyponatremia  Hyperkalemia  Patent was noted to have sodium 119, potassium of 5.9, on admission in the setting of reduced oral intake and vomiting. Likely hypovolemic hyponatremia on admission. Patient was given 1 L NS bolus and albumin following paracentesis, followed by NS infusion. Initially, sodium improved to 126 then down trended to 123. On assessment patient seems volume down still. Suspect hyperkalemia related to acute kidney injury and reduced urine output. Additionally, given concurrent hyponatremia and hyperkalemia there is concern for adrenal pathology either metastasis or related to immunotherapy. For now, would rule out hypovolemic hyponatremia.    Sodium improving with IV fluid resuscitation but continues to have some degree of hyperkalemia. Hyperkalemia is unclear but could be multifactorial -  if there is urinary retention, high cell turnover, adrenal pathology, or hyperkalemia from thrombocytosis. CK mildly elevated.     Plan:  - continue IV fluids to match output losses/output  - expect potassium to improve with increased urine output  - BMP q12  - strict intake and output        Thank you for your consult. I will  follow-up with patient. Please contact us if you have any additional questions.    Jimbo Cabral MD  Nephrology  [facility]

## 2022-04-19 NOTE — ASSESSMENT & PLAN NOTE
Pt with worsening mental status starting 4/17 with worsening agitation and anxiety. On 4/18 patient with worsening mental status, increasingly confused. Patient requiring increasing sedating medications. Psychiatry consulted. Unclear etiology of behavioral changes. Likely brain mets vs possible withdrawal (pt with previous hx of methamphetamine use and alcohol use.) Mother confirms no alcohol use, not sure about other drug use. UDS pending. GGT negative.   -- f/u psych recs  -- prn haldol   -- hold zofran while on haldol  -- holding ativan given concern for adverse reactions and brain mets  -- prn hydroxyzine for anxiety

## 2022-04-19 NOTE — PLAN OF CARE
Plan of care reviewed with the patient at the beginning of shift. The patient is alert to self. Disoriented to place and situation. Pt removed soft restraints multiple times. Restraints changed to velcro. B52 and Depakote ordered. Unable to obtain vitals during shift due to patients aggressive attitude and being unwilling to cooperate. Pt removed his IV overnight. Unable to establish new access. Sitter remained at beside. Pt remained free from harm and injuries. Bed in low locked position. Restraints in place. Will continue to monitor.

## 2022-04-19 NOTE — ASSESSMENT & PLAN NOTE
Hyperkalemia  Patent was noted to have sodium 119, potassium of 5.9, on admission in the setting of reduced oral intake and vomiting. Likely hypovolemic hyponatremia on admission. Patient was given 1 L NS bolus and albumin following paracentesis, followed by NS infusion. Initially, sodium improved to 126 then down trended to 123. On assessment patient seems volume down still. Suspect hyperkalemia related to acute kidney injury and reduced urine output. Additionally, given concurrent hyponatremia and hyperkalemia there is concern for adrenal pathology either metastasis or related to immunotherapy. For now, would rule out hypovolemic hyponatremia.    Sodium improving with IV fluid resuscitation but continues to have some degree of hyperkalemia. Hyperkalemia is unclear but could be multifactorial -  if there is urinary retention, high cell turnover, adrenal pathology, or hyperkalemia from thrombocytosis. CK mildly elevated.     Plan:  - continue IV fluids to match output losses/output  - expect potassium to improve with increased urine output  - BMP q12  - strict intake and output

## 2022-04-19 NOTE — NURSING TRANSFER
Nursing Transfer Note      4/19/2022     Reason patient is being transferred: moved off oncology unit - not receiving oncology treatment    Transfer to room 645 from 807    Transfer via hospital bed    Transfer with paper chart    Transported by Cris RN (OC), Karlee RN (primary), security officers x2, and HUGO Ashley (PEC sitter)    Medicines sent: none    Any special needs or follow-up needed: Velcro 4-point restraints in place (order last renewed 4/19/2022 at 0715)    Chart send with patient: yes    Notified: PJ Chaparro (primary)    Upon arrival to floor, patient calm and cooperative. Restraints in place. Notified primary nurse, Krysta. MD aware of transfer.

## 2022-04-19 NOTE — ASSESSMENT & PLAN NOTE
Pt with Cr 2.0 on admission up from baseline of 1.0. Likely prerenal s/o poor PO intake, vomiting, and ascites. Cr improved to 1.3 with IVF and para. Now 1.5 on 4/19. Nephro following. Renal US showing normal  Renal nephrology and no hydronephrosis.     -- daily bmp  -- avoid nephrotoxic medications  -- renally dose meds

## 2022-04-19 NOTE — ASSESSMENT & PLAN NOTE
52 y/o M hx of malignant melanoma presents from clinic with symptomatic malignant ascites. Pt has had multiple admissions over the past month for therapeutic paracenteses in the setting of malignant ascites. His last para was 4/4, 10 days prior to admission. Patient will likely need scheduled weekly therapeutic josiah in the future to prevent hospitalization. S/p para on4/15 with 7L removed and albumin administered.     Plan:  -- Cell counts not c/w SBP, though continuing to cover with Ceftriaxone 2g q24h x7 days for given concurrent leukocytosis  -- schedule weekly para at discharge

## 2022-04-19 NOTE — PROGRESS NOTES
[facility]  Hematology/Oncology  Progress Note    Patient Name: Joseluis Garner  Admission Date: 4/14/2022  Hospital Length of Stay: 5 days  Code Status: Full Code     Subjective:     HPI:  51 y.o. male with history of CAD s/p PCI, seizures, substance abuse, stage IV malignant melanoma presenting from oncology clinic with multiple electrolyte abnormalities and worsening ascites. Pt has hx of malignant melanoma first diagnosed 1/2022. He is s/p 1 round of tx on 3/3/2022, with  plans to start binimetinib and enorafenib in  the near future. However, patient's clinical status has been deteriorating over the past few weeks. He was admitted on 03/18 at [facility] for abdominal pain, and distension. He underwent therapeutic paracentesis with removal of 3L, and was discharged on 03/22. CT abdomen during that admission showed soft tissue attenuation throughout the peritoneum consistent with innumerable peritoneal implants.  He was then readmitted the following day 03/23 for re accumulation and had another paracentesis with removal of 4.7L. Pt was again readmitted 03/30 to 04/02 and underwent paracentesis. He was planned to receive pleur-X catheter as outpatient. [place] [facility] [place] with worsening abdominal distension and pain. He underwent therapeutic paracentesis with removal of 5L. Cytology was positive for malignant cells. He was discharged on 04/05 on Cipro for SBP coverage.      Since his discharge, he notes worsening abdominal distension. He had progressive nausea and vomiting and has not been able to tolerate any diet including water. He has tried zofran at home which has not helped. He denies any recent fever, chills, cough, sore throat. He was evaluated in  oncology clinic this morning  with his mother, during which time he had several episodes of vomiting. Labs obtained during clinic were concerning for leukocytosis, hyperkalemia, and hyponatremia. He was sent to the ED for admission for  management of ascites, and other current comorbid conditions.     In the ED, Pt afebrile, /85, HR 80s,  ALEJANDRO. CBC notable for  WBC 19, Plt 619. K  5.9, Na 119.  Alb 2.6. Cr 2.0 from baseline 1.0. TSH 8.8, though free T4 wnl. Procal elevated to 1.1. EKG with no acute changes, no  T wave abnormalities.        Interval History: Pt with severe agitation and mental status changes overnight. Pt required ativan, haldol, zyprexa, benadryl, depakote for sedation. This morning pt very altered and agitated.  Pt oriented to self only. Rapid pressured tangential speech.    Oncology Treatment Plan:   OP NIVOLUMAB 1 MG/KG IPILIMUMAB 3MG/KG FOLLOWED BY NIVOLUMAB 480MG Q4W    Medications:  Continuous Infusions:   sodium chloride 0.9%       Scheduled Meds:   cefTRIAXone (ROCEPHIN) IVPB  2 g Intravenous Q24H    enoxaparin  40 mg Subcutaneous Daily    fentaNYL  1 patch Transdermal Q72H    levETIRAcetam  1,000 mg Oral BID     PRN Meds:[COMPLETED] calcium gluconate IVPB **AND** calcium gluconate IVPB, dextrose 10%, dextrose 10%, glucagon (human recombinant), glucose, glucose, haloperidol lactate, HYDROmorphone, hydrOXYzine HCL, LIDOcaine HCl 2%, naloxone, oxyCODONE, prochlorperazine, promethazine (PHENERGAN) IVPB, sodium chloride 0.9%     Review of Systems   Unable to perform ROS: Mental status change   Objective:     Vital Signs (Most Recent):  Temp: 97.5 °F (36.4 °C) (04/19/22 0733)  Pulse: 104 (04/19/22 1124)  Resp: 18 (04/19/22 1147)  BP: (!) 142/104 (04/19/22 1124)  SpO2: (!) 93 % (04/19/22 1124)   Vital Signs (24h Range):  Temp:  [96.8 °F (36 °C)-97.5 °F (36.4 °C)] 97.5 °F (36.4 °C)  Pulse:  [104-105] 104  Resp:  [18] 18  SpO2:  [93 %-99 %] 93 %  BP: (131-142)/() 142/104     Weight: 76.4 kg (168 lb 6.9 oz) (76.4)  Body mass index is 22.84 kg/m².  Body surface area is 1.97 meters squared.    No intake or output data in the 24 hours ending 04/19/22 1235    Physical Exam  Vitals and nursing note reviewed.    Constitutional:       General: He is in acute distress.      Appearance: He is ill-appearing.      Comments: Pt agitated, yelling, confused. Restraints in place.    HENT:      Head: Normocephalic and atraumatic.      Nose: Nose normal. No congestion.      Mouth/Throat:      Mouth: Mucous membranes are dry.   Eyes:      Conjunctiva/sclera: Conjunctivae normal.      Pupils: Pupils are equal, round, and reactive to light.   Cardiovascular:      Rate and Rhythm: Tachycardia present.      Pulses: Normal pulses.      Heart sounds: Normal heart sounds. No murmur heard.    No friction rub. No gallop.   Pulmonary:      Effort: Pulmonary effort is normal. No respiratory distress.      Breath sounds: Normal breath sounds.   Abdominal:      General: There is distension.      Tenderness: There is abdominal tenderness.      Comments: Decreased bowel sounds.    Musculoskeletal:         General: No swelling. Normal range of motion.      Cervical back: Normal range of motion and neck supple.   Skin:     General: Skin is warm and dry.      Comments: Multiple large subcutaneous nodules noted under R axilla.    Neurological:      Comments: Patient disoriented.    Psychiatric:      Comments: Pt confused and agitated. Rapid, pressured, tangential speech. Delusional, keeps muttering nonsensical phrases.        Significant Labs:   CBC:   Recent Labs   Lab 04/19/22  0942   WBC 22.06*   HGB 14.6   HCT 44.3   *   , CMP:   Recent Labs   Lab 04/19/22  0054 04/19/22  0851 04/19/22  0942   * 131* 133*   K 5.1 5.1 5.4*   CL 89* 90* 90*   CO2 24 24 21*   GLU 81 96 99   BUN 33* 34* 35*   CREATININE 1.5* 1.6* 1.5*   CALCIUM 8.7 8.9 8.9   PROT  --   --  5.4*   ALBUMIN  --   --  2.5*   BILITOT  --   --  0.4   ALKPHOS  --   --  71   AST  --   --  53*   ALT  --   --  22   ANIONGAP 17* 17* 22*   EGFRNONAA 52.8* 48.8* 52.8*   , and Urine Studies: No results for input(s): COLORU, APPEARANCEUA, PHUR, SPECGRAV, PROTEINUA, GLUCUA, KETONESU,  BILIRUBINUA, OCCULTUA, NITRITE, UROBILINOGEN, LEUKOCYTESUR, RBCUA, WBCUA, BACTERIA, SQUAMEPITHEL, HYALINECASTS in the last 48 hours.    Invalid input(s): RUSSELL    Diagnostic Results:  I have reviewed all pertinent imaging results/findings within the past 24 hours.    Assessment/Plan:     * Ascites  50 y/o M hx of malignant melanoma presents from clinic with symptomatic malignant ascites. Pt has had multiple admissions over the past month for therapeutic paracenteses in the setting of malignant ascites. His last para was 4/4, 10 days prior to admission. Patient will likely need scheduled weekly therapeutic josiah in the future to prevent hospitalization. S/p para on4/15 with 7L removed and albumin administered.     Plan:  -- Cell counts not c/w SBP, though continuing to cover with Ceftriaxone 2g q24h x7 days for given concurrent leukocytosis  -- schedule weekly para at discharge    Ileus  Pt with concerns for ileus vs SBO on abdominal imaging on 4/16. Pt with chronic opioid use given cancer and abdominal mets. Pt made NPO and NGT placed with significant output. Repeat imaging with concerns for worsening ileus despite nonoperative treatment. Concerns for malignant obstruction. Gen surg consulted, though patient will likely not be operative candidate given high tumor burden. On 4/19 Pt passed BM. Pt removed his NGT, though now without nausea. Will advance diet as tolerated.      -- clear liquid diet; can advance as tolerated  -- consider bowel regimen    Intractable nausea and vomiting  Pt presenting with several days of intractable nausea and vomiting. Pt unable to keep anything, including water, down. Zofran with no improvement at home. Likely 2/2 hyponatremia and ileus. KUB and CT demonstrating ileus vs partial SBO. NGT placed 4/17, pt keeps pulling out. Replaced x3. On 4/19 pt with BM and improved nausea.     -- therapeutic para as above  -- management of hyponatremia as above  -- IV compazine and phenergan  --  improving on 4/19      Hyperkalemia  Pt with potassium 5.9 on CMP in ED. K 6.4 on istat. EKG without any acute changes. Pt shifted in ED with repeat BMP showing K 5.5. Unclear etiology of acute hyperkalemia, though patient does have concurrent PAULA. K consistently elevated, 5.9 on 4/16. S/p shift again on 4/17 with improvement in K to 5.1. AM cortisol elevated to 44. Concern for potential adrenal insufficiency vs adrenal mets though no convincing evidence of lesions on CT. Am cortisol  Elevated.     -- serial bmp  -- avoid potential potassium-increasing medications  -- ACTH pending    PAULA (acute kidney injury)  Pt with Cr 2.0 on admission up from baseline of 1.0. Likely prerenal s/o poor PO intake, vomiting, and ascites. Cr improved to 1.3 with IVF and para. Now 1.5 on 4/19. Nephro following. Renal US showing normal  Renal nephrology and no hydronephrosis.     -- daily bmp  -- avoid nephrotoxic medications  -- renally dose meds    Hyponatremia  Pt presenting with severe hyponatremia to 119 in ED. Likely hypovolemic hyponatremia s/o poor PO And N/V. Na on labs 10 days prior to admission 128. Pt presenting with severe intractable nausea and vomiting. No changes in mental status or seizures. Sxs improving with improvement in Na. Nephro following, pt receiving intermittent NS boluses. Now on continuous IVF. Na 130 on 4/19    -- s/p therapeutic paracentesis on 4/14  --  Midline placed for better access; now removed on 4/17 as pt was about to leave ama   -- Pt taking good PO on 4/19. Tolerating without nausea        Metastatic melanoma  He presented initially with enlarging right axillary lymph nodes that have grown progressively in size since April 2021. He presented to urgent care and was referred for US which showed multiple enlarged masses in the right axilla most likely reflecting abnormal lymph nodes concerning for malignancy.   He was subsequently referred to dermatology who performed a complete exam of the skin  that was negative for suspicious cutaneous lesions as well as punch biopsy of one of the lymph node on 01/04/22. Pathology came back as malignant melanoma.   BRAF mutation: positive for a V600E mutation.   He subsequently underwent a PET/CT on 01/28/22 which showed multiple hypermetabolic soft tissue masses within the right axillary region, metastatic lesions to the lungs and abdomen (VI segment of the liver measuring a max SUV of 6.99, 1.6 x 1.9 cm nodular soft tissue density with a max SUV  7.64 was seen adjacent to the colon within the right lower quadrant. Brain MRI was negative for metastatic disease.  LDH was 307 U/L  He saw Oncology at [facility] on 01/31/22 with plan to start First line Nivolumab / ipilimumab. He received C1 on 03/03/22.     -- pt scheduled to start binimetinib and encorafenib prior to admission.  -- will restart upon discharge      Altered mental status  Pt with worsening mental status starting 4/17 with worsening agitation and anxiety. On 4/18 patient with worsening mental status, increasingly confused. Patient requiring increasing sedating medications. Psychiatry consulted. Unclear etiology of behavioral changes. Likely brain mets vs possible withdrawal (pt with previous hx of methamphetamine use and alcohol use.) Mother confirms no alcohol use, not sure about other drug use. UDS pending. GGT negative.   -- f/u psych recs  -- prn haldol   -- hold zofran while on haldol  -- holding ativan given concern for adverse reactions and brain mets  -- prn hydroxyzine for anxiety    History of seizure  --  Continue home Keppra 1000 BID              Roel Campos MD   Internal Medicine PGY-1  Hematology/Oncology  [facility]

## 2022-04-19 NOTE — SUBJECTIVE & OBJECTIVE
Interval History: Pt with severe agitation and mental status changes overnight. Pt required ativan, haldol, zyprexa, benadryl, depakote for sedation. This morning pt very altered and agitated.  Pt oriented to self only. Rapid pressured tangential speech.    Oncology Treatment Plan:   OP NIVOLUMAB 1 MG/KG IPILIMUMAB 3MG/KG FOLLOWED BY NIVOLUMAB 480MG Q4W    Medications:  Continuous Infusions:   sodium chloride 0.9%       Scheduled Meds:   cefTRIAXone (ROCEPHIN) IVPB  2 g Intravenous Q24H    enoxaparin  40 mg Subcutaneous Daily    fentaNYL  1 patch Transdermal Q72H    levETIRAcetam  1,000 mg Oral BID     PRN Meds:[COMPLETED] calcium gluconate IVPB **AND** calcium gluconate IVPB, dextrose 10%, dextrose 10%, glucagon (human recombinant), glucose, glucose, haloperidol lactate, HYDROmorphone, hydrOXYzine HCL, LIDOcaine HCl 2%, naloxone, oxyCODONE, prochlorperazine, promethazine (PHENERGAN) IVPB, sodium chloride 0.9%     Review of Systems   Unable to perform ROS: Mental status change   Objective:     Vital Signs (Most Recent):  Temp: 97.5 °F (36.4 °C) (04/19/22 0733)  Pulse: 104 (04/19/22 1124)  Resp: 18 (04/19/22 1147)  BP: (!) 142/104 (04/19/22 1124)  SpO2: (!) 93 % (04/19/22 1124)   Vital Signs (24h Range):  Temp:  [96.8 °F (36 °C)-97.5 °F (36.4 °C)] 97.5 °F (36.4 °C)  Pulse:  [104-105] 104  Resp:  [18] 18  SpO2:  [93 %-99 %] 93 %  BP: (131-142)/() 142/104     Weight: 76.4 kg (168 lb 6.9 oz) (76.4)  Body mass index is 22.84 kg/m².  Body surface area is 1.97 meters squared.    No intake or output data in the 24 hours ending 04/19/22 1235    Physical Exam  Vitals and nursing note reviewed.   Constitutional:       General: He is in acute distress.      Appearance: He is ill-appearing.      Comments: Pt agitated, yelling, confused. Restraints in place.    HENT:      Head: Normocephalic and atraumatic.      Nose: Nose normal. No congestion.      Mouth/Throat:      Mouth: Mucous membranes are dry.   Eyes:       Conjunctiva/sclera: Conjunctivae normal.      Pupils: Pupils are equal, round, and reactive to light.   Cardiovascular:      Rate and Rhythm: Tachycardia present.      Pulses: Normal pulses.      Heart sounds: Normal heart sounds. No murmur heard.    No friction rub. No gallop.   Pulmonary:      Effort: Pulmonary effort is normal. No respiratory distress.      Breath sounds: Normal breath sounds.   Abdominal:      General: There is distension.      Tenderness: There is abdominal tenderness.      Comments: Decreased bowel sounds.    Musculoskeletal:         General: No swelling. Normal range of motion.      Cervical back: Normal range of motion and neck supple.   Skin:     General: Skin is warm and dry.      Comments: Multiple large subcutaneous nodules noted under R axilla.    Neurological:      Comments: Patient disoriented.    Psychiatric:      Comments: Pt confused and agitated. Rapid, pressured, tangential speech. Delusional, keeps muttering nonsensical phrases.        Significant Labs:   CBC:   Recent Labs   Lab 04/19/22  0942   WBC 22.06*   HGB 14.6   HCT 44.3   *   , CMP:   Recent Labs   Lab 04/19/22  0054 04/19/22  0851 04/19/22  0942   * 131* 133*   K 5.1 5.1 5.4*   CL 89* 90* 90*   CO2 24 24 21*   GLU 81 96 99   BUN 33* 34* 35*   CREATININE 1.5* 1.6* 1.5*   CALCIUM 8.7 8.9 8.9   PROT  --   --  5.4*   ALBUMIN  --   --  2.5*   BILITOT  --   --  0.4   ALKPHOS  --   --  71   AST  --   --  53*   ALT  --   --  22   ANIONGAP 17* 17* 22*   EGFRNONAA 52.8* 48.8* 52.8*   , and Urine Studies: No results for input(s): COLORU, APPEARANCEUA, PHUR, SPECGRAV, PROTEINUA, GLUCUA, KETONESU, BILIRUBINUA, OCCULTUA, NITRITE, UROBILINOGEN, LEUKOCYTESUR, RBCUA, WBCUA, BACTERIA, SQUAMEPITHEL, HYALINECASTS in the last 48 hours.    Invalid input(s): WRIGHTSUR    Diagnostic Results:  I have reviewed all pertinent imaging results/findings within the past 24 hours.

## 2022-04-19 NOTE — ASSESSMENT & PLAN NOTE
Pt presenting with severe hyponatremia to 119 in ED. Likely hypovolemic hyponatremia s/o poor PO And N/V. Na on labs 10 days prior to admission 128. Pt presenting with severe intractable nausea and vomiting. No changes in mental status or seizures. Sxs improving with improvement in Na. Nephro following, pt receiving intermittent NS boluses. Now on continuous IVF. Na 130 on 4/19    -- s/p therapeutic paracentesis on 4/14  --  Midline placed for better access; now removed on 4/17 as pt was about to leave ama   -- Pt taking good PO on 4/19. Tolerating without nausea

## 2022-04-20 LAB
ALBUMIN SERPL BCP-MCNC: 2.1 G/DL (ref 3.5–5.2)
ALP SERPL-CCNC: 60 U/L (ref 55–135)
ALT SERPL W/O P-5'-P-CCNC: 18 U/L (ref 10–44)
ANION GAP SERPL CALC-SCNC: 13 MMOL/L (ref 8–16)
AST SERPL-CCNC: 43 U/L (ref 10–40)
BASOPHILS # BLD AUTO: 0.02 K/UL (ref 0–0.2)
BASOPHILS NFR BLD: 0.1 % (ref 0–1.9)
BILIRUB SERPL-MCNC: 0.3 MG/DL (ref 0.1–1)
BUN SERPL-MCNC: 36 MG/DL (ref 6–20)
CALCIUM SERPL-MCNC: 8.2 MG/DL (ref 8.7–10.5)
CHLORIDE SERPL-SCNC: 89 MMOL/L (ref 95–110)
CO2 SERPL-SCNC: 24 MMOL/L (ref 23–29)
CREAT SERPL-MCNC: 1.4 MG/DL (ref 0.5–1.4)
DIFFERENTIAL METHOD: ABNORMAL
EOSINOPHIL # BLD AUTO: 0 K/UL (ref 0–0.5)
EOSINOPHIL NFR BLD: 0 % (ref 0–8)
ERYTHROCYTE [DISTWIDTH] IN BLOOD BY AUTOMATED COUNT: 14.2 % (ref 11.5–14.5)
EST. GFR  (AFRICAN AMERICAN): >60 ML/MIN/1.73 M^2
EST. GFR  (NON AFRICAN AMERICAN): 57.4 ML/MIN/1.73 M^2
GLUCOSE SERPL-MCNC: 126 MG/DL (ref 70–110)
HCT VFR BLD AUTO: 43.6 % (ref 40–54)
HGB BLD-MCNC: 14.1 G/DL (ref 14–18)
IMM GRANULOCYTES # BLD AUTO: 0.16 K/UL (ref 0–0.04)
IMM GRANULOCYTES NFR BLD AUTO: 0.7 % (ref 0–0.5)
LYMPHOCYTES # BLD AUTO: 0.8 K/UL (ref 1–4.8)
LYMPHOCYTES NFR BLD: 3.5 % (ref 18–48)
MAGNESIUM SERPL-MCNC: 2.1 MG/DL (ref 1.6–2.6)
MCH RBC QN AUTO: 25.7 PG (ref 27–31)
MCHC RBC AUTO-ENTMCNC: 32.3 G/DL (ref 32–36)
MCV RBC AUTO: 79 FL (ref 82–98)
MONOCYTES # BLD AUTO: 0.9 K/UL (ref 0.3–1)
MONOCYTES NFR BLD: 4 % (ref 4–15)
NEUTROPHILS # BLD AUTO: 19.9 K/UL (ref 1.8–7.7)
NEUTROPHILS NFR BLD: 91.7 % (ref 38–73)
NRBC BLD-RTO: 0 /100 WBC
PHOSPHATE SERPL-MCNC: 3.7 MG/DL (ref 2.7–4.5)
PLATELET # BLD AUTO: 554 K/UL (ref 150–450)
PMV BLD AUTO: 9.3 FL (ref 9.2–12.9)
POCT GLUCOSE: 122 MG/DL (ref 70–110)
POCT GLUCOSE: 128 MG/DL (ref 70–110)
POCT GLUCOSE: 128 MG/DL (ref 70–110)
POCT GLUCOSE: 130 MG/DL (ref 70–110)
POTASSIUM SERPL-SCNC: 4.4 MMOL/L (ref 3.5–5.1)
PROT SERPL-MCNC: 4.9 G/DL (ref 6–8.4)
RBC # BLD AUTO: 5.49 M/UL (ref 4.6–6.2)
SODIUM SERPL-SCNC: 126 MMOL/L (ref 136–145)
WBC # BLD AUTO: 21.73 K/UL (ref 3.9–12.7)

## 2022-04-20 PROCEDURE — A9585 GADOBUTROL INJECTION: HCPCS | Performed by: INTERNAL MEDICINE

## 2022-04-20 PROCEDURE — 36415 COLL VENOUS BLD VENIPUNCTURE: CPT

## 2022-04-20 PROCEDURE — 63600175 PHARM REV CODE 636 W HCPCS: Performed by: STUDENT IN AN ORGANIZED HEALTH CARE EDUCATION/TRAINING PROGRAM

## 2022-04-20 PROCEDURE — 85025 COMPLETE CBC W/AUTO DIFF WBC: CPT

## 2022-04-20 PROCEDURE — 25500020 PHARM REV CODE 255: Performed by: INTERNAL MEDICINE

## 2022-04-20 PROCEDURE — 93010 EKG 12-LEAD: ICD-10-PCS | Mod: ,,, | Performed by: INTERNAL MEDICINE

## 2022-04-20 PROCEDURE — 99231 PR SUBSEQUENT HOSPITAL CARE,LEVL I: ICD-10-PCS | Mod: ,,, | Performed by: PSYCHIATRY & NEUROLOGY

## 2022-04-20 PROCEDURE — 25000003 PHARM REV CODE 250

## 2022-04-20 PROCEDURE — 99233 SBSQ HOSP IP/OBS HIGH 50: CPT | Mod: ,,, | Performed by: INTERNAL MEDICINE

## 2022-04-20 PROCEDURE — 84100 ASSAY OF PHOSPHORUS: CPT

## 2022-04-20 PROCEDURE — 99231 SBSQ HOSP IP/OBS SF/LOW 25: CPT | Mod: ,,, | Performed by: PSYCHIATRY & NEUROLOGY

## 2022-04-20 PROCEDURE — 11000001 HC ACUTE MED/SURG PRIVATE ROOM

## 2022-04-20 PROCEDURE — 99233 PR SUBSEQUENT HOSPITAL CARE,LEVL III: ICD-10-PCS | Mod: ,,, | Performed by: INTERNAL MEDICINE

## 2022-04-20 PROCEDURE — 80053 COMPREHEN METABOLIC PANEL: CPT

## 2022-04-20 PROCEDURE — 83735 ASSAY OF MAGNESIUM: CPT

## 2022-04-20 PROCEDURE — 93010 ELECTROCARDIOGRAM REPORT: CPT | Mod: ,,, | Performed by: INTERNAL MEDICINE

## 2022-04-20 PROCEDURE — 93005 ELECTROCARDIOGRAM TRACING: CPT

## 2022-04-20 PROCEDURE — 63600175 PHARM REV CODE 636 W HCPCS

## 2022-04-20 RX ORDER — GADOBUTROL 604.72 MG/ML
8 INJECTION INTRAVENOUS
Status: COMPLETED | OUTPATIENT
Start: 2022-04-20 | End: 2022-04-20

## 2022-04-20 RX ADMIN — HYDROXYZINE HYDROCHLORIDE 25 MG: 25 TABLET, FILM COATED ORAL at 08:04

## 2022-04-20 RX ADMIN — GADOBUTROL 8 ML: 604.72 INJECTION INTRAVENOUS at 03:04

## 2022-04-20 RX ADMIN — ENOXAPARIN SODIUM 40 MG: 100 INJECTION SUBCUTANEOUS at 04:04

## 2022-04-20 RX ADMIN — DEXTROSE 250 MG: 50 INJECTION, SOLUTION INTRAVENOUS at 05:04

## 2022-04-20 RX ADMIN — CEFTRIAXONE 2 G: 2 INJECTION, SOLUTION INTRAVENOUS at 04:04

## 2022-04-20 RX ADMIN — OXYCODONE 5 MG: 5 TABLET ORAL at 08:04

## 2022-04-20 RX ADMIN — PROCHLORPERAZINE EDISYLATE 5 MG: 5 INJECTION INTRAMUSCULAR; INTRAVENOUS at 12:04

## 2022-04-20 RX ADMIN — HYDROMORPHONE HYDROCHLORIDE 0.5 MG: 1 INJECTION, SOLUTION INTRAMUSCULAR; INTRAVENOUS; SUBCUTANEOUS at 04:04

## 2022-04-20 RX ADMIN — HYDROMORPHONE HYDROCHLORIDE 0.5 MG: 1 INJECTION, SOLUTION INTRAMUSCULAR; INTRAVENOUS; SUBCUTANEOUS at 09:04

## 2022-04-20 RX ADMIN — SODIUM CHLORIDE: 0.9 INJECTION, SOLUTION INTRAVENOUS at 04:04

## 2022-04-20 RX ADMIN — DEXTROSE 250 MG: 50 INJECTION, SOLUTION INTRAVENOUS at 09:04

## 2022-04-20 RX ADMIN — LEVETIRACETAM 1000 MG: 500 TABLET, FILM COATED ORAL at 08:04

## 2022-04-20 RX ADMIN — FAMOTIDINE 20 MG: 20 TABLET ORAL at 08:04

## 2022-04-20 RX ADMIN — OXYCODONE 5 MG: 5 TABLET ORAL at 03:04

## 2022-04-20 RX ADMIN — HYDROMORPHONE HYDROCHLORIDE 0.5 MG: 1 INJECTION, SOLUTION INTRAMUSCULAR; INTRAVENOUS; SUBCUTANEOUS at 12:04

## 2022-04-20 RX ADMIN — SODIUM CHLORIDE: 0.9 INJECTION, SOLUTION INTRAVENOUS at 05:04

## 2022-04-20 RX ADMIN — DEXTROSE 250 MG: 50 INJECTION, SOLUTION INTRAVENOUS at 02:04

## 2022-04-20 NOTE — ASSESSMENT & PLAN NOTE
Pt with worsening mental status starting 4/17 with worsening agitation and anxiety. On 4/18 patient with worsening mental status, increasingly confused. Patient requiring increasing sedating medications. Psychiatry consulted. Unclear etiology of behavioral changes. Likely brain mets vs possible withdrawal (pt with previous hx of methamphetamine use and alcohol use.) Mother confirms no alcohol use, not sure about other drug use. UDS pending. GGT negative.     UDS +opiate, +THC  Psych consulted, appreciate assistance  Recommends Depacon IV TID and Zyprexa PRN  Daily ECG to monitor QTc  Continue PEC for now  F/u brain imaging

## 2022-04-20 NOTE — PLAN OF CARE
Pt oriented x4 throughout shift. Pt remains without restraints. Fall precautions in place. Bedside 1:1 sitter remains in place. Increased generalized pain reported throughout shift. PRN duo nebs ordered for generalized wheezes per on call provider. Call light within arms reach and pt instructed to call with additional questions/requests.   Problem: Adult Inpatient Plan of Care  Goal: Plan of Care Review  Outcome: Ongoing, Progressing  Goal: Patient-Specific Goal (Individualized)  Outcome: Ongoing, Progressing  Goal: Absence of Hospital-Acquired Illness or Injury  Outcome: Ongoing, Progressing  Goal: Optimal Comfort and Wellbeing  Outcome: Ongoing, Progressing  Goal: Readiness for Transition of Care  Outcome: Ongoing, Progressing     Problem: Fluid and Electrolyte Imbalance (Acute Kidney Injury/Impairment)  Goal: Fluid and Electrolyte Balance  Outcome: Ongoing, Progressing     Problem: Oral Intake Inadequate (Acute Kidney Injury/Impairment)  Goal: Optimal Nutrition Intake  Outcome: Ongoing, Progressing     Problem: Renal Function Impairment (Acute Kidney Injury/Impairment)  Goal: Effective Renal Function  Outcome: Ongoing, Progressing     Problem: Skin Injury Risk Increased  Goal: Skin Health and Integrity  Outcome: Ongoing, Progressing     Problem: Infection  Goal: Absence of Infection Signs and Symptoms  Outcome: Ongoing, Progressing     Problem: Fall Injury Risk  Goal: Absence of Fall and Fall-Related Injury  Outcome: Ongoing, Progressing     Problem: Restraint, Nonbehavioral (Nonviolent)  Goal: Absence of Harm or Injury  Outcome: Ongoing, Progressing     Problem: Restraint, Behavioral (Acute Care)  Goal: Absence of Harm or Injury  Outcome: Ongoing, Progressing

## 2022-04-20 NOTE — ASSESSMENT & PLAN NOTE
He presented initially with enlarging right axillary lymph nodes that have grown progressively in size since April 2021. He presented to urgent care and was referred for US which showed multiple enlarged masses in the right axilla most likely reflecting abnormal lymph nodes concerning for malignancy.   He was subsequently referred to dermatology who performed a complete exam of the skin that was negative for suspicious cutaneous lesions as well as punch biopsy of one of the lymph node on 01/04/22. Pathology came back as malignant melanoma.   BRAF mutation: positive for a V600E mutation.   He subsequently underwent a PET/CT on 01/28/22 which showed multiple hypermetabolic soft tissue masses within the right axillary region, metastatic lesions to the lungs and abdomen (VI segment of the liver measuring a max SUV of 6.99, 1.6 x 1.9 cm nodular soft tissue density with a max SUV  7.64 was seen adjacent to the colon within the right lower quadrant. Brain MRI was negative for metastatic disease.  LDH was 307 U/L  He saw Oncology at [facility] on 01/31/22 with plan to start First line Nivolumab / ipilimumab. He received C1 on 03/03/22.     -- pt scheduled to start binimetinib and encorafenib prior to admission.  -- will restart upon discharge

## 2022-04-20 NOTE — ASSESSMENT & PLAN NOTE
Hyperkalemia  Patent was noted to have sodium 119, potassium of 5.9, on admission in the setting of reduced oral intake and vomiting. Likely hypovolemic hyponatremia on admission. Patient was given 1 L NS bolus and albumin following paracentesis, followed by NS infusion. Initially, sodium improved to 126 then down trended to 123. On assessment patient seems volume down still. Suspect hyperkalemia related to acute kidney injury and reduced urine output. Additionally, given concurrent hyponatremia and hyperkalemia there is concern for adrenal pathology either metastasis or related to immunotherapy. For now, would rule out hypovolemic hyponatremia.    Sodium improving with IV fluid resuscitation but continues to have some degree of hyperkalemia. Hyperkalemia is unclear but could be multifactorial -  if there is urinary retention, high cell turnover, adrenal pathology, or hyperkalemia from thrombocytosis. CK mildly elevated.     Plan:  - continue IV fluids to match output losses  - encourage oral food intake as patient likely has low solute given multiple days NPO  - repeat serum osm, urine osm, and sodium (ordered)  - strict intake and output

## 2022-04-20 NOTE — SUBJECTIVE & OBJECTIVE
Interval History:   NAEON. PEC remains in place. No further aggressive episodes or outbursts. Mechanical restraints off. Patient sleepy but answered questions appropriately this morning. 1 BM yesterday, passing flatus. MRI brain today.     Oncology Treatment Plan:   OP NIVOLUMAB 1 MG/KG IPILIMUMAB 3MG/KG FOLLOWED BY NIVOLUMAB 480MG Q4W    Medications:  Continuous Infusions:   sodium chloride 0.9% 125 mL/hr at 04/20/22 0534     Scheduled Meds:   cefTRIAXone (ROCEPHIN) IVPB  2 g Intravenous Q24H    enoxaparin  40 mg Subcutaneous Daily    famotidine  20 mg Oral BID    fentaNYL  1 patch Transdermal Q72H    levETIRAcetam  1,000 mg Oral BID    valproate sodium (DEPACON) IVPB  250 mg Intravenous Q8H     PRN Meds:albuterol-ipratropium, [COMPLETED] calcium gluconate IVPB **AND** calcium gluconate IVPB, dextrose 10%, dextrose 10%, glucagon (human recombinant), glucose, glucose, HYDROmorphone, hydrOXYzine HCL, LIDOcaine HCl 2%, naloxone, OLANZapine, oxyCODONE, prochlorperazine, sodium chloride 0.9%     Review of Systems   Constitutional:  Negative for fever.   Respiratory:  Negative for shortness of breath.    Cardiovascular:  Negative for chest pain.   Gastrointestinal:  Negative for abdominal pain and constipation.   Psychiatric/Behavioral:  Negative for agitation, behavioral problems, hallucinations and sleep disturbance.    Objective:     Vital Signs (Most Recent):  Temp: 98.3 °F (36.8 °C) (04/20/22 1213)  Pulse: 94 (04/20/22 1213)  Resp: 18 (04/20/22 1216)  BP: (!) 127/93 (04/20/22 1213)  SpO2: 95 % (04/20/22 0746)   Vital Signs (24h Range):  Temp:  [96.5 °F (35.8 °C)-98.8 °F (37.1 °C)] 98.3 °F (36.8 °C)  Pulse:  [] 94  Resp:  [16-20] 18  SpO2:  [93 %-99 %] 95 %  BP: (127-171)/(78-99) 127/93     Weight: 76.4 kg (168 lb 6.9 oz) (76.4)  Body mass index is 22.84 kg/m².  Body surface area is 1.97 meters squared.      Intake/Output Summary (Last 24 hours) at 4/20/2022 1302  Last data filed at 4/20/2022 0600  Gross per 24  hour   Intake 480 ml   Output 400 ml   Net 80 ml       Physical Exam  Vitals and nursing note reviewed.   Constitutional:       General: He is not in acute distress.     Appearance: He is ill-appearing.      Comments: Patient lying in bed, sleeping. No longer requiring restraints.   HENT:      Head: Normocephalic and atraumatic.      Nose: Nose normal. No congestion.      Mouth/Throat:      Mouth: Mucous membranes are dry.   Eyes:      Conjunctiva/sclera: Conjunctivae normal.      Pupils: Pupils are equal, round, and reactive to light.   Cardiovascular:      Rate and Rhythm: Normal rate.      Pulses: Normal pulses.      Heart sounds: Normal heart sounds. No murmur heard.    No friction rub. No gallop.   Pulmonary:      Effort: Pulmonary effort is normal. No respiratory distress.      Breath sounds: Normal breath sounds.   Abdominal:      General: There is distension.      Tenderness: There is abdominal tenderness (minimal).      Comments: Decreased bowel sounds.    Musculoskeletal:         General: No swelling. Normal range of motion.      Cervical back: Normal range of motion and neck supple.   Skin:     General: Skin is warm and dry.      Comments: Multiple large subcutaneous nodules noted under R axilla.        Significant Labs:   CBC:   Recent Labs   Lab 04/19/22  0942 04/20/22  0850   WBC 22.06* 21.73*   HGB 14.6 14.1   HCT 44.3 43.6   * 554*   , CMP:   Recent Labs   Lab 04/19/22  0851 04/19/22  0942 04/20/22  0850   * 133* 126*   K 5.1 5.4* 4.4   CL 90* 90* 89*   CO2 24 21* 24   GLU 96 99 126*   BUN 34* 35* 36*   CREATININE 1.6* 1.5* 1.4   CALCIUM 8.9 8.9 8.2*   PROT  --  5.4* 4.9*   ALBUMIN  --  2.5* 2.1*   BILITOT  --  0.4 0.3   ALKPHOS  --  71 60   AST  --  53* 43*   ALT  --  22 18   ANIONGAP 17* 22* 13   EGFRNONAA 48.8* 52.8* 57.4*   , and All pertinent labs from the last 24 hours have been reviewed.    Diagnostic Results:  I have reviewed all pertinent imaging results/findings within the past  24 hours.

## 2022-04-20 NOTE — SUBJECTIVE & OBJECTIVE
Interval History: Patient with improving potassium but worsening sodium.    Review of patient's allergies indicates:  No Known Allergies  Current Facility-Administered Medications   Medication Frequency    0.9%  NaCl infusion Continuous    albuterol-ipratropium 2.5 mg-0.5 mg/3 mL nebulizer solution 3 mL Q4H PRN    calcium gluconat 1 g in NS IVPB (premixed) Q10 Min PRN    cefTRIAXone (ROCEPHIN) 2 g/50 mL D5W IVPB Q24H    dextrose 10% bolus 125 mL PRN    dextrose 10% bolus 250 mL PRN    enoxaparin injection 40 mg Daily    famotidine tablet 20 mg BID    fentaNYL 25 mcg/hr 1 patch Q72H    glucagon (human recombinant) injection 1 mg PRN    glucose chewable tablet 16 g PRN    glucose chewable tablet 24 g PRN    HYDROmorphone injection 0.5 mg Q3H PRN    hydrOXYzine HCL tablet 25 mg TID PRN    levETIRAcetam tablet 1,000 mg BID    LIDOcaine HCl 2% oral solution 15 mL Q4H PRN    naloxone 0.4 mg/mL injection 0.02 mg PRN    OLANZapine injection 10 mg Q8H PRN    oxyCODONE immediate release tablet 5 mg Q4H PRN    prochlorperazine injection Soln 5 mg Q6H PRN    sodium chloride 0.9% flush 10 mL Q12H PRN    valproate (DEPACON) 250 mg in dextrose 5 % 50 mL IVPB Q8H       Objective:     Vital Signs (Most Recent):  Temp: 98 °F (36.7 °C) (04/20/22 1245)  Pulse: 94 (04/20/22 1213)  Resp: 18 (04/20/22 1216)  BP: 125/78 (04/20/22 1245)  SpO2: 95 % (04/20/22 1200)  O2 Device (Oxygen Therapy): room air (04/20/22 0328)   Vital Signs (24h Range):  Temp:  [97.8 °F (36.6 °C)-98.8 °F (37.1 °C)] 98 °F (36.7 °C)  Pulse:  [] 94  Resp:  [16-20] 18  SpO2:  [93 %-99 %] 95 %  BP: (125-158)/(78-98) 125/78     Weight: 76.4 kg (168 lb 6.9 oz) (76.4) (04/14/22 1810)  Body mass index is 22.84 kg/m².  Body surface area is 1.97 meters squared.    I/O last 3 completed shifts:  In: 480 [P.O.:480]  Out: 400 [Urine:400]    Physical Exam  Vitals and nursing note reviewed.   Constitutional:       General: He is not in acute distress.     Appearance: He is  normal weight. He is ill-appearing and toxic-appearing. He is not diaphoretic.   HENT:      Head: Normocephalic and atraumatic.      Nose: Nose normal. No congestion or rhinorrhea.      Mouth/Throat:      Mouth: Mucous membranes are dry.      Pharynx: Oropharynx is clear.   Eyes:      Extraocular Movements: Extraocular movements intact.      Pupils: Pupils are equal, round, and reactive to light.   Cardiovascular:      Rate and Rhythm: Normal rate and regular rhythm.      Pulses: Normal pulses.      Heart sounds: Normal heart sounds. No murmur heard.    No friction rub. No gallop.   Pulmonary:      Effort: Pulmonary effort is normal. No respiratory distress.      Breath sounds: Normal breath sounds.   Abdominal:      General: Bowel sounds are decreased. There is no distension.      Tenderness: There is abdominal tenderness. There is no guarding or rebound.   Musculoskeletal:         General: No swelling. Normal range of motion.      Cervical back: Normal range of motion and neck supple.   Skin:     General: Skin is warm and dry.   Neurological:      Mental Status: He is alert. He is disoriented.   Psychiatric:         Mood and Affect: Mood normal.         Behavior: Behavior normal.       Significant Labs:  All labs within the past 24 hours have been reviewed.     Significant Imaging:  All labs within the past 24 hours have been reviewed.

## 2022-04-20 NOTE — ASSESSMENT & PLAN NOTE
Pt with potassium 5.9 on CMP in ED. K 6.4 on istat. EKG without any acute changes. Pt shifted in ED with repeat BMP showing K 5.5. Unclear etiology of acute hyperkalemia, though patient does have concurrent PAULA. K consistently elevated, 5.9 on 4/16. S/p shift again on 4/17 with improvement in K to 5.1. AM cortisol elevated to 44. Concern for potential adrenal insufficiency vs adrenal mets though no convincing evidence of lesions on CT. Am cortisol  Elevated.     -- K+ 4.4 today  -- serial bmp  -- avoid potential potassium-increasing medications  -- ACTH pending

## 2022-04-20 NOTE — ASSESSMENT & PLAN NOTE
Pt with Cr 2.0 on admission up from baseline of 1.0. Likely prerenal s/o poor PO intake, vomiting, and ascites. Cr improved to 1.3 with IVF and para. Nephro following.   sCr 1.4, stable    -- nephro consulted  -- strict I/O's  -- daily bmp  -- avoid nephrotoxic medications  -- renally dose meds

## 2022-04-20 NOTE — PROGRESS NOTES
[facility]  Hematology/Oncology  Progress Note    Patient Name: Joseluis Garner  Admission Date: 4/14/2022  Hospital Length of Stay: 6 days  Code Status: Full Code     Subjective:     HPI:  51 y.o. male with history of CAD s/p PCI, seizures, substance abuse, stage IV malignant melanoma presenting from oncology clinic with multiple electrolyte abnormalities and worsening ascites. Pt has hx of malignant melanoma first diagnosed 1/2022. He is s/p 1 round of tx on 3/3/2022, with  plans to start binimetinib and enorafenib in  the near future. However, patient's clinical status has been deteriorating over the past few weeks. He was admitted on 03/18 at [facility] for abdominal pain, and distension. He underwent therapeutic paracentesis with removal of 3L, and was discharged on 03/22. CT abdomen during that admission showed soft tissue attenuation throughout the peritoneum consistent with innumerable peritoneal implants.  He was then readmitted the following day 03/23 for re accumulation and had another paracentesis with removal of 4.7L. Pt was again readmitted 03/30 to 04/02 and underwent paracentesis. He was planned to receive pleur-X catheter as outpatient. He presented again to [facility] on 04/04 with worsening abdominal distension and pain. He underwent therapeutic paracentesis with removal of 5L. Cytology was positive for malignant cells. He was discharged on 04/05 on Cipro for SBP coverage.      Since his discharge, he notes worsening abdominal distension. He had progressive nausea and vomiting and has not been able to tolerate any diet including water. He has tried zofran at home which has not helped. He denies any recent fever, chills, cough, sore throat. He was evaluated in  oncology clinic this morning  with his mother, during which time he had several episodes of vomiting. Labs obtained during clinic were concerning for leukocytosis, hyperkalemia, and hyponatremia. He was sent to the ED for admission for  management of ascites, and other current comorbid conditions.     In the ED, Pt afebrile, /85, HR 80s,  ALEJANDRO. CBC notable for  WBC 19, Plt 619. K  5.9, Na 119.  Alb 2.6. Cr 2.0 from baseline 1.0. TSH 8.8, though free T4 wnl. Procal elevated to 1.1. EKG with no acute changes, no  T wave abnormalities.        Interval History:   NAEON. PEC remains in place. No further aggressive episodes or outbursts. Mechanical restraints off. Patient sleepy but answered questions appropriately this morning. 1 BM yesterday, passing flatus. MRI brain today.     Oncology Treatment Plan:   OP NIVOLUMAB 1 MG/KG IPILIMUMAB 3MG/KG FOLLOWED BY NIVOLUMAB 480MG Q4W    Medications:  Continuous Infusions:   sodium chloride 0.9% 125 mL/hr at 04/20/22 0534     Scheduled Meds:   cefTRIAXone (ROCEPHIN) IVPB  2 g Intravenous Q24H    enoxaparin  40 mg Subcutaneous Daily    famotidine  20 mg Oral BID    fentaNYL  1 patch Transdermal Q72H    levETIRAcetam  1,000 mg Oral BID    valproate sodium (DEPACON) IVPB  250 mg Intravenous Q8H     PRN Meds:albuterol-ipratropium, [COMPLETED] calcium gluconate IVPB **AND** calcium gluconate IVPB, dextrose 10%, dextrose 10%, glucagon (human recombinant), glucose, glucose, HYDROmorphone, hydrOXYzine HCL, LIDOcaine HCl 2%, naloxone, OLANZapine, oxyCODONE, prochlorperazine, sodium chloride 0.9%     Review of Systems   Constitutional:  Negative for fever.   Respiratory:  Negative for shortness of breath.    Cardiovascular:  Negative for chest pain.   Gastrointestinal:  Negative for abdominal pain and constipation.   Psychiatric/Behavioral:  Negative for agitation, behavioral problems, hallucinations and sleep disturbance.    Objective:     Vital Signs (Most Recent):  Temp: 98.3 °F (36.8 °C) (04/20/22 1213)  Pulse: 94 (04/20/22 1213)  Resp: 18 (04/20/22 1216)  BP: (!) 127/93 (04/20/22 1213)  SpO2: 95 % (04/20/22 0746)   Vital Signs (24h Range):  Temp:  [96.5 °F (35.8 °C)-98.8 °F (37.1 °C)] 98.3 °F (36.8  °C)  Pulse:  [] 94  Resp:  [16-20] 18  SpO2:  [93 %-99 %] 95 %  BP: (127-171)/(78-99) 127/93     Weight: 76.4 kg (168 lb 6.9 oz) (76.4)  Body mass index is 22.84 kg/m².  Body surface area is 1.97 meters squared.      Intake/Output Summary (Last 24 hours) at 4/20/2022 1302  Last data filed at 4/20/2022 0600  Gross per 24 hour   Intake 480 ml   Output 400 ml   Net 80 ml       Physical Exam  Vitals and nursing note reviewed.   Constitutional:       General: He is not in acute distress.     Appearance: He is ill-appearing.      Comments: Patient lying in bed, sleeping. No longer requiring restraints.   HENT:      Head: Normocephalic and atraumatic.      Nose: Nose normal. No congestion.      Mouth/Throat:      Mouth: Mucous membranes are dry.   Eyes:      Conjunctiva/sclera: Conjunctivae normal.      Pupils: Pupils are equal, round, and reactive to light.   Cardiovascular:      Rate and Rhythm: Normal rate.      Pulses: Normal pulses.      Heart sounds: Normal heart sounds. No murmur heard.    No friction rub. No gallop.   Pulmonary:      Effort: Pulmonary effort is normal. No respiratory distress.      Breath sounds: Normal breath sounds.   Abdominal:      General: There is distension.      Tenderness: There is abdominal tenderness (minimal).      Comments: Decreased bowel sounds.    Musculoskeletal:         General: No swelling. Normal range of motion.      Cervical back: Normal range of motion and neck supple.   Skin:     General: Skin is warm and dry.      Comments: Multiple large subcutaneous nodules noted under R axilla.        Significant Labs:   CBC:   Recent Labs   Lab 04/19/22  0942 04/20/22  0850   WBC 22.06* 21.73*   HGB 14.6 14.1   HCT 44.3 43.6   * 554*   , CMP:   Recent Labs   Lab 04/19/22  0851 04/19/22  0942 04/20/22  0850   * 133* 126*   K 5.1 5.4* 4.4   CL 90* 90* 89*   CO2 24 21* 24   GLU 96 99 126*   BUN 34* 35* 36*   CREATININE 1.6* 1.5* 1.4   CALCIUM 8.9 8.9 8.2*   PROT  --   5.4* 4.9*   ALBUMIN  --  2.5* 2.1*   BILITOT  --  0.4 0.3   ALKPHOS  --  71 60   AST  --  53* 43*   ALT  --  22 18   ANIONGAP 17* 22* 13   EGFRNONAA 48.8* 52.8* 57.4*   , and All pertinent labs from the last 24 hours have been reviewed.    Diagnostic Results:  I have reviewed all pertinent imaging results/findings within the past 24 hours.    Assessment/Plan:     * Ascites  50 y/o M hx of malignant melanoma presents from clinic with symptomatic malignant ascites. Pt has had multiple admissions over the past month for therapeutic paracenteses in the setting of malignant ascites. His last para was 4/4, 10 days prior to admission. Patient will likely need scheduled weekly therapeutic josiah in the future to prevent hospitalization. S/p para on4/15 with 7L removed and albumin administered.     Plan:  -- Cell counts not c/w SBP, though continuing to cover with Ceftriaxone 2g q24h x7 days for given concurrent leukocytosis  -- schedule weekly para at discharge    Ileus  Pt with concerns for ileus vs SBO on abdominal imaging on 4/16. Pt with chronic opioid use given cancer and abdominal mets. Pt made NPO and NGT placed with significant output. Repeat imaging with concerns for worsening ileus despite nonoperative treatment. Concerns for malignant obstruction. Gen surg consulted, though patient will likely not be operative candidate given high tumor burden. On 4/19 Pt passed BM. Pt removed his NGT, though now without nausea. Will advance diet as tolerated.    1 BM yesterday, passing flatus    -- clear liquid diet; can advance as tolerated  -- consider bowel regimen    Intractable nausea and vomiting  Pt presenting with several days of intractable nausea and vomiting. Pt unable to keep anything, including water, down. Zofran with no improvement at home. Likely 2/2 hyponatremia and ileus. KUB and CT demonstrating ileus vs partial SBO. NGT placed 4/17, pt keeps pulling out. Replaced x3.     -- therapeutic para as above  --  management of hyponatremia as above  -- IV compazine and phenergan      Hyperkalemia  Pt with potassium 5.9 on CMP in ED. K 6.4 on istat. EKG without any acute changes. Pt shifted in ED with repeat BMP showing K 5.5. Unclear etiology of acute hyperkalemia, though patient does have concurrent PAULA. K consistently elevated, 5.9 on 4/16. S/p shift again on 4/17 with improvement in K to 5.1. AM cortisol elevated to 44. Concern for potential adrenal insufficiency vs adrenal mets though no convincing evidence of lesions on CT. Am cortisol  Elevated.     -- K+ 4.4 today  -- serial bmp  -- avoid potential potassium-increasing medications  -- ACTH pending    PAULA (acute kidney injury)  Pt with Cr 2.0 on admission up from baseline of 1.0. Likely prerenal s/o poor PO intake, vomiting, and ascites. Cr improved to 1.3 with IVF and para. Nephro following.   sCr 1.4, stable    -- nephro consulted  -- strict I/O's  -- daily bmp  -- avoid nephrotoxic medications  -- renally dose meds    Hyponatremia  Pt presenting with severe hyponatremia to 119 in ED. Likely hypovolemic hyponatremia s/o poor PO And N/V. Na on labs 10 days prior to admission 128. Pt presenting with severe intractable nausea and vomiting. No changes in mental status or seizures. Sxs improving with improvement in Na. Nephro following, pt receiving intermittent NS boluses. Now on continuous IVF. Na 126 today    -- nephro following  -- continue IVF to match output  -- s/p therapeutic paracentesis on 4/14  -- Midline placed for better access; now removed on 4/17 as pt was about to leave [facility]a        Metastatic melanoma  He presented initially with enlarging right axillary lymph nodes that have grown progressively in size since April 2021. He presented to urgent care and was referred for US which showed multiple enlarged masses in the right axilla most likely reflecting abnormal lymph nodes concerning for malignancy.   He was subsequently referred to dermatology who performed  a complete exam of the skin that was negative for suspicious cutaneous lesions as well as punch biopsy of one of the lymph node on 01/04/22. Pathology came back as malignant melanoma.   BRAF mutation: positive for a [id number] mutation.   He subsequently underwent a PET/CT on 01/28/22 which showed multiple hypermetabolic soft tissue masses within the right axillary region, metastatic lesions to the lungs and abdomen (VI segment of the liver measuring a max SUV of 6.99, 1.6 x 1.9 cm nodular soft tissue density with a max SUV  7.64 was seen adjacent to the colon within the right lower quadrant. Brain MRI was negative for metastatic disease.  LDH was 307 U/L  He saw Oncology at [facility] on 01/31/22 with plan to start First line Nivolumab / ipilimumab. He received C1 on 03/03/22.     -- pt scheduled to start binimetinib and encorafenib prior to admission.  -- will restart upon discharge      Altered mental status  Pt with worsening mental status starting 4/17 with worsening agitation and anxiety. On 4/18 patient with worsening mental status, increasingly confused. Patient requiring increasing sedating medications. Psychiatry consulted. Unclear etiology of behavioral changes. Likely brain mets vs possible withdrawal (pt with previous hx of methamphetamine use and alcohol use.) Mother confirms no alcohol use, not sure about other drug use. UDS pending. GGT negative.     UDS +opiate, +THC  Psych consulted, appreciate assistance  Recommends Depacon IV TID and Zyprexa PRN  Daily ECG to monitor QTc  Continue PEC for now  F/u brain imaging    History of seizure  --  Continue home Keppra 1000 BID              Chalo Herrera MD PGY-1  Hematology/Oncology  [facility]

## 2022-04-20 NOTE — ASSESSMENT & PLAN NOTE
Patient with creatinine 2.0 on admission from baseline 1.0 in the context of reduced oral intake, vomiting, and NG losses. Suspect likely prerenal given improvement with IV fluids.    Improving.    Plan:  - continue IV fluids to match output   - strict intake/output  - avoid nephrotoxic agents and renally dose medications

## 2022-04-20 NOTE — PROGRESS NOTES
1615 Pt went to MRI for a scan as x rays were negative.returned to the floor without incident.Pt is aware urine sample is needed, urinal is at the bedside sitter is also aware urine sample is needed.

## 2022-04-20 NOTE — ASSESSMENT & PLAN NOTE
Pt with concerns for ileus vs SBO on abdominal imaging on 4/16. Pt with chronic opioid use given cancer and abdominal mets. Pt made NPO and NGT placed with significant output. Repeat imaging with concerns for worsening ileus despite nonoperative treatment. Concerns for malignant obstruction. Gen surg consulted, though patient will likely not be operative candidate given high tumor burden. On 4/19 Pt passed BM. Pt removed his NGT, though now without nausea. Will advance diet as tolerated.    1 BM yesterday, passing flatus    -- clear liquid diet; can advance as tolerated  -- consider bowel regimen

## 2022-04-20 NOTE — ASSESSMENT & PLAN NOTE
Pt presenting with several days of intractable nausea and vomiting. Pt unable to keep anything, including water, down. Zofran with no improvement at home. Likely 2/2 hyponatremia and ileus. KUB and CT demonstrating ileus vs partial SBO. NGT placed 4/17, pt keeps pulling out. Replaced x3.     -- therapeutic para as above  -- management of hyponatremia as above  -- IV compazine and phenergan

## 2022-04-20 NOTE — PROGRESS NOTES
[facility] - [facility]  Nephrology  Progress Note    Patient Name: Joseluis Garner  MRN: [id number]  Admission Date: 4/14/2022  Hospital Length of Stay: 6 days  Attending Provider: Meghan Rios MD   Primary Care Physician: Primary Doctor No  Principal Problem:Ascites    Subjective:     HPI: Joseluis Garner is a 51 year old male with CAD, seizure disorder, stage IV melanoma who presents with hyponatremia, hyperkalemia, and worsening ascites. Patent was noted to have sodium 119, potassium of 5.9, and creatinine of 2.0 from baseline 1.0, on admission. Patient being treated with nivolumab and iplimumab. Notably, describes nausea and vomiting with reduced oral intake. He received a 7L paracentesis with albumin replacement. Additionally, patient was given 1 L NS bolus and NS continue infusion. Initially, sodium improved to 126 then down trended to 123. Potassium now 5.5. Renal function improving with IV fluids - creatinine 1.4. Nephrology consulted for hyponatremia, hyperkalemia, and acute kidney injury.      Interval History: Patient with improving potassium but worsening sodium.    Review of patient's allergies indicates:  No Known Allergies  Current Facility-Administered Medications   Medication Frequency    0.9%  NaCl infusion Continuous    albuterol-ipratropium 2.5 mg-0.5 mg/3 mL nebulizer solution 3 mL Q4H PRN    calcium gluconat 1 g in NS IVPB (premixed) Q10 Min PRN    cefTRIAXone (ROCEPHIN) 2 g/50 mL D5W IVPB Q24H    dextrose 10% bolus 125 mL PRN    dextrose 10% bolus 250 mL PRN    enoxaparin injection 40 mg Daily    famotidine tablet 20 mg BID    fentaNYL 25 mcg/hr 1 patch Q72H    glucagon (human recombinant) injection 1 mg PRN    glucose chewable tablet 16 g PRN    glucose chewable tablet 24 g PRN    HYDROmorphone injection 0.5 mg Q3H PRN    hydrOXYzine HCL tablet 25 mg TID PRN    levETIRAcetam tablet 1,000 mg BID    LIDOcaine HCl 2% oral solution 15 mL Q4H PRN    naloxone 0.4 mg/mL injection  0.02 mg PRN    OLANZapine injection 10 mg Q8H PRN    oxyCODONE immediate release tablet 5 mg Q4H PRN    prochlorperazine injection Soln 5 mg Q6H PRN    sodium chloride 0.9% flush 10 mL Q12H PRN    valproate (DEPACON) 250 mg in dextrose 5 % 50 mL IVPB Q8H       Objective:     Vital Signs (Most Recent):  Temp: 98 °F (36.7 °C) (04/20/22 1245)  Pulse: 94 (04/20/22 1213)  Resp: 18 (04/20/22 1216)  BP: 125/78 (04/20/22 1245)  SpO2: 95 % (04/20/22 1200)  O2 Device (Oxygen Therapy): room air (04/20/22 0328)   Vital Signs (24h Range):  Temp:  [97.8 °F (36.6 °C)-98.8 °F (37.1 °C)] 98 °F (36.7 °C)  Pulse:  [] 94  Resp:  [16-20] 18  SpO2:  [93 %-99 %] 95 %  BP: (125-158)/(78-98) 125/78     Weight: 76.4 kg (168 lb 6.9 oz) (76.4) (04/14/22 1810)  Body mass index is 22.84 kg/m².  Body surface area is 1.97 meters squared.    I/O last 3 completed shifts:  In: 480 [P.O.:480]  Out: 400 [Urine:400]    Physical Exam  Vitals and nursing note reviewed.   Constitutional:       General: He is not in acute distress.     Appearance: He is normal weight. He is ill-appearing and toxic-appearing. He is not diaphoretic.   HENT:      Head: Normocephalic and atraumatic.      Nose: Nose normal. No congestion or rhinorrhea.      Mouth/Throat:      Mouth: Mucous membranes are dry.      Pharynx: Oropharynx is clear.   Eyes:      Extraocular Movements: Extraocular movements intact.      Pupils: Pupils are equal, round, and reactive to light.   Cardiovascular:      Rate and Rhythm: Normal rate and regular rhythm.      Pulses: Normal pulses.      Heart sounds: Normal heart sounds. No murmur heard.    No friction rub. No gallop.   Pulmonary:      Effort: Pulmonary effort is normal. No respiratory distress.      Breath sounds: Normal breath sounds.   Abdominal:      General: Bowel sounds are decreased. There is no distension.      Tenderness: There is abdominal tenderness. There is no guarding or rebound.   Musculoskeletal:         General: No  swelling. Normal range of motion.      Cervical back: Normal range of motion and neck supple.   Skin:     General: Skin is warm and dry.   Neurological:      Mental Status: He is alert. He is disoriented.   Psychiatric:         Mood and Affect: Mood normal.         Behavior: Behavior normal.       Significant Labs:  All labs within the past 24 hours have been reviewed.     Significant Imaging:  All labs within the past 24 hours have been reviewed.    Assessment/Plan:     PAULA (acute kidney injury)  Patient with creatinine 2.0 on admission from baseline 1.0 in the context of reduced oral intake, vomiting, and NG losses. Suspect likely prerenal given improvement with IV fluids.    Improving.    Plan:  - continue IV fluids to match output   - strict intake/output  - avoid nephrotoxic agents and renally dose medications    Hyponatremia  Hyperkalemia  Patent was noted to have sodium 119, potassium of 5.9, on admission in the setting of reduced oral intake and vomiting. Likely hypovolemic hyponatremia on admission. Patient was given 1 L NS bolus and albumin following paracentesis, followed by NS infusion. Initially, sodium improved to 126 then down trended to 123. On assessment patient seems volume down still. Suspect hyperkalemia related to acute kidney injury and reduced urine output. Additionally, given concurrent hyponatremia and hyperkalemia there is concern for adrenal pathology either metastasis or related to immunotherapy. For now, would rule out hypovolemic hyponatremia.    Sodium improving with IV fluid resuscitation but continues to have some degree of hyperkalemia. Hyperkalemia is unclear but could be multifactorial -  if there is urinary retention, high cell turnover, adrenal pathology, or hyperkalemia from thrombocytosis. CK mildly elevated.     Plan:  - continue IV fluids to match output losses  - encourage oral food intake as patient likely has low solute given multiple days NPO  - repeat serum osm, urine  osm, and sodium (ordered)  - strict intake and output          Thank you for your consult. I will follow-up with patient. Please contact us if you have any additional questions.    Jimbo Cabral MD  Nephrology  [facility]

## 2022-04-20 NOTE — ASSESSMENT & PLAN NOTE
Pt presenting with severe hyponatremia to 119 in ED. Likely hypovolemic hyponatremia s/o poor PO And N/V. Na on labs 10 days prior to admission 128. Pt presenting with severe intractable nausea and vomiting. No changes in mental status or seizures. Sxs improving with improvement in Na. Nephro following, pt receiving intermittent NS boluses. Now on continuous IVF. Na 126 today    -- nephro following  -- continue IVF to match output  -- s/p therapeutic paracentesis on 4/14  -- Midline placed for better access; now removed on 4/17 as pt was about to leave a

## 2022-04-20 NOTE — PLAN OF CARE
Problem: Adult Inpatient Plan of Care  Goal: Plan of Care Review  Outcome: Ongoing, Not Progressing     Problem: Adult Inpatient Plan of Care  Goal: Patient-Specific Goal (Individualized)  Outcome: Ongoing, Not Progressing     Problem: Adult Inpatient Plan of Care  Goal: Optimal Comfort and Wellbeing  Outcome: Ongoing, Not Progressing     Problem: Oral Intake Inadequate (Acute Kidney Injury/Impairment)  Goal: Optimal Nutrition Intake  Outcome: Ongoing, Not Progressing     Problem: Skin Injury Risk Increased  Goal: Skin Health and Integrity  Outcome: Ongoing, Not Progressing     Problem: Infection  Goal: Absence of Infection Signs and Symptoms  Outcome: Ongoing, Not Progressing   Pt is sleeping most of the day but he does arise with speaking and gentle touch. He wakes then drifts back to sleep. Sitter remains at the bedside to monitor and assist he had clear liquid diet and has only drank a small amount of of both trays. Abdomen remains distended and taut, medicated for pain and nausea when he returned from X ray and is currently resting quietly

## 2022-04-21 ENCOUNTER — SPECIALTY PHARMACY (OUTPATIENT)
Dept: PHARMACY | Facility: CLINIC | Age: 52
End: 2022-04-21

## 2022-04-21 LAB
ALBUMIN SERPL BCP-MCNC: 2.1 G/DL (ref 3.5–5.2)
ALP SERPL-CCNC: 59 U/L (ref 55–135)
ALT SERPL W/O P-5'-P-CCNC: 19 U/L (ref 10–44)
ANION GAP SERPL CALC-SCNC: 12 MMOL/L (ref 8–16)
AST SERPL-CCNC: 37 U/L (ref 10–40)
BASOPHILS # BLD AUTO: 0.02 K/UL (ref 0–0.2)
BASOPHILS NFR BLD: 0.1 % (ref 0–1.9)
BILIRUB SERPL-MCNC: 0.3 MG/DL (ref 0.1–1)
BUN SERPL-MCNC: 42 MG/DL (ref 6–20)
CALCIUM SERPL-MCNC: 8 MG/DL (ref 8.7–10.5)
CHLORIDE SERPL-SCNC: 90 MMOL/L (ref 95–110)
CO2 SERPL-SCNC: 23 MMOL/L (ref 23–29)
CREAT SERPL-MCNC: 1.7 MG/DL (ref 0.5–1.4)
DIFFERENTIAL METHOD: ABNORMAL
EOSINOPHIL # BLD AUTO: 0 K/UL (ref 0–0.5)
EOSINOPHIL NFR BLD: 0.1 % (ref 0–8)
ERYTHROCYTE [DISTWIDTH] IN BLOOD BY AUTOMATED COUNT: 14.5 % (ref 11.5–14.5)
EST. GFR  (AFRICAN AMERICAN): 52.4 ML/MIN/1.73 M^2
EST. GFR  (NON AFRICAN AMERICAN): 45.4 ML/MIN/1.73 M^2
GLUCOSE SERPL-MCNC: 112 MG/DL (ref 70–110)
HCT VFR BLD AUTO: 44.4 % (ref 40–54)
HGB BLD-MCNC: 14.8 G/DL (ref 14–18)
IMM GRANULOCYTES # BLD AUTO: 0.14 K/UL (ref 0–0.04)
IMM GRANULOCYTES NFR BLD AUTO: 0.7 % (ref 0–0.5)
LYMPHOCYTES # BLD AUTO: 0.6 K/UL (ref 1–4.8)
LYMPHOCYTES NFR BLD: 3 % (ref 18–48)
MAGNESIUM SERPL-MCNC: 2.2 MG/DL (ref 1.6–2.6)
MCH RBC QN AUTO: 26 PG (ref 27–31)
MCHC RBC AUTO-ENTMCNC: 33.3 G/DL (ref 32–36)
MCV RBC AUTO: 78 FL (ref 82–98)
MONOCYTES # BLD AUTO: 0.7 K/UL (ref 0.3–1)
MONOCYTES NFR BLD: 3.7 % (ref 4–15)
NEUTROPHILS # BLD AUTO: 18.2 K/UL (ref 1.8–7.7)
NEUTROPHILS NFR BLD: 92.4 % (ref 38–73)
NRBC BLD-RTO: 0 /100 WBC
OSMOLALITY SERPL: 273 MOSM/KG (ref 280–300)
PHOSPHATE SERPL-MCNC: 3.8 MG/DL (ref 2.7–4.5)
PLATELET # BLD AUTO: 515 K/UL (ref 150–450)
PMV BLD AUTO: 9.6 FL (ref 9.2–12.9)
POCT GLUCOSE: 111 MG/DL (ref 70–110)
POCT GLUCOSE: 138 MG/DL (ref 70–110)
POCT GLUCOSE: 193 MG/DL (ref 70–110)
POCT GLUCOSE: 250 MG/DL (ref 70–110)
POTASSIUM SERPL-SCNC: 4.9 MMOL/L (ref 3.5–5.1)
PROT SERPL-MCNC: 4.9 G/DL (ref 6–8.4)
RBC # BLD AUTO: 5.69 M/UL (ref 4.6–6.2)
SODIUM SERPL-SCNC: 125 MMOL/L (ref 136–145)
WBC # BLD AUTO: 19.71 K/UL (ref 3.9–12.7)

## 2022-04-21 PROCEDURE — 80053 COMPREHEN METABOLIC PANEL: CPT

## 2022-04-21 PROCEDURE — 99233 PR SUBSEQUENT HOSPITAL CARE,LEVL III: ICD-10-PCS | Mod: ,,, | Performed by: INTERNAL MEDICINE

## 2022-04-21 PROCEDURE — 99233 SBSQ HOSP IP/OBS HIGH 50: CPT | Mod: ,,, | Performed by: INTERNAL MEDICINE

## 2022-04-21 PROCEDURE — 93010 ELECTROCARDIOGRAM REPORT: CPT | Mod: ,,, | Performed by: INTERNAL MEDICINE

## 2022-04-21 PROCEDURE — 99231 PR SUBSEQUENT HOSPITAL CARE,LEVL I: ICD-10-PCS | Mod: ,,, | Performed by: PSYCHIATRY & NEUROLOGY

## 2022-04-21 PROCEDURE — 93005 ELECTROCARDIOGRAM TRACING: CPT

## 2022-04-21 PROCEDURE — 36415 COLL VENOUS BLD VENIPUNCTURE: CPT

## 2022-04-21 PROCEDURE — 83735 ASSAY OF MAGNESIUM: CPT

## 2022-04-21 PROCEDURE — 83930 ASSAY OF BLOOD OSMOLALITY: CPT

## 2022-04-21 PROCEDURE — 85025 COMPLETE CBC W/AUTO DIFF WBC: CPT

## 2022-04-21 PROCEDURE — 63600175 PHARM REV CODE 636 W HCPCS: Performed by: STUDENT IN AN ORGANIZED HEALTH CARE EDUCATION/TRAINING PROGRAM

## 2022-04-21 PROCEDURE — 93010 EKG 12-LEAD: ICD-10-PCS | Mod: ,,, | Performed by: INTERNAL MEDICINE

## 2022-04-21 PROCEDURE — 99231 SBSQ HOSP IP/OBS SF/LOW 25: CPT | Mod: ,,, | Performed by: PSYCHIATRY & NEUROLOGY

## 2022-04-21 PROCEDURE — 11000001 HC ACUTE MED/SURG PRIVATE ROOM

## 2022-04-21 PROCEDURE — 63600175 PHARM REV CODE 636 W HCPCS

## 2022-04-21 PROCEDURE — 84100 ASSAY OF PHOSPHORUS: CPT

## 2022-04-21 PROCEDURE — 25000003 PHARM REV CODE 250

## 2022-04-21 RX ORDER — SODIUM CHLORIDE 9 MG/ML
INJECTION, SOLUTION INTRAVENOUS CONTINUOUS
Status: ACTIVE | OUTPATIENT
Start: 2022-04-21 | End: 2022-04-22

## 2022-04-21 RX ORDER — AMOXICILLIN 250 MG
1 CAPSULE ORAL DAILY PRN
Qty: 30 TABLET | Refills: 3 | Status: SHIPPED | OUTPATIENT
Start: 2022-04-21 | End: 2022-05-25 | Stop reason: SDUPTHER

## 2022-04-21 RX ORDER — DIVALPROEX SODIUM 250 MG/1
250 TABLET, DELAYED RELEASE ORAL EVERY 8 HOURS
Status: DISCONTINUED | OUTPATIENT
Start: 2022-04-21 | End: 2022-04-24 | Stop reason: HOSPADM

## 2022-04-21 RX ORDER — DIVALPROEX SODIUM 250 MG/1
250 TABLET, DELAYED RELEASE ORAL EVERY 8 HOURS
Qty: 90 TABLET | Refills: 11 | Status: SHIPPED | OUTPATIENT
Start: 2022-04-21 | End: 2022-07-08

## 2022-04-21 RX ORDER — PROMETHAZINE HYDROCHLORIDE 12.5 MG/1
12.5 TABLET ORAL EVERY 6 HOURS PRN
Status: DISCONTINUED | OUTPATIENT
Start: 2022-04-21 | End: 2022-04-24 | Stop reason: HOSPADM

## 2022-04-21 RX ORDER — POLYETHYLENE GLYCOL 3350 17 G/17G
17 POWDER, FOR SOLUTION ORAL DAILY
Qty: 510 G | Refills: 3 | Status: SHIPPED | OUTPATIENT
Start: 2022-04-22

## 2022-04-21 RX ORDER — POLYETHYLENE GLYCOL 3350 17 G/17G
17 POWDER, FOR SOLUTION ORAL DAILY
Status: DISCONTINUED | OUTPATIENT
Start: 2022-04-21 | End: 2022-04-24 | Stop reason: HOSPADM

## 2022-04-21 RX ORDER — AMOXICILLIN 250 MG
1 CAPSULE ORAL DAILY PRN
Status: DISCONTINUED | OUTPATIENT
Start: 2022-04-21 | End: 2022-04-24 | Stop reason: HOSPADM

## 2022-04-21 RX ADMIN — FAMOTIDINE 20 MG: 20 TABLET ORAL at 08:04

## 2022-04-21 RX ADMIN — POLYETHYLENE GLYCOL 3350 17 G: 17 POWDER, FOR SOLUTION ORAL at 11:04

## 2022-04-21 RX ADMIN — HYDROMORPHONE HYDROCHLORIDE 0.5 MG: 1 INJECTION, SOLUTION INTRAMUSCULAR; INTRAVENOUS; SUBCUTANEOUS at 08:04

## 2022-04-21 RX ADMIN — FENTANYL 1 PATCH: 25 PATCH, EXTENDED RELEASE TRANSDERMAL at 02:04

## 2022-04-21 RX ADMIN — SODIUM CHLORIDE: 0.9 INJECTION, SOLUTION INTRAVENOUS at 11:04

## 2022-04-21 RX ADMIN — HYDROMORPHONE HYDROCHLORIDE 0.5 MG: 1 INJECTION, SOLUTION INTRAMUSCULAR; INTRAVENOUS; SUBCUTANEOUS at 02:04

## 2022-04-21 RX ADMIN — DEXTROSE 250 MG: 50 INJECTION, SOLUTION INTRAVENOUS at 05:04

## 2022-04-21 RX ADMIN — ENOXAPARIN SODIUM 40 MG: 100 INJECTION SUBCUTANEOUS at 05:04

## 2022-04-21 RX ADMIN — HYDROMORPHONE HYDROCHLORIDE 0.5 MG: 1 INJECTION, SOLUTION INTRAMUSCULAR; INTRAVENOUS; SUBCUTANEOUS at 11:04

## 2022-04-21 RX ADMIN — HYDROMORPHONE HYDROCHLORIDE 0.5 MG: 1 INJECTION, SOLUTION INTRAMUSCULAR; INTRAVENOUS; SUBCUTANEOUS at 04:04

## 2022-04-21 RX ADMIN — HYDROMORPHONE HYDROCHLORIDE 0.5 MG: 1 INJECTION, SOLUTION INTRAMUSCULAR; INTRAVENOUS; SUBCUTANEOUS at 05:04

## 2022-04-21 RX ADMIN — SODIUM CHLORIDE: 0.9 INJECTION, SOLUTION INTRAVENOUS at 07:04

## 2022-04-21 RX ADMIN — HYDROMORPHONE HYDROCHLORIDE 0.5 MG: 1 INJECTION, SOLUTION INTRAMUSCULAR; INTRAVENOUS; SUBCUTANEOUS at 07:04

## 2022-04-21 RX ADMIN — OXYCODONE 5 MG: 5 TABLET ORAL at 11:04

## 2022-04-21 RX ADMIN — DEXTROSE 250 MG: 50 INJECTION, SOLUTION INTRAVENOUS at 02:04

## 2022-04-21 RX ADMIN — LEVETIRACETAM 1000 MG: 500 TABLET, FILM COATED ORAL at 08:04

## 2022-04-21 RX ADMIN — PROMETHAZINE HYDROCHLORIDE 12.5 MG: 12.5 TABLET ORAL at 11:04

## 2022-04-21 RX ADMIN — DIVALPROEX SODIUM 250 MG: 250 TABLET, DELAYED RELEASE ORAL at 09:04

## 2022-04-21 RX ADMIN — PROCHLORPERAZINE EDISYLATE 5 MG: 5 INJECTION INTRAMUSCULAR; INTRAVENOUS at 08:04

## 2022-04-21 RX ADMIN — PROCHLORPERAZINE EDISYLATE 5 MG: 5 INJECTION INTRAMUSCULAR; INTRAVENOUS at 03:04

## 2022-04-21 RX ADMIN — PROCHLORPERAZINE EDISYLATE 5 MG: 5 INJECTION INTRAMUSCULAR; INTRAVENOUS at 12:04

## 2022-04-21 RX ADMIN — PROMETHAZINE HYDROCHLORIDE 12.5 MG: 12.5 TABLET ORAL at 08:04

## 2022-04-21 RX ADMIN — HYDROXYZINE HYDROCHLORIDE 25 MG: 25 TABLET, FILM COATED ORAL at 08:04

## 2022-04-21 NOTE — ASSESSMENT & PLAN NOTE
Pt presenting with several days of intractable nausea and vomiting. Pt unable to keep anything, including water, down. Zofran with no improvement at home. Likely 2/2 hyponatremia and ileus. KUB and CT demonstrating ileus vs partial SBO. NGT placed 4/17, pt keeps pulling out. Replaced x3. Pt with  Multiple BMs and improving nausea, now able to tolerate increasing diet as of 4/21.     -- plan for repeat para on 4/22 prior to discharge  -- management of hyponatremia as above  -- IV compazine and phenergan

## 2022-04-21 NOTE — SUBJECTIVE & OBJECTIVE
Interval History: NAEON. Pt continues to improve in mentation and agitation. Pt calm and interactive this morning. Complains of some abdominal pain and nausea. Had 1 bm today. Still  able to take PO. Will start on home cancer tx today.      Oncology Treatment Plan:   OP NIVOLUMAB 1 MG/KG IPILIMUMAB 3MG/KG FOLLOWED BY NIVOLUMAB 480MG Q4W    Medications:  Continuous Infusions:   sodium chloride 0.9% 75 mL/hr at 04/21/22 1138     Scheduled Meds:   binimetinib  45 mg Oral BID    encorafenib  450 mg Oral Daily    enoxaparin  40 mg Subcutaneous Daily    famotidine  20 mg Oral BID    fentaNYL  1 patch Transdermal Q72H    levETIRAcetam  1,000 mg Oral BID    polyethylene glycol  17 g Oral Daily    valproate sodium (DEPACON) IVPB  250 mg Intravenous Q8H     PRN Meds:albuterol-ipratropium, [COMPLETED] calcium gluconate IVPB **AND** calcium gluconate IVPB, dextrose 10%, dextrose 10%, glucagon (human recombinant), glucose, glucose, HYDROmorphone, hydrOXYzine HCL, LIDOcaine HCl 2%, naloxone, OLANZapine, oxyCODONE, prochlorperazine, promethazine, senna-docusate 8.6-50 mg, sodium chloride 0.9%     Review of Systems   Constitutional:  Negative for fever.   Respiratory:  Negative for shortness of breath.    Cardiovascular:  Negative for chest pain.   Gastrointestinal:  Positive for abdominal pain and nausea. Negative for constipation.   Psychiatric/Behavioral:  Negative for agitation, behavioral problems, hallucinations and sleep disturbance.    Objective:     Vital Signs (Most Recent):  Temp: 98.3 °F (36.8 °C) (04/21/22 0410)  Pulse: 93 (04/21/22 0756)  Resp: 16 (04/21/22 1121)  BP: (!) 141/102 (04/21/22 0410)  SpO2: 98 % (04/21/22 0756)   Vital Signs (24h Range):  Temp:  [96.4 °F (35.8 °C)-98.3 °F (36.8 °C)] 98.3 °F (36.8 °C)  Pulse:  [] 93  Resp:  [16-20] 16  SpO2:  [94 %-98 %] 98 %  BP: (141-151)/() 141/102     Weight: 76.4 kg (168 lb 6.9 oz) (76.4)  Body mass index is 22.84 kg/m².  Body surface area is 1.97 meters  squared.      Intake/Output Summary (Last 24 hours) at 4/21/2022 1349  Last data filed at 4/21/2022 0600  Gross per 24 hour   Intake 440 ml   Output 650 ml   Net -210 ml       Physical Exam  Vitals and nursing note reviewed.   Constitutional:       General: He is not in acute distress.     Appearance: He is ill-appearing.      Comments: Patient lying in bed, sleeping. No longer requiring restraints.   HENT:      Head: Normocephalic and atraumatic.      Nose: Nose normal. No congestion.      Mouth/Throat:      Mouth: Mucous membranes are dry.   Eyes:      Conjunctiva/sclera: Conjunctivae normal.      Pupils: Pupils are equal, round, and reactive to light.   Cardiovascular:      Rate and Rhythm: Normal rate.      Pulses: Normal pulses.      Heart sounds: Normal heart sounds. No murmur heard.    No friction rub. No gallop.   Pulmonary:      Effort: Pulmonary effort is normal. No respiratory distress.      Breath sounds: Normal breath sounds.   Abdominal:      General: There is distension.      Tenderness: There is abdominal tenderness (minimal).      Comments: Decreased bowel sounds.    Musculoskeletal:         General: No swelling. Normal range of motion.      Cervical back: Normal range of motion and neck supple.   Skin:     General: Skin is warm and dry.      Comments: Multiple large subcutaneous nodules noted under R axilla.        Significant Labs:   CBC:   Recent Labs   Lab 04/20/22  0850 04/21/22  0515   WBC 21.73* 19.71*   HGB 14.1 14.8   HCT 43.6 44.4   * 515*    and CMP:   Recent Labs   Lab 04/20/22  0850 04/21/22  0515   * 125*   K 4.4 4.9   CL 89* 90*   CO2 24 23   * 112*   BUN 36* 42*   CREATININE 1.4 1.7*   CALCIUM 8.2* 8.0*   PROT 4.9* 4.9*   ALBUMIN 2.1* 2.1*   BILITOT 0.3 0.3   ALKPHOS 60 59   AST 43* 37   ALT 18 19   ANIONGAP 13 12   EGFRNONAA 57.4* 45.4*       Diagnostic Results:  I have reviewed all pertinent imaging results/findings within the past 24 hours.    X-Ray Abdomen AP  1 View   Final Result      See above         Electronically signed by: Jay Ramsey MD   Date:    04/21/2022   Time:    13:14      MRI Brain W WO Contrast   Final Result   Abnormal      Two subcentimeter enhancing foci noted along the left frontal and right parietal parasagittal cortex/subcortical white matter suspicious for metastatic disease.  Please note the thin-section images are too degraded by motion artifact.      This report was flagged in Epic as abnormal.      Electronically signed by resident: Gaurav Norman   Date:    04/20/2022   Time:    16:17      Electronically signed by: Zhen Chairez   Date:    04/20/2022   Time:    17:57      X-Ray Skull Ltd Less Than 4 Views   Final Result      As above.         Electronically signed by: Marky Carney   Date:    04/20/2022   Time:    12:39      US Retroperitoneal Complete   Final Result      Normal morphologic appearance of the bilateral kidneys.  No hydronephrosis or evidence of obstructive uropathy.      Large volume ascites.      Electronically signed by resident: Enrique Ibarra   Date:    04/18/2022   Time:    13:56      Electronically signed by: Ej Wise MD   Date:    04/18/2022   Time:    14:02      X-ray Abdomen for NG Tube Placement (Nursing should notify Radiology after placement)   Final Result      1. Tip of the nasogastric tube in the expected location of the distal antrum or the pylorus of the stomach.  There is no perforation.   2. Left midlung zone pulmonary nodule as above.         Electronically signed by: Lamine Sim MD   Date:    04/18/2022   Time:    09:44      X-Ray Abdomen AP 1 View   Final Result      Ileus versus obstruction.         Electronically signed by: Darci Armstrong MD   Date:    04/18/2022   Time:    08:03      X-ray Abdomen for NG Tube Placement (Nursing should notify Radiology after placement)   Final Result      As above.         Electronically signed by: Jhon Goetz   Date:    04/16/2022   Time:    17:19      X-Ray  Abdomen AP 1 View   Final Result      As above.         Electronically signed by: Jhon Goetz   Date:    04/16/2022   Time:    13:17      CT Chest Abdomen Pelvis Without Contrast (XPD)   Final Result      In this patient with a reported history of metastatic melanoma, there is diffuse metastatic disease involving the right axilla, bilateral lungs, and peritoneal metastasis as described in detail above.  Additional subcutaneous soft tissue nodule which could represent metastatic focus.      Mild wedging of L2 superior endplate suggestive of mild compression fracture.  Recommend correlation with outside imaging.      Diverticulosis.  No evidence of diverticulitis.      Hepatomegaly.      Additional findings as above.      Electronically signed by resident: Baltazar Purcell   Date:    04/16/2022   Time:    09:17      Electronically signed by: Joseluis Arias   Date:    04/16/2022   Time:    12:24      IR Paracentesis without imaging   Final Result      Ultrasound-guided paracentesis with drainage of 7000 mL of elizabeth fluid.  Albumin administered as per protocol.      _______________________________________________________________      Electronically signed by resident: Rudy Otero   Date:    04/14/2022   Time:    17:39      Electronically signed by: Juan Jose Hernández MD   Date:    04/14/2022   Time:    17:46      IR Paracentesis with Imaging    (Results Pending)

## 2022-04-21 NOTE — PLAN OF CARE
Patient in bed awake and alert, patient's mother at bedside, patient complains of discomfort to abdomen, abdomen distended and tender, pain medication administer, tolerated well, will continue to monitor.    Problem: Adult Inpatient Plan of Care  Goal: Optimal Comfort and Wellbeing  Outcome: Ongoing, Progressing  Intervention: Monitor Pain and Promote Comfort  Flowsheets (Taken 4/21/2022 1820)  Pain Management Interventions:   pain management plan reviewed with patient/caregiver   medication offered but refused   medication offered     Problem: Skin Injury Risk Increased  Goal: Skin Health and Integrity  Outcome: Ongoing, Progressing     Problem: Infection  Goal: Absence of Infection Signs and Symptoms  Intervention: Prevent or Manage Infection  Flowsheets (Taken 4/21/2022 1820)  Fever Reduction/Comfort Measures: lightweight clothing  Isolation Precautions:   precautions initiated   precautions maintained

## 2022-04-21 NOTE — AI DETERIORATION ALERT
RAPID RESPONSE NURSE AI ALERT       AI alert received.    Chart Reviewed: 04/21/2022, 5:32 PM    MRN: [id number]  Bed: 645/645 A    Dx: Ascites    Joseluis Garner has a past medical history of Seizures.    Last VS: /83 (BP Location: Left arm, Patient Position: Lying)   Pulse 96   Temp 98.4 °F (36.9 °C) (Oral)   Resp 16   Ht 6' (1.829 m)   Wt 76.4 kg (168 lb 6.9 oz) Comment: 76.4  SpO2 95%   BMI 22.84 kg/m²     24H Vital Sign Range:  Temp:  [96.4 °F (35.8 °C)-98.4 °F (36.9 °C)]   Pulse:  []   Resp:  [16-20]   BP: (111-151)/()   SpO2:  [94 %-98 %]     Level of Consciousness (AVPU): alert    Recent Labs     04/19/22  0942 04/20/22  0850 04/21/22  0515   WBC 22.06* 21.73* 19.71*   HGB 14.6 14.1 14.8   HCT 44.3 43.6 44.4   * 554* 515*       Recent Labs     04/19/22  0942 04/20/22  0850 04/21/22  0515   * 126* 125*   K 5.4* 4.4 4.9   CL 90* 89* 90*   CO2 21* 24 23   CREATININE 1.5* 1.4 1.7*   GLU 99 126* 112*   PHOS 4.9* 3.7 3.8   MG 2.4 2.1 2.2        No results for input(s): PH, PCO2, PO2, HCO3, POCSATURATED, BE in the last 72 hours.     OXYGEN:        O2 Device (Oxygen Therapy): room air    MEWS score: 2    bedside RN, Betsy contacted. No concerns verbalized at this time. Instructed to call [phone] for further concerns or assistance.    Guilherme Dillard RN

## 2022-04-21 NOTE — ASSESSMENT & PLAN NOTE
Pt with concerns for ileus vs SBO on abdominal imaging on 4/16. Pt with chronic opioid use given cancer and abdominal mets. Pt made NPO and NGT placed with significant output. Repeat imaging with concerns for worsening ileus despite nonoperative treatment. Concerns for malignant obstruction. Gen surg consulted, though patient will likely not be operative candidate given high tumor burden. On 4/19 Pt passed BM. Pt removed his NGT, though now without nausea. Will advance diet as tolerated.    Several bowel movements in the past 2 days, passing flatus. Pt tolerating PO.     -- advance diet as tolerated  -- adding bowel regimen

## 2022-04-21 NOTE — PLAN OF CARE
Pt is without restraint this shift. No acute issue. Pt has episodes of emesis and c/o of pain several times. 1:1 sitter is active, and pt's belongings are within reach.

## 2022-04-21 NOTE — ASSESSMENT & PLAN NOTE
Pt with Cr 2.0 on admission up from baseline of 1.0. Likely prerenal s/o poor PO intake, vomiting, and ascites. Cr improved to 1.3 with IVF and para. Nephro following.   sCr 1.47 Following MRI w/  Contrast On 4/21. Maintenance IVF and encouraging PO.      -- nephro consulted  -- strict I/O's  -- daily bmp  -- avoid nephrotoxic medications  -- renally dose meds

## 2022-04-21 NOTE — PROGRESS NOTES
[facility]  Hematology/Oncology  Progress Note    Patient Name: Joseluis Garner  Admission Date: 4/14/2022  Hospital Length of Stay: 7 days  Code Status: Full Code     Subjective:     HPI:  51 y.o. male with history of CAD s/p PCI, seizures, substance abuse, stage IV malignant melanoma presenting from oncology clinic with multiple electrolyte abnormalities and worsening ascites. Pt has hx of malignant melanoma first diagnosed 1/2022. He is s/p 1 round of tx on 3/3/2022, with  plans to start binimetinib and enorafenib in  the near future. However, patient's clinical status has been deteriorating over the past few weeks. He was admitted on 03/18 at [facility] for abdominal pain, and distension. He underwent therapeutic paracentesis with removal of 3L, and was discharged on 03/22. CT abdomen during that admission showed soft tissue attenuation throughout the peritoneum consistent with innumerable peritoneal implants.  He was then readmitted the following day 03/23 for re accumulation and had another paracentesis with removal of 4.7L. Pt was again readmitted 03/30 to 04/02 and underwent paracentesis. He was planned to receive pleur-X catheter as outpatient. He presented again to [facility] on 04/04 with worsening abdominal distension and pain. He underwent therapeutic paracentesis with removal of 5L. Cytology was positive for malignant cells. He was discharged on 04/05 on Cipro for SBP coverage.      Since his discharge, he notes worsening abdominal distension. He had progressive nausea and vomiting and has not been able to tolerate any diet including water. He has tried zofran at home which has not helped. He denies any recent fever, chills, cough, sore throat. He was evaluated in  oncology clinic this morning  with his mother, during which time he had several episodes of vomiting. Labs obtained during clinic were concerning for leukocytosis, hyperkalemia, and hyponatremia. He was sent to the ED for admission for  management of ascites, and other current comorbid conditions.     In the ED, Pt afebrile, /85, HR 80s,  ALEJANDRO. CBC notable for  WBC 19, Plt 619. K  5.9, Na 119.  Alb 2.6. Cr 2.0 from baseline 1.0. TSH 8.8, though free T4 wnl. Procal elevated to 1.1. EKG with no acute changes, no  T wave abnormalities.        Interval History: NAEON. Pt continues to improve in mentation and agitation. Pt calm and interactive this morning. Complains of some abdominal pain and nausea. Had 1 bm today. Still  able to take PO. Will start on home cancer tx today.      Oncology Treatment Plan:   OP NIVOLUMAB 1 MG/KG IPILIMUMAB 3MG/KG FOLLOWED BY NIVOLUMAB 480MG Q4W    Medications:  Continuous Infusions:   sodium chloride 0.9% 75 mL/hr at 04/21/22 1138     Scheduled Meds:   binimetinib  45 mg Oral BID    encorafenib  450 mg Oral Daily    enoxaparin  40 mg Subcutaneous Daily    famotidine  20 mg Oral BID    fentaNYL  1 patch Transdermal Q72H    levETIRAcetam  1,000 mg Oral BID    polyethylene glycol  17 g Oral Daily    valproate sodium (DEPACON) IVPB  250 mg Intravenous Q8H     PRN Meds:albuterol-ipratropium, [COMPLETED] calcium gluconate IVPB **AND** calcium gluconate IVPB, dextrose 10%, dextrose 10%, glucagon (human recombinant), glucose, glucose, HYDROmorphone, hydrOXYzine HCL, LIDOcaine HCl 2%, naloxone, OLANZapine, oxyCODONE, prochlorperazine, promethazine, senna-docusate 8.6-50 mg, sodium chloride 0.9%     Review of Systems   Constitutional:  Negative for fever.   Respiratory:  Negative for shortness of breath.    Cardiovascular:  Negative for chest pain.   Gastrointestinal:  Positive for abdominal pain and nausea. Negative for constipation.   Psychiatric/Behavioral:  Negative for agitation, behavioral problems, hallucinations and sleep disturbance.    Objective:     Vital Signs (Most Recent):  Temp: 98.3 °F (36.8 °C) (04/21/22 0410)  Pulse: 93 (04/21/22 0756)  Resp: 16 (04/21/22 1121)  BP: (!) 141/102 (04/21/22  9470)  SpO2: 98 % (04/21/22 0756)   Vital Signs (24h Range):  Temp:  [96.4 °F (35.8 °C)-98.3 °F (36.8 °C)] 98.3 °F (36.8 °C)  Pulse:  [] 93  Resp:  [16-20] 16  SpO2:  [94 %-98 %] 98 %  BP: (141-151)/() 141/102     Weight: 76.4 kg (168 lb 6.9 oz) (76.4)  Body mass index is 22.84 kg/m².  Body surface area is 1.97 meters squared.      Intake/Output Summary (Last 24 hours) at 4/21/2022 1349  Last data filed at 4/21/2022 0600  Gross per 24 hour   Intake 440 ml   Output 650 ml   Net -210 ml       Physical Exam  Vitals and nursing note reviewed.   Constitutional:       General: He is not in acute distress.     Appearance: He is ill-appearing.      Comments: Patient lying in bed, sleeping. No longer requiring restraints.   HENT:      Head: Normocephalic and atraumatic.      Nose: Nose normal. No congestion.      Mouth/Throat:      Mouth: Mucous membranes are dry.   Eyes:      Conjunctiva/sclera: Conjunctivae normal.      Pupils: Pupils are equal, round, and reactive to light.   Cardiovascular:      Rate and Rhythm: Normal rate.      Pulses: Normal pulses.      Heart sounds: Normal heart sounds. No murmur heard.    No friction rub. No gallop.   Pulmonary:      Effort: Pulmonary effort is normal. No respiratory distress.      Breath sounds: Normal breath sounds.   Abdominal:      General: There is distension.      Tenderness: There is abdominal tenderness (minimal).      Comments: Decreased bowel sounds.    Musculoskeletal:         General: No swelling. Normal range of motion.      Cervical back: Normal range of motion and neck supple.   Skin:     General: Skin is warm and dry.      Comments: Multiple large subcutaneous nodules noted under R axilla.        Significant Labs:   CBC:   Recent Labs   Lab 04/20/22  0850 04/21/22  0515   WBC 21.73* 19.71*   HGB 14.1 14.8   HCT 43.6 44.4   * 515*    and CMP:   Recent Labs   Lab 04/20/22  0850 04/21/22  0515   * 125*   K 4.4 4.9   CL 89* 90*   CO2 24 23   GLU  126* 112*   BUN 36* 42*   CREATININE 1.4 1.7*   CALCIUM 8.2* 8.0*   PROT 4.9* 4.9*   ALBUMIN 2.1* 2.1*   BILITOT 0.3 0.3   ALKPHOS 60 59   AST 43* 37   ALT 18 19   ANIONGAP 13 12   EGFRNONAA 57.4* 45.4*       Diagnostic Results:  I have reviewed all pertinent imaging results/findings within the past 24 hours.    X-Ray Abdomen AP 1 View   Final Result      See above         Electronically signed by: Jay Ramsey MD   Date:    04/21/2022   Time:    13:14      MRI Brain W WO Contrast   Final Result   Abnormal      Two subcentimeter enhancing foci noted along the left frontal and right parietal parasagittal cortex/subcortical white matter suspicious for metastatic disease.  Please note the thin-section images are too degraded by motion artifact.      This report was flagged in Epic as abnormal.      Electronically signed by resident: Gaurav [place]   Date:    04/20/2022   Time:    16:17      Electronically signed by: Zhen Chairez   Date:    04/20/2022   Time:    17:57      X-Ray Skull Ltd Less Than 4 Views   Final Result      As above.         Electronically signed by: Marky Carney   Date:    04/20/2022   Time:    12:39      US Retroperitoneal Complete   Final Result      Normal morphologic appearance of the bilateral kidneys.  No hydronephrosis or evidence of obstructive uropathy.      Large volume ascites.      Electronically signed by resident: Enrique Ibarra   Date:    04/18/2022   Time:    13:56      Electronically signed by: Ej Wise MD   Date:    04/18/2022   Time:    14:02      X-ray Abdomen for NG Tube Placement (Nursing should notify Radiology after placement)   Final Result      1. Tip of the nasogastric tube in the expected location of the distal antrum or the pylorus of the stomach.  There is no perforation.   2. Left midlung zone pulmonary nodule as above.         Electronically signed by: Lamine Sim MD   Date:    04/18/2022   Time:    09:44      X-Ray Abdomen AP 1 View   Final Result       Ileus versus obstruction.         Electronically signed by: Darci Armstrong MD   Date:    04/18/2022   Time:    08:03      X-ray Abdomen for NG Tube Placement (Nursing should notify Radiology after placement)   Final Result      As above.         Electronically signed by: Jhon Goetz   Date:    04/16/2022   Time:    17:19      X-Ray Abdomen AP 1 View   Final Result      As above.         Electronically signed by: Jhon Goetz   Date:    04/16/2022   Time:    13:17      CT Chest Abdomen Pelvis Without Contrast (XPD)   Final Result      In this patient with a reported history of metastatic melanoma, there is diffuse metastatic disease involving the right axilla, bilateral lungs, and peritoneal metastasis as described in detail above.  Additional subcutaneous soft tissue nodule which could represent metastatic focus.      Mild wedging of L2 superior endplate suggestive of mild compression fracture.  Recommend correlation with outside imaging.      Diverticulosis.  No evidence of diverticulitis.      Hepatomegaly.      Additional findings as above.      Electronically signed by resident: Baltazar Purcell   Date:    04/16/2022   Time:    09:17      Electronically signed by: Joseluis Arias   Date:    04/16/2022   Time:    12:24      IR Paracentesis without imaging   Final Result      Ultrasound-guided paracentesis with drainage of 7000 mL of elizabeth fluid.  Albumin administered as per protocol.      _______________________________________________________________      Electronically signed by resident: Rudy Otero   Date:    04/14/2022   Time:    17:39      Electronically signed by: Juan Jose Hernández MD   Date:    04/14/2022   Time:    17:46      IR Paracentesis with Imaging    (Results Pending)         Assessment/Plan:     * Ascites  52 y/o M hx of malignant melanoma presents from clinic with symptomatic malignant ascites. Pt has had multiple admissions over the past month for therapeutic paracenteses in the setting of  malignant ascites. His last para was 4/4, 10 days prior to admission. Patient will likely need scheduled weekly therapeutic josiah in the future to prevent hospitalization. S/p para on4/15 with 7L removed and albumin administered.     Plan:  -- Cell counts not c/w SBP, though continuing to cover with Ceftriaxone 2g q24h x7 days for given concurrent leukocytosis  -- schedule weekly para at discharge  -- Will order repeat  Therapeutic  Paracentesis prior to discharge on 4/22    Ileus  Pt with concerns for ileus vs SBO on abdominal imaging on 4/16. Pt with chronic opioid use given cancer and abdominal mets. Pt made NPO and NGT placed with significant output. Repeat imaging with concerns for worsening ileus despite nonoperative treatment. Concerns for malignant obstruction. Gen surg consulted, though patient will likely not be operative candidate given high tumor burden. On 4/19 Pt passed BM. Pt removed his NGT, though now without nausea. Will advance diet as tolerated.    Several bowel movements in the past 2 days, passing flatus. Pt tolerating PO.     -- advance diet as tolerated  -- adding bowel regimen    Intractable nausea and vomiting  Pt presenting with several days of intractable nausea and vomiting. Pt unable to keep anything, including water, down. Zofran with no improvement at home. Likely 2/2 hyponatremia and ileus. KUB and CT demonstrating ileus vs partial SBO. NGT placed 4/17, pt keeps pulling out. Replaced x3. Pt with  Multiple BMs and improving nausea, now able to tolerate increasing diet as of 4/21.     -- plan for repeat para on 4/22 prior to discharge  -- management of hyponatremia as above  -- IV compazine and phenergan      Hyperkalemia  Pt with potassium 5.9 on CMP in ED. K 6.4 on istat. EKG without any acute changes. Pt shifted in ED with repeat BMP showing K 5.5. Unclear etiology of acute hyperkalemia, though patient does have concurrent PAULA. K consistently elevated, 5.9 on 4/16. S/p shift again  on 4/17 with improvement in K to 5.1. AM cortisol elevated to 44. Initial oncern for potential adrenal insufficiency vs adrenal mets though no convincing evidence of lesions on CT. Am cortisol  Elevated.     -- K+ 4.9 4/21  -- serial bmp  -- avoid potential potassium-increasing medications  -- ACTH pending    PAULA (acute kidney injury)  Pt with Cr 2.0 on admission up from baseline of 1.0. Likely prerenal s/o poor PO intake, vomiting, and ascites. Cr improved to 1.3 with IVF and para. Nephro following.   sCr 1.47 Following MRI w/  Contrast On 4/21. Maintenance IVF and encouraging PO.      -- nephro consulted  -- strict I/O's  -- daily bmp  -- avoid nephrotoxic medications  -- renally dose meds    Hyponatremia  Pt presenting with severe hyponatremia to 119 in ED. Likely hypovolemic hyponatremia s/o poor PO And N/V. Na on labs 10 days prior to admission 128. Pt presenting with severe intractable nausea and vomiting. No changes in mental status or seizures. Sxs improving with improvement in Na. Nephro following, pt receiving intermittent NS boluses. Now on continuous IVF. Na 125  4/21    -- nephro following  -- continue IVF to match output  -- s/p therapeutic paracentesis on 4/14  --  Will repeat therapeutic para prior to discharge.         Metastatic melanoma  He presented initially with enlarging right axillary lymph nodes that have grown progressively in size since April 2021. He presented to urgent care and was referred for US which showed multiple enlarged masses in the right axilla most likely reflecting abnormal lymph nodes concerning for malignancy.   He was subsequently referred to dermatology who performed a complete exam of the skin that was negative for suspicious cutaneous lesions as well as punch biopsy of one of the lymph node on 01/04/22. Pathology came back as malignant melanoma.   BRAF mutation: positive for a [id number] mutation.   He subsequently underwent a PET/CT on 01/28/22 which showed multiple  hypermetabolic soft tissue masses within the right axillary region, metastatic lesions to the lungs and abdomen (VI segment of the liver measuring a max SUV of 6.99, 1.6 x 1.9 cm nodular soft tissue density with a max SUV  7.64 was seen adjacent to the colon within the right lower quadrant. Brain MRI was negative for metastatic disease.  LDH was 307 U/L  He saw Oncology at [facility] on 01/31/22 with plan to start First line Nivolumab / ipilimumab. He received C1 on 03/03/22.   MRI brain with multiple small frontal lobe lesions without mass effect concerning for metastatic disease. Rad onc consulted, no recommendations for RT at this time,  Will f/u in clinic.     -- pt scheduled to start binimetinib and encorafenib prior to admission.  -- will start on 4/21      Altered mental status  Pt with worsening mental status starting 4/17 with worsening agitation and anxiety. On 4/18 patient with worsening mental status, increasingly confused. Patient requiring increasing sedating medications. Psychiatry consulted. Unclear etiology of behavioral changes. Likely brain mets vs possible withdrawal (pt with previous hx of methamphetamine use and alcohol use.) Mother confirms no alcohol use, not sure about other drug use. UDS + for benzos and THC. GGT negative. MRI brain with small lesions w/o  Mass effect. Unclear etiology of behavioral, possibly underlying agitation made worse with ativan.      Psych consulted, appreciate assistance  Recommends Depacon IV TID and Zyprexa PRN  Daily ECG to monitor QTc  Discontinue PEC      History of seizure  --  Continue home Keppra 1000 BID              Roel Campos MD   Internal Medicine PGY-1  Hematology/Oncology  [facility] Surg

## 2022-04-21 NOTE — ASSESSMENT & PLAN NOTE
Pt with worsening mental status starting 4/17 with worsening agitation and anxiety. On 4/18 patient with worsening mental status, increasingly confused. Patient requiring increasing sedating medications. Psychiatry consulted. Unclear etiology of behavioral changes. Likely brain mets vs possible withdrawal (pt with previous hx of methamphetamine use and alcohol use.) Mother confirms no alcohol use, not sure about other drug use. UDS + for benzos and THC. GGT negative. MRI brain with small lesions w/o  Mass effect. Unclear etiology of behavioral, possibly underlying agitation made worse with ativan.      Psych consulted, appreciate assistance  Recommends Depacon IV TID and Zyprexa PRN  Daily ECG to monitor QTc  Discontinue PEC

## 2022-04-21 NOTE — ASSESSMENT & PLAN NOTE
Mr. Garner is a 50 y/o male with known Stage IV metastatic melanoma admitted for malignant ascites. He developed delirium while inpatient. MRI Brain obtain 4/20/22 demonstrated 2 sub-centimeter brain metastases without significant edema. Radiation oncology is consulted for consideration of treatment options.     I have reviewed the patients chart and images. Two small brain mets are not contributing to any symptoms at this time. He is planned to start BRAF inhibitor, which may have efficacy against low volume intracranial disease. I will set him up for outpatient consultation and likely re-image in ~6 weeks to assess response to systemic therapy. If no improvement at that time, would consider stereotactic radiosurgery.     Plan discussed with primary team. Outpatient consultation will be scheduled for next week and included in his discharge appointments.

## 2022-04-21 NOTE — CONSULTS
[facility] Surg  Radiation Oncology  Consult Note    Patient Name: Joseluis Garner  MRN: [id number]  Admission Date: 4/14/2022  Hospital Length of Stay: 7 days  Code Status: Full Code   Attending Provider: Meghan Rios MD  Consulting Provider: Adam Levine MD  Primary Care Physician: Primary Doctor No  Principal Problem:Ascites    Inpatient consult to Radiation Oncology  Consult performed by: Adam Levine MD  Consult ordered by: Lopez Ríos MD        Assessment/Plan:     Metastatic melanoma  Mr. Garner is a 50 y/o male with known Stage IV metastatic melanoma admitted for malignant ascites. He developed delirium while inpatient. MRI Brain obtain 4/20/22 demonstrated 2 sub-centimeter brain metastases without significant edema. Radiation oncology is consulted for consideration of treatment options.     I have reviewed the patients chart and images. Two small brain mets are not contributing to any symptoms at this time. He is planned to start BRAF inhibitor, which may have efficacy against low volume intracranial disease. I will set him up for outpatient consultation and likely re-image in ~6 weeks to assess response to systemic therapy. If no improvement at that time, would consider stereotactic radiosurgery.     Plan discussed with primary team. Outpatient consultation will be scheduled for next week and included in his discharge appointments.         Thank you for your consult. I will follow-up with patient. Please contact us if you have any additional questions.    Adam Levine MD  Radiation Oncology  [facility] Surg

## 2022-04-21 NOTE — ASSESSMENT & PLAN NOTE
50 y/o M hx of malignant melanoma presents from clinic with symptomatic malignant ascites. Pt has had multiple admissions over the past month for therapeutic paracenteses in the setting of malignant ascites. His last para was 4/4, 10 days prior to admission. Patient will likely need scheduled weekly therapeutic josiah in the future to prevent hospitalization. S/p para on4/15 with 7L removed and albumin administered.     Plan:  -- Cell counts not c/w SBP, though continuing to cover with Ceftriaxone 2g q24h x7 days for given concurrent leukocytosis  -- schedule weekly para at discharge  -- Will order repeat  Therapeutic  Paracentesis prior to discharge on 4/22

## 2022-04-21 NOTE — SUBJECTIVE & OBJECTIVE
Interval History: Patient with increased nausea and vomiting. Worsening sodium.    Review of patient's allergies indicates:  No Known Allergies  Current Facility-Administered Medications   Medication Frequency    0.9%  NaCl infusion Continuous    albuterol-ipratropium 2.5 mg-0.5 mg/3 mL nebulizer solution 3 mL Q4H PRN    binimetinib Tab 45 mg BID    calcium gluconat 1 g in NS IVPB (premixed) Q10 Min PRN    dextrose 10% bolus 125 mL PRN    dextrose 10% bolus 250 mL PRN    encorafenib Cap 450 mg Daily    enoxaparin injection 40 mg Daily    famotidine tablet 20 mg BID    fentaNYL 25 mcg/hr 1 patch Q72H    glucagon (human recombinant) injection 1 mg PRN    glucose chewable tablet 16 g PRN    glucose chewable tablet 24 g PRN    HYDROmorphone injection 0.5 mg Q3H PRN    hydrOXYzine HCL tablet 25 mg TID PRN    levETIRAcetam tablet 1,000 mg BID    LIDOcaine HCl 2% oral solution 15 mL Q4H PRN    naloxone 0.4 mg/mL injection 0.02 mg PRN    OLANZapine injection 10 mg Q8H PRN    oxyCODONE immediate release tablet 5 mg Q4H PRN    polyethylene glycol packet 17 g Daily    prochlorperazine injection Soln 5 mg Q6H PRN    promethazine tablet 12.5 mg Q6H PRN    senna-docusate 8.6-50 mg per tablet 1 tablet Daily PRN    sodium chloride 0.9% flush 10 mL Q12H PRN    valproate (DEPACON) 250 mg in dextrose 5 % 50 mL IVPB Q8H       Objective:     Vital Signs (Most Recent):  Temp: 98.3 °F (36.8 °C) (04/21/22 0410)  Pulse: 93 (04/21/22 0756)  Resp: 16 (04/21/22 1121)  BP: (!) 141/102 (04/21/22 0410)  SpO2: 98 % (04/21/22 0756)  O2 Device (Oxygen Therapy): room air (04/20/22 0328)   Vital Signs (24h Range):  Temp:  [96.4 °F (35.8 °C)-98.3 °F (36.8 °C)] 98.3 °F (36.8 °C)  Pulse:  [] 93  Resp:  [16-20] 16  SpO2:  [94 %-98 %] 98 %  BP: (141-151)/() 141/102     Weight: 76.4 kg (168 lb 6.9 oz) (76.4) (04/14/22 1810)  Body mass index is 22.84 kg/m².  Body surface area is 1.97 meters squared.    I/O last 3 completed shifts:  In: 1160  [P.O.:1160]  Out: 1050 [Urine:850; Emesis/NG output:200]    Physical Exam  Vitals and nursing note reviewed.   Constitutional:       General: He is not in acute distress.     Appearance: He is normal weight. He is ill-appearing and toxic-appearing. He is not diaphoretic.   HENT:      Head: Normocephalic and atraumatic.      Nose: Nose normal. No congestion or rhinorrhea.      Mouth/Throat:      Mouth: Mucous membranes are dry.      Pharynx: Oropharynx is clear.   Eyes:      Extraocular Movements: Extraocular movements intact.      Pupils: Pupils are equal, round, and reactive to light.   Cardiovascular:      Rate and Rhythm: Normal rate and regular rhythm.      Pulses: Normal pulses.      Heart sounds: Normal heart sounds. No murmur heard.    No friction rub. No gallop.   Pulmonary:      Effort: Pulmonary effort is normal. No respiratory distress.      Breath sounds: Normal breath sounds.   Abdominal:      General: Bowel sounds are decreased. There is no distension.      Tenderness: There is abdominal tenderness. There is no guarding or rebound.   Musculoskeletal:         General: No swelling. Normal range of motion.      Cervical back: Normal range of motion and neck supple.   Skin:     General: Skin is warm and dry.   Neurological:      Mental Status: He is alert. He is disoriented.   Psychiatric:         Mood and Affect: Mood normal.         Behavior: Behavior normal.       Significant Labs:  All labs within the past 24 hours have been reviewed.     Significant Imaging:  All labs within the past 24 hours have been reviewed.

## 2022-04-21 NOTE — TELEPHONE ENCOUNTER
Tried to submit PA for [employer]. Called and spoke to rep with insurance who informed [employer] that insurance is not active. Tried to call patient and mother (emergency contact on file) and unable to reach or LVM. Messaged MD office

## 2022-04-21 NOTE — SUBJECTIVE & OBJECTIVE
Family History    None       Tobacco Use    Smoking status: Current Every Day Smoker    Smokeless tobacco: Never Used   Substance and Sexual Activity    Alcohol use: Yes    Drug use: Yes     Types: Methamphetamines, Marijuana    Sexual activity: Not on file     Psychotherapeutics (From admission, onward)                Start     Stop Route Frequency Ordered    04/19/22 1541  OLANZapine injection 10 mg         -- IM Every 8 hours PRN 04/19/22 1441          Review of Systems   Unable to perform ROS: Mental status change   Constitutional:  Positive for malaise/fatigue.        Objective:     Vital Signs (Most Recent):  Temp: 97.8 °F (36.6 °C) (04/20/22 1939)  Pulse: 96 (04/20/22 1939)  Resp: 18 (04/20/22 1939)  BP: (!) 151/97 (04/20/22 1939)  SpO2: 95 % (04/20/22 1939)   Vital Signs (24h Range):  Temp:  [97.8 °F (36.6 °C)-98.8 °F (37.1 °C)] 97.8 °F (36.6 °C)  Pulse:  [] 96  Resp:  [16-20] 18  SpO2:  [93 %-99 %] 95 %  BP: (125-158)/(78-98) 151/97     Height: 6' (182.9 cm)  Weight: 76.4 kg (168 lb 6.9 oz) (76.4)  Body mass index is 22.84 kg/m².      Intake/Output Summary (Last 24 hours) at 4/20/2022 2028  Last data filed at 4/20/2022 1753  Gross per 24 hour   Intake 920 ml   Output 650 ml   Net 270 ml     MENTAL STATUS EXAMINATION  General Appearance:  ill-appearing  Arousal: drowsy  Behavior:  improved agitation  Movements and Motor Activity:  unable to assess  Muscle Strength and Tone:  unable to assess  Gait and Station: unable to assess - patient lying down or seated  Orientation:  impaired  Speech:  improved  Language:  English  Mood: drowsy  Affect: less agitated  Thought Process: impaired  Associations: impaired  Thought Content and Perceptions: no SI, no HI  Recent and Remote Memory: not assessed  Attention and Concentration: impaired  Fund of Knowledge: not assessed  Insight: limited  Judgment: limited    Physical Exam     Significant Labs: Last 24 Hours:   Recent Lab Results  (Last 5 results in the  past 24 hours)        04/20/22  1823   04/20/22  1211   04/20/22  0850   04/20/22  0540   04/20/22  0336        Albumin     2.1           Alkaline Phosphatase     60           ALT     18           Anion Gap     13           AST     43           Baso #     0.02           Basophil %     0.1           BILIRUBIN TOTAL     0.3  Comment: For infants and newborns, interpretation of results should be based  on gestational age, weight and in agreement with clinical  observations.    Premature Infant recommended reference ranges:  Up to 24 hours.............<8.0 mg/dL  Up to 48 hours............<12.0 mg/dL  3-5 days..................<15.0 mg/dL  6-29 days.................<15.0 mg/dL             BUN     36           Calcium     8.2           Chloride     89           CO2     24           Creatinine     1.4           Differential Method     Automated           eGFR if      >60.0           eGFR if non      57.4  Comment: Calculation used to obtain the estimated glomerular filtration  rate (eGFR) is the CKD-EPI equation.              Eos #     0.0           Eosinophil %     0.0           Glucose     126           Gran # (ANC)     19.9           Gran %     91.7           Hematocrit     43.6           Hemoglobin     14.1           Immature Grans (Abs)     0.16  Comment: Mild elevation in immature granulocytes is non specific and   can be seen in a variety of conditions including stress response,   acute inflammation, trauma and pregnancy. Correlation with other   laboratory and clinical findings is essential.             Immature Granulocytes     0.7           Lymph #     0.8           Lymph %     3.5           Magnesium     2.1           MCH     25.7           MCHC     32.3           MCV     79           Mono #     0.9           Mono %     4.0           MPV     9.3           nRBC     0           Phosphorus     3.7           Platelets     554           POCT Glucose 128   130     122   128        Potassium     4.4           PROTEIN TOTAL     4.9           RBC     5.49           RDW     14.2           Sodium     126           WBC     21.73                                  Significant Imaging:  MRI brain pending

## 2022-04-21 NOTE — HOSPITAL COURSE
04/20/2022  Agitation improved with scheduled Depakote, no additional PRNs for agitation given overnight. Patient out of room for imaging this morning. Seen by staff in the afternoon.     04/21/2022  No agitation overnight and no PRNs for agitation were given. Compliant with scheduled Depakote. Displays improved insight this morning. Thoughts are linear, logical, and future oriented. Denies suicidal thoughts, plans, intent. No hallucinations.

## 2022-04-21 NOTE — PROGRESS NOTES
[facility]  Nephrology  Progress Note    Patient Name: Joseluis Garner  MRN: [id number]  Admission Date: 4/14/2022  Hospital Length of Stay: 7 days  Attending Provider: Meghan Rios MD   Primary Care Physician: Primary Doctor No  Principal Problem:Ascites    Subjective:     HPI: Joseluis Garner is a 51 year old male with CAD, seizure disorder, stage IV melanoma who presents with hyponatremia, hyperkalemia, and worsening ascites. Patent was noted to have sodium 119, potassium of 5.9, and creatinine of 2.0 from baseline 1.0, on admission. Patient being treated with nivolumab and iplimumab. Notably, describes nausea and vomiting with reduced oral intake. He received a 7L paracentesis with albumin replacement. Additionally, patient was given 1 L NS bolus and NS continue infusion. Initially, sodium improved to 126 then down trended to 123. Potassium now 5.5. Renal function improving with IV fluids - creatinine 1.4. Nephrology consulted for hyponatremia, hyperkalemia, and acute kidney injury.      Interval History: Patient with increased nausea and vomiting. Worsening sodium.    Review of patient's allergies indicates:  No Known Allergies  Current Facility-Administered Medications   Medication Frequency    0.9%  NaCl infusion Continuous    albuterol-ipratropium 2.5 mg-0.5 mg/3 mL nebulizer solution 3 mL Q4H PRN    binimetinib Tab 45 mg BID    calcium gluconat 1 g in NS IVPB (premixed) Q10 Min PRN    dextrose 10% bolus 125 mL PRN    dextrose 10% bolus 250 mL PRN    encorafenib Cap 450 mg Daily    enoxaparin injection 40 mg Daily    famotidine tablet 20 mg BID    fentaNYL 25 mcg/hr 1 patch Q72H    glucagon (human recombinant) injection 1 mg PRN    glucose chewable tablet 16 g PRN    glucose chewable tablet 24 g PRN    HYDROmorphone injection 0.5 mg Q3H PRN    hydrOXYzine HCL tablet 25 mg TID PRN    levETIRAcetam tablet 1,000 mg BID    LIDOcaine HCl 2% oral solution 15 mL Q4H PRN    naloxone 0.4  mg/mL injection 0.02 mg PRN    OLANZapine injection 10 mg Q8H PRN    oxyCODONE immediate release tablet 5 mg Q4H PRN    polyethylene glycol packet 17 g Daily    prochlorperazine injection Soln 5 mg Q6H PRN    promethazine tablet 12.5 mg Q6H PRN    senna-docusate 8.6-50 mg per tablet 1 tablet Daily PRN    sodium chloride 0.9% flush 10 mL Q12H PRN    valproate (DEPACON) 250 mg in dextrose 5 % 50 mL IVPB Q8H       Objective:     Vital Signs (Most Recent):  Temp: 98.3 °F (36.8 °C) (04/21/22 0410)  Pulse: 93 (04/21/22 0756)  Resp: 16 (04/21/22 1121)  BP: (!) 141/102 (04/21/22 0410)  SpO2: 98 % (04/21/22 0756)  O2 Device (Oxygen Therapy): room air (04/20/22 0328)   Vital Signs (24h Range):  Temp:  [96.4 °F (35.8 °C)-98.3 °F (36.8 °C)] 98.3 °F (36.8 °C)  Pulse:  [] 93  Resp:  [16-20] 16  SpO2:  [94 %-98 %] 98 %  BP: (141-151)/() 141/102     Weight: 76.4 kg (168 lb 6.9 oz) (76.4) (04/14/22 1810)  Body mass index is 22.84 kg/m².  Body surface area is 1.97 meters squared.    I/O last 3 completed shifts:  In: 1160 [P.O.:1160]  Out: 1050 [Urine:850; Emesis/NG output:200]    Physical Exam  Vitals and nursing note reviewed.   Constitutional:       General: He is not in acute distress.     Appearance: He is normal weight. He is ill-appearing and toxic-appearing. He is not diaphoretic.   HENT:      Head: Normocephalic and atraumatic.      Nose: Nose normal. No congestion or rhinorrhea.      Mouth/Throat:      Mouth: Mucous membranes are dry.      Pharynx: Oropharynx is clear.   Eyes:      Extraocular Movements: Extraocular movements intact.      Pupils: Pupils are equal, round, and reactive to light.   Cardiovascular:      Rate and Rhythm: Normal rate and regular rhythm.      Pulses: Normal pulses.      Heart sounds: Normal heart sounds. No murmur heard.    No friction rub. No gallop.   Pulmonary:      Effort: Pulmonary effort is normal. No respiratory distress.      Breath sounds: Normal breath sounds.    Abdominal:      General: Bowel sounds are decreased. There is no distension.      Tenderness: There is abdominal tenderness. There is no guarding or rebound.   Musculoskeletal:         General: No swelling. Normal range of motion.      Cervical back: Normal range of motion and neck supple.   Skin:     General: Skin is warm and dry.   Neurological:      Mental Status: He is alert. He is disoriented.   Psychiatric:         Mood and Affect: Mood normal.         Behavior: Behavior normal.       Significant Labs:  All labs within the past 24 hours have been reviewed.     Significant Imaging:  All labs within the past 24 hours have been reviewed.    Assessment/Plan:     PAULA (acute kidney injury)  Patient with creatinine 2.0 on admission from baseline 1.0 in the context of reduced oral intake, vomiting, and NG losses. Suspect likely prerenal given improvement with IV fluids.    Plan:  - recommend additional 1 L NS, repeat bmp and consider additional 500-1L NS if improving  - strict intake/output  - avoid nephrotoxic agents and renally dose medications    Hyponatremia  Hyperkalemia  Patent was noted to have sodium 119, potassium of 5.9, on admission in the setting of reduced oral intake and vomiting. Likely hypovolemic hyponatremia on admission. Patient was given 1 L NS bolus and albumin following paracentesis, followed by NS infusion. Initially, sodium improved to 126 then down trended to 123. On assessment patient seems volume down still. Suspect hyperkalemia related to acute kidney injury and reduced urine output. Additionally, given concurrent hyponatremia and hyperkalemia there is concern for adrenal pathology either metastasis or related to immunotherapy. For now, would rule out hypovolemic hyponatremia.    Sodium improving with IV fluid resuscitation but continues to have some degree of hyperkalemia. Hyperkalemia is unclear but could be multifactorial -  if there is urinary retention, high cell turnover, adrenal  pathology, or hyperkalemia from thrombocytosis. CK mildly elevated.     Plan:  - recommend additional 1 L NS, repeat bmp and consider additional 500ml-1L if improving sodium  - encourage oral food intake or consider boost as patient likely has low solute given multiple days NPO  - strict intake and output          Thank you for your consult. I will follow-up with patient. Please contact us if you have any additional questions.    Jimbo Cabral MD  Nephrology  [facility] - Med Surg

## 2022-04-21 NOTE — ASSESSMENT & PLAN NOTE
He presented initially with enlarging right axillary lymph nodes that have grown progressively in size since April 2021. He presented to urgent care and was referred for US which showed multiple enlarged masses in the right axilla most likely reflecting abnormal lymph nodes concerning for malignancy.   He was subsequently referred to dermatology who performed a complete exam of the skin that was negative for suspicious cutaneous lesions as well as punch biopsy of one of the lymph node on 01/04/22. Pathology came back as malignant melanoma.   BRAF mutation: positive for a [id number] mutation.   He subsequently underwent a PET/CT on 01/28/22 which showed multiple hypermetabolic soft tissue masses within the right axillary region, metastatic lesions to the lungs and abdomen (VI segment of the liver measuring a max SUV of 6.99, 1.6 x 1.9 cm nodular soft tissue density with a max SUV  7.64 was seen adjacent to the colon within the right lower quadrant. Brain MRI was negative for metastatic disease.  LDH was 307 U/L  He saw Oncology at [facility] on 01/31/22 with plan to start First line Nivolumab / ipilimumab. He received C1 on 03/03/22.   MRI brain with multiple small frontal lobe lesions without mass effect concerning for metastatic disease. Rad onc consulted, no recommendations for RT at this time,  Will f/u in clinic.     -- pt scheduled to start binimetinib and encorafenib prior to admission.  -- will start on 4/21

## 2022-04-21 NOTE — ASSESSMENT & PLAN NOTE
Pt presenting with severe hyponatremia to 119 in ED. Likely hypovolemic hyponatremia s/o poor PO And N/V. Na on labs 10 days prior to admission 128. Pt presenting with severe intractable nausea and vomiting. No changes in mental status or seizures. Sxs improving with improvement in Na. Nephro following, pt receiving intermittent NS boluses. Now on continuous IVF. Na 125  4/21    -- nephro following  -- continue IVF to match output  -- s/p therapeutic paracentesis on 4/14  --  Will repeat therapeutic para prior to discharge.

## 2022-04-21 NOTE — ASSESSMENT & PLAN NOTE
Pt with potassium 5.9 on CMP in ED. K 6.4 on istat. EKG without any acute changes. Pt shifted in ED with repeat BMP showing K 5.5. Unclear etiology of acute hyperkalemia, though patient does have concurrent PAULA. K consistently elevated, 5.9 on 4/16. S/p shift again on 4/17 with improvement in K to 5.1. AM cortisol elevated to 44. Initial oncern for potential adrenal insufficiency vs adrenal mets though no convincing evidence of lesions on CT. Am cortisol  Elevated.     -- K+ 4.9 4/21  -- serial bmp  -- avoid potential potassium-increasing medications  -- ACTH pending

## 2022-04-21 NOTE — ASSESSMENT & PLAN NOTE
ASSESSMENT     Delirium due to multiple etiologies   R/O Adjustment disorder    RECOMMENDATION(S)      1. Scheduled Medication(s):  - Continue scheduled Depakote 250 mg TID    2. PRN Medication(s):  - Continue Zyprexa 10 mg PO-or- IM q8 hours PRN non-redirectable agitation (offer PO first; IM only if refuses or unable to take PO)    - No parenteral lorazepam within 2 hours of IM Zyprexa  - Avoid benzos as these can worsen delirium / confusion     3.  Monitor:  Please obtain daily EKG to monitor QTc  Monitor LFTs and platelets with depakote    4. Legal Status/Precaution(s):  Continue PEC for now  1:1 sitter    5. Other:    DELIRIUM BEHAVIOR MANAGEMENT   PLEASE utilize CHEMICAL restraints with PRN meds first for agitation. Minimize use of PHYSICAL restraints   Keep window shades open and room lit during day and room dim at night in order to promote normal sleep-wake cycles   Encourage family at bedside. [place] patient often to situation, location, date   Continue to Limit or Discontinue use of Narcotics, Benzos and Anti-cholinergic medications as they may worsen delirium   Continue medical workup for causative etiology of Delirium    Discussed with Dr. Wills.     Appreciate consult. Will continue to follow.     Please contact ON CALL psychiatry service for any acute issues that may arise.

## 2022-04-21 NOTE — PROGRESS NOTES
[facility] - [facility]  Psychiatry  Progress Note    Patient Name: Joseluis Garner  MRN: [id number]   Code Status: Full Code  Admission Date: 4/14/2022  Hospital Length of Stay: 6 days  Expected Discharge Date: 4/24/2022  Attending Physician: Meghan Rios MD  Primary Care Provider: Primary Doctor No    Current Legal Status: Physician's Emergency Certificate (PEC)      Subjective:     Patient is a 51 y.o., male, presents with:    Principal Problem:Ascites    Chief Complaint: delirium     HPI: Per Hospital Medicine:  51 y.o. male with history of CAD s/p PCI, seizures, substance abuse, stage IV malignant melanoma presenting from oncology clinic with multiple electrolyte abnormalities and worsening ascites. Pt has hx of malignant melanoma first diagnosed 1/2022. He is s/p 1 round of tx on 3/3/2022, with  plans to start binimetinib and enorafenib in  the near future. However, patient's clinical status has been deteriorating over the past few weeks. He was admitted on 03/18 at [facility] for abdominal pain, and distension. He underwent therapeutic paracentesis with removal of 3L, and was discharged on 03/22. CT abdomen during that admission showed soft tissue attenuation throughout the peritoneum consistent with innumerable peritoneal implants.  He was then readmitted the following day 03/23 for re accumulation and had another paracentesis with removal of 4.7L. Pt was again readmitted 03/30 to 04/02 and underwent paracentesis. He was planned to receive pleur-X catheter as outpatient. [place] [facility] [place] worsening abdominal distension and pain. He underwent therapeutic paracentesis with removal of 5L. Cytology was positive for malignant cells. He was discharged on 04/05 on Cipro for SBP coverage. Since his discharge, he notes worsening abdominal distension. He had progressive nausea and vomiting and has not been able to tolerate any diet including water. He has tried zofran at home which has not helped. He  "denies any recent fever, chills, cough, sore throat. He was evaluated in  oncology clinic this morning  with his mother, during which time he had several episodes of vomiting. Labs obtained during clinic were concerning for leukocytosis, hyperkalemia, and hyponatremia. He was sent to the ED for admission for management of ascites, and other current comorbid conditions. In the ED, Pt afebrile, /85, HR 80s,  ALEJANDRO. CBC notable for  WBC 19, Plt 619. K  5.9, Na 119.  Alb 2.6. Cr 2.0 from baseline 1.0. TSH 8.8, though free T4 wnl. Procal elevated to 1.1. EKG with no acute changes, no  T wave abnormalities.  Pt with severe agitation and mental status changes overnight. Pt required ativan, haldol, zyprexa, benadryl, depakote for sedation. This morning pt very altered and agitated.  Pt oriented to self only. Rapid pressured tangential speech.     --------------  Initial Psychiatric Interview: 04/19/2022   Met with the patient while patient lying in his hospital bed. He seemed very restless and agitated. He was constantly trying to get out of his restraints. He presented as an alert and orientated x 3. When he was asked about the reason for his hospitalization he stated, "I have metastasized cancer in my lungs, heart, stomach, and my lymph". He reported feeling very depression and hopeless. He endorsed depressive symptoms of low mood, amotivation, anhedonia, decreased concentration, feelings of guilt, worthlessness, hopelessness, fatigue, poor sleep, poor appetite, and passive suicidal thoughts without a plan or intent. When he was asked about having suicidal thoughts he replied, "yes. This is not the life I want for me but I don't want to kill myself. I will try to keep myself comfortable and see what alternative they have for me. I don't want to die but I don't want to live like this". He denied previous suicide attempts. He reported feeling very anxious. He stated he started to have panic attacks about 2 weeks ago " and so far he had 3 panic attacks. He stated he wants to leave the hospital and go home to live with his mother and stated his sister, and aunt will all help in taking care of him. He stated he lost his job recently as a  and moved to [place] from [place] recently to live with his mother due to cancer. He denied HI and no AVH. He denied being on antidepressant. He denied alcohol abuse but reported being in detention for one DUI. He denied drug abuse but reported occasional marijuana use and his last use was 2 weeks ago. HE stated when he was younger he tried methamphetamine but stated he has not used in a long time. He denied auditory or visual hallucinations.     Psychiatric Review Of Systems - Is patient experiencing or having changes in:  sleep: not sleeping  appetite: not eating  weight: lost 30 lbs in the past 2 weeks  energy/anergy: down.   interest/pleasure/anhedonia: yes  somatic symptoms: no  anxiety/panic: yes  guilty/hopelessness: yes  concentration: yes  S.I.B.s/risky behavior: no  Irritability: yes  Racing thoughts: yes  Impulsive behaviors: no  Paranoia:no  AVH:No  Suicidal thoughts/plan/intent: See HPI. No SI plan or intent        Hospital Course: 04/20/2022  Agitation improved with scheduled Depakote, no additional PRNs for agitation given overnight. Patient out of room for imaging this morning. Seen by staff in the afternoon.           Family History    None       Tobacco Use    Smoking status: Current Every Day Smoker    Smokeless tobacco: Never Used   Substance and Sexual Activity    Alcohol use: Yes    Drug use: Yes     Types: Methamphetamines, Marijuana    Sexual activity: Not on file     Psychotherapeutics (From admission, onward)                Start     Stop Route Frequency Ordered    04/19/22 1541  OLANZapine injection 10 mg         -- IM Every 8 hours PRN 04/19/22 1441          Review of Systems   Unable to perform ROS: Mental status change   Constitutional:  Positive for  malaise/fatigue.        Objective:     Vital Signs (Most Recent):  Temp: 97.8 °F (36.6 °C) (04/20/22 1939)  Pulse: 96 (04/20/22 1939)  Resp: 18 (04/20/22 1939)  BP: (!) 151/97 (04/20/22 1939)  SpO2: 95 % (04/20/22 1939)   Vital Signs (24h Range):  Temp:  [97.8 °F (36.6 °C)-98.8 °F (37.1 °C)] 97.8 °F (36.6 °C)  Pulse:  [] 96  Resp:  [16-20] 18  SpO2:  [93 %-99 %] 95 %  BP: (125-158)/(78-98) 151/97     Height: 6' (182.9 cm)  Weight: 76.4 kg (168 lb 6.9 oz) (76.4)  Body mass index is 22.84 kg/m².      Intake/Output Summary (Last 24 hours) at 4/20/2022 2028  Last data filed at 4/20/2022 1753  Gross per 24 hour   Intake 920 ml   Output 650 ml   Net 270 ml     MENTAL STATUS EXAMINATION  General Appearance:  ill-appearing  Arousal: drowsy  Behavior:  improved agitation  Movements and Motor Activity:  unable to assess  Muscle Strength and Tone:  unable to assess  Gait and Station: unable to assess - patient lying down or seated  Orientation:  impaired  Speech:  improved  Language:  English  Mood: drowsy  Affect: less agitated  Thought Process: impaired  Associations: impaired  Thought Content and Perceptions: no SI, no HI  Recent and Remote Memory: not assessed  Attention and Concentration: impaired  Fund of Knowledge: not assessed  Insight: limited  Judgment: limited    Physical Exam     Significant Labs: Last 24 Hours:   Recent Lab Results  (Last 5 results in the past 24 hours)        04/20/22  1823   04/20/22  1211   04/20/22  0850   04/20/22  0540   04/20/22  0336        Albumin     2.1           Alkaline Phosphatase     60           ALT     18           Anion Gap     13           AST     43           Baso #     0.02           Basophil %     0.1           BILIRUBIN TOTAL     0.3  Comment: For infants and newborns, interpretation of results should be based  on gestational age, weight and in agreement with clinical  observations.    Premature Infant recommended reference ranges:  Up to 24 hours.............<8.0  mg/dL  Up to 48 hours............<12.0 mg/dL  3-5 days..................<15.0 mg/dL  6-29 days.................<15.0 mg/dL             BUN     36           Calcium     8.2           Chloride     89           CO2     24           Creatinine     1.4           Differential Method     Automated           eGFR if      >60.0           eGFR if non      57.4  Comment: Calculation used to obtain the estimated glomerular filtration  rate (eGFR) is the CKD-EPI equation.              Eos #     0.0           Eosinophil %     0.0           Glucose     126           Gran # (ANC)     19.9           Gran %     91.7           Hematocrit     43.6           Hemoglobin     14.1           Immature Grans (Abs)     0.16  Comment: Mild elevation in immature granulocytes is non specific and   can be seen in a variety of conditions including stress response,   acute inflammation, trauma and pregnancy. Correlation with other   laboratory and clinical findings is essential.             Immature Granulocytes     0.7           Lymph #     0.8           Lymph %     3.5           Magnesium     2.1           MCH     25.7           MCHC     32.3           MCV     79           Mono #     0.9           Mono %     4.0           MPV     9.3           nRBC     0           Phosphorus     3.7           Platelets     554           POCT Glucose 128   130     122   128       Potassium     4.4           PROTEIN TOTAL     4.9           RBC     5.49           RDW     14.2           Sodium     126           WBC     21.73                                  Significant Imaging:  MRI brain pending       Scheduled Medications:   enoxaparin  40 mg Subcutaneous Daily    famotidine  20 mg Oral BID    fentaNYL  1 patch Transdermal Q72H    levETIRAcetam  1,000 mg Oral BID    valproate sodium (DEPACON) IVPB  250 mg Intravenous Q8H       PRN Medications:  albuterol-ipratropium, [COMPLETED] calcium gluconate IVPB **AND** calcium gluconate  IVPB, dextrose 10%, dextrose 10%, glucagon (human recombinant), glucose, glucose, HYDROmorphone, hydrOXYzine HCL, LIDOcaine HCl 2%, naloxone, OLANZapine, oxyCODONE, prochlorperazine, sodium chloride 0.9%    Review of patient's allergies indicates:  No Known Allergies    Assessment/Plan:     Agitation  ASSESSMENT     Delirium due to multiple etiologies   R/O Adjustment disorder    RECOMMENDATION(S)      1. Scheduled Medication(s):  - Continue scheduled Depakote 250 mg TID    2. PRN Medication(s):  - Continue Zyprexa 10 mg PO-or- IM q8 hours PRN non-redirectable agitation (offer PO first; IM only if refuses or unable to take PO)    - No parenteral lorazepam within 2 hours of IM Zyprexa  - Avoid benzos as these can worsen delirium / confusion     3.  Monitor:  Please obtain daily EKG to monitor QTc  Monitor LFTs and platelets with depakote    4. Legal Status/Precaution(s):  Continue PEC for now  1:1 sitter    5. Other:    DELIRIUM BEHAVIOR MANAGEMENT   PLEASE utilize CHEMICAL restraints with PRN meds first for agitation. Minimize use of PHYSICAL restraints   Keep window shades open and room lit during day and room dim at night in order to promote normal sleep-wake cycles   Encourage family at bedside. Foster patient often to situation, location, date   Continue to Limit or Discontinue use of Narcotics, Benzos and Anti-cholinergic medications as they may worsen delirium   Continue medical workup for causative etiology of Delirium    Discussed with Dr. Wills.     Appreciate consult. Will continue to follow.     Please contact ON CALL psychiatry service for any acute issues that may arise.            Kwan Hernandze MD  [facility]

## 2022-04-21 NOTE — ASSESSMENT & PLAN NOTE
Hyperkalemia  Patent was noted to have sodium 119, potassium of 5.9, on admission in the setting of reduced oral intake and vomiting. Likely hypovolemic hyponatremia on admission. Patient was given 1 L NS bolus and albumin following paracentesis, followed by NS infusion. Initially, sodium improved to 126 then down trended to 123. On assessment patient seems volume down still. Suspect hyperkalemia related to acute kidney injury and reduced urine output. Additionally, given concurrent hyponatremia and hyperkalemia there is concern for adrenal pathology either metastasis or related to immunotherapy. For now, would rule out hypovolemic hyponatremia.    Sodium improving with IV fluid resuscitation but continues to have some degree of hyperkalemia. Hyperkalemia is unclear but could be multifactorial -  if there is urinary retention, high cell turnover, adrenal pathology, or hyperkalemia from thrombocytosis. CK mildly elevated.     Plan:  - recommend additional 1 L NS, repeat bmp and consider additional 500ml-1L if improving sodium  - encourage oral food intake or consider boost as patient likely has low solute given multiple days NPO  - strict intake and output

## 2022-04-21 NOTE — ASSESSMENT & PLAN NOTE
Patient with creatinine 2.0 on admission from baseline 1.0 in the context of reduced oral intake, vomiting, and NG losses. Suspect likely prerenal given improvement with IV fluids.    Plan:  - recommend additional 1 L NS, repeat bmp and consider additional 500-1L NS if improving  - strict intake/output  - avoid nephrotoxic agents and renally dose medications

## 2022-04-22 LAB
ALBUMIN SERPL BCP-MCNC: 2.1 G/DL (ref 3.5–5.2)
ALP SERPL-CCNC: 60 U/L (ref 55–135)
ALT SERPL W/O P-5'-P-CCNC: 19 U/L (ref 10–44)
ANION GAP SERPL CALC-SCNC: 11 MMOL/L (ref 8–16)
ANION GAP SERPL CALC-SCNC: 13 MMOL/L (ref 8–16)
AST SERPL-CCNC: 33 U/L (ref 10–40)
BASOPHILS # BLD AUTO: 0.03 K/UL (ref 0–0.2)
BASOPHILS NFR BLD: 0.2 % (ref 0–1.9)
BILIRUB SERPL-MCNC: 0.3 MG/DL (ref 0.1–1)
BUN SERPL-MCNC: 49 MG/DL (ref 6–20)
BUN SERPL-MCNC: 54 MG/DL (ref 6–20)
CALCIUM SERPL-MCNC: 8.2 MG/DL (ref 8.7–10.5)
CALCIUM SERPL-MCNC: 8.2 MG/DL (ref 8.7–10.5)
CHLORIDE SERPL-SCNC: 86 MMOL/L (ref 95–110)
CHLORIDE SERPL-SCNC: 87 MMOL/L (ref 95–110)
CK SERPL-CCNC: 217 U/L (ref 20–200)
CO2 SERPL-SCNC: 22 MMOL/L (ref 23–29)
CO2 SERPL-SCNC: 23 MMOL/L (ref 23–29)
CREAT SERPL-MCNC: 2.3 MG/DL (ref 0.5–1.4)
CREAT SERPL-MCNC: 2.3 MG/DL (ref 0.5–1.4)
DIFFERENTIAL METHOD: ABNORMAL
EOSINOPHIL # BLD AUTO: 0 K/UL (ref 0–0.5)
EOSINOPHIL NFR BLD: 0.1 % (ref 0–8)
ERYTHROCYTE [DISTWIDTH] IN BLOOD BY AUTOMATED COUNT: 14.6 % (ref 11.5–14.5)
EST. GFR  (AFRICAN AMERICAN): 36.4 ML/MIN/1.73 M^2
EST. GFR  (AFRICAN AMERICAN): 36.4 ML/MIN/1.73 M^2
EST. GFR  (NON AFRICAN AMERICAN): 31.5 ML/MIN/1.73 M^2
EST. GFR  (NON AFRICAN AMERICAN): 31.5 ML/MIN/1.73 M^2
GLUCOSE SERPL-MCNC: 70 MG/DL (ref 70–110)
GLUCOSE SERPL-MCNC: 93 MG/DL (ref 70–110)
HCT VFR BLD AUTO: 45.4 % (ref 40–54)
HGB BLD-MCNC: 15.2 G/DL (ref 14–18)
IMM GRANULOCYTES # BLD AUTO: 0.13 K/UL (ref 0–0.04)
IMM GRANULOCYTES NFR BLD AUTO: 0.8 % (ref 0–0.5)
LYMPHOCYTES # BLD AUTO: 1 K/UL (ref 1–4.8)
LYMPHOCYTES NFR BLD: 5.8 % (ref 18–48)
MAGNESIUM SERPL-MCNC: 2.3 MG/DL (ref 1.6–2.6)
MCH RBC QN AUTO: 26.5 PG (ref 27–31)
MCHC RBC AUTO-ENTMCNC: 33.5 G/DL (ref 32–36)
MCV RBC AUTO: 79 FL (ref 82–98)
MONOCYTES # BLD AUTO: 0.6 K/UL (ref 0.3–1)
MONOCYTES NFR BLD: 3.9 % (ref 4–15)
NEUTROPHILS # BLD AUTO: 14.5 K/UL (ref 1.8–7.7)
NEUTROPHILS NFR BLD: 89.2 % (ref 38–73)
NRBC BLD-RTO: 0 /100 WBC
OSMOLALITY UR: 489 MOSM/KG (ref 50–1200)
PHOSPHATE SERPL-MCNC: 4.8 MG/DL (ref 2.7–4.5)
PLATELET # BLD AUTO: 399 K/UL (ref 150–450)
PLATELET BLD QL SMEAR: ABNORMAL
PMV BLD AUTO: 10.2 FL (ref 9.2–12.9)
POCT GLUCOSE: 93 MG/DL (ref 70–110)
POCT GLUCOSE: 97 MG/DL (ref 70–110)
POTASSIUM SERPL-SCNC: 5.2 MMOL/L (ref 3.5–5.1)
POTASSIUM SERPL-SCNC: 5.3 MMOL/L (ref 3.5–5.1)
PROT SERPL-MCNC: 4.8 G/DL (ref 6–8.4)
RBC # BLD AUTO: 5.74 M/UL (ref 4.6–6.2)
SODIUM SERPL-SCNC: 120 MMOL/L (ref 136–145)
SODIUM SERPL-SCNC: 122 MMOL/L (ref 136–145)
SODIUM UR-SCNC: <10 MMOL/L (ref 20–250)
VALPROATE SERPL-MCNC: 42.1 UG/ML (ref 50–100)
WBC # BLD AUTO: 16.27 K/UL (ref 3.9–12.7)

## 2022-04-22 PROCEDURE — 93010 EKG 12-LEAD: ICD-10-PCS | Mod: ,,, | Performed by: INTERNAL MEDICINE

## 2022-04-22 PROCEDURE — 25000003 PHARM REV CODE 250

## 2022-04-22 PROCEDURE — 63600175 PHARM REV CODE 636 W HCPCS

## 2022-04-22 PROCEDURE — 93010 ELECTROCARDIOGRAM REPORT: CPT | Mod: ,,, | Performed by: INTERNAL MEDICINE

## 2022-04-22 PROCEDURE — 11000001 HC ACUTE MED/SURG PRIVATE ROOM

## 2022-04-22 PROCEDURE — 80048 BASIC METABOLIC PNL TOTAL CA: CPT | Mod: XB

## 2022-04-22 PROCEDURE — 82550 ASSAY OF CK (CPK): CPT | Performed by: NURSE PRACTITIONER

## 2022-04-22 PROCEDURE — 83935 ASSAY OF URINE OSMOLALITY: CPT

## 2022-04-22 PROCEDURE — 99232 PR SUBSEQUENT HOSPITAL CARE,LEVL II: ICD-10-PCS | Mod: ,,, | Performed by: INTERNAL MEDICINE

## 2022-04-22 PROCEDURE — 84100 ASSAY OF PHOSPHORUS: CPT

## 2022-04-22 PROCEDURE — 63600175 PHARM REV CODE 636 W HCPCS: Performed by: STUDENT IN AN ORGANIZED HEALTH CARE EDUCATION/TRAINING PROGRAM

## 2022-04-22 PROCEDURE — 83735 ASSAY OF MAGNESIUM: CPT

## 2022-04-22 PROCEDURE — 93005 ELECTROCARDIOGRAM TRACING: CPT

## 2022-04-22 PROCEDURE — 97161 PT EVAL LOW COMPLEX 20 MIN: CPT

## 2022-04-22 PROCEDURE — 80053 COMPREHEN METABOLIC PANEL: CPT

## 2022-04-22 PROCEDURE — 99232 SBSQ HOSP IP/OBS MODERATE 35: CPT | Mod: ,,, | Performed by: INTERNAL MEDICINE

## 2022-04-22 PROCEDURE — 94640 AIRWAY INHALATION TREATMENT: CPT

## 2022-04-22 PROCEDURE — 85025 COMPLETE CBC W/AUTO DIFF WBC: CPT

## 2022-04-22 PROCEDURE — 80164 ASSAY DIPROPYLACETIC ACD TOT: CPT | Performed by: STUDENT IN AN ORGANIZED HEALTH CARE EDUCATION/TRAINING PROGRAM

## 2022-04-22 PROCEDURE — 94761 N-INVAS EAR/PLS OXIMETRY MLT: CPT

## 2022-04-22 PROCEDURE — 84300 ASSAY OF URINE SODIUM: CPT

## 2022-04-22 PROCEDURE — 36415 COLL VENOUS BLD VENIPUNCTURE: CPT | Performed by: NURSE PRACTITIONER

## 2022-04-22 PROCEDURE — 25000242 PHARM REV CODE 250 ALT 637 W/ HCPCS: Performed by: STUDENT IN AN ORGANIZED HEALTH CARE EDUCATION/TRAINING PROGRAM

## 2022-04-22 RX ORDER — PROCHLORPERAZINE EDISYLATE 5 MG/ML
5 INJECTION INTRAMUSCULAR; INTRAVENOUS EVERY 6 HOURS PRN
Status: DISCONTINUED | OUTPATIENT
Start: 2022-04-22 | End: 2022-04-24 | Stop reason: HOSPADM

## 2022-04-22 RX ORDER — PROCHLORPERAZINE MALEATE 5 MG
5 TABLET ORAL 4 TIMES DAILY PRN
Status: DISCONTINUED | OUTPATIENT
Start: 2022-04-22 | End: 2022-04-22

## 2022-04-22 RX ORDER — HYDROMORPHONE HYDROCHLORIDE 1 MG/ML
0.5 INJECTION, SOLUTION INTRAMUSCULAR; INTRAVENOUS; SUBCUTANEOUS ONCE
Status: COMPLETED | OUTPATIENT
Start: 2022-04-22 | End: 2022-04-22

## 2022-04-22 RX ORDER — FAMOTIDINE 20 MG/1
20 TABLET, FILM COATED ORAL DAILY
Status: DISCONTINUED | OUTPATIENT
Start: 2022-04-23 | End: 2022-04-23

## 2022-04-22 RX ADMIN — OXYCODONE 5 MG: 5 TABLET ORAL at 08:04

## 2022-04-22 RX ADMIN — HYDROMORPHONE HYDROCHLORIDE 0.5 MG: 1 INJECTION, SOLUTION INTRAMUSCULAR; INTRAVENOUS; SUBCUTANEOUS at 11:04

## 2022-04-22 RX ADMIN — DIVALPROEX SODIUM 250 MG: 250 TABLET, DELAYED RELEASE ORAL at 02:04

## 2022-04-22 RX ADMIN — DIVALPROEX SODIUM 250 MG: 250 TABLET, DELAYED RELEASE ORAL at 06:04

## 2022-04-22 RX ADMIN — SODIUM ZIRCONIUM CYCLOSILICATE 5 G: 5 POWDER, FOR SUSPENSION ORAL at 09:04

## 2022-04-22 RX ADMIN — SODIUM CHLORIDE 1000 ML: 0.9 INJECTION, SOLUTION INTRAVENOUS at 06:04

## 2022-04-22 RX ADMIN — HYDROMORPHONE HYDROCHLORIDE 0.5 MG: 1 INJECTION, SOLUTION INTRAMUSCULAR; INTRAVENOUS; SUBCUTANEOUS at 02:04

## 2022-04-22 RX ADMIN — DIVALPROEX SODIUM 250 MG: 250 TABLET, DELAYED RELEASE ORAL at 09:04

## 2022-04-22 RX ADMIN — IPRATROPIUM BROMIDE AND ALBUTEROL SULFATE 3 ML: 2.5; .5 SOLUTION RESPIRATORY (INHALATION) at 02:04

## 2022-04-22 RX ADMIN — PROCHLORPERAZINE MALEATE 5 MG: 5 TABLET, FILM COATED ORAL at 11:04

## 2022-04-22 RX ADMIN — HYDROMORPHONE HYDROCHLORIDE 0.5 MG: 1 INJECTION, SOLUTION INTRAMUSCULAR; INTRAVENOUS; SUBCUTANEOUS at 08:04

## 2022-04-22 RX ADMIN — PROCHLORPERAZINE EDISYLATE 5 MG: 5 INJECTION INTRAMUSCULAR; INTRAVENOUS at 11:04

## 2022-04-22 RX ADMIN — LEVETIRACETAM 1000 MG: 500 TABLET, FILM COATED ORAL at 08:04

## 2022-04-22 RX ADMIN — HYDROMORPHONE HYDROCHLORIDE 0.5 MG: 1 INJECTION, SOLUTION INTRAMUSCULAR; INTRAVENOUS; SUBCUTANEOUS at 06:04

## 2022-04-22 RX ADMIN — POLYETHYLENE GLYCOL 3350 17 G: 17 POWDER, FOR SOLUTION ORAL at 08:04

## 2022-04-22 RX ADMIN — SODIUM CHLORIDE 1000 ML: 0.9 INJECTION, SOLUTION INTRAVENOUS at 08:04

## 2022-04-22 RX ADMIN — LEVETIRACETAM 1000 MG: 500 TABLET, FILM COATED ORAL at 09:04

## 2022-04-22 RX ADMIN — OXYCODONE 5 MG: 5 TABLET ORAL at 09:04

## 2022-04-22 RX ADMIN — PROCHLORPERAZINE EDISYLATE 5 MG: 5 INJECTION INTRAMUSCULAR; INTRAVENOUS at 02:04

## 2022-04-22 RX ADMIN — PROCHLORPERAZINE EDISYLATE 5 MG: 5 INJECTION INTRAMUSCULAR; INTRAVENOUS at 09:04

## 2022-04-22 RX ADMIN — ENOXAPARIN SODIUM 40 MG: 100 INJECTION SUBCUTANEOUS at 06:04

## 2022-04-22 RX ADMIN — FAMOTIDINE 20 MG: 20 TABLET ORAL at 08:04

## 2022-04-22 NOTE — SUBJECTIVE & OBJECTIVE
Family History    None       Tobacco Use    Smoking status: Current Every Day Smoker    Smokeless tobacco: Never Used   Substance and Sexual Activity    Alcohol use: Yes    Drug use: Yes     Types: Methamphetamines, Marijuana    Sexual activity: Not on file     Psychotherapeutics (From admission, onward)                Start     Stop Route Frequency Ordered    04/19/22 1541  OLANZapine injection 10 mg         -- IM Every 8 hours PRN 04/19/22 1441          ROS  Medical Review Of Systems:  Complete review of systems performed covering Constitutional, Eyes, ENT/Mouth, Cardiovascular, Respiratory, Gastrointestinal, Genitourinary, Musculoskeletal, Skin, Neurologic, Endocrine, Heme/Lymph, and Allergy/Immune.     Complete review of systems was negative with the exception of the following positive symptoms: fatigue, malaise, abdominal distention & pain.    Psych ROS covered elsewhere in the note(see above).        Objective:     Vital Signs (Most Recent):  Temp: 97.7 °F (36.5 °C) (04/21/22 1954)  Pulse: 89 (04/21/22 1954)  Resp: 18 (04/21/22 1954)  BP: (!) 140/100 (04/21/22 1954)  SpO2: 97 % (04/21/22 1954)   Vital Signs (24h Range):  Temp:  [96.4 °F (35.8 °C)-98.4 °F (36.9 °C)] 97.7 °F (36.5 °C)  Pulse:  [] 89  Resp:  [16-20] 18  SpO2:  [94 %-98 %] 97 %  BP: (111-143)/() 140/100     Height: 6' (182.9 cm)  Weight: 76.4 kg (168 lb 6.9 oz) (76.4)  Body mass index is 22.84 kg/m².      Intake/Output Summary (Last 24 hours) at 4/21/2022 2010  Last data filed at 4/21/2022 0600  Gross per 24 hour   Intake 240 ml   Output 400 ml   Net -160 ml       MENTAL STATUS EXAMINATION  General Appearance:  ill-appearing, NAD  Arousal: drowsy  Behavior: calm and cooperative  Movements and Motor Activity: no psychomotor agitation or retardation  Muscle Strength and Tone:  not assessed  Gait and Station: unable to assess - patient lying down or seated  Orientation: grossly intact, oriented to situation  Speech:  intact  Language:   English  Mood: improved irritability, anxious  Affect: constricted  Thought Process: linear  Associations: intact  Thought Content and Perceptions: no SI, no HI, no hallucinations  Recent and Remote Memory: grossly intact  Attention and Concentration: improved  Fund of Knowledge: not assessed  Insight: improved  Judgment: improved    Physical Exam     Significant Labs: Last 24 Hours:   Recent Lab Results         04/21/22  1807   04/21/22  0717   04/21/22  0515   04/21/22  0019        Albumin     2.1         Alkaline Phosphatase     59         ALT     19         Anion Gap     12         AST     37         Baso #     0.02         Basophil %     0.1         BILIRUBIN TOTAL     0.3  Comment: For infants and newborns, interpretation of results should be based  on gestational age, weight and in agreement with clinical  observations.    Premature Infant recommended reference ranges:  Up to 24 hours.............<8.0 mg/dL  Up to 48 hours............<12.0 mg/dL  3-5 days..................<15.0 mg/dL  6-29 days.................<15.0 mg/dL           BUN     42         Calcium     8.0         Chloride     90         CO2     23         Creatinine     1.7         Differential Method     Automated         eGFR if      52.4         eGFR if non      45.4  Comment: Calculation used to obtain the estimated glomerular filtration  rate (eGFR) is the CKD-EPI equation.            Eos #     0.0         Eosinophil %     0.1         Glucose     112         Gran # (ANC)     18.2         Gran %     92.4         Hematocrit     44.4         Hemoglobin     14.8         Immature Grans (Abs)     0.14  Comment: Mild elevation in immature granulocytes is non specific and   can be seen in a variety of conditions including stress response,   acute inflammation, trauma and pregnancy. Correlation with other   laboratory and clinical findings is essential.           Immature Granulocytes     0.7         Lymph #     0.6          Lymph %     3.0         Magnesium     2.2         MCH     26.0         MCHC     33.3         MCV     78         Mono #     0.7         Mono %     3.7         MPV     9.6         nRBC     0         Osmolality     273         Phosphorus     3.8         Platelets     515         POCT Glucose 193   250     138       Potassium     4.9         PROTEIN TOTAL     4.9         RBC     5.69         RDW     14.5         Sodium     125         WBC     19.71                 Significant Imaging:   X-Ray Abdomen AP 1 View   Final Result      See above         Electronically signed by: Jay Ramsey MD   Date:    04/21/2022   Time:    13:14      MRI Brain W WO Contrast   Final Result   Abnormal      Two subcentimeter enhancing foci noted along the left frontal and right parietal parasagittal cortex/subcortical white matter suspicious for metastatic disease.  Please note the thin-section images are too degraded by motion artifact.      This report was flagged in Epic as abnormal.      Electronically signed by resident: Gaurav Norman   Date:    04/20/2022   Time:    16:17      Electronically signed by: Zhen Chairez   Date:    04/20/2022   Time:    17:57      X-Ray Skull Ltd Less Than 4 Views   Final Result      As above.         Electronically signed by: Marky Carney   Date:    04/20/2022   Time:    12:39      US Retroperitoneal Complete   Final Result      Normal morphologic appearance of the bilateral kidneys.  No hydronephrosis or evidence of obstructive uropathy.      Large volume ascites.      Electronically signed by resident: Enrique Ibarra   Date:    04/18/2022   Time:    13:56      Electronically signed by: Ej Wise MD   Date:    04/18/2022   Time:    14:02      X-ray Abdomen for NG Tube Placement (Nursing should notify Radiology after placement)   Final Result      1. Tip of the nasogastric tube in the expected location of the distal antrum or the pylorus of the stomach.  There is no perforation.   2. Left  midlung zone pulmonary nodule as above.         Electronically signed by: Lamine Sim MD   Date:    04/18/2022   Time:    09:44      X-Ray Abdomen AP 1 View   Final Result      Ileus versus obstruction.         Electronically signed by: Darci Armstrong MD   Date:    04/18/2022   Time:    08:03      X-ray Abdomen for NG Tube Placement (Nursing should notify Radiology after placement)   Final Result      As above.         Electronically signed by: Jhon Goetz   Date:    04/16/2022   Time:    17:19      X-Ray Abdomen AP 1 View   Final Result      As above.         Electronically signed by: Jhon Goetz   Date:    04/16/2022   Time:    13:17      CT Chest Abdomen Pelvis Without Contrast (XPD)   Final Result      In this patient with a reported history of metastatic melanoma, there is diffuse metastatic disease involving the right axilla, bilateral lungs, and peritoneal metastasis as described in detail above.  Additional subcutaneous soft tissue nodule which could represent metastatic focus.      Mild wedging of L2 superior endplate suggestive of mild compression fracture.  Recommend correlation with outside imaging.      Diverticulosis.  No evidence of diverticulitis.      Hepatomegaly.      Additional findings as above.      Electronically signed by resident: Baltazar Purcell   Date:    04/16/2022   Time:    09:17      Electronically signed by: Joseluis Airas   Date:    04/16/2022   Time:    12:24      IR Paracentesis without imaging   Final Result      Ultrasound-guided paracentesis with drainage of 7000 mL of elizabeth fluid.  Albumin administered as per protocol.      _______________________________________________________________      Electronically signed by resident: Rudy Otero   Date:    04/14/2022   Time:    17:39      Electronically signed by: Juan Jose Hernández MD   Date:    04/14/2022   Time:    17:46      IR Paracentesis with Imaging    (Results Pending)

## 2022-04-22 NOTE — SUBJECTIVE & OBJECTIVE
Interval History:   Acute worsening of kidney function over the past 24 hours without obvious cause (SCR up to 2.3 from 1.7) along with worsening hyponatremia (125 to 120).  He voices no complaints this morning, remains with poor appetite but has been drinking fluids (with some vomiting).  He has remained on NS @ 75 cc/hr.  Abdomen distended and firm with plans for para today.    Review of patient's allergies indicates:  No Known Allergies  Current Facility-Administered Medications   Medication Frequency    albuterol-ipratropium 2.5 mg-0.5 mg/3 mL nebulizer solution 3 mL Q4H PRN    binimetinib Tab 45 mg BID    calcium gluconat 1 g in NS IVPB (premixed) Q10 Min PRN    dextrose 10% bolus 125 mL PRN    dextrose 10% bolus 250 mL PRN    divalproex EC tablet 250 mg Q8H    encorafenib Cap 450 mg Daily    enoxaparin injection 40 mg Daily    famotidine tablet 20 mg BID    fentaNYL 25 mcg/hr 1 patch Q72H    glucagon (human recombinant) injection 1 mg PRN    glucose chewable tablet 16 g PRN    glucose chewable tablet 24 g PRN    HYDROmorphone injection 0.5 mg Q3H PRN    hydrOXYzine HCL tablet 25 mg TID PRN    levETIRAcetam tablet 1,000 mg BID    LIDOcaine HCl 2% oral solution 15 mL Q4H PRN    naloxone 0.4 mg/mL injection 0.02 mg PRN    OLANZapine injection 10 mg Q8H PRN    oxyCODONE immediate release tablet 5 mg Q4H PRN    polyethylene glycol packet 17 g Daily    prochlorperazine injection Soln 5 mg Q6H PRN    promethazine tablet 12.5 mg Q6H PRN    senna-docusate 8.6-50 mg per tablet 1 tablet Daily PRN    sodium chloride 0.9% flush 10 mL Q12H PRN       Objective:     Vital Signs (Most Recent):  Temp: 97.9 °F (36.6 °C) (04/22/22 0747)  Pulse: 85 (04/22/22 0747)  Resp: 16 (04/22/22 0824)  BP: 115/84 (04/22/22 0747)  SpO2: (!) 93 % (04/22/22 0747)  O2 Device (Oxygen Therapy): room air (04/20/22 0328)   Vital Signs (24h Range):  Temp:  [97.6 °F (36.4 °C)-98.4 °F (36.9 °C)] 97.9 °F (36.6 °C)  Pulse:  [79-96] 85  Resp:  [16-20]  16  SpO2:  [92 %-97 %] 93 %  BP: (111-140)/() 115/84     Weight: 76.4 kg (168 lb 6.9 oz) (76.4) (04/14/22 1810)  Body mass index is 22.84 kg/m².  Body surface area is 1.97 meters squared.    I/O last 3 completed shifts:  In: 240 [P.O.:240]  Out: 400 [Urine:200; Emesis/NG output:200]    Physical Exam  Vitals and nursing note reviewed.   Constitutional:       General: He is not in acute distress.     Appearance: He is normal weight. He is ill-appearing and toxic-appearing. He is not diaphoretic.   HENT:      Head: Normocephalic and atraumatic.      Nose: Nose normal. No congestion or rhinorrhea.      Mouth/Throat:      Mouth: Mucous membranes are dry.      Pharynx: Oropharynx is clear.   Eyes:      Extraocular Movements: Extraocular movements intact.      Pupils: Pupils are equal, round, and reactive to light.   Cardiovascular:      Rate and Rhythm: Normal rate and regular rhythm.      Pulses: Normal pulses.      Heart sounds: Normal heart sounds. No murmur heard.    No friction rub. No gallop.   Pulmonary:      Effort: Pulmonary effort is normal. No respiratory distress.      Breath sounds: Normal breath sounds.   Abdominal:      General: Bowel sounds are decreased. There is distension.      Tenderness: There is abdominal tenderness. There is no guarding or rebound.   Musculoskeletal:         General: No swelling. Normal range of motion.      Cervical back: Normal range of motion and neck supple.   Skin:     General: Skin is warm and dry.   Neurological:      Mental Status: He is alert and oriented to person, place, and time.   Psychiatric:         Mood and Affect: Mood normal.         Behavior: Behavior normal.       Significant Labs:  CBC:   Recent Labs   Lab 04/22/22  0308   WBC 16.27*   RBC 5.74   HGB 15.2   HCT 45.4      MCV 79*   MCH 26.5*   MCHC 33.5     CMP:   Recent Labs   Lab 04/22/22  0308   GLU 93   CALCIUM 8.2*   ALBUMIN 2.1*   PROT 4.8*   *   K 5.2*   CO2 23   CL 86*   BUN 49*    CREATININE 2.3*   ALKPHOS 60   ALT 19   AST 33   BILITOT 0.3

## 2022-04-22 NOTE — PLAN OF CARE
Patient in bed with mother at bedside, complains of pain to abdomen, paracentesis scheduled for tomorrow, patient complained of feeling SOB, oxygen SATs 95%, 2 of oxygen per n/c applied for comfort, respiratory called for breathing treatment, wheezing can be heard when standing at patient's bedside, when continue to monitor, patient sats are 100% on oxygen.    Problem: Renal Function Impairment (Acute Kidney Injury/Impairment)  Goal: Effective Renal Function  Outcome: Ongoing, Progressing  Intervention: Monitor and Support Renal Function  Flowsheets (Taken 4/22/2022 1652)  Medication Review/Management: medications reviewed     Problem: Skin Injury Risk Increased  Goal: Skin Health and Integrity  Outcome: Ongoing, Progressing     Problem: Infection  Goal: Absence of Infection Signs and Symptoms  Intervention: Prevent or Manage Infection  Flowsheets (Taken 4/22/2022 1652)  Isolation Precautions: precautions maintained

## 2022-04-22 NOTE — PLAN OF CARE
PT Eval complete and POC established.    Cherry Viveros, PT, DPT  2022      Problem: Physical Therapy  Goal: Physical Therapy Goal  Description: Goals to be met by: 2022     Patient will increase functional independence with mobility by performin. Sit to stand transfer with Modified [place] and LRAD  2. Gait  x 200 feet with Modified [place] using LRAD.   3. Ascend/descend 14 steps with bilateral Handrails Contact Guard Assistance using No Assistive Device.     2022 1457 by Cherry Viveros PT  Outcome: Ongoing, Progressing

## 2022-04-22 NOTE — PT/OT/SLP EVAL
Physical Therapy Evaluation    Patient Name:  Joseluis Garner   MRN:  [id number]    Recommendations:     Discharge Recommendations:  home with home health, home health PT (and 24/7 assistance)   Discharge Equipment Recommendations: walker, rolling, bath bench   Barriers to discharge: stair trial needed    Assessment:     Joseluis Garner is a 51 y.o. male admitted with a medical diagnosis of Ascites.  He presents with the following impairments/functional limitations:  impaired endurance, impaired functional mobilty, impaired self care skills, gait instability, impaired balance, pain. Pt participated in PT despite recent episode of emesis. Pt demo'd mild balance deficits in standing and during ambulation. Pt would benefit from HHPT Dynamic/static standing/sitting balance through skilled balance training, strengthening with the use of skilled therapeutic exercises interventions, and mobility through adaptive equipment training. Pt continues to benefit from a collaborative PT program to improve quality of life and focus on recovery of impairments.    Rehab Prognosis: Good; patient would benefit from acute skilled PT services to address these deficits and reach maximum level of function.    Recent Surgery: * No surgery found *      Plan:     During this hospitalization, patient to be seen 3 x/week to address the identified rehab impairments via gait training, therapeutic activities, therapeutic exercises, neuromuscular re-education and progress toward the following goals:    · Plan of Care Expires:  05/22/22    Subjective     Chief Complaint: abdominal pain/discomfort, nausea, and vomitting  Patient/Family Comments/goals: to feel better  Pain/Comfort:  · Pain Rating 1:  (not rated)  · Location 1: abdomen (discomfort causing nausea and vomitting)  · Pain Addressed 1: Reposition, Distraction, Nurse notified, Cessation of Activity  · Pain Rating Post-Intervention 1: other (see comments) (not rated)    Patients  cultural, spiritual, Restorationist conflicts given the current situation: no    Living Environment:  Pt lives with his mother in a rased [facility] with 14 KARL and B HRs. Pt has a tub/shower with a shower chair.  Prior to admission, patients level of function was independent for all functional mobility and ADLs.  Equipment used at home: none.  DME owned (not currently used): none.  Upon discharge, patient will have assistance from family.    Objective:     Communicated with RN prior to session.  Patient found supine with  (no active lines)  upon PT entry to room.    General Precautions: Standard, fall   Orthopedic Precautions:N/A   Braces: N/A  Respiratory Status: Room air    Exams:  · Cognitive Exam:  Patient is oriented to Person, Place, Time and Situation  · Fine Motor Coordination:  BLE, heel/shin, WFL  · Gross Motor Coordination:  WFL  · Postural Exam:  Patient presented with the following abnormalities:    · -       Rounded shoulders  · -       Forward head  · Sensation:  Numbness in B fingertips and toes  · RLE ROM: WFL  · RLE Strength: grossly 5/5  · LLE ROM: WFL  · LLE Strength: grossly 5/5    Functional Mobility:  · Bed Mobility:     · Rolling Left:  independence  · Scooting: independence  · Supine to Sit: independence  · Sit to Supine: independence  · Transfers:     · Sit to Stand:  stand by assistance with no AD, mild unsteadiness present  · Gait: 24 ft, CGA, no AD; decreased gait speed, decreased step length, mild unsteadiness present  · Balance:   · Static Sitting: independent  · Dynamic Sitting: independent  · Static Standing: SBA  · Dynamic Standing: CGA for ambualtion    Therapeutic Activities and Exercises:  Patient educated on role of therapy, goals of session, and benefits of mobilizing.   Discussed PT plan of care during hospitalization.   Patient educated on calling for assistance.   Patient educated on how their diagnosis impacts their mobility within PT scope of practice and recommended DME and PT for  after DC.  Communication board up to date.  All questions answered within PT scope of practice.    AM-PAC 6 CLICK MOBILITY  Total Score:20     Patient left HOB elevated with all lines intact, call button in reach, bed alarm on, RN notified and pt's mother present.    GOALS:   Multidisciplinary Problems     Physical Therapy Goals        Problem: Physical Therapy    Goal Priority Disciplines Outcome Goal Variances Interventions   Physical Therapy Goal     PT, PT/OT Ongoing, Progressing     Description: Goals to be met by: 2022     Patient will increase functional independence with mobility by performin. Sit to stand transfer with Modified [place] and LRAD  2. Gait  x 200 feet with Modified [place] using LRAD.   3. Ascend/descend 14 steps with bilateral Handrails Contact Guard Assistance using No Assistive Device.                      History:     Past Medical History:   Diagnosis Date    Seizures        History reviewed. No pertinent surgical history.    Time Tracking:     PT Received On: 22  PT Start Time: 1039     PT Stop Time: 1046  PT Total Time (min): 7 min     Billable Minutes: Evaluation 7      2022

## 2022-04-22 NOTE — ASSESSMENT & PLAN NOTE
Pt presenting with severe hyponatremia to 119 in ED. Likely hypovolemic hyponatremia s/o poor PO And N/V. Na on labs 10 days prior to admission 128. Pt presenting with severe intractable nausea and vomiting. No changes in mental status or seizures. Sxs improving with improvement in Na. Nephro following, pt receiving intermittent NS boluses. Na worsening, now Na 120 On 4/22.    -- nephro following  -- s/p therapeutic paracentesis on 4/14  --  Will repeat therapeutic para prior to discharge.   -- encouraging good PO

## 2022-04-22 NOTE — ASSESSMENT & PLAN NOTE
Pt presenting with several days of intractable nausea and vomiting. Pt unable to keep anything, including water, down. Zofran with no improvement at home. Likely 2/2 hyponatremia and ileus. KUB and CT demonstrating ileus vs partial SBO. NGT placed 4/17, pt keeps pulling out. Replaced x3. Pt with  Multiple BMs and improving nausea, now able to tolerate increasing diet as of 4/21. Will need additional para soon to reduce nausea again.    -- plan for repeat para on 4/22 prior to discharge  -- management of hyponatremia as above  -- IV compazine and phenergan

## 2022-04-22 NOTE — ASSESSMENT & PLAN NOTE
Hyperkalemia  Patent was noted to have sodium 119, potassium of 5.9, on admission in the setting of reduced oral intake and vomiting. Likely hypovolemic hyponatremia on admission. Patient was given 1 L NS bolus and albumin following paracentesis, followed by NS infusion. Initially, sodium improved to 126 then down trended to 123. On assessment patient seems volume down still. Suspect hyperkalemia related to acute kidney injury and reduced urine output. Additionally, given concurrent hyponatremia and hyperkalemia there is concern for adrenal pathology either metastasis or related to immunotherapy. For now, would rule out hypovolemic hyponatremia.    Sodium initially improved with IVF, but now trending back down.  He has had poor solute intake, but able to drink fluids but has some vomiting associated with this.  Now with worsening kidney function, this is adding another barrier to water excretion.    Plan:  -recommend paracetnesis today, would follow with albumin post.  -would recommend 1L NS bolus  -repeat [facility]/Joel  - strict intake and output

## 2022-04-22 NOTE — SUBJECTIVE & OBJECTIVE
Interval History: Pt doing well this morning, tearful and apologetic about his agitation a few days ago. Pt reports some intermittent nausea and worsening abdominal distension. Will plan for para as soon as can be scheduled.     Oncology Treatment Plan:   OP NIVOLUMAB 1 MG/KG IPILIMUMAB 3MG/KG FOLLOWED BY NIVOLUMAB 480MG Q4W    Medications:  Continuous Infusions:  Scheduled Meds:   binimetinib  45 mg Oral BID    divalproex  250 mg Oral Q8H    encorafenib  450 mg Oral Daily    enoxaparin  40 mg Subcutaneous Daily    [START ON 4/23/2022] famotidine  20 mg Oral Daily    fentaNYL  1 patch Transdermal Q72H    levETIRAcetam  1,000 mg Oral BID    polyethylene glycol  17 g Oral Daily     PRN Meds:albuterol-ipratropium, [COMPLETED] calcium gluconate IVPB **AND** calcium gluconate IVPB, dextrose 10%, dextrose 10%, glucagon (human recombinant), glucose, glucose, HYDROmorphone, hydrOXYzine HCL, LIDOcaine HCl 2%, naloxone, OLANZapine, oxyCODONE, prochlorperazine, promethazine, senna-docusate 8.6-50 mg, sodium chloride 0.9%     Review of Systems   Constitutional:  Negative for fever.   Respiratory:  Negative for shortness of breath.    Cardiovascular:  Negative for chest pain.   Gastrointestinal:  Positive for abdominal pain, nausea and vomiting. Negative for blood in stool, constipation and diarrhea.   Psychiatric/Behavioral:  Negative for agitation, behavioral problems, confusion, hallucinations and sleep disturbance.    Objective:     Vital Signs (Most Recent):  Temp: 97.7 °F (36.5 °C) (04/22/22 1156)  Pulse: 83 (04/22/22 1156)  Resp: 18 (04/22/22 1156)  BP: (!) 147/98 (04/22/22 1156)  SpO2: 100 % (04/22/22 1156)   Vital Signs (24h Range):  Temp:  [97.6 °F (36.4 °C)-98.4 °F (36.9 °C)] 97.7 °F (36.5 °C)  Pulse:  [79-96] 83  Resp:  [16-20] 18  SpO2:  [92 %-100 %] 100 %  BP: (111-147)/() 147/98     Weight: 76.4 kg (168 lb 6.9 oz) (76.4)  Body mass index is 22.84 kg/m².  Body surface area is 1.97 meters  squared.      Intake/Output Summary (Last 24 hours) at 4/22/2022 1249  Last data filed at 4/22/2022 1200  Gross per 24 hour   Intake --   Output 150 ml   Net -150 ml       Physical Exam  Vitals and nursing note reviewed.   Constitutional:       General: He is not in acute distress.     Appearance: He is ill-appearing. He is not toxic-appearing or diaphoretic.      Comments: Patient sitting up in bed in  NAD.    HENT:      Head: Normocephalic and atraumatic.      Nose: Nose normal. No congestion.      Mouth/Throat:      Mouth: Mucous membranes are dry.   Eyes:      Conjunctiva/sclera: Conjunctivae normal.      Pupils: Pupils are equal, round, and reactive to light.   Cardiovascular:      Rate and Rhythm: Normal rate.      Pulses: Normal pulses.      Heart sounds: Normal heart sounds. No murmur heard.    No friction rub. No gallop.   Pulmonary:      Effort: Pulmonary effort is normal. No respiratory distress.      Breath sounds: Normal breath sounds.   Abdominal:      General: There is distension.      Tenderness: There is abdominal tenderness (minimal).      Comments: Decreased bowel sounds.    Musculoskeletal:         General: No swelling. Normal range of motion.      Cervical back: Normal range of motion and neck supple.   Skin:     General: Skin is warm and dry.      Comments: Multiple large subcutaneous nodules noted under R axilla.    Neurological:      General: No focal deficit present.      Mental Status: He is oriented to person, place, and time.   Psychiatric:         Mood and Affect: Mood normal.         Behavior: Behavior normal.       Significant Labs:   CBC:   Recent Labs   Lab 04/21/22  0515 04/22/22  0308   WBC 19.71* 16.27*   HGB 14.8 15.2   HCT 44.4 45.4   * 399    and CMP:   Recent Labs   Lab 04/21/22  0515 04/22/22  0308   * 120*   K 4.9 5.2*   CL 90* 86*   CO2 23 23   * 93   BUN 42* 49*   CREATININE 1.7* 2.3*   CALCIUM 8.0* 8.2*   PROT 4.9* 4.8*   ALBUMIN 2.1* 2.1*   BILITOT  0.3 0.3   ALKPHOS 59 60   AST 37 33   ALT 19 19   ANIONGAP 12 11   EGFRNONAA 45.4* 31.5*       Diagnostic Results:  I have reviewed all pertinent imaging results/findings within the past 24 hours.

## 2022-04-22 NOTE — ASSESSMENT & PLAN NOTE
Pt with Cr 2.0 on admission up from baseline of 1.0. Likely prerenal s/o poor PO intake, vomiting, and ascites. Cr improved to 1.3 with IVF and para. Nephro following. sCr increasing on 4/21. Maintenance IVF and encouraging PO without improvement. Cr increased to 2.3 on 4/22. Continuing to encourage good PO. Pt also with worsening abdominal distension and so needs another para.       -- nephro consulted  -- strict I/O's  -- daily bmp  -- avoid nephrotoxic medications  -- renally dose meds

## 2022-04-22 NOTE — ASSESSMENT & PLAN NOTE
Pt with worsening mental status starting 4/17 with worsening agitation and anxiety. On 4/18 patient with worsening mental status, increasingly confused. Patient requiring increasing sedating medications. Psychiatry consulted. Unclear etiology of behavioral changes. Likely brain mets vs possible withdrawal (pt with previous hx of methamphetamine use and alcohol use.) Mother confirms no alcohol use, not sure about other drug use. UDS + for benzos and THC. GGT negative. MRI brain with small lesions w/o  Mass effect. Unclear etiology of behavioral, possibly underlying agitation made worse with ativan.      Per psych recs, depakote PO TID and Zyprexa PRN  Daily ECG to monitor QTc  Discontinue PEC on 4/21

## 2022-04-22 NOTE — PLAN OF CARE
Pt is no longer in PEC, mother at bedside. Pt complained of pain and vomiting. PRN medicines was given. Tolerated po medicines, personal belongings are within reach. Q6 blood sugar completed and safety was maintained.

## 2022-04-22 NOTE — PROGRESS NOTES
Pharmacist Renal Dose Adjustment Note    Joseluis Garner is a 51 y.o. male being treated with the medication famotidine    Patient Data:    Vital Signs (Most Recent):  Temp: 97.7 °F (36.5 °C) (04/22/22 1156)  Pulse: 83 (04/22/22 1156)  Resp: 18 (04/22/22 1156)  BP: (!) 147/98 (04/22/22 1156)  SpO2: 100 % (04/22/22 1156)   Vital Signs (72h Range):  Temp:  [96.4 °F (35.8 °C)-98.8 °F (37.1 °C)]   Pulse:  []   Resp:  [16-20]   BP: (111-171)/()   SpO2:  [92 %-100 %]      Recent Labs   Lab 04/20/22  0850 04/21/22  0515 04/22/22  0308   CREATININE 1.4 1.7* 2.3*     Serum creatinine: 2.3 mg/dL (H) 04/22/22 0308  Estimated creatinine clearance: 40.6 mL/min (A)    Famotidine 20mg BID will be changed to famotidine 20mg daily    Pharmacist's Name: Carrie Arciniega  Pharmacist's Extension: [phone]

## 2022-04-22 NOTE — ASSESSMENT & PLAN NOTE
ASSESSMENT     Delirium due to multiple etiologies  R/O Adjustment disorder    RECOMMENDATION(S)      1. Scheduled Medication(s):  - Transition IV Valproate to PO Depakote 250 mg TID -- recommend pt continue following discharge to help with irritability & anxiety     2. PRN Medication(s):  - Has not required PRN Zyprexa in >48 hrs.   - AVOID benzodiazepines as these can worsen delirium / confusion     3.  Monitor:  Please obtain daily EKG to monitor QTc  Monitor LFTs and platelets with depakote    Follow up trough VPA (ordered for AM draw on 4/22/22) to ensure level <120.     4. Legal Status/Precaution(s):  Recommend to rescind PEC-CEC. Currently, patient is not at imminent danger to self, or to others, and is not gravely disabled, as a result of mental illness. At this time, patient neither meets PEC-CEC criteria, nor would benefit from an involuntary, inpatient psychiatric hospitalization.     Other:  - Agree with palliative medicine involvement.     DELIRIUM BEHAVIOR MANAGEMENT   PLEASE utilize CHEMICAL restraints with PRN meds first for agitation. Minimize use of PHYSICAL restraints   Keep window shades open and room lit during day and room dim at night in order to promote normal sleep-wake cycles   Encourage family at bedside. [place] patient often to situation, location, date   Continue to Limit or Discontinue use of Narcotics, Benzos and Anti-cholinergic medications as they may worsen delirium   Continue medical workup for causative etiology of Delirium    Discussed with Dr. Wills.     Appreciate consult. Will sign off.     Please contact ON CALL psychiatry service for any acute issues that may arise.

## 2022-04-22 NOTE — ASSESSMENT & PLAN NOTE
Patient with creatinine 2.0 on admission from baseline 1.0 in the context of reduced oral intake, vomiting, and NG losses. Suspect likely prerenal given improvement with IV fluids.  Worsening kidney function 4/22 of unclear etiology.  He is having increased abdominal distension so intrabdominal hypertension can not be ruled out.    Plan:  -recommend paracentesis today with albumin post  -recommend 1L NS bolus  - strict intake/output  - avoid nephrotoxic agents and renally dose medications  -check CPK  -urine for microscopy  -repeat UA

## 2022-04-22 NOTE — PROGRESS NOTES
Jesse Ramírez - [facility] Surg  Psychiatry  Progress Note    Patient Name: Joseluis Garner  MRN: [id number]   Code Status: Full Code  Admission Date: 4/14/2022  Hospital Length of Stay: 7 days  Expected Discharge Date: 4/24/2022  Attending Physician: Meghan Rios MD  Primary Care Provider: Primary Doctor No      Subjective:     Patient is a 51 y.o., male, presents with:    Principal Problem:Ascites    Chief Complaint: agitation    HPI: Per Hospital Medicine:  51 y.o. male with history of CAD s/p PCI, seizures, substance abuse, stage IV malignant melanoma presenting from oncology clinic with multiple electrolyte abnormalities and worsening ascites. Pt has hx of malignant melanoma first diagnosed 1/2022. He is s/p 1 round of tx on 3/3/2022, with  plans to start binimetinib and enorafenib in  the near future. However, patient's clinical status has been deteriorating over the past few weeks. He was admitted on 03/18 at [facility] for abdominal pain, and distension. He underwent therapeutic paracentesis with removal of 3L, and was discharged on 03/22. CT abdomen during that admission showed soft tissue attenuation throughout the peritoneum consistent with innumerable peritoneal implants.  He was then readmitted the following day 03/23 for re accumulation and had another paracentesis with removal of 4.7L. Pt was again readmitted 03/30 to 04/02 and underwent paracentesis. He was planned to receive pleur-X catheter as outpatient. [place] [facility] [place] with worsening abdominal distension and pain. He underwent therapeutic paracentesis with removal of 5L. Cytology was positive for malignant cells. He was discharged on 04/05 on Cipro for SBP coverage. Since his discharge, he notes worsening abdominal distension. He had progressive nausea and vomiting and has not been able to tolerate any diet including water. He has tried zofran at home which has not helped. He denies any recent fever, chills, cough, sore throat. He was  "evaluated in  oncology clinic this morning  with his mother, during which time he had several episodes of vomiting. Labs obtained during clinic were concerning for leukocytosis, hyperkalemia, and hyponatremia. He was sent to the ED for admission for management of ascites, and other current comorbid conditions. In the ED, Pt afebrile, /85, HR 80s,  ALEJANDRO. CBC notable for  WBC 19, Plt 619. K  5.9, Na 119.  Alb 2.6. Cr 2.0 from baseline 1.0. TSH 8.8, though free T4 wnl. Procal elevated to 1.1. EKG with no acute changes, no  T wave abnormalities.  Pt with severe agitation and mental status changes overnight. Pt required ativan, haldol, zyprexa, benadryl, depakote for sedation. This morning pt very altered and agitated.  Pt oriented to self only. Rapid pressured tangential speech.     --------------  Initial Psychiatric Interview: 04/19/2022   Met with the patient while patient lying in his hospital bed. He seemed very restless and agitated. He was constantly trying to get out of his restraints. He presented as an alert and orientated x 3. When he was asked about the reason for his hospitalization he stated, "I have metastasized cancer in my lungs, heart, stomach, and my lymph". He reported feeling very depression and hopeless. He endorsed depressive symptoms of low mood, amotivation, anhedonia, decreased concentration, feelings of guilt, worthlessness, hopelessness, fatigue, poor sleep, poor appetite, and passive suicidal thoughts without a plan or intent. When he was asked about having suicidal thoughts he replied, "yes. This is not the life I want for me but I don't want to kill myself. I will try to keep myself comfortable and see what alternative they have for me. I don't want to die but I don't want to live like this". He denied previous suicide attempts. He reported feeling very anxious. He stated he started to have panic attacks about 2 weeks ago and so far he had 3 panic attacks. He stated he wants to leave " the hospital and go home to live with his mother and stated his sister, and aunt will all help in taking care of him. He stated he lost his job recently as a  and moved to [place] from [place] recently to live with his mother due to cancer. He denied HI and no AVH. He denied being on antidepressant. He denied alcohol abuse but reported being in custodial for one DUI. He denied drug abuse but reported occasional marijuana use and his last use was 2 weeks ago. HE stated when he was younger he tried methamphetamine but stated he has not used in a long time. He denied auditory or visual hallucinations.     Psychiatric Review Of Systems - Is patient experiencing or having changes in:  sleep: not sleeping  appetite: not eating  weight: lost 30 lbs in the past 2 weeks  energy/anergy: down.   interest/pleasure/anhedonia: yes  somatic symptoms: no  anxiety/panic: yes  guilty/hopelessness: yes  concentration: yes  S.I.B.s/risky behavior: no  Irritability: yes  Racing thoughts: yes  Impulsive behaviors: no  Paranoia:no  AVH:No  Suicidal thoughts/plan/intent: See HPI. No SI plan or intent        Hospital Course: 04/20/2022  Agitation improved with scheduled Depakote, no additional PRNs for agitation given overnight. Patient out of room for imaging this morning. Seen by staff in the afternoon.     04/21/2022  No agitation overnight and no PRNs for agitation were given. Compliant with scheduled Depakote. Displays improved insight this morning. Thoughts are linear, logical, and future oriented. Denies suicidal thoughts, plans, intent. No hallucinations.           Family History    None       Tobacco Use    Smoking status: Current Every Day Smoker    Smokeless tobacco: Never Used   Substance and Sexual Activity    Alcohol use: Yes    Drug use: Yes     Types: Methamphetamines, Marijuana    Sexual activity: Not on file     Psychotherapeutics (From admission, onward)                Start     Stop Route Frequency Ordered     04/19/22 1541  OLANZapine injection 10 mg         -- IM Every 8 hours PRN 04/19/22 1441          ROS  Medical Review Of Systems:  Complete review of systems performed covering Constitutional, Eyes, ENT/Mouth, Cardiovascular, Respiratory, Gastrointestinal, Genitourinary, Musculoskeletal, Skin, Neurologic, Endocrine, Heme/Lymph, and Allergy/Immune.     Complete review of systems was negative with the exception of the following positive symptoms: fatigue, malaise, abdominal distention & pain.    Psych ROS covered elsewhere in the note(see above).        Objective:     Vital Signs (Most Recent):  Temp: 97.7 °F (36.5 °C) (04/21/22 1954)  Pulse: 89 (04/21/22 1954)  Resp: 18 (04/21/22 1954)  BP: (!) 140/100 (04/21/22 1954)  SpO2: 97 % (04/21/22 1954)   Vital Signs (24h Range):  Temp:  [96.4 °F (35.8 °C)-98.4 °F (36.9 °C)] 97.7 °F (36.5 °C)  Pulse:  [] 89  Resp:  [16-20] 18  SpO2:  [94 %-98 %] 97 %  BP: (111-143)/() 140/100     Height: 6' (182.9 cm)  Weight: 76.4 kg (168 lb 6.9 oz) (76.4)  Body mass index is 22.84 kg/m².      Intake/Output Summary (Last 24 hours) at 4/21/2022 2010  Last data filed at 4/21/2022 0600  Gross per 24 hour   Intake 240 ml   Output 400 ml   Net -160 ml       MENTAL STATUS EXAMINATION  General Appearance:  ill-appearing, NAD  Arousal: drowsy  Behavior: calm and cooperative  Movements and Motor Activity: no psychomotor agitation or retardation  Muscle Strength and Tone:  not assessed  Gait and Station: unable to assess - patient lying down or seated  Orientation: grossly intact, oriented to situation  Speech:  intact  Language:  English  Mood: improved irritability, anxious  Affect: constricted  Thought Process: linear  Associations: intact  Thought Content and Perceptions: no SI, no HI, no hallucinations  Recent and Remote Memory: grossly intact  Attention and Concentration: improved  Fund of Knowledge: not assessed  Insight: improved  Judgment: improved    Physical Exam      Significant Labs: Last 24 Hours:   Recent Lab Results         04/21/22  1807   04/21/22  0717   04/21/22  0515   04/21/22  0019        Albumin     2.1         Alkaline Phosphatase     59         ALT     19         Anion Gap     12         AST     37         Baso #     0.02         Basophil %     0.1         BILIRUBIN TOTAL     0.3  Comment: For infants and newborns, interpretation of results should be based  on gestational age, weight and in agreement with clinical  observations.    Premature Infant recommended reference ranges:  Up to 24 hours.............<8.0 mg/dL  Up to 48 hours............<12.0 mg/dL  3-5 days..................<15.0 mg/dL  6-29 days.................<15.0 mg/dL           BUN     42         Calcium     8.0         Chloride     90         CO2     23         Creatinine     1.7         Differential Method     Automated         eGFR if      52.4         eGFR if non      45.4  Comment: Calculation used to obtain the estimated glomerular filtration  rate (eGFR) is the CKD-EPI equation.            Eos #     0.0         Eosinophil %     0.1         Glucose     112         Gran # (ANC)     18.2         Gran %     92.4         Hematocrit     44.4         Hemoglobin     14.8         Immature Grans (Abs)     0.14  Comment: Mild elevation in immature granulocytes is non specific and   can be seen in a variety of conditions including stress response,   acute inflammation, trauma and pregnancy. Correlation with other   laboratory and clinical findings is essential.           Immature Granulocytes     0.7         Lymph #     0.6         Lymph %     3.0         Magnesium     2.2         MCH     26.0         MCHC     33.3         MCV     78         Mono #     0.7         Mono %     3.7         MPV     9.6         nRBC     0         Osmolality     273         Phosphorus     3.8         Platelets     515         POCT Glucose 193   250     138       Potassium     4.9         PROTEIN  TOTAL     4.9         RBC     5.69         RDW     14.5         Sodium     125         WBC     19.71                 Significant Imaging:   X-Ray Abdomen AP 1 View   Final Result      See above         Electronically signed by: Jay Ramsey MD   Date:    04/21/2022   Time:    13:14      MRI Brain W WO Contrast   Final Result   Abnormal      Two subcentimeter enhancing foci noted along the left frontal and right parietal parasagittal cortex/subcortical white matter suspicious for metastatic disease.  Please note the thin-section images are too degraded by motion artifact.      This report was flagged in Epic as abnormal.      Electronically signed by resident: Gaurav Norman   Date:    04/20/2022   Time:    16:17      Electronically signed by: Zhen Chairez   Date:    04/20/2022   Time:    17:57      X-Ray Skull Ltd Less Than 4 Views   Final Result      As above.         Electronically signed by: Marky Carney   Date:    04/20/2022   Time:    12:39      US Retroperitoneal Complete   Final Result      Normal morphologic appearance of the bilateral kidneys.  No hydronephrosis or evidence of obstructive uropathy.      Large volume ascites.      Electronically signed by resident: Enrique Ibarra   Date:    04/18/2022   Time:    13:56      Electronically signed by: Ej Wise MD   Date:    04/18/2022   Time:    14:02      X-ray Abdomen for NG Tube Placement (Nursing should notify Radiology after placement)   Final Result      1. Tip of the nasogastric tube in the expected location of the distal antrum or the pylorus of the stomach.  There is no perforation.   2. Left midlung zone pulmonary nodule as above.         Electronically signed by: Lamine iSm MD   Date:    04/18/2022   Time:    09:44      X-Ray Abdomen AP 1 View   Final Result      Ileus versus obstruction.         Electronically signed by: Darci Armstrong MD   Date:    04/18/2022   Time:    08:03      X-ray Abdomen for NG Tube Placement (Nursing should  notify Radiology after placement)   Final Result      As above.         Electronically signed by: Jhon Goetz   Date:    04/16/2022   Time:    17:19      X-Ray Abdomen AP 1 View   Final Result      As above.         Electronically signed by: Jhon Goetz   Date:    04/16/2022   Time:    13:17      CT Chest Abdomen Pelvis Without Contrast (XPD)   Final Result      In this patient with a reported history of metastatic melanoma, there is diffuse metastatic disease involving the right axilla, bilateral lungs, and peritoneal metastasis as described in detail above.  Additional subcutaneous soft tissue nodule which could represent metastatic focus.      Mild wedging of L2 superior endplate suggestive of mild compression fracture.  Recommend correlation with outside imaging.      Diverticulosis.  No evidence of diverticulitis.      Hepatomegaly.      Additional findings as above.      Electronically signed by resident: Baltazar Purcell   Date:    04/16/2022   Time:    09:17      Electronically signed by: Joseluis Arias   Date:    04/16/2022   Time:    12:24      IR Paracentesis without imaging   Final Result      Ultrasound-guided paracentesis with drainage of 7000 mL of elizabeth fluid.  Albumin administered as per protocol.      _______________________________________________________________      Electronically signed by resident: Rudy Otero   Date:    04/14/2022   Time:    17:39      Electronically signed by: Juan Jose Hernández MD   Date:    04/14/2022   Time:    17:46      IR Paracentesis with Imaging    (Results Pending)            Scheduled Medications:   binimetinib  45 mg Oral BID    divalproex  250 mg Oral Q8H    encorafenib  450 mg Oral Daily    enoxaparin  40 mg Subcutaneous Daily    famotidine  20 mg Oral BID    fentaNYL  1 patch Transdermal Q72H    levETIRAcetam  1,000 mg Oral BID    polyethylene glycol  17 g Oral Daily       PRN Medications:  albuterol-ipratropium, [COMPLETED] calcium gluconate IVPB **AND**  calcium gluconate IVPB, dextrose 10%, dextrose 10%, glucagon (human recombinant), glucose, glucose, HYDROmorphone, hydrOXYzine HCL, LIDOcaine HCl 2%, naloxone, OLANZapine, oxyCODONE, prochlorperazine, promethazine, senna-docusate 8.6-50 mg, sodium chloride 0.9%    Review of patient's allergies indicates:  No Known Allergies    Assessment/Plan:     Agitation- RESOLVED  ASSESSMENT     Delirium due to multiple etiologies- Liekly Benzo induced completely resolved  Adjustment disorder    RECOMMENDATION(S)      1. Scheduled Medication(s):  - Transition IV Valproate to PO Depakote 250 mg TID -- recommend pt continue following discharge to help with irritability & potential disinhibited behaviors due to frontal lobe involvement of the mets.    2. PRN Medication(s):  - Has not required PRN Zyprexa in >48 hrs.   - AVOID benzodiazepines as these can worsen delirium / confusion     3.  Monitor:  Please obtain daily EKG to monitor QTc  Monitor LFTs and platelets with depakote    Follow up trough VPA (ordered for AM draw on 4/22/22) to ensure level <120.     4. Legal Status/Precaution(s):  Recommend to rescind PEC-CEC. Currently, patient is not at imminent danger to self, or to others, and is not gravely disabled, as a result of mental illness. At this time, patient neither meets PEC-CEC criteria, nor would benefit from an involuntary, inpatient psychiatric hospitalization.     Other:  - Agree with palliative medicine involvement.     DELIRIUM BEHAVIOR MANAGEMENT   PLEASE utilize CHEMICAL restraints with PRN meds first for agitation. Minimize use of PHYSICAL restraints   Keep window shades open and room lit during day and room dim at night in order to promote normal sleep-wake cycles   Encourage family at bedside. [place] patient often to situation, location, date   Continue to Limit or Discontinue use of Narcotics, Benzos and Anti-cholinergic medications as they may worsen delirium   Continue medical workup for causative  "etiology of Delirium    Discussed with Dr. Sanabria.     Appreciate consult. Will sign off.     Please contact ON CALL psychiatry service for any acute issues that may arise.     Kwan Hernandez MD  [facility] Psych[facility]  04/21/2022 8:28 PM    --------------------------------------------------------------------------------------------------------------------------------------------------------------------------------------------------------------------------------------  STAFF NOTE  I have seen the patient, reviewed the Resident's history and physical, assessment, and plan. I have personally interviewed and examined the patient at bedside and: agree with the findings.     Pt no longer delirious. Pt is linear and logical discussing goals of care and wanting to talk to palliative team regarding his brain mets and what that means for his prognosis and "what kind of quality of life will I have" Pt states that he is a fairly independent sukh and is quite physically active and doesn't see himself bed bound for long periods of time. Pt is future oriented and not endorsing any depression symptoms. He does have appropriate emotional response to his current unfortunate situation. Pt denied any further questions.     -Please contact ON CALL psychiatry service for any acute issues that may arise.    HARPREET SANABRIA MD   Department of Psychiatry   [facility]  4/22/2022 2:43 PM      "

## 2022-04-22 NOTE — ASSESSMENT & PLAN NOTE
Pt with potassium 5.9 on CMP in ED. K 6.4 on istat. EKG without any acute changes. Pt shifted in ED with repeat BMP showing K 5.5. Unclear etiology of acute hyperkalemia, though patient does have concurrent PAULA. K consistently elevated, 5.9 on 4/16. S/p shift again on 4/17 with improvement in K to 5.1. AM cortisol elevated to 44. Initial oncern for potential adrenal insufficiency vs adrenal mets though no convincing evidence of lesions on CT, and Am cortisol Elevated. K slowly increasing s/o worsening PAULA. K 5.2 On 4/22.     -- serial bmp  -- avoid potential potassium-increasing medications  -- shift as needed

## 2022-04-22 NOTE — PLAN OF CARE
Chart check note.    - Urine osm 489, urine Na 14 which could be from intravsacular depletion or low solute intake.  - Ok to give 1 L of NS- if receives paracentesis would recommend to give albumin.      Curt Herrera MD.  Nephrology fellow

## 2022-04-22 NOTE — ASSESSMENT & PLAN NOTE
52 y/o M hx of malignant melanoma presents from clinic with symptomatic malignant ascites. Pt has had multiple admissions over the past month for therapeutic paracenteses in the setting of malignant ascites. His last para was 4/4, 10 days prior to admission. Patient will likely need scheduled weekly therapeutic josiah in the future to prevent hospitalization. S/p para on4/15 with 7L removed and albumin administered.     Plan:  -- Cell counts not c/w SBP, though continuing to cover with Ceftriaxone 2g q24h x7 days for given concurrent leukocytosis  -- schedule weekly para at discharge  -- will get therapeutic para prior to DC. IR scheduled for 4/25.  Will attempt if earlier available.

## 2022-04-22 NOTE — ASSESSMENT & PLAN NOTE
He presented initially with enlarging right axillary lymph nodes that have grown progressively in size since April 2021. He presented to urgent care and was referred for US which showed multiple enlarged masses in the right axilla most likely reflecting abnormal lymph nodes concerning for malignancy.   He was subsequently referred to dermatology who performed a complete exam of the skin that was negative for suspicious cutaneous lesions as well as punch biopsy of one of the lymph node on 01/04/22. Pathology came back as malignant melanoma.   BRAF mutation: positive for a V600E mutation.   He subsequently underwent a PET/CT on 01/28/22 which showed multiple hypermetabolic soft tissue masses within the right axillary region, metastatic lesions to the lungs and abdomen (VI segment of the liver measuring a max SUV of 6.99, 1.6 x 1.9 cm nodular soft tissue density with a max SUV  7.64 was seen adjacent to the colon within the right lower quadrant. Brain MRI was negative for metastatic disease.  LDH was 307 U/L  He saw Oncology at [facility] on 01/31/22 with plan to start First line Nivolumab / ipilimumab. He received C1 on 03/03/22.   MRI brain with multiple small frontal lobe lesions without mass effect concerning for metastatic disease. Rad onc consulted, no recommendations for RT at this time,  Will f/u in clinic.     -- pt scheduled to start binimetinib and encorafenib prior to admission.  -- started on 4/21

## 2022-04-22 NOTE — PROGRESS NOTES
[facility]  Hematology/Oncology  Progress Note    Patient Name: Joseluis Garner  Admission Date: 4/14/2022  Hospital Length of Stay: 8 days  Code Status: Full Code     Subjective:     HPI:  51 y.o. male with history of CAD s/p PCI, seizures, substance abuse, stage IV malignant melanoma presenting from oncology clinic with multiple electrolyte abnormalities and worsening ascites. Pt has hx of malignant melanoma first diagnosed 1/2022. He is s/p 1 round of tx on 3/3/2022, with  plans to start binimetinib and enorafenib in  the near future. However, patient's clinical status has been deteriorating over the past few weeks. He was admitted on 03/18 at [facility] for abdominal pain, and distension. He underwent therapeutic paracentesis with removal of 3L, and was discharged on 03/22. CT abdomen during that admission showed soft tissue attenuation throughout the peritoneum consistent with innumerable peritoneal implants.  He was then readmitted the following day 03/23 for re accumulation and had another paracentesis with removal of 4.7L. Pt was again readmitted 03/30 to 04/02 and underwent paracentesis. He was planned to receive pleur-X catheter as outpatient. [place] [facility] [place] worsening abdominal distension and pain. He underwent therapeutic paracentesis with removal of 5L. Cytology was positive for malignant cells. He was discharged on 04/05 on Cipro for SBP coverage.      Since his discharge, he notes worsening abdominal distension. He had progressive nausea and vomiting and has not been able to tolerate any diet including water. He has tried zofran at home which has not helped. He denies any recent fever, chills, cough, sore throat. He was evaluated in  oncology clinic this morning  with his mother, during which time he had several episodes of vomiting. Labs obtained during clinic were concerning for leukocytosis, hyperkalemia, and hyponatremia. He was sent to the ED for admission for  management of ascites, and other current comorbid conditions.     In the ED, Pt afebrile, /85, HR 80s,  ALEJANDRO. CBC notable for  WBC 19, Plt 619. K  5.9, Na 119.  Alb 2.6. Cr 2.0 from baseline 1.0. TSH 8.8, though free T4 wnl. Procal elevated to 1.1. EKG with no acute changes, no  T wave abnormalities.        Interval History: Pt doing well this morning, tearful and apologetic about his agitation a few days ago. Pt reports some intermittent nausea and worsening abdominal distension. Will plan for para as soon as can be scheduled.     Oncology Treatment Plan:   OP NIVOLUMAB 1 MG/KG IPILIMUMAB 3MG/KG FOLLOWED BY NIVOLUMAB 480MG Q4W    Medications:  Continuous Infusions:  Scheduled Meds:   binimetinib  45 mg Oral BID    divalproex  250 mg Oral Q8H    encorafenib  450 mg Oral Daily    enoxaparin  40 mg Subcutaneous Daily    [START ON 4/23/2022] famotidine  20 mg Oral Daily    fentaNYL  1 patch Transdermal Q72H    levETIRAcetam  1,000 mg Oral BID    polyethylene glycol  17 g Oral Daily     PRN Meds:albuterol-ipratropium, [COMPLETED] calcium gluconate IVPB **AND** calcium gluconate IVPB, dextrose 10%, dextrose 10%, glucagon (human recombinant), glucose, glucose, HYDROmorphone, hydrOXYzine HCL, LIDOcaine HCl 2%, naloxone, OLANZapine, oxyCODONE, prochlorperazine, promethazine, senna-docusate 8.6-50 mg, sodium chloride 0.9%     Review of Systems   Constitutional:  Negative for fever.   Respiratory:  Negative for shortness of breath.    Cardiovascular:  Negative for chest pain.   Gastrointestinal:  Positive for abdominal pain, nausea and vomiting. Negative for blood in stool, constipation and diarrhea.   Psychiatric/Behavioral:  Negative for agitation, behavioral problems, confusion, hallucinations and sleep disturbance.    Objective:     Vital Signs (Most Recent):  Temp: 97.7 °F (36.5 °C) (04/22/22 1156)  Pulse: 83 (04/22/22 1156)  Resp: 18 (04/22/22 1156)  BP: (!) 147/98 (04/22/22 1156)  SpO2: 100 % (04/22/22  1156)   Vital Signs (24h Range):  Temp:  [97.6 °F (36.4 °C)-98.4 °F (36.9 °C)] 97.7 °F (36.5 °C)  Pulse:  [79-96] 83  Resp:  [16-20] 18  SpO2:  [92 %-100 %] 100 %  BP: (111-147)/() 147/98     Weight: 76.4 kg (168 lb 6.9 oz) (76.4)  Body mass index is 22.84 kg/m².  Body surface area is 1.97 meters squared.      Intake/Output Summary (Last 24 hours) at 4/22/2022 1249  Last data filed at 4/22/2022 1200  Gross per 24 hour   Intake --   Output 150 ml   Net -150 ml       Physical Exam  Vitals and nursing note reviewed.   Constitutional:       General: He is not in acute distress.     Appearance: He is ill-appearing. He is not toxic-appearing or diaphoretic.      Comments: Patient sitting up in bed in  NAD.    HENT:      Head: Normocephalic and atraumatic.      Nose: Nose normal. No congestion.      Mouth/Throat:      Mouth: Mucous membranes are dry.   Eyes:      Conjunctiva/sclera: Conjunctivae normal.      Pupils: Pupils are equal, round, and reactive to light.   Cardiovascular:      Rate and Rhythm: Normal rate.      Pulses: Normal pulses.      Heart sounds: Normal heart sounds. No murmur heard.    No friction rub. No gallop.   Pulmonary:      Effort: Pulmonary effort is normal. No respiratory distress.      Breath sounds: Normal breath sounds.   Abdominal:      General: There is distension.      Tenderness: There is abdominal tenderness (minimal).      Comments: Decreased bowel sounds.    Musculoskeletal:         General: No swelling. Normal range of motion.      Cervical back: Normal range of motion and neck supple.   Skin:     General: Skin is warm and dry.      Comments: Multiple large subcutaneous nodules noted under R axilla.    Neurological:      General: No focal deficit present.      Mental Status: He is oriented to person, place, and time.   Psychiatric:         Mood and Affect: Mood normal.         Behavior: Behavior normal.       Significant Labs:   CBC:   Recent Labs   Lab 04/21/22  8652  04/22/22  0308   WBC 19.71* 16.27*   HGB 14.8 15.2   HCT 44.4 45.4   * 399    and CMP:   Recent Labs   Lab 04/21/22  0515 04/22/22  0308   * 120*   K 4.9 5.2*   CL 90* 86*   CO2 23 23   * 93   BUN 42* 49*   CREATININE 1.7* 2.3*   CALCIUM 8.0* 8.2*   PROT 4.9* 4.8*   ALBUMIN 2.1* 2.1*   BILITOT 0.3 0.3   ALKPHOS 59 60   AST 37 33   ALT 19 19   ANIONGAP 12 11   EGFRNONAA 45.4* 31.5*       Diagnostic Results:  I have reviewed all pertinent imaging results/findings within the past 24 hours.    Assessment/Plan:     * Ascites  52 y/o M hx of malignant melanoma presents from clinic with symptomatic malignant ascites. Pt has had multiple admissions over the past month for therapeutic paracenteses in the setting of malignant ascites. His last para was 4/4, 10 days prior to admission. Patient will likely need scheduled weekly therapeutic josiah in the future to prevent hospitalization. S/p para on4/15 with 7L removed and albumin administered.     Plan:  -- Cell counts not c/w SBP, though continuing to cover with Ceftriaxone 2g q24h x7 days for given concurrent leukocytosis  -- schedule weekly para at discharge  -- will get therapeutic para prior to DC. IR scheduled for 4/25.  Will attempt if earlier available.     Ileus  Pt with concerns for ileus vs SBO on abdominal imaging on 4/16. Pt with chronic opioid use given cancer and abdominal mets. Pt made NPO and NGT placed with significant output. Repeat imaging with concerns for worsening ileus despite nonoperative treatment. Concerns for malignant obstruction. Gen surg consulted, though patient will likely not be operative candidate given high tumor burden. On 4/19 Pt passed BM. Pt removed his NGT, though now without nausea. Will advance diet as tolerated.    Several bowel movements in the past 2 days, passing flatus. Pt tolerating PO.     -- advance diet as tolerated  -- adding bowel regimen    Intractable nausea and vomiting  Pt presenting with several days  of intractable nausea and vomiting. Pt unable to keep anything, including water, down. Zofran with no improvement at home. Likely 2/2 hyponatremia and ileus. KUB and CT demonstrating ileus vs partial SBO. NGT placed 4/17, pt keeps pulling out. Replaced x3. Pt with  Multiple BMs and improving nausea, now able to tolerate increasing diet as of 4/21. Will need additional para soon to reduce nausea again.    -- plan for repeat para on 4/22 prior to discharge  -- management of hyponatremia as above  -- IV compazine and phenergan      Hyperkalemia  Pt with potassium 5.9 on CMP in ED. K 6.4 on istat. EKG without any acute changes. Pt shifted in ED with repeat BMP showing K 5.5. Unclear etiology of acute hyperkalemia, though patient does have concurrent PAULA. K consistently elevated, 5.9 on 4/16. S/p shift again on 4/17 with improvement in K to 5.1. AM cortisol elevated to 44. Initial oncern for potential adrenal insufficiency vs adrenal mets though no convincing evidence of lesions on CT, and Am cortisol Elevated. K slowly increasing s/o worsening PAULA. K 5.2 On 4/22.     -- serial bmp  -- avoid potential potassium-increasing medications  -- shift as needed    PAULA (acute kidney injury)  Pt with Cr 2.0 on admission up from baseline of 1.0. Likely prerenal s/o poor PO intake, vomiting, and ascites. Cr improved to 1.3 with IVF and para. Nephro following. sCr increasing on 4/21. Maintenance IVF and encouraging PO without improvement. Cr increased to 2.3 on 4/22. Continuing to encourage good PO. Pt also with worsening abdominal distension and so needs another para.       -- nephro consulted  -- strict I/O's  -- daily bmp  -- avoid nephrotoxic medications  -- renally dose meds    Hyponatremia  Pt presenting with severe hyponatremia to 119 in ED. Likely hypovolemic hyponatremia s/o poor PO And N/V. Na on labs 10 days prior to admission 128. Pt presenting with severe intractable nausea and vomiting. No changes in mental status or  seizures. Sxs improving with improvement in Na. Nephro following, pt receiving intermittent NS boluses. Na worsening, now Na 120 On 4/22.    -- nephro following  -- s/p therapeutic paracentesis on 4/14  --  Will repeat therapeutic para prior to discharge.   -- encouraging good PO        Metastatic melanoma  He presented initially with enlarging right axillary lymph nodes that have grown progressively in size since April 2021. He presented to urgent care and was referred for US which showed multiple enlarged masses in the right axilla most likely reflecting abnormal lymph nodes concerning for malignancy.   He was subsequently referred to dermatology who performed a complete exam of the skin that was negative for suspicious cutaneous lesions as well as punch biopsy of one of the lymph node on 01/04/22. Pathology came back as malignant melanoma.   BRAF mutation: positive for a [id number] mutation.   He subsequently underwent a PET/CT on 01/28/22 which showed multiple hypermetabolic soft tissue masses within the right axillary region, metastatic lesions to the lungs and abdomen (VI segment of the liver measuring a max SUV of 6.99, 1.6 x 1.9 cm nodular soft tissue density with a max SUV  7.64 was seen adjacent to the colon within the right lower quadrant. Brain MRI was negative for metastatic disease.  LDH was 307 U/L  He saw Oncology at [facility] on 01/31/22 with plan to start First line Nivolumab / ipilimumab. He received C1 on 03/03/22.   MRI brain with multiple small frontal lobe lesions without mass effect concerning for metastatic disease. Rad onc consulted, no recommendations for RT at this time,  Will f/u in clinic.     -- pt scheduled to start binimetinib and encorafenib prior to admission.  -- started on 4/21      Altered mental status  Pt with worsening mental status starting 4/17 with worsening agitation and anxiety. On 4/18 patient with worsening mental status, increasingly confused. Patient requiring increasing  sedating medications. Psychiatry consulted. Unclear etiology of behavioral changes. Likely brain mets vs possible withdrawal (pt with previous hx of methamphetamine use and alcohol use.) Mother confirms no alcohol use, not sure about other drug use. UDS + for benzos and THC. GGT negative. MRI brain with small lesions w/o  Mass effect. Unclear etiology of behavioral, possibly underlying agitation made worse with ativan.      Per psych recs, depakote PO TID and Zyprexa PRN  Daily ECG to monitor QTc  Discontinue PEC on 4/21      History of seizure  --  Continue home Keppra 1000 BID              Roel Campos MD   Internal Medicine PGY-1  Hematology/Oncology  [facility]

## 2022-04-23 LAB
ALBUMIN SERPL BCP-MCNC: 2.1 G/DL (ref 3.5–5.2)
ALP SERPL-CCNC: 66 U/L (ref 55–135)
ALT SERPL W/O P-5'-P-CCNC: 23 U/L (ref 10–44)
ANION GAP SERPL CALC-SCNC: 14 MMOL/L (ref 8–16)
AST SERPL-CCNC: 46 U/L (ref 10–40)
BASOPHILS # BLD AUTO: 0.02 K/UL (ref 0–0.2)
BASOPHILS NFR BLD: 0.1 % (ref 0–1.9)
BILIRUB SERPL-MCNC: 0.2 MG/DL (ref 0.1–1)
BUN SERPL-MCNC: 51 MG/DL (ref 6–20)
CALCIUM SERPL-MCNC: 8 MG/DL (ref 8.7–10.5)
CHLORIDE SERPL-SCNC: 86 MMOL/L (ref 95–110)
CO2 SERPL-SCNC: 22 MMOL/L (ref 23–29)
CREAT SERPL-MCNC: 1.8 MG/DL (ref 0.5–1.4)
DIFFERENTIAL METHOD: ABNORMAL
EOSINOPHIL # BLD AUTO: 0 K/UL (ref 0–0.5)
EOSINOPHIL NFR BLD: 0.3 % (ref 0–8)
ERYTHROCYTE [DISTWIDTH] IN BLOOD BY AUTOMATED COUNT: 14.4 % (ref 11.5–14.5)
EST. GFR  (AFRICAN AMERICAN): 48.9 ML/MIN/1.73 M^2
EST. GFR  (NON AFRICAN AMERICAN): 42.3 ML/MIN/1.73 M^2
GLUCOSE SERPL-MCNC: 81 MG/DL (ref 70–110)
HCT VFR BLD AUTO: 41.6 % (ref 40–54)
HGB BLD-MCNC: 13.9 G/DL (ref 14–18)
IMM GRANULOCYTES # BLD AUTO: 0.08 K/UL (ref 0–0.04)
IMM GRANULOCYTES NFR BLD AUTO: 0.5 % (ref 0–0.5)
LYMPHOCYTES # BLD AUTO: 1.1 K/UL (ref 1–4.8)
LYMPHOCYTES NFR BLD: 7.3 % (ref 18–48)
MAGNESIUM SERPL-MCNC: 2.4 MG/DL (ref 1.6–2.6)
MCH RBC QN AUTO: 26.1 PG (ref 27–31)
MCHC RBC AUTO-ENTMCNC: 33.4 G/DL (ref 32–36)
MCV RBC AUTO: 78 FL (ref 82–98)
MONOCYTES # BLD AUTO: 0.5 K/UL (ref 0.3–1)
MONOCYTES NFR BLD: 3.2 % (ref 4–15)
NEUTROPHILS # BLD AUTO: 13.7 K/UL (ref 1.8–7.7)
NEUTROPHILS NFR BLD: 88.6 % (ref 38–73)
NRBC BLD-RTO: 0 /100 WBC
PHOSPHATE SERPL-MCNC: 5 MG/DL (ref 2.7–4.5)
PLATELET # BLD AUTO: 491 K/UL (ref 150–450)
PMV BLD AUTO: 10 FL (ref 9.2–12.9)
POCT GLUCOSE: 100 MG/DL (ref 70–110)
POCT GLUCOSE: 114 MG/DL (ref 70–110)
POCT GLUCOSE: 96 MG/DL (ref 70–110)
POTASSIUM SERPL-SCNC: 4.9 MMOL/L (ref 3.5–5.1)
PROT SERPL-MCNC: 4.9 G/DL (ref 6–8.4)
RBC # BLD AUTO: 5.32 M/UL (ref 4.6–6.2)
SODIUM SERPL-SCNC: 122 MMOL/L (ref 136–145)
WBC # BLD AUTO: 15.48 K/UL (ref 3.9–12.7)

## 2022-04-23 PROCEDURE — 49083 ABD PARACENTESIS W/IMAGING: CPT

## 2022-04-23 PROCEDURE — 25000003 PHARM REV CODE 250

## 2022-04-23 PROCEDURE — 84100 ASSAY OF PHOSPHORUS: CPT

## 2022-04-23 PROCEDURE — 80053 COMPREHEN METABOLIC PANEL: CPT

## 2022-04-23 PROCEDURE — 85025 COMPLETE CBC W/AUTO DIFF WBC: CPT

## 2022-04-23 PROCEDURE — 93005 ELECTROCARDIOGRAM TRACING: CPT

## 2022-04-23 PROCEDURE — 25000003 PHARM REV CODE 250: Performed by: INTERNAL MEDICINE

## 2022-04-23 PROCEDURE — 93010 ELECTROCARDIOGRAM REPORT: CPT | Mod: ,,, | Performed by: INTERNAL MEDICINE

## 2022-04-23 PROCEDURE — P9047 ALBUMIN (HUMAN), 25%, 50ML: HCPCS | Mod: JG

## 2022-04-23 PROCEDURE — 83735 ASSAY OF MAGNESIUM: CPT

## 2022-04-23 PROCEDURE — 11000001 HC ACUTE MED/SURG PRIVATE ROOM

## 2022-04-23 PROCEDURE — 36415 COLL VENOUS BLD VENIPUNCTURE: CPT

## 2022-04-23 PROCEDURE — 49083 ABD PARACENTESIS W/IMAGING: CPT | Mod: ,,, | Performed by: HOSPITALIST

## 2022-04-23 PROCEDURE — 63600175 PHARM REV CODE 636 W HCPCS

## 2022-04-23 PROCEDURE — 93010 EKG 12-LEAD: ICD-10-PCS | Mod: ,,, | Performed by: INTERNAL MEDICINE

## 2022-04-23 PROCEDURE — 99233 SBSQ HOSP IP/OBS HIGH 50: CPT | Mod: ,,, | Performed by: INTERNAL MEDICINE

## 2022-04-23 PROCEDURE — 49083 PR ABD PARACENTESIS W/IMAGING: ICD-10-PCS | Mod: ,,, | Performed by: HOSPITALIST

## 2022-04-23 PROCEDURE — 99233 PR SUBSEQUENT HOSPITAL CARE,LEVL III: ICD-10-PCS | Mod: ,,, | Performed by: INTERNAL MEDICINE

## 2022-04-23 PROCEDURE — 63600175 PHARM REV CODE 636 W HCPCS: Mod: JG

## 2022-04-23 PROCEDURE — 25000003 PHARM REV CODE 250: Performed by: HOSPITALIST

## 2022-04-23 RX ORDER — ALBUMIN HUMAN 250 G/1000ML
25 SOLUTION INTRAVENOUS ONCE
Status: DISCONTINUED | OUTPATIENT
Start: 2022-04-23 | End: 2022-04-23

## 2022-04-23 RX ORDER — OXYCODONE HYDROCHLORIDE 10 MG/1
20 TABLET ORAL EVERY 6 HOURS PRN
Status: DISCONTINUED | OUTPATIENT
Start: 2022-04-23 | End: 2022-04-24 | Stop reason: HOSPADM

## 2022-04-23 RX ORDER — FAMOTIDINE 20 MG/1
20 TABLET, FILM COATED ORAL 2 TIMES DAILY
Status: DISCONTINUED | OUTPATIENT
Start: 2022-04-23 | End: 2022-04-24 | Stop reason: HOSPADM

## 2022-04-23 RX ORDER — ALBUMIN HUMAN 250 G/1000ML
50 SOLUTION INTRAVENOUS ONCE
Status: COMPLETED | OUTPATIENT
Start: 2022-04-23 | End: 2022-04-23

## 2022-04-23 RX ORDER — HYDROMORPHONE HYDROCHLORIDE 1 MG/ML
0.5 INJECTION, SOLUTION INTRAMUSCULAR; INTRAVENOUS; SUBCUTANEOUS EVERY 4 HOURS PRN
Status: DISCONTINUED | OUTPATIENT
Start: 2022-04-23 | End: 2022-04-24 | Stop reason: HOSPADM

## 2022-04-23 RX ORDER — LIDOCAINE HYDROCHLORIDE 10 MG/ML
5 INJECTION INFILTRATION; PERINEURAL ONCE
Status: COMPLETED | OUTPATIENT
Start: 2022-04-23 | End: 2022-04-23

## 2022-04-23 RX ADMIN — OXYCODONE 5 MG: 5 TABLET ORAL at 01:04

## 2022-04-23 RX ADMIN — HYDROXYZINE HYDROCHLORIDE 25 MG: 25 TABLET, FILM COATED ORAL at 06:04

## 2022-04-23 RX ADMIN — HYDROMORPHONE HYDROCHLORIDE 0.5 MG: 1 INJECTION, SOLUTION INTRAMUSCULAR; INTRAVENOUS; SUBCUTANEOUS at 02:04

## 2022-04-23 RX ADMIN — HYDROMORPHONE HYDROCHLORIDE 0.5 MG: 1 INJECTION, SOLUTION INTRAMUSCULAR; INTRAVENOUS; SUBCUTANEOUS at 06:04

## 2022-04-23 RX ADMIN — ENOXAPARIN SODIUM 40 MG: 100 INJECTION SUBCUTANEOUS at 05:04

## 2022-04-23 RX ADMIN — DIVALPROEX SODIUM 250 MG: 250 TABLET, DELAYED RELEASE ORAL at 06:04

## 2022-04-23 RX ADMIN — OXYCODONE HYDROCHLORIDE 20 MG: 10 TABLET ORAL at 11:04

## 2022-04-23 RX ADMIN — SODIUM ZIRCONIUM CYCLOSILICATE 5 G: 5 POWDER, FOR SUSPENSION ORAL at 09:04

## 2022-04-23 RX ADMIN — DIVALPROEX SODIUM 250 MG: 250 TABLET, DELAYED RELEASE ORAL at 01:04

## 2022-04-23 RX ADMIN — HYDROMORPHONE HYDROCHLORIDE 0.5 MG: 1 INJECTION, SOLUTION INTRAMUSCULAR; INTRAVENOUS; SUBCUTANEOUS at 01:04

## 2022-04-23 RX ADMIN — FAMOTIDINE 20 MG: 20 TABLET ORAL at 09:04

## 2022-04-23 RX ADMIN — LIDOCAINE HYDROCHLORIDE 5 ML: 10 INJECTION, SOLUTION INFILTRATION; PERINEURAL at 04:04

## 2022-04-23 RX ADMIN — HYDROMORPHONE HYDROCHLORIDE 0.5 MG: 1 INJECTION, SOLUTION INTRAMUSCULAR; INTRAVENOUS; SUBCUTANEOUS at 09:04

## 2022-04-23 RX ADMIN — OXYCODONE 5 MG: 5 TABLET ORAL at 07:04

## 2022-04-23 RX ADMIN — HYDROMORPHONE HYDROCHLORIDE 0.5 MG: 1 INJECTION, SOLUTION INTRAMUSCULAR; INTRAVENOUS; SUBCUTANEOUS at 05:04

## 2022-04-23 RX ADMIN — ALBUMIN (HUMAN) 50 G: 12.5 SOLUTION INTRAVENOUS at 05:04

## 2022-04-23 RX ADMIN — POLYETHYLENE GLYCOL 3350 17 G: 17 POWDER, FOR SOLUTION ORAL at 09:04

## 2022-04-23 RX ADMIN — LEVETIRACETAM 1000 MG: 500 TABLET, FILM COATED ORAL at 09:04

## 2022-04-23 RX ADMIN — DIVALPROEX SODIUM 250 MG: 250 TABLET, DELAYED RELEASE ORAL at 09:04

## 2022-04-23 RX ADMIN — OXYCODONE HYDROCHLORIDE 20 MG: 10 TABLET ORAL at 05:04

## 2022-04-23 RX ADMIN — HYDROMORPHONE HYDROCHLORIDE 0.5 MG: 1 INJECTION, SOLUTION INTRAMUSCULAR; INTRAVENOUS; SUBCUTANEOUS at 10:04

## 2022-04-23 NOTE — PROGRESS NOTES
Jesse Ramírez [facility]  Hematology/Oncology  Progress Note    Patient Name: Joseluis Garner  Admission Date: 4/14/2022  Hospital Length of Stay: 9 days  Code Status: Full Code     Subjective:     HPI:  51 y.o. male with history of CAD s/p PCI, seizures, substance abuse, stage IV malignant melanoma presenting from oncology clinic with multiple electrolyte abnormalities and worsening ascites. Pt has hx of malignant melanoma first diagnosed 1/2022. He is s/p 1 round of tx on 3/3/2022, with  plans to start binimetinib and enorafenib in  the near future. However, patient's clinical status has been deteriorating over the past few weeks. He was admitted on 03/18 at [facility] for abdominal pain, and distension. He underwent therapeutic paracentesis with removal of 3L, and was discharged on 03/22. CT abdomen during that admission showed soft tissue attenuation throughout the peritoneum consistent with innumerable peritoneal implants.  He was then readmitted the following day 03/23 for re accumulation and had another paracentesis with removal of 4.7L. Pt was again readmitted 03/30 to 04/02 and underwent paracentesis. He was planned to receive pleur-X catheter as outpatient. [place] [facility] [place] with worsening abdominal distension and pain. He underwent therapeutic paracentesis with removal of 5L. Cytology was positive for malignant cells. He was discharged on 04/05 on Cipro for SBP coverage.      Since his discharge, he notes worsening abdominal distension. He had progressive nausea and vomiting and has not been able to tolerate any diet including water. He has tried zofran at home which has not helped. He denies any recent fever, chills, cough, sore throat. He was evaluated in  oncology clinic this morning  with his mother, during which time he had several episodes of vomiting. Labs obtained during clinic were concerning for leukocytosis, hyperkalemia, and hyponatremia. He was sent to the ED for admission for  management of ascites, and other current comorbid conditions.     In the ED, Pt afebrile, /85, HR 80s,  ALEJANDRO. CBC notable for  WBC 19, Plt 619. K  5.9, Na 119.  Alb 2.6. Cr 2.0 from baseline 1.0. TSH 8.8, though free T4 wnl. Procal elevated to 1.1. EKG with no acute changes, no  T wave abnormalities.        Interval History:   NAEON. C/o SOB this morning and placed on NC for a short period. Likely related to ascites vs volume overload vs anxiety. Appears comfortable on my evaluation without respiratory distress. Paracentesis scheduled for today. sCr trending down, good UOP. Continues to have episodes of nausea/emesis. Pain well controlled.    Oncology Treatment Plan:   OP NIVOLUMAB 1 MG/KG IPILIMUMAB 3MG/KG FOLLOWED BY NIVOLUMAB 480MG Q4W    Medications:  Continuous Infusions:  Scheduled Meds:   binimetinib  45 mg Oral BID    divalproex  250 mg Oral Q8H    encorafenib  450 mg Oral Daily    enoxaparin  40 mg Subcutaneous Daily    famotidine  20 mg Oral Daily    fentaNYL  1 patch Transdermal Q72H    levETIRAcetam  1,000 mg Oral BID    polyethylene glycol  17 g Oral Daily    sodium zirconium cyclosilicate  5 g Oral TID     PRN Meds:albuterol-ipratropium, [COMPLETED] calcium gluconate IVPB **AND** calcium gluconate IVPB, dextrose 10%, dextrose 10%, glucagon (human recombinant), glucose, glucose, HYDROmorphone, hydrOXYzine HCL, LIDOcaine HCl 2%, naloxone, OLANZapine, oxyCODONE, prochlorperazine, promethazine, senna-docusate 8.6-50 mg, sodium chloride 0.9%     Review of Systems   Constitutional:  Negative for fever.   Respiratory:  Negative for shortness of breath.    Cardiovascular:  Negative for chest pain.   Gastrointestinal:  Positive for abdominal pain, nausea and vomiting. Negative for blood in stool, constipation and diarrhea.   Psychiatric/Behavioral:  Negative for agitation, behavioral problems, confusion, hallucinations and sleep disturbance.    Objective:     Vital Signs (Most Recent):  Temp:  98.1 °F (36.7 °C) (04/23/22 0739)  Pulse: 76 (04/23/22 0739)  Resp: 18 (04/23/22 0954)  BP: (!) 125/96 (04/23/22 0739)  SpO2: 98 % (04/23/22 0739)   Vital Signs (24h Range):  Temp:  [97.1 °F (36.2 °C)-98.1 °F (36.7 °C)] 98.1 °F (36.7 °C)  Pulse:  [] 76  Resp:  [6-20] 18  SpO2:  [89 %-100 %] 98 %  BP: (123-151)/() 125/96     Weight: 76.4 kg (168 lb 6.9 oz) (76.4)  Body mass index is 22.84 kg/m².  Body surface area is 1.97 meters squared.      Intake/Output Summary (Last 24 hours) at 4/23/2022 0956  Last data filed at 4/22/2022 1300  Gross per 24 hour   Intake 360 ml   Output 150 ml   Net 210 ml       Physical Exam  Vitals and nursing note reviewed.   Constitutional:       General: He is not in acute distress.     Appearance: He is ill-appearing. He is not toxic-appearing or diaphoretic.      Comments: Patient sitting up in bed in  NAD.    HENT:      Head: Normocephalic and atraumatic.      Nose: Nose normal. No congestion.      Mouth/Throat:      Mouth: Mucous membranes are dry.   Eyes:      Conjunctiva/sclera: Conjunctivae normal.      Pupils: Pupils are equal, round, and reactive to light.   Cardiovascular:      Rate and Rhythm: Normal rate.      Pulses: Normal pulses.      Heart sounds: Normal heart sounds. No murmur heard.    No friction rub. No gallop.   Pulmonary:      Effort: Pulmonary effort is normal. No respiratory distress.      Breath sounds: Normal breath sounds.      Comments: Breathing comfortable on room air  Abdominal:      General: There is distension.      Tenderness: There is abdominal tenderness (minimal).      Comments: Decreased bowel sounds.    Musculoskeletal:         General: No swelling. Normal range of motion.      Cervical back: Normal range of motion and neck supple.   Skin:     General: Skin is warm and dry.      Comments: Multiple large subcutaneous nodules noted under R axilla.    Neurological:      General: No focal deficit present.      Mental Status: He is oriented to  person, place, and time.   Psychiatric:         Mood and Affect: Mood normal.         Behavior: Behavior normal.       Significant Labs:   CBC:   Recent Labs   Lab 04/22/22  0308 04/23/22  0400   WBC 16.27* 15.48*   HGB 15.2 13.9*   HCT 45.4 41.6    491*   , CMP:   Recent Labs   Lab 04/22/22  0308 04/22/22  1422 04/23/22  0400   * 122* 122*   K 5.2* 5.3* 4.9   CL 86* 87* 86*   CO2 23 22* 22*   GLU 93 70 81   BUN 49* 54* 51*   CREATININE 2.3* 2.3* 1.8*   CALCIUM 8.2* 8.2* 8.0*   PROT 4.8*  --  4.9*   ALBUMIN 2.1*  --  2.1*   BILITOT 0.3  --  0.2   ALKPHOS 60  --  66   AST 33  --  46*   ALT 19  --  23   ANIONGAP 11 13 14   EGFRNONAA 31.5* 31.5* 42.3*   , and All pertinent labs from the last 24 hours have been reviewed.    Diagnostic Results:  I have reviewed all pertinent imaging results/findings within the past 24 hours.    Assessment/Plan:     * Ascites  50 y/o M hx of malignant melanoma presents from clinic with symptomatic malignant ascites. Pt has had multiple admissions over the past month for therapeutic paracenteses in the setting of malignant ascites. His last para was 4/4, 10 days prior to admission. Patient will likely need scheduled weekly therapeutic josiah in the future to prevent hospitalization. S/p para on4/15 with 7L removed and albumin administered.     Plan:  -- Cell counts not c/w SBP, though continuing to cover with Ceftriaxone 2g q24h x7 days for given concurrent leukocytosis  -- schedule weekly para at discharge  -- therapeutic para today    Ileus  Pt with concerns for ileus vs SBO on abdominal imaging on 4/16. Pt with chronic opioid use given cancer and abdominal mets. Pt made NPO and NGT placed with significant output. Repeat imaging with concerns for worsening ileus despite nonoperative treatment. Concerns for malignant obstruction. Gen surg consulted, though patient will likely not be operative candidate given high tumor burden. On 4/19 Pt passed BM. Pt removed his NGT, though  now without nausea. Will advance diet as tolerated.    Several bowel movements in the past 2 days, passing flatus. Pt tolerating PO.     -- advance diet as tolerated  -- adding bowel regimen    Intractable nausea and vomiting  Pt presenting with several days of intractable nausea and vomiting. Pt unable to keep anything, including water, down. Zofran with no improvement at home. Likely 2/2 hyponatremia and ileus. KUB and CT demonstrating ileus vs partial SBO. NGT placed 4/17, pt keeps pulling out. Replaced x3. Pt with  Multiple BMs and improving nausea, now able to tolerate increasing diet as of 4/21. Will need additional para soon to reduce nausea again.    -- plan for repeat para on 4/22 prior to discharge  -- management of hyponatremia as above  -- IV compazine and phenergan      Hyperkalemia  Pt with potassium 5.9 on CMP in ED. K 6.4 on istat. EKG without any acute changes. Pt shifted in ED with repeat BMP showing K 5.5. Unclear etiology of acute hyperkalemia, though patient does have concurrent PAULA. K consistently elevated, 5.9 on 4/16. S/p shift again on 4/17 with improvement in K to 5.1. AM cortisol elevated to 44. Initial oncern for potential adrenal insufficiency vs adrenal mets though no convincing evidence of lesions on CT, and Am cortisol Elevated. K slowly increasing s/o worsening PAULA. K+ WNL.    -- serial bmp  -- avoid potential potassium-increasing medications  -- shift as needed    PAULA (acute kidney injury)  Pt with Cr 2.0 on admission up from baseline of 1.0. Likely prerenal s/o poor PO intake, vomiting, and ascites. Cr improved to 1.3 with IVF and para. Nephro following. sCr increasing on 4/21. Maintenance IVF and encouraging PO without improvement. Cr increased to 2.3 on 4/22. sCr improved today.    -- nephro consulted  -- strict I/O's  -- daily bmp  -- avoid nephrotoxic medications  -- renally dose meds    Hyponatremia  Pt presenting with severe hyponatremia to 119 in ED. Likely hypovolemic  hyponatremia s/o poor PO And N/V. Na on labs 10 days prior to admission 128. Pt presenting with severe intractable nausea and vomiting. No changes in mental status or seizures. Sxs improving with improvement in Na. Nephro following, pt receiving intermittent NS boluses. Na stable.    -- nephro following  -- s/p therapeutic paracentesis on 4/14  --  Will repeat therapeutic para prior to discharge.   -- encouraging good PO        Metastatic melanoma  He presented initially with enlarging right axillary lymph nodes that have grown progressively in size since April 2021. He presented to urgent care and was referred for US which showed multiple enlarged masses in the right axilla most likely reflecting abnormal lymph nodes concerning for malignancy.   He was subsequently referred to dermatology who performed a complete exam of the skin that was negative for suspicious cutaneous lesions as well as punch biopsy of one of the lymph node on 01/04/22. Pathology came back as malignant melanoma.   BRAF mutation: positive for a V600E mutation.   He subsequently underwent a PET/CT on 01/28/22 which showed multiple hypermetabolic soft tissue masses within the right axillary region, metastatic lesions to the lungs and abdomen (VI segment of the liver measuring a max SUV of 6.99, 1.6 x 1.9 cm nodular soft tissue density with a max SUV  7.64 was seen adjacent to the colon within the right lower quadrant. Brain MRI was negative for metastatic disease.  LDH was 307 U/L  He saw Oncology at [facility] on 01/31/22 with plan to start First line Nivolumab / ipilimumab. He received C1 on 03/03/22.   MRI brain with multiple small frontal lobe lesions without mass effect concerning for metastatic disease. Rad onc consulted, no recommendations for RT at this time,  Will f/u in clinic.     -- pt scheduled to start binimetinib and encorafenib prior to admission.  -- started on 4/21      Altered mental status  Pt with worsening mental status starting  4/17 with worsening agitation and anxiety. On 4/18 patient with worsening mental status, increasingly confused. Patient requiring increasing sedating medications. Psychiatry consulted. Unclear etiology of behavioral changes. Likely brain mets vs possible withdrawal (pt with previous hx of methamphetamine use and alcohol use.) Mother confirms no alcohol use, not sure about other drug use. UDS + for benzos and THC. GGT negative. MRI brain with small lesions w/o  Mass effect. Unclear etiology of behavioral, possibly underlying agitation made worse with ativan.      Per psych recs, depakote PO TID and Zyprexa PRN  Daily ECG to monitor QTc  Discontinue PEC on 4/21      History of seizure  --  Continue home Keppra 1000 BID              Chalo Herrera MD PGY-1  Hematology/Oncology  [facility] - Med Surg

## 2022-04-23 NOTE — ASSESSMENT & PLAN NOTE
50 y/o M hx of malignant melanoma presents from clinic with symptomatic malignant ascites. Pt has had multiple admissions over the past month for therapeutic paracenteses in the setting of malignant ascites. His last para was 4/4, 10 days prior to admission. Patient will likely need scheduled weekly therapeutic josiah in the future to prevent hospitalization. S/p para on4/15 with 7L removed and albumin administered.     Plan:  -- Cell counts not c/w SBP, though continuing to cover with Ceftriaxone 2g q24h x7 days for given concurrent leukocytosis  -- schedule weekly para at discharge  -- therapeutic para today

## 2022-04-23 NOTE — PROGRESS NOTES
Pharmacist Renal Dose Adjustment Note    Joseluis Garner is a 51 y.o. male being treated with the medication famotidine.    Patient Data:    Vital Signs (Most Recent):  Temp: 98 °F (36.7 °C) (04/23/22 1140)  Pulse: 82 (04/23/22 1140)  Resp: 18 (04/23/22 1140)  BP: (!) 136/98 (04/23/22 1140)  SpO2: 96 % (04/23/22 1140)   Vital Signs (72h Range):  Temp:  [96.4 °F (35.8 °C)-98.4 °F (36.9 °C)]   Pulse:  []   Resp:  [6-20]   BP: (111-151)/()   SpO2:  [89 %-100 %]      Recent Labs   Lab 04/22/22  0308 04/22/22  1422 04/23/22  0400   CREATININE 2.3* 2.3* 1.8*     Serum creatinine: 1.8 mg/dL (H) 04/23/22 0400  Estimated creatinine clearance: 51.9 mL/min (A)    Medication:Famotidine 20mg daily will be changed to famotidine 20mg BID.     Pharmacist's Name: James Whitehead, PharmD  Pharmacist's Extension: [phone]

## 2022-04-23 NOTE — ASSESSMENT & PLAN NOTE
He presented initially with enlarging right axillary lymph nodes that have grown progressively in size since April 2021. He presented to urgent care and was referred for US which showed multiple enlarged masses in the right axilla most likely reflecting abnormal lymph nodes concerning for malignancy.   He was subsequently referred to dermatology who performed a complete exam of the skin that was negative for suspicious cutaneous lesions as well as punch biopsy of one of the lymph node on 01/04/22. Pathology came back as malignant melanoma.   BRAF mutation: positive for a V600E mutation.   He subsequently underwent a PET/CT on 01/28/22 which showed multiple hypermetabolic soft tissue masses within the right axillary region, metastatic lesions to the lungs and abdomen (VI segment of the liver measuring a max SUV of 6.99, 1.6 x 1.9 cm nodular soft tissue density with a max SUV  7.64 was seen adjacent to the colon within the right lower quadrant. Brain MRI was negative for metastatic disease.  LDH was 307 U/L  He saw Oncology at [facility] on 01/31/22 with plan to start First line Nivolumab / ipilimumab. He received C1 on 03/03/22.   MRI brain with multiple small frontal lobe lesions without mass effect concerning for metastatic disease. Rad onc consulted, no recommendations for RT at this time,  Will f/u in clinic.     -- pt scheduled to start binimetinib and encorafenib prior to admission.  -- started on 4/21

## 2022-04-23 NOTE — PROGRESS NOTES
Received report from my coworker following rounds with the Medical Oncology team this morning and patient is not medically ready for discharge today but will likely be ready over the weekend.   Contacted [facility], Bedside Delivery, ext.[phone], and spoke with Candice re: patient's discharge prescriptions. The total cost will be $16+ which is affordable amount for patient/mother. She said they tried calling patient and his other but they didn't answer. They will not finalize delivery until the discharge order is in.   Sent an email to [facility] and Johnathan Espinoise replied that no additional information is needed at this time and they can try to follow up with me on the status of patient's pending Medicaid application.  Referring patient via email to [facility] for a Social Security Disability application.   Sent message via Epic with update to Dr. Rivera and staff, Romel Lira RN, Meme Hunt RN, and Financial Coordinators Nohemy Davis and Grey Serrato re: patient's insurance status.  Reviewed PT notes entered this afternoon - home health and DME (rolling walker and bath bench) are recommended. Discussed with the Resident Dr. Campos and informed him that home health or outpatient therapy is not feasible due to patient not having active insurance at present. The DME can be ordered and if patient/mother unable to afford the items through [employer] then a cost transfer can be used and the cost will be covered through the [employer] Patient Assistance Fund. The Resident entered the DME orders in Epic. Also spoke with the Resident about the midline that was placed today, and he said that it will be removed prior to patient's discharge.  Sent a Secure Chat message to Krysta Mcdowell and Melva Roberts with [employer]; available on line at the time but they did not respond. Added my coworker Marilee Vela [facility], to the Chat as she is on call this weekend.    Patient will return home with his mother Margarita Hernandez ([phone]  cell.) via family car to her home at [address], [place], [address].  Discussed case with my coworker Marilee Vela LCSW, Oncology Social Worker who is on call this weekend.

## 2022-04-23 NOTE — PROCEDURES
"Joseluis Garner is a 51 y.o. male patient.    Temp: 98 °F (36.7 °C) (22 1140)  Pulse: 82 (22 1140)  Resp: 16 (22)  BP: (!) 136/98 (22 1140)  SpO2: 96 % (22 1140)  Weight: 76.4 kg (168 lb 6.9 oz) (76.4) (22)  Height: 6' (182.9 cm) (22)       Paracentesis    Date/Time: 2022 5:47 PM  Location procedure was performed: [facility] MEDICINE  Performed by: Taniya Jamison MD  Authorized by: Taniya Jamison MD   Assisting provider: Lidia Gordon MD  Pre-operative diagnosis: ascites  Post-operative diagnosis: ascites  Consent Done: Yes  Consent: Verbal consent obtained. Written consent obtained.  Consent given by: patient  Patient understanding: patient states understanding of the procedure being performed  Patient consent: the patient's understanding of the procedure matches consent given  Procedure consent: procedure consent matches procedure scheduled  Relevant documents: relevant documents present and verified  Test results: test results available and properly labeled  Site marked: the operative site was marked  Imaging studies: imaging studies available  Patient identity confirmed: , verbally with patient and MRN  Time out: Immediately prior to procedure a "time out" was called to verify the correct patient, procedure, equipment, support staff and site/side marked as required.  Initial or subsequent exam: initial  Procedure purpose: therapeutic  Indications: abdominal discomfort secondary to ascites and respiratory distress secondary to ascites  Anesthesia: local infiltration    Anesthesia:  Local Anesthetic: lidocaine 1% without epinephrine    Patient sedated: no  Preparation: Patient was prepped and draped in the usual sterile fashion.  Needle gauge: 18  Ultrasound guidance: yes  Puncture site: right lower quadrant  Fluid removed: 9000(ml)  Fluid appearance: clear and bilious  Dressing: 4x4 sterile gauze  Complications: No  Estimated blood loss " (mL): 5  Specimens: No  Implants: No          4/23/2022

## 2022-04-23 NOTE — ASSESSMENT & PLAN NOTE
Pt with potassium 5.9 on CMP in ED. K 6.4 on istat. EKG without any acute changes. Pt shifted in ED with repeat BMP showing K 5.5. Unclear etiology of acute hyperkalemia, though patient does have concurrent PAULA. K consistently elevated, 5.9 on 4/16. S/p shift again on 4/17 with improvement in K to 5.1. AM cortisol elevated to 44. Initial oncern for potential adrenal insufficiency vs adrenal mets though no convincing evidence of lesions on CT, and Am cortisol Elevated. K slowly increasing s/o worsening PAULA. K+ WNL.    -- serial bmp  -- avoid potential potassium-increasing medications  -- shift as needed

## 2022-04-23 NOTE — SUBJECTIVE & OBJECTIVE
Interval History:   NAEON. C/o SOB this morning and placed on NC for a short period. Likely related to ascites vs volume overload vs anxiety. Appears comfortable on my evaluation without respiratory distress. Paracentesis scheduled for today. sCr trending down, good UOP. Continues to have episodes of nausea/emesis. Pain well controlled.    Oncology Treatment Plan:   OP NIVOLUMAB 1 MG/KG IPILIMUMAB 3MG/KG FOLLOWED BY NIVOLUMAB 480MG Q4W    Medications:  Continuous Infusions:  Scheduled Meds:   binimetinib  45 mg Oral BID    divalproex  250 mg Oral Q8H    encorafenib  450 mg Oral Daily    enoxaparin  40 mg Subcutaneous Daily    famotidine  20 mg Oral Daily    fentaNYL  1 patch Transdermal Q72H    levETIRAcetam  1,000 mg Oral BID    polyethylene glycol  17 g Oral Daily    sodium zirconium cyclosilicate  5 g Oral TID     PRN Meds:albuterol-ipratropium, [COMPLETED] calcium gluconate IVPB **AND** calcium gluconate IVPB, dextrose 10%, dextrose 10%, glucagon (human recombinant), glucose, glucose, HYDROmorphone, hydrOXYzine HCL, LIDOcaine HCl 2%, naloxone, OLANZapine, oxyCODONE, prochlorperazine, promethazine, senna-docusate 8.6-50 mg, sodium chloride 0.9%     Review of Systems   Constitutional:  Negative for fever.   Respiratory:  Negative for shortness of breath.    Cardiovascular:  Negative for chest pain.   Gastrointestinal:  Positive for abdominal pain, nausea and vomiting. Negative for blood in stool, constipation and diarrhea.   Psychiatric/Behavioral:  Negative for agitation, behavioral problems, confusion, hallucinations and sleep disturbance.    Objective:     Vital Signs (Most Recent):  Temp: 98.1 °F (36.7 °C) (04/23/22 0739)  Pulse: 76 (04/23/22 0739)  Resp: 18 (04/23/22 0954)  BP: (!) 125/96 (04/23/22 0739)  SpO2: 98 % (04/23/22 0739)   Vital Signs (24h Range):  Temp:  [97.1 °F (36.2 °C)-98.1 °F (36.7 °C)] 98.1 °F (36.7 °C)  Pulse:  [] 76  Resp:  [6-20] 18  SpO2:  [89 %-100 %] 98 %  BP: (123-151)/()  125/96     Weight: 76.4 kg (168 lb 6.9 oz) (76.4)  Body mass index is 22.84 kg/m².  Body surface area is 1.97 meters squared.      Intake/Output Summary (Last 24 hours) at 4/23/2022 0956  Last data filed at 4/22/2022 1300  Gross per 24 hour   Intake 360 ml   Output 150 ml   Net 210 ml       Physical Exam  Vitals and nursing note reviewed.   Constitutional:       General: He is not in acute distress.     Appearance: He is ill-appearing. He is not toxic-appearing or diaphoretic.      Comments: Patient sitting up in bed in  NAD.    HENT:      Head: Normocephalic and atraumatic.      Nose: Nose normal. No congestion.      Mouth/Throat:      Mouth: Mucous membranes are dry.   Eyes:      Conjunctiva/sclera: Conjunctivae normal.      Pupils: Pupils are equal, round, and reactive to light.   Cardiovascular:      Rate and Rhythm: Normal rate.      Pulses: Normal pulses.      Heart sounds: Normal heart sounds. No murmur heard.    No friction rub. No gallop.   Pulmonary:      Effort: Pulmonary effort is normal. No respiratory distress.      Breath sounds: Normal breath sounds.      Comments: Breathing comfortable on room air  Abdominal:      General: There is distension.      Tenderness: There is abdominal tenderness (minimal).      Comments: Decreased bowel sounds.    Musculoskeletal:         General: No swelling. Normal range of motion.      Cervical back: Normal range of motion and neck supple.   Skin:     General: Skin is warm and dry.      Comments: Multiple large subcutaneous nodules noted under R axilla.    Neurological:      General: No focal deficit present.      Mental Status: He is oriented to person, place, and time.   Psychiatric:         Mood and Affect: Mood normal.         Behavior: Behavior normal.       Significant Labs:   CBC:   Recent Labs   Lab 04/22/22  0308 04/23/22  0400   WBC 16.27* 15.48*   HGB 15.2 13.9*   HCT 45.4 41.6    491*   , CMP:   Recent Labs   Lab 04/22/22  0308 04/22/22  1422  04/23/22  0400   * 122* 122*   K 5.2* 5.3* 4.9   CL 86* 87* 86*   CO2 23 22* 22*   GLU 93 70 81   BUN 49* 54* 51*   CREATININE 2.3* 2.3* 1.8*   CALCIUM 8.2* 8.2* 8.0*   PROT 4.8*  --  4.9*   ALBUMIN 2.1*  --  2.1*   BILITOT 0.3  --  0.2   ALKPHOS 60  --  66   AST 33  --  46*   ALT 19  --  23   ANIONGAP 11 13 14   EGFRNONAA 31.5* 31.5* 42.3*   , and All pertinent labs from the last 24 hours have been reviewed.    Diagnostic Results:  I have reviewed all pertinent imaging results/findings within the past 24 hours.

## 2022-04-23 NOTE — PT/OT/SLP PROGRESS
Physical Therapy      Patient Name:  Joseluis Garner   MRN:  [id number]    Patient not seen today secondary to declined, reports he is tired and just got into bed, discussed stairs at home and reports he does not expect any difficulty with ascending stairs once home. Reports he will have assistance if needed with stairs by Step brother. Will follow-up 4/25/22.

## 2022-04-23 NOTE — ASSESSMENT & PLAN NOTE
Pt with Cr 2.0 on admission up from baseline of 1.0. Likely prerenal s/o poor PO intake, vomiting, and ascites. Cr improved to 1.3 with IVF and para. Nephro following. sCr increasing on 4/21. Maintenance IVF and encouraging PO without improvement. Cr increased to 2.3 on 4/22. sCr improved today.    -- nephro consulted  -- strict I/O's  -- daily bmp  -- avoid nephrotoxic medications  -- renally dose meds

## 2022-04-23 NOTE — ASSESSMENT & PLAN NOTE
Pt presenting with severe hyponatremia to 119 in ED. Likely hypovolemic hyponatremia s/o poor PO And N/V. Na on labs 10 days prior to admission 128. Pt presenting with severe intractable nausea and vomiting. No changes in mental status or seizures. Sxs improving with improvement in Na. Nephro following, pt receiving intermittent NS boluses. Na stable.    -- nephro following  -- s/p therapeutic paracentesis on 4/14  --  Will repeat therapeutic para prior to discharge.   -- encouraging good PO

## 2022-04-24 VITALS
BODY MASS INDEX: 22.81 KG/M2 | TEMPERATURE: 98 F | DIASTOLIC BLOOD PRESSURE: 89 MMHG | HEART RATE: 65 BPM | SYSTOLIC BLOOD PRESSURE: 139 MMHG | WEIGHT: 168.44 LBS | OXYGEN SATURATION: 95 % | RESPIRATION RATE: 16 BRPM | HEIGHT: 72 IN

## 2022-04-24 LAB
ALBUMIN SERPL BCP-MCNC: 2.1 G/DL (ref 3.5–5.2)
ALP SERPL-CCNC: 55 U/L (ref 55–135)
ALT SERPL W/O P-5'-P-CCNC: 21 U/L (ref 10–44)
ANION GAP SERPL CALC-SCNC: 11 MMOL/L (ref 8–16)
AST SERPL-CCNC: 36 U/L (ref 10–40)
BASOPHILS # BLD AUTO: 0.03 K/UL (ref 0–0.2)
BASOPHILS NFR BLD: 0.2 % (ref 0–1.9)
BILIRUB SERPL-MCNC: 0.2 MG/DL (ref 0.1–1)
BUN SERPL-MCNC: 48 MG/DL (ref 6–20)
CALCIUM SERPL-MCNC: 7.7 MG/DL (ref 8.7–10.5)
CHLORIDE SERPL-SCNC: 88 MMOL/L (ref 95–110)
CO2 SERPL-SCNC: 24 MMOL/L (ref 23–29)
CREAT SERPL-MCNC: 1.2 MG/DL (ref 0.5–1.4)
DIFFERENTIAL METHOD: ABNORMAL
EOSINOPHIL # BLD AUTO: 0.1 K/UL (ref 0–0.5)
EOSINOPHIL NFR BLD: 0.5 % (ref 0–8)
ERYTHROCYTE [DISTWIDTH] IN BLOOD BY AUTOMATED COUNT: 14.1 % (ref 11.5–14.5)
EST. GFR  (AFRICAN AMERICAN): >60 ML/MIN/1.73 M^2
EST. GFR  (NON AFRICAN AMERICAN): >60 ML/MIN/1.73 M^2
GLUCOSE SERPL-MCNC: 79 MG/DL (ref 70–110)
HCT VFR BLD AUTO: 39.3 % (ref 40–54)
HGB BLD-MCNC: 13.2 G/DL (ref 14–18)
IMM GRANULOCYTES # BLD AUTO: 0.1 K/UL (ref 0–0.04)
IMM GRANULOCYTES NFR BLD AUTO: 0.6 % (ref 0–0.5)
LYMPHOCYTES # BLD AUTO: 1.5 K/UL (ref 1–4.8)
LYMPHOCYTES NFR BLD: 9.7 % (ref 18–48)
MAGNESIUM SERPL-MCNC: 2.2 MG/DL (ref 1.6–2.6)
MCH RBC QN AUTO: 26.4 PG (ref 27–31)
MCHC RBC AUTO-ENTMCNC: 33.6 G/DL (ref 32–36)
MCV RBC AUTO: 79 FL (ref 82–98)
MONOCYTES # BLD AUTO: 0.5 K/UL (ref 0.3–1)
MONOCYTES NFR BLD: 3 % (ref 4–15)
NEUTROPHILS # BLD AUTO: 13.5 K/UL (ref 1.8–7.7)
NEUTROPHILS NFR BLD: 86 % (ref 38–73)
NRBC BLD-RTO: 0 /100 WBC
PHOSPHATE SERPL-MCNC: 4.1 MG/DL (ref 2.7–4.5)
PLATELET # BLD AUTO: 437 K/UL (ref 150–450)
PMV BLD AUTO: 9.9 FL (ref 9.2–12.9)
POTASSIUM SERPL-SCNC: 4.7 MMOL/L (ref 3.5–5.1)
PROT SERPL-MCNC: 4.5 G/DL (ref 6–8.4)
RBC # BLD AUTO: 5 M/UL (ref 4.6–6.2)
SODIUM SERPL-SCNC: 123 MMOL/L (ref 136–145)
WBC # BLD AUTO: 15.7 K/UL (ref 3.9–12.7)

## 2022-04-24 PROCEDURE — 93010 ELECTROCARDIOGRAM REPORT: CPT | Mod: ,,, | Performed by: INTERNAL MEDICINE

## 2022-04-24 PROCEDURE — 83735 ASSAY OF MAGNESIUM: CPT

## 2022-04-24 PROCEDURE — 99239 HOSP IP/OBS DSCHRG MGMT >30: CPT | Mod: ,,, | Performed by: INTERNAL MEDICINE

## 2022-04-24 PROCEDURE — 63600175 PHARM REV CODE 636 W HCPCS

## 2022-04-24 PROCEDURE — 36415 COLL VENOUS BLD VENIPUNCTURE: CPT

## 2022-04-24 PROCEDURE — 25000003 PHARM REV CODE 250

## 2022-04-24 PROCEDURE — 25000003 PHARM REV CODE 250: Performed by: INTERNAL MEDICINE

## 2022-04-24 PROCEDURE — 93005 ELECTROCARDIOGRAM TRACING: CPT

## 2022-04-24 PROCEDURE — 85025 COMPLETE CBC W/AUTO DIFF WBC: CPT

## 2022-04-24 PROCEDURE — 93010 EKG 12-LEAD: ICD-10-PCS | Mod: ,,, | Performed by: INTERNAL MEDICINE

## 2022-04-24 PROCEDURE — 80053 COMPREHEN METABOLIC PANEL: CPT

## 2022-04-24 PROCEDURE — 84100 ASSAY OF PHOSPHORUS: CPT

## 2022-04-24 PROCEDURE — 99239 PR HOSPITAL DISCHARGE DAY,>30 MIN: ICD-10-PCS | Mod: ,,, | Performed by: INTERNAL MEDICINE

## 2022-04-24 RX ORDER — FAMOTIDINE 20 MG/1
20 TABLET, FILM COATED ORAL 2 TIMES DAILY
Qty: 60 TABLET | Refills: 11 | Status: ON HOLD | OUTPATIENT
Start: 2022-04-24 | End: 2022-08-21 | Stop reason: HOSPADM

## 2022-04-24 RX ORDER — OXYCODONE HYDROCHLORIDE 20 MG/1
20 TABLET ORAL EVERY 6 HOURS PRN
Qty: 28 TABLET | Refills: 0 | Status: SHIPPED | OUTPATIENT
Start: 2022-04-24 | End: 2022-05-01

## 2022-04-24 RX ADMIN — FAMOTIDINE 20 MG: 20 TABLET ORAL at 10:04

## 2022-04-24 RX ADMIN — LEVETIRACETAM 1000 MG: 500 TABLET, FILM COATED ORAL at 10:04

## 2022-04-24 RX ADMIN — DIVALPROEX SODIUM 250 MG: 250 TABLET, DELAYED RELEASE ORAL at 05:04

## 2022-04-24 RX ADMIN — OXYCODONE HYDROCHLORIDE 20 MG: 10 TABLET ORAL at 05:04

## 2022-04-24 RX ADMIN — HYDROMORPHONE HYDROCHLORIDE 0.5 MG: 1 INJECTION, SOLUTION INTRAMUSCULAR; INTRAVENOUS; SUBCUTANEOUS at 07:04

## 2022-04-24 RX ADMIN — HYDROMORPHONE HYDROCHLORIDE 0.5 MG: 1 INJECTION, SOLUTION INTRAMUSCULAR; INTRAVENOUS; SUBCUTANEOUS at 11:04

## 2022-04-24 RX ADMIN — OXYCODONE HYDROCHLORIDE 20 MG: 10 TABLET ORAL at 11:04

## 2022-04-24 RX ADMIN — SODIUM ZIRCONIUM CYCLOSILICATE 5 G: 5 POWDER, FOR SUSPENSION ORAL at 10:04

## 2022-04-24 RX ADMIN — HYDROMORPHONE HYDROCHLORIDE 0.5 MG: 1 INJECTION, SOLUTION INTRAMUSCULAR; INTRAVENOUS; SUBCUTANEOUS at 02:04

## 2022-04-24 RX ADMIN — POLYETHYLENE GLYCOL 3350 17 G: 17 POWDER, FOR SOLUTION ORAL at 10:04

## 2022-04-24 NOTE — DISCHARGE SUMMARY
[facility]  Hematology/Oncology  Discharge Summary      Patient Name: Joseluis Garner  MRN: [id number]  Admission Date: 4/14/2022  Hospital Length of Stay: 10 days  Discharge Date and Time:  04/24/2022 10:49 AM  Attending Physician: Sergio Thacker MD   Discharging Provider: Roel Campos MD  Primary Care Provider: Primary Doctor No    HPI: 51 y.o. male with history of CAD s/p PCI, seizures, substance abuse, stage IV malignant melanoma presenting from oncology clinic with multiple electrolyte abnormalities and worsening ascites. Pt has hx of malignant melanoma first diagnosed 1/2022. He is s/p 1 round of tx on 3/3/2022, with  plans to start binimetinib and enorafenib in  the near future. However, patient's clinical status has been deteriorating over the past few weeks. He was admitted on 03/18 at [facility] for abdominal pain, and distension. He underwent therapeutic paracentesis with removal of 3L, and was discharged on 03/22. CT abdomen during that admission showed soft tissue attenuation throughout the peritoneum consistent with innumerable peritoneal implants.  He was then readmitted the following day 03/23 for re accumulation and had another paracentesis with removal of 4.7L. Pt was again readmitted 03/30 to 04/02 and underwent paracentesis. He was planned to receive pleur-X catheter as outpatient. He presented again to [facility] on 04/04 with worsening abdominal distension and pain. He underwent therapeutic paracentesis with removal of 5L. Cytology was positive for malignant cells. He was discharged on 04/05 on Cipro for SBP coverage.      Since his discharge, he notes worsening abdominal distension. He had progressive nausea and vomiting and has not been able to tolerate any diet including water. He has tried zofran at home which has not helped. He denies any recent fever, chills, cough, sore throat. He was evaluated in  oncology clinic this morning  with his mother, during which time he had several episodes of  vomiting. Labs obtained during clinic were concerning for leukocytosis, hyperkalemia, and hyponatremia. He was sent to the ED for admission for management of ascites, and other current comorbid conditions.     In the ED, Pt afebrile, /85, HR 80s,  ALEJANDRO. CBC notable for  WBC 19, Plt 619. K  5.9, Na 119.  Alb 2.6. Cr 2.0 from baseline 1.0. TSH 8.8, though free T4 wnl. Procal elevated to 1.1. EKG with no acute changes, no  T wave abnormalities.        * No surgery found *     Hospital Course: Patient admitted for workup and management of worsening ascites, intractable nausea and vomiting, hyperkalemia, and hyponatremia. Pt started on  ctx for SBP prophylaxis given ascites and concurrent leukocytosis. He underwent urgent para with IR and 7L taken off. Para labs not consistent with SBP. K shifted with improvement in hyperkalemia. Na improving with NS infusion. Pain controlled with oxy PO and fentanyl patch. Nephro consulted for assistance given multiple electrolyte abnormalities. On 4/16 patient with worsening hyponatremia and hyperkalemia as well as intractible nausea and vomiting. KUB and CT-CAP demonstrating possible partial SBO vs ileus. K  shifted and pt given 2L NS for hypovolemic hyponatremia. NGT placed 4/17 with significant output. Overnight on 4/17 pt removing  NGT multiple times, requiring ativan for agitation. Overnight on 4/18 pt wanting to leave AMA but while leaving hospital changed his mind and returned to room. Pt with worsening mental status and increasing agitation 4/18-4/19. Pt requiring significant sedation with multiple medications as well as hard restraints. Psychiatry consulted. Unclear  etiology of behavioral changes, favor brain mets vs substance withdrawal. Pt with no recent alcohol use per mom, UDS +opiate and THC. Psychiatry recommended scheduled Depakote and Zyprexa PRN. MRI demonstrating sub-centimeter frontal lobe lesions concerning for metastatic disease. Pt to start on home  melanoma treatment 4/21. Para ordered, likely 4/23. PAULA worsening, likely hypervolemic s/o worsening ascites. Pt received para with 9L pulled and alb administered. Pt with subsequently markedly improved kidney function.  On 4/24 pt with Cr at baseline, labs improving, symptoms improved, and pt stable for discharge to home with close onc follow up and weekly outpatient josiah.       Wt Readings from Last 3 Encounters:   04/14/22 76.4 kg (168 lb 6.9 oz)   04/14/22 72.2 kg (159 lb 2.8 oz)   04/04/22 70.3 kg (155 lb)     Temp Readings from Last 3 Encounters:   04/24/22 97.8 °F (36.6 °C)   04/05/22 97.8 °F (36.6 °C) (Oral)   02/19/21 98.2 °F (36.8 °C) (Oral)     BP Readings from Last 3 Encounters:   04/24/22 128/86   04/14/22 (!) 175/119   04/05/22 130/78     Pulse Readings from Last 3 Encounters:   04/24/22 63   04/14/22 107   04/05/22 85       Physical Exam  Vitals and nursing note reviewed.   Constitutional:       General: He is not in acute distress.     Appearance: He is ill-appearing. He is not toxic-appearing or diaphoretic.      Comments: Patient sitting up in bed comfortable and in NAD.    HENT:      Head: Normocephalic and atraumatic.      Nose: Nose normal. No congestion.      Mouth/Throat:      Mouth: Mucous membranes are dry.   Eyes:      Conjunctiva/sclera: Conjunctivae normal.      Pupils: Pupils are equal, round, and reactive to light.   Cardiovascular:      Rate and Rhythm: Normal rate.      Pulses: Normal pulses.      Heart sounds: Normal heart sounds. No murmur heard.    No friction rub. No gallop.   Pulmonary:      Effort: Pulmonary effort is normal. No respiratory distress.      Breath sounds: Normal breath sounds.      Comments: Breathing comfortable on room air  Abdominal:      General: There is no distension.     Tenderness: There is abdominal tenderness (minimal).      Comments: normal  Bowel sounds  Musculoskeletal:         General: No swelling. Normal range of motion.      Cervical back: Normal  range of motion and neck supple.   Skin:     General: Skin is warm and dry.      Comments: Multiple large subcutaneous nodules noted under R axilla.    Neurological:      General: No focal deficit present.      Mental Status: He is oriented to person, place, and time.   Psychiatric:         Mood and Affect: Mood normal.         Behavior: Behavior normal.       Goals of Care Treatment Preferences:  Code Status: Full Code      Consults:   Consults (From admission, onward)        Status Ordering Provider     Inpatient consult to Midline team  Once        Provider:  (Not yet assigned)    Completed LILIAN CHU     Inpatient consult to Radiation Oncology  Once        Provider:  (Not yet assigned)    Completed JILLIAN RUFFIN     Inpatient consult to Psychiatry  Once        Provider:  (Not yet assigned)    Completed CARMITA PAULINO     Inpatient consult to General Surgery  Once        Provider:  (Not yet assigned)    Completed SOLEDAD CONTRERAS     Inpatient consult to Hematology/Oncology Psychology  Once        Provider:  (Not yet assigned)    Completed SOLEDAD CONTRERAS     Inpatient consult to Nephrology  Once        Provider:  (Not yet assigned)    Completed SOLEDAD CONTRERAS     Inpatient consult to Hematology/Oncology  Once        Provider:  (Not yet assigned)    Completed DEYANIRA ENGLE          X-Ray Abdomen AP 1 View   Final Result      See above         Electronically signed by: Jay Ramsey MD   Date:    04/21/2022   Time:    13:14      MRI Brain W WO Contrast   Final Result   Abnormal      Two subcentimeter enhancing foci noted along the left frontal and right parietal parasagittal cortex/subcortical white matter suspicious for metastatic disease.  Please note the thin-section images are too degraded by motion artifact.      This report was flagged in Epic as abnormal.      Electronically signed by resident: Gaurav Norman   Date:    04/20/2022   Time:    16:17      Electronically signed by: Zhen  Contreras   Date:    04/20/2022   Time:    17:57      X-Ray Skull Ltd Less Than 4 Views   Final Result      As above.         Electronically signed by: Marky Carney   Date:    04/20/2022   Time:    12:39      US Retroperitoneal Complete   Final Result      Normal morphologic appearance of the bilateral kidneys.  No hydronephrosis or evidence of obstructive uropathy.      Large volume ascites.      Electronically signed by resident: Enrique Ibarra   Date:    04/18/2022   Time:    13:56      Electronically signed by: Ej Wise MD   Date:    04/18/2022   Time:    14:02      X-ray Abdomen for NG Tube Placement (Nursing should notify Radiology after placement)   Final Result      1. Tip of the nasogastric tube in the expected location of the distal antrum or the pylorus of the stomach.  There is no perforation.   2. Left midlung zone pulmonary nodule as above.         Electronically signed by: Lamine Sim MD   Date:    04/18/2022   Time:    09:44      X-Ray Abdomen AP 1 View   Final Result      Ileus versus obstruction.         Electronically signed by: Darci Armstrong MD   Date:    04/18/2022   Time:    08:03      X-ray Abdomen for NG Tube Placement (Nursing should notify Radiology after placement)   Final Result      As above.         Electronically signed by: Jhon Goetz   Date:    04/16/2022   Time:    17:19      X-Ray Abdomen AP 1 View   Final Result      As above.         Electronically signed by: Jhon Goetz   Date:    04/16/2022   Time:    13:17      CT Chest Abdomen Pelvis Without Contrast (XPD)   Final Result      In this patient with a reported history of metastatic melanoma, there is diffuse metastatic disease involving the right axilla, bilateral lungs, and peritoneal metastasis as described in detail above.  Additional subcutaneous soft tissue nodule which could represent metastatic focus.      Mild wedging of L2 superior endplate suggestive of mild compression fracture.  Recommend correlation with  outside imaging.      Diverticulosis.  No evidence of diverticulitis.      Hepatomegaly.      Additional findings as above.      Electronically signed by resident: Baltazar Purcell   Date:    04/16/2022   Time:    09:17      Electronically signed by: Joseluis Arias   Date:    04/16/2022   Time:    12:24      IR Paracentesis without imaging   Final Result      Ultrasound-guided paracentesis with drainage of 7000 mL of elizabeth fluid.  Albumin administered as per protocol.      _______________________________________________________________      Electronically signed by resident: Rudy Otero   Date:    04/14/2022   Time:    17:39      Electronically signed by: Juan Jose Hernández MD   Date:    04/14/2022   Time:    17:46      IR Paracentesis with Imaging    (Results Pending)         Pending Diagnostic Studies:     Procedure Component Value Units Date/Time    IR Paracentesis with Imaging [[id number]]     Order Status: Sent Lab Status: No result         Final Active Diagnoses:    Diagnosis Date Noted POA    PRINCIPAL PROBLEM:  Ascites [R18.8] 04/04/2022 Yes    Agitation [R45.1] 04/19/2022 Yes    Ileus [K56.7] 04/18/2022 Unknown    Hyperkalemia [E87.5] 04/14/2022 Unknown    Intractable nausea and vomiting [R11.2] 04/14/2022 Unknown    PAULA (acute kidney injury) [N17.9] 04/04/2022 Yes    Hyponatremia [E87.1] 04/04/2022 Yes    Metastatic melanoma [C79.9] 04/04/2022 Yes    History of seizure [Z87.898] 02/18/2021 Not Applicable    Altered mental status [R41.82] 02/18/2021 Yes      Problems Resolved During this Admission:      Discharged Condition: stable    Disposition: Home or Self Care    Follow Up:    Patient Instructions:      WALKER FOR HOME USE     Order Specific Question Answer Comments   Type of Walker: Rollator with brakes and/or seat    With wheels? Yes    Height: 6' (1.829 m)    Weight: 76.4 kg (168 lb 6.9 oz) 76.4   Length of need (1-99 months): 99    Does patient have medical equipment at home? none    Please  check all that apply: Patient's condition impairs ambulation.    Please check all that apply: Patient is unable to safely ambulate without equipment.      TRANSFER TUB BENCH FOR HOME USE     Order Specific Question Answer Comments   Type of Transfer Tub Bench: Unpadded    Height: 6' (1.829 m)    Weight: 76.4 kg (168 lb 6.9 oz) 76.4   Does patient have medical equipment at home? none    Length of need (1-99 months): 99    Patient notified - Not covered by insurance considered a convenience item Yes    Discussed financial responsibility with responsible party Yes      IR Paracentesis with Imaging   Standing Status: Standing Number of Occurrences: 52 Standing Exp. Date: 04/24/23     Order Specific Question Answer Comments   Is this Procedure Therapeutic or Diagnostic? Therapeutic    Has the patient had a prior contrast or iodine reaction? No    Is the patient currently on any blood thinners like Aspirin, Coumadin, or Plavix? No    Is the patient on any Metformin drug, such as Glucophage or Glucovance? No    May the Radiologist modify the order per protocol to meet the clinical needs of the patient? Yes    Release to patient Immediate      Medications:  Reconciled Home Medications:      Medication List      START taking these medications    binimetinib 15 mg Tab  Take 45 mg by mouth 2 (two) times daily.     divalproex 250 MG EC tablet  Commonly known as: DEPAKOTE  Take 1 tablet (250 mg total) by mouth every 8 (eight) hours.     encorafenib 75 mg Cap  Take 450 mg by mouth once daily.     famotidine 20 MG tablet  Commonly known as: PEPCID  Take 1 tablet (20 mg total) by mouth 2 (two) times daily.     polyethylene glycol 17 gram/dose powder  Commonly known as: GLYCOLAX  Dissolve one capful (17 g) in liquid and take by mouth once daily.     senna-docusate 8.6-50 mg 8.6-50 mg per tablet  Commonly known as: PERICOLACE  Take 1 tablet by mouth daily as needed for Constipation.        CONTINUE taking these medications     fentaNYL 25 mcg/hr  Commonly known as: DURAGESIC  Place 1 patch onto the skin once.     levETIRAcetam 1000 MG tablet  Commonly known as: KEPPRA  Take 1,000 mg by mouth 2 (two) times a day.     ondansetron 8 MG Tbdl  Commonly known as: ZOFRAN-ODT  Dissolve 1 tablet (8 mg total) by mouth every 8 (eight) hours as needed.     oxyCODONE 20 mg Tab immediate release tablet  Commonly known as: ROXICODONE  Take 1 tablet (20 mg total) by mouth every 6 (six) hours as needed.        STOP taking these medications    trametinib 2 mg Tab  Commonly known as: MELANY Campos MD   Internal Medicine PGY-1  Hematology/Oncology  [facility]

## 2022-04-24 NOTE — NURSING
"Called into pt's room because he wanted to go AMA. He stated that he wanted the NGT and Iv removed "now," and that he wanted to leave. He felt like he was being treated like an animal in a cage. Dr. Pan called into the pt's room and explained in detail why the pt should stay, that he was at risk for aspiration and sepsis, and that if th pt left he could die. Pt was able to repeat back to the MD that he understood that he could die if he left, but he said that he wanted to go home tonight and would go back to the ED tomorrow if needed. Pt also stated that he was going to go home and take multiple fleets enemas to help him have a bowel movement. Pt signed the AMA form, and his NGT and midline were removed.   "
BS @ 2300-111    @ [phone]    
Notified by family that pt had fallen onto floor while ambulating. Pt stated that he tripped over his IV tubing, fell backwards onto foot of bed and slid off hitting the floor with his left side. Pt stated that he did not hit his head. Pt able to move all extremities and stated that he had no new pains from the fall. No bruises or lacerations were noted. Pt's mother was at bedside at time of fall and is aware. MD notified, and no new orders placed. Fall risk precautions added to pt's plan of care. Bed alarm set, pt's personal footwear removed and nonskid socks applied, call light within reach.   
Patient was leaving AMA and change his mind. Patient received alert and oriented x 4, family at bedside, bed alarm and tele-sitter in room. Will continue to monitor.  
Pt AAOx4, VSS. Pain being controlled with IV/po pain meds. Pt ready for d/c, mother at bedside.  D/C instructions reviewed with pt, mother-pt verbalized understanding.   Awaiting bedside med delivery for d/c. Mother to drive him home.  
Pt AAOx4, VSS. Pt complains of uncontrolled pain. MD notified, med increased, pt reports relief of pain. Mother at bedside, involved in care.  Paracentesis performed at the bedside, pt tolerated. 9L removed. Pt reports relief.  Pt in bed in lowest position, sr up x2, call light/personal effects within reach, mom at bedside.  
Pt continues to be aggressive. Pt pulled out IV access and removed restraints a third time. MD aware. Depakote ordered, Charge nurse and House supervisor updated. Restraint orders changed. Soft restraints to be replaced with velcro restraints when security arrives at bedside. MD to place new order. Will continue to monitor.   
Received AAO x4, Family at bedside. Spoke with lab r/t Potassium of 6.3 Spoke with oncall MD, EKG ordered, spoke with RT. Will continue to monitor for changes. Patient is asymptomatic  
Received AAO x4, Mother at bedside C/O pain in abd. Respirations even and unlabored with no distress noted. Will continue to monitor for changes   
Staff alerted to pt yelling. Pt was being extremely aggressive and verbally abusive. Pt managed to remove restraints twice. B52 ordered. Will continue to monitor.   
(4) no impairment

## 2022-04-24 NOTE — PLAN OF CARE
Pt alert and orientedx4. Vitals stable. Pt states 8-9/10 pain medicated per MAR. Pt states pain all over. Will continue monitor.     Problem: Adult Inpatient Plan of Care  Goal: Plan of Care Review  Outcome: Ongoing, Progressing  Goal: Patient-Specific Goal (Individualized)  Outcome: Ongoing, Progressing  Goal: Absence of Hospital-Acquired Illness or Injury  Outcome: Ongoing, Progressing  Goal: Optimal Comfort and Wellbeing  Outcome: Ongoing, Progressing  Goal: Readiness for Transition of Care  Outcome: Ongoing, Progressing     Problem: Fluid and Electrolyte Imbalance (Acute Kidney Injury/Impairment)  Goal: Fluid and Electrolyte Balance  Outcome: Ongoing, Progressing     Problem: Oral Intake Inadequate (Acute Kidney Injury/Impairment)  Goal: Optimal Nutrition Intake  Outcome: Ongoing, Progressing     Problem: Renal Function Impairment (Acute Kidney Injury/Impairment)  Goal: Effective Renal Function  Outcome: Ongoing, Progressing     Problem: Skin Injury Risk Increased  Goal: Skin Health and Integrity  Outcome: Ongoing, Progressing     Problem: Infection  Goal: Absence of Infection Signs and Symptoms  Outcome: Ongoing, Progressing     Problem: Fall Injury Risk  Goal: Absence of Fall and Fall-Related Injury  Outcome: Ongoing, Progressing     Problem: Restraint, Nonbehavioral (Nonviolent)  Goal: Absence of Harm or Injury  Outcome: Ongoing, Progressing     Problem: Restraint, Behavioral (Acute Care)  Goal: Absence of Harm or Injury  Outcome: Ongoing, Progressing

## 2022-04-25 ENCOUNTER — PATIENT OUTREACH (OUTPATIENT)
Dept: ADMINISTRATIVE | Facility: CLINIC | Age: 52
End: 2022-04-25
Payer: MEDICAID

## 2022-04-25 ENCOUNTER — TELEPHONE (OUTPATIENT)
Dept: HEMATOLOGY/ONCOLOGY | Facility: CLINIC | Age: 52
End: 2022-04-25
Payer: MEDICAID

## 2022-04-25 LAB
BACTERIA SPEC ANAEROBE CULT: NORMAL
POCT GLUCOSE: 114 MG/DL (ref 70–110)

## 2022-04-26 ENCOUNTER — DOCUMENTATION ONLY (OUTPATIENT)
Dept: HEMATOLOGY/ONCOLOGY | Facility: CLINIC | Age: 52
End: 2022-04-26
Payer: MEDICAID

## 2022-04-26 NOTE — PROGRESS NOTES
Received reply message via Epic from Praveena Wise, Leigh, with [facility]. She was out of the office and just returned today, and received the message I sent to her on 4/22 re: patient's insurance status ([facility] termed and Medicaid is pending) and need for pharmaceutical  assistance programs for his treatment medications in the interim until patient has active Medicaid coverage. She will forward the information to their financial assistance team so they can work on this one urgently.   Will continue to follow and assist as needs are identified.

## 2022-04-26 NOTE — TELEPHONE ENCOUNTER
Confirmed with Praveena Hawley LCSW that patient currently does not have any active insurance on file. Will need assistance with  program. Forwarded to FA team to work on assistance.

## 2022-04-26 NOTE — TELEPHONE ENCOUNTER
No answer, Unable to LVM. Call attempt to patient regarding Braftovi/Mektovi prescription we received. Calling to discuss applying for  assistance due to patient being uninsured. WILL NEED HH SIZE, ANNUAL INCOME, & HOW PT WANTS TO RECEIVE THEIR PORTION OF APPLICATION. Will continue to follow up.

## 2022-04-28 ENCOUNTER — LAB VISIT (OUTPATIENT)
Dept: LAB | Facility: HOSPITAL | Age: 52
End: 2022-04-28
Attending: INTERNAL MEDICINE
Payer: MEDICAID

## 2022-04-28 ENCOUNTER — OFFICE VISIT (OUTPATIENT)
Dept: HEMATOLOGY/ONCOLOGY | Facility: CLINIC | Age: 52
End: 2022-04-28
Payer: MEDICAID

## 2022-04-28 ENCOUNTER — TELEPHONE (OUTPATIENT)
Dept: PHARMACY | Facility: CLINIC | Age: 52
End: 2022-04-28
Payer: MEDICAID

## 2022-04-28 VITALS
HEIGHT: 72 IN | RESPIRATION RATE: 18 BRPM | HEART RATE: 68 BPM | DIASTOLIC BLOOD PRESSURE: 80 MMHG | WEIGHT: 152.13 LBS | TEMPERATURE: 99 F | OXYGEN SATURATION: 100 % | BODY MASS INDEX: 20.6 KG/M2 | SYSTOLIC BLOOD PRESSURE: 120 MMHG

## 2022-04-28 DIAGNOSIS — E88.09 HYPOALBUMINEMIA: ICD-10-CM

## 2022-04-28 DIAGNOSIS — C43.9 METASTATIC MELANOMA: ICD-10-CM

## 2022-04-28 DIAGNOSIS — C79.31 MALIGNANT MELANOMA METASTATIC TO BRAIN: ICD-10-CM

## 2022-04-28 DIAGNOSIS — R18.0 MALIGNANT ASCITES: ICD-10-CM

## 2022-04-28 DIAGNOSIS — C43.9 METASTATIC MELANOMA: Primary | ICD-10-CM

## 2022-04-28 DIAGNOSIS — E03.9 HYPOTHYROIDISM, UNSPECIFIED TYPE: ICD-10-CM

## 2022-04-28 DIAGNOSIS — G89.3 CANCER ASSOCIATED PAIN: ICD-10-CM

## 2022-04-28 DIAGNOSIS — Z87.898 HISTORY OF SEIZURE: ICD-10-CM

## 2022-04-28 DIAGNOSIS — M79.89 FOOT SWELLING: ICD-10-CM

## 2022-04-28 LAB
ALBUMIN SERPL BCP-MCNC: 2.2 G/DL (ref 3.5–5.2)
ALP SERPL-CCNC: 70 U/L (ref 55–135)
ALT SERPL W/O P-5'-P-CCNC: 19 U/L (ref 10–44)
ANION GAP SERPL CALC-SCNC: 9 MMOL/L (ref 8–16)
AST SERPL-CCNC: 23 U/L (ref 10–40)
BASOPHILS # BLD AUTO: 0.07 K/UL (ref 0–0.2)
BASOPHILS NFR BLD: 0.4 % (ref 0–1.9)
BILIRUB SERPL-MCNC: 0.2 MG/DL (ref 0.1–1)
BUN SERPL-MCNC: 22 MG/DL (ref 6–20)
CALCIUM SERPL-MCNC: 8.4 MG/DL (ref 8.7–10.5)
CHLORIDE SERPL-SCNC: 89 MMOL/L (ref 95–110)
CO2 SERPL-SCNC: 31 MMOL/L (ref 23–29)
CREAT SERPL-MCNC: 1 MG/DL (ref 0.5–1.4)
DIFFERENTIAL METHOD: ABNORMAL
EOSINOPHIL # BLD AUTO: 0.1 K/UL (ref 0–0.5)
EOSINOPHIL NFR BLD: 0.5 % (ref 0–8)
ERYTHROCYTE [DISTWIDTH] IN BLOOD BY AUTOMATED COUNT: 14.7 % (ref 11.5–14.5)
EST. GFR  (AFRICAN AMERICAN): >60 ML/MIN/1.73 M^2
EST. GFR  (NON AFRICAN AMERICAN): >60 ML/MIN/1.73 M^2
GLUCOSE SERPL-MCNC: 76 MG/DL (ref 70–110)
HCT VFR BLD AUTO: 38 % (ref 40–54)
HGB BLD-MCNC: 12.2 G/DL (ref 14–18)
IMM GRANULOCYTES # BLD AUTO: 0.2 K/UL (ref 0–0.04)
IMM GRANULOCYTES NFR BLD AUTO: 1.2 % (ref 0–0.5)
LYMPHOCYTES # BLD AUTO: 2 K/UL (ref 1–4.8)
LYMPHOCYTES NFR BLD: 12.3 % (ref 18–48)
MCH RBC QN AUTO: 26 PG (ref 27–31)
MCHC RBC AUTO-ENTMCNC: 32.1 G/DL (ref 32–36)
MCV RBC AUTO: 81 FL (ref 82–98)
MONOCYTES # BLD AUTO: 1 K/UL (ref 0.3–1)
MONOCYTES NFR BLD: 5.9 % (ref 4–15)
NEUTROPHILS # BLD AUTO: 12.9 K/UL (ref 1.8–7.7)
NEUTROPHILS NFR BLD: 79.7 % (ref 38–73)
NRBC BLD-RTO: 0 /100 WBC
PLATELET # BLD AUTO: 555 K/UL (ref 150–450)
PMV BLD AUTO: 9.3 FL (ref 9.2–12.9)
POTASSIUM SERPL-SCNC: 5 MMOL/L (ref 3.5–5.1)
PROT SERPL-MCNC: 5.1 G/DL (ref 6–8.4)
RBC # BLD AUTO: 4.7 M/UL (ref 4.6–6.2)
SODIUM SERPL-SCNC: 129 MMOL/L (ref 136–145)
T4 FREE SERPL-MCNC: 0.8 NG/DL (ref 0.71–1.51)
TSH SERPL DL<=0.005 MIU/L-ACNC: 5.4 UIU/ML (ref 0.4–4)
WBC # BLD AUTO: 16.22 K/UL (ref 3.9–12.7)

## 2022-04-28 PROCEDURE — 85025 COMPLETE CBC W/AUTO DIFF WBC: CPT | Performed by: INTERNAL MEDICINE

## 2022-04-28 PROCEDURE — 99999 PR PBB SHADOW E&M-EST. PATIENT-LVL IV: CPT | Mod: PBBFAC,,, | Performed by: INTERNAL MEDICINE

## 2022-04-28 PROCEDURE — 84439 ASSAY OF FREE THYROXINE: CPT | Performed by: INTERNAL MEDICINE

## 2022-04-28 PROCEDURE — 1159F MED LIST DOCD IN RCRD: CPT | Mod: CPTII,,, | Performed by: INTERNAL MEDICINE

## 2022-04-28 PROCEDURE — 84443 ASSAY THYROID STIM HORMONE: CPT | Performed by: INTERNAL MEDICINE

## 2022-04-28 PROCEDURE — 99214 OFFICE O/P EST MOD 30 MIN: CPT | Mod: PBBFAC | Performed by: INTERNAL MEDICINE

## 2022-04-28 PROCEDURE — 3079F DIAST BP 80-89 MM HG: CPT | Mod: CPTII,,, | Performed by: INTERNAL MEDICINE

## 2022-04-28 PROCEDURE — 1160F PR REVIEW ALL MEDS BY PRESCRIBER/CLIN PHARMACIST DOCUMENTED: ICD-10-PCS | Mod: CPTII,,, | Performed by: INTERNAL MEDICINE

## 2022-04-28 PROCEDURE — 3008F PR BODY MASS INDEX (BMI) DOCUMENTED: ICD-10-PCS | Mod: CPTII,,, | Performed by: INTERNAL MEDICINE

## 2022-04-28 PROCEDURE — 3079F PR MOST RECENT DIASTOLIC BLOOD PRESSURE 80-89 MM HG: ICD-10-PCS | Mod: CPTII,,, | Performed by: INTERNAL MEDICINE

## 2022-04-28 PROCEDURE — 1159F PR MEDICATION LIST DOCUMENTED IN MEDICAL RECORD: ICD-10-PCS | Mod: CPTII,,, | Performed by: INTERNAL MEDICINE

## 2022-04-28 PROCEDURE — 99215 PR OFFICE/OUTPT VISIT, EST, LEVL V, 40-54 MIN: ICD-10-PCS | Mod: S$PBB,,, | Performed by: INTERNAL MEDICINE

## 2022-04-28 PROCEDURE — 1111F DSCHRG MED/CURRENT MED MERGE: CPT | Mod: CPTII,,, | Performed by: INTERNAL MEDICINE

## 2022-04-28 PROCEDURE — 99999 PR PBB SHADOW E&M-EST. PATIENT-LVL IV: ICD-10-PCS | Mod: PBBFAC,,, | Performed by: INTERNAL MEDICINE

## 2022-04-28 PROCEDURE — 80053 COMPREHEN METABOLIC PANEL: CPT | Performed by: INTERNAL MEDICINE

## 2022-04-28 PROCEDURE — 3074F SYST BP LT 130 MM HG: CPT | Mod: CPTII,,, | Performed by: INTERNAL MEDICINE

## 2022-04-28 PROCEDURE — 3008F BODY MASS INDEX DOCD: CPT | Mod: CPTII,,, | Performed by: INTERNAL MEDICINE

## 2022-04-28 PROCEDURE — 99215 OFFICE O/P EST HI 40 MIN: CPT | Mod: S$PBB,,, | Performed by: INTERNAL MEDICINE

## 2022-04-28 PROCEDURE — 1160F RVW MEDS BY RX/DR IN RCRD: CPT | Mod: CPTII,,, | Performed by: INTERNAL MEDICINE

## 2022-04-28 PROCEDURE — 3074F PR MOST RECENT SYSTOLIC BLOOD PRESSURE < 130 MM HG: ICD-10-PCS | Mod: CPTII,,, | Performed by: INTERNAL MEDICINE

## 2022-04-28 PROCEDURE — 36415 COLL VENOUS BLD VENIPUNCTURE: CPT | Performed by: INTERNAL MEDICINE

## 2022-04-28 PROCEDURE — 1111F PR DISCHARGE MEDS RECONCILED W/ CURRENT OUTPATIENT MED LIST: ICD-10-PCS | Mod: CPTII,,, | Performed by: INTERNAL MEDICINE

## 2022-04-28 NOTE — PROGRESS NOTES
ESTABLISHED MEDICAL ONCOLOGY VISIT    Reason for visit:  Metastatic melanoma to the lungs, axillary lymph nodes, liver, peritoneal carcinomatosis.     Best Contact Phone Number(s): [phone] (home)      Cancer/Stage/TNM:   Cancer Staging  Metastatic melanoma  Staging form: Melanoma of the Skin, AJCC 8th Edition  - Clinical stage from 1/28/2022: Stage IV (cTX, cNX, cM1) - Signed by Adria Rivera MD on 4/12/2022       Oncology History   Metastatic melanoma   1/28/2022 Cancer Staged    Staging form: Melanoma of the Skin, AJCC 8th Edition  - Clinical stage from 1/28/2022: Stage IV (cTX, cNX, cM1)     4/4/2022 Initial Diagnosis    Metastatic melanoma     4/6/2022 -  Chemotherapy    Treatment Summary   Plan Name: OP NIVOLUMAB 1 MG/KG IPILIMUMAB 3MG/KG FOLLOWED BY NIVOLUMAB 480MG Q4W  Treatment Goal: Control  Status: Active  Start Date: 4/6/2022 (Planned)  End Date: 3/8/2023 (Planned)  Provider: Clayton Wise MD  Chemotherapy: ipilimumab (YERVOY) 3 mg/kg = 211 mg in sodium chloride 0.9% 100 mL chemo infusion, 3 mg/kg, Intravenous, Clinic/HOD 1 time, 0 of 4 cycles  nivolumab 70 mg in sodium chloride 0.9% 107 mL infusion, 1 mg/kg, Intravenous, Clinic/HOD 1 time, 0 of 14 cycles          HPI:   51 y.o. male with history of CAD s/p PCI, seizures, ?substance abuse, stage IV malignant melanoma who presents today to establish care at [facility].   He presented initially with enlarging right axillary lymph nodes that have grown progressively in size since April 2021. He presented to urgent care and was referred for US which showed multiple enlarged masses in the right axilla most likely reflecting abnormal lymph nodes concerning for malignancy.   He was subsequently referred to dermatology who performed a complete exam of the skin that was negative for suspicious cutaneous lesions as well as punch biopsy of one of the lymph node on 01/04/22. Pathology came back as malignant melanoma.   BRAF mutation: positive for  a V600E mutation.   He subsequently underwent a PET/CT on 01/28/22 which showed multiple hypermetabolic soft tissue masses within the right axillary region, metastatic lesions to the lungs and abdomen (VI segment of the liver measuring a max SUV of 6.99, 1.6 x 1.9 cm nodular soft tissue density with a max SUV  7.64 was seen adjacent to the colon within the right lower quadrant. Brain MRI was negative for metastatic disease.  LDH was 307 U/L  He saw Oncology at [facility] on 01/31/22 with plan to start First line Nivolumab / ipilimumab. He received C1 on 03/03/22.     He was admitted on 03/18 at [facility] for abdominal pain, and distension. He underwent therapeutic paracentesis with removal of 3L, and was discharged on 03/22. CT abdomen during that admission showed soft tissue attenuation throughout the peritoneum consistent with innumerable peritoneal implants.     He was readmitted the following day 03/23 for re accumulation and had another paracentesis with removal of 4.7L.  Unfortunately, he had rapid re accumulation of his ascites and was readmitted again between 03/30 and 04/02 and underwent paracentesis. He was planned to receive pleur-X catheter as outpatient.     He presented again to [facility] on 04/04 with worsening abdominal distension and pain. He underwent therapeutic paracentesis with removal of 5L. Cytology was positive for malignant cells. He was discharged on 04/05 on Cipro for SBP coverage.       Interval history:   Since his discharge, he has had improvement in mobility and able to ambulate and go up stairs. Started having bilateral ankle and foot swelling starting over the last few days. Stopped taking Lasix. Zofran controls nausea. Eating better and eating protein supplements. Right axillary lymphadenopathy is improving.        ROS:   Review of Systems   Constitutional: Positive for malaise/fatigue and weight loss. Negative for chills, diaphoresis and fever.   HENT: Negative.    Eyes: Negative.    Respiratory:  Negative for cough and shortness of breath.    Cardiovascular: Negative for chest pain and leg swelling.   Gastrointestinal: Positive for nausea. Negative for abdominal pain and vomiting.   Genitourinary: Negative.    Musculoskeletal: Negative.         Right axillary lymphadenopathy    Skin: Negative for itching and rash.   Neurological: Negative.    Endo/Heme/Allergies: Negative.    Psychiatric/Behavioral: Negative.        Past Medical History:   Past Medical History:   Diagnosis Date    Seizures         Past Surgical History:   No past surgical history on file.     Family History:   No family history on file.     Social History:   Social History     Tobacco Use    Smoking status: Current Every Day Smoker    Smokeless tobacco: Never Used   Substance Use Topics    Alcohol use: Yes        I have reviewed and updated the patient's past medical, surgical, family and social histories.    Allergies:   Review of patient's allergies indicates:  No Known Allergies     Medications:   Current Outpatient Medications   Medication Sig Dispense Refill    binimetinib 15 mg Tab Take 45 mg by mouth 2 (two) times daily. 180 tablet 3    divalproex (DEPAKOTE) 250 MG EC tablet Take 1 tablet (250 mg total) by mouth every 8 (eight) hours. 90 tablet 11    encorafenib 75 mg Cap Take 450 mg by mouth once daily. 180 capsule 3    famotidine (PEPCID) 20 MG tablet Take 1 tablet (20 mg total) by mouth 2 (two) times daily. 60 tablet 11    fentaNYL (DURAGESIC) 25 mcg/hr Place 1 patch onto the skin once.      levETIRAcetam (KEPPRA) 1000 MG tablet Take 1,000 mg by mouth 2 (two) times a day.      ondansetron (ZOFRAN-ODT) 8 MG TbDL Dissolve 1 tablet (8 mg total) by mouth every 8 (eight) hours as needed. 20 tablet 1    oxyCODONE (ROXICODONE) 20 mg Tab immediate release tablet Take 1 tablet (20 mg total) by mouth every 6 (six) hours as needed. 28 tablet 0    polyethylene glycol (GLYCOLAX) 17 gram/dose powder Dissolve one capful (17 g) in  liquid and take by mouth once daily. 510 g 3    senna-docusate 8.6-50 mg (PERICOLACE) 8.6-50 mg per tablet Take 1 tablet by mouth daily as needed for Constipation. 30 tablet 3     No current facility-administered medications for this visit.        Physical Exam:   /80 (BP Location: Right arm, Patient Position: Sitting, BP Method: Medium (Automatic))   Pulse 68   Temp 98.9 °F (37.2 °C) (Oral)   Resp 18   Ht 6' (1.829 m)   Wt 69 kg (152 lb 1.9 oz)   SpO2 100%   BMI 20.63 kg/m²      ECOG Performance status: 2            Physical Exam  Constitutional:       General: He is not in acute distress.     Appearance: He is underweight. He is ill-appearing.   HENT:      Head: Normocephalic and atraumatic.   Cardiovascular:      Rate and Rhythm: Normal rate.      Heart sounds: No murmur heard.    No gallop.   Pulmonary:      Effort: No respiratory distress.      Breath sounds: No wheezing or rales.   Chest:   Breasts:      Right: Axillary adenopathy (2 Large masses in the R axilla ) present.       Abdominal:      General: There is distension.      Tenderness: There is no abdominal tenderness.       Musculoskeletal:      Right lower leg: No edema.      Left lower leg: No edema.   Lymphadenopathy:      Upper Body:      Right upper body: Axillary adenopathy (2 Large masses in the R axilla ) present.   Skin:     Coloration: Skin is pale.           Labs:   Recent Results (from the past 48 hour(s))   CBC Auto Differential    Collection Time: 04/28/22  2:45 PM   Result Value Ref Range    WBC 16.22 (H) 3.90 - 12.70 K/uL    RBC 4.70 4.60 - 6.20 M/uL    Hemoglobin 12.2 (L) 14.0 - 18.0 g/dL    Hematocrit 38.0 (L) 40.0 - 54.0 %    MCV 81 (L) 82 - 98 fL    MCH 26.0 (L) 27.0 - 31.0 pg    MCHC 32.1 32.0 - 36.0 g/dL    RDW 14.7 (H) 11.5 - 14.5 %    Platelets 555 (H) 150 - 450 K/uL    MPV 9.3 9.2 - 12.9 fL    Immature Granulocytes 1.2 (H) 0.0 - 0.5 %    Gran # (ANC) 12.9 (H) 1.8 - 7.7 K/uL    Immature Grans (Abs) 0.20 (H) 0.00 -  0.04 K/uL    Lymph # 2.0 1.0 - 4.8 K/uL    Mono # 1.0 0.3 - 1.0 K/uL    Eos # 0.1 0.0 - 0.5 K/uL    Baso # 0.07 0.00 - 0.20 K/uL    nRBC 0 0 /100 WBC    Gran % 79.7 (H) 38.0 - 73.0 %    Lymph % 12.3 (L) 18.0 - 48.0 %    Mono % 5.9 4.0 - 15.0 %    Eosinophil % 0.5 0.0 - 8.0 %    Basophil % 0.4 0.0 - 1.9 %    Differential Method Automated    Comprehensive Metabolic Panel    Collection Time: 04/28/22  2:45 PM   Result Value Ref Range    Sodium 129 (L) 136 - 145 mmol/L    Potassium 5.0 3.5 - 5.1 mmol/L    Chloride 89 (L) 95 - 110 mmol/L    CO2 31 (H) 23 - 29 mmol/L    Glucose 76 70 - 110 mg/dL    BUN 22 (H) 6 - 20 mg/dL    Creatinine 1.0 0.5 - 1.4 mg/dL    Calcium 8.4 (L) 8.7 - 10.5 mg/dL    Total Protein 5.1 (L) 6.0 - 8.4 g/dL    Albumin 2.2 (L) 3.5 - 5.2 g/dL    Total Bilirubin 0.2 0.1 - 1.0 mg/dL    Alkaline Phosphatase 70 55 - 135 U/L    AST 23 10 - 40 U/L    ALT 19 10 - 44 U/L    Anion Gap 9 8 - 16 mmol/L    eGFR if African American >60.0 >60 mL/min/1.73 m^2    eGFR if non African American >60.0 >60 mL/min/1.73 m^2   T4, Free    Collection Time: 04/28/22  2:45 PM   Result Value Ref Range    Free T4 0.80 0.71 - 1.51 ng/dL   TSH    Collection Time: 04/28/22  2:45 PM   Result Value Ref Range    TSH 5.399 (H) 0.400 - 4.000 uIU/mL        Imaging:    CT abdomen pelvis 03/18/2022  FINDINGS:   01. LIVER: There is a 2.1 x 1.5 cm hypoattenuating lesion in the anterior left hepatic lobe (series 201, image 70). Portal vein is patent.   02. SPLEEN: Normal.   03. PANCREAS: Normal.   04. BILIARY TREE: The gallbladder is distended, otherwise within normal limits. Biliary tree is not dilated.   05. ADRENALS: Normal.   06. KIDNEYS: The kidneys enhance symmetrically. No evidence of calcification, hydronephrosis or solid renal mass.   07. LYMPHADENOPATHY/RETROPERITONEUM:The aorta is normal caliber with scattered atherosclerotic changes including major side branches. There are multiple nonenlarged retroperitoneal lymph nodes, for  reference a para-aortic node is seen (series 201, image 61) which measures 0.7 cm in short axis dimension.   08. BOWEL: No bowel related abnormalities.   09. PELVIC VISCERA: Normal CT appearance of the pelvic viscera.   10. PELVIC LYMPH NODES: No lymphadenopathy.   11. PERITONEUM/ABDOMINAL WALL: There is soft tissue attenuation throughout the peritoneum consistent with innumerable peritoneal implants. Large volume ascites. There are multiple large heterogeneous enhancing masses within the right lateral chest wall soft tissues. The largest measures 4.3 x 5.7 centimeters) series 201, image 1).   12. SKELETAL: No aggressive appearing lytic/blastic lesions. No acute osseous abnormality. Moderate degenerative changes of the thoracolumbar spine.   13. LUNG BASES: There is a 2.7 cm juxtapleural nodular density in the posterolateral right lower lobe (series 201, image 3). There is a 2.9 x 2.1 cm juxtapleural soft tissue mass within the posterior right lower lobe (series 201, image 17) as well as a 1.9 x 1.9 cm nodule within the anterior middle lobe (series 201, image 25). Multiple smaller nodular opacities are seen about the left lower lobe. There is a 2.0 x 1.8 cm juxtapleural nodule in the posterior left lower lobe (series 201, image 35). There is trace dependent subsegmental atelectasis. Heart size is normal. Trace pericardial effusion. A stent is visualized within the LAD.            Path:   Skin, right axilla, biopsy:  -  Malignant melanoma.     Note:  The tumor measures approximately 3 x 4 mm and involves the peripheral margins of the specimen. Given no visualized connection to the overlying epidermis, a metastasis or satellitosis of a larger nearby melanoma is a consideration. Clear cell sarcoma is a differential diagnosis.     Microscopic Description:  Skin with a poorly circumscribed dermal nodule of atypical melanocytes. The tumor cells are hyperchromatic with a high nuclear to cytoplasm ratio. The tumor cells are  strongly positive for Sox10 stain. CD43 demonstrates scattered tumor infiltrating lymphocytes, but does not stain tumor cells. No lymphovascular invasion is seen with CD31 and D2-40 stains. All controls are adequate.     A PHH3 stain is pending with addendum to follow.     Mitotic counts:  3/mm2.  Ulceration:   Not identified.  Regression:  Not identified.  Tumor infiltrating lymphocytes:  Mild.  Angiolymphatic invasion:  Not identified.  Associated nevus:  Not identified.  Predominant cytology:  Epithelioid cell.  Margin involvement:  Involved.    Assessment:       1. Metastatic melanoma    2. Malignant ascites    3. Malignant melanoma metastatic to brain    4. Cancer associated pain    5. History of seizure    6. Hypoalbuminemia    7. Foot swelling          Plan:     # Metastatic melanoma to the lungs, liver, and peritoneum   51 year-old-male patient with metastatic melanoma to the axillary LNs, lungs, liver, and peritoneum, BRAF V600E mutant, who is status post C1 of first line immunotherapy with Nivolumab+Ipilimumab on 03/03. Unfortunately, since then he has developed peritoneal carcinomatosis leading to recurrent accumulation of malignant ascites. Over the past month, he required admission 4 times to the hospital for therapeutic paracentesis.   He is seen in clinic today. We have discussed the nature of his cancer, our treatment goals which are palliative in nature and to prolong his life.     He is very symptomatic today with worsening abdominal distension and notable worsening R axillary swelling. He cannot keep anything down, with persistent nausea and vomiting; likely mechanical from the distension.     With his rapidly progressive disease, we recommend switching him to targeted therapy with Encorafinib and Binimitinib. Samples and handouts were given to them today.   - Patient tolerating BRAFi well. Labs acceptable to continue treatment.    #Brain mets  Repeat brain MRI in 6 weeks per Dr. Levine's inpatient  consult recommendation. Will consider SRS at that time.     # Malignant ascites   - Recently had paracentesis      # Cancer associated pain   - Current pain regimen is;   Fentanyl patch 25 mcg/hr every 72 hours   Oxycodone (doesn't know the dose)   - Consult palliative care for pain and symptom management     # Seizures   - On Keppra 750 mg daily     #intactable nausea and vomiting   - Likely mechanical from abdominal distension   - only uses zofran at home.        Patient was also seen and examined by Dr. Wise. Patient is in agreement with the proposed treatment plan. All questions were answered to the patient's satisfaction. Pt knows to call clinic if anything is needed before the next clinic visit.    Kitty Segura, MSN, APRN, FNP-C  Hematology and Medical Oncology  Nurse Practitioner to Dr. Clayton Wise  Nurse Practitioner, [facility]      I have reviewed the notes, assessments, and/or procedures performed by Kitty ATKINSON, as above.  I have personally interviewed and examined the patient at the beside, and rounded with Kitty. I concur with her assessment and plan and the documentation of Joseluis Garner.    Clayton Wise M.D.  Hematology/Oncology Attending  [facility]  [facility]          Route Chart for Scheduling    Med Onc Chart Routing      Follow up with physician    Follow up with RAYMUNDO 2 weeks. On 5/9 or 5/10 for tox check   Labs CBC and CMP   Lab interval: every 2 weeks  Prior to next visit   Imaging MRI   Brain MRI in 6 weeks   Pharmacy appointment    Other referrals          Treatment Plan Information   OP NIVOLUMAB 1 MG/KG IPILIMUMAB 3MG/KG FOLLOWED BY NIVOLUMAB 480MG Q4W   Clayton Wise MD   Upcoming Treatment Dates - OP NIVOLUMAB 1 MG/KG IPILIMUMAB 3MG/KG FOLLOWED BY NIVOLUMAB 480MG Q4W    4/6/2022       Chemotherapy       nivolumab 70 mg in sodium chloride 0.9% 107 mL infusion       ipilimumab  (YERVOY) 3 mg/kg = 211 mg in sodium chloride 0.9% 100 mL chemo infusion  4/27/2022       Chemotherapy       nivolumab 70 mg in sodium chloride 0.9% 107 mL infusion       ipilimumab (YERVOY) 3 mg/kg = 211 mg in sodium chloride 0.9% 100 mL chemo infusion  5/18/2022       Chemotherapy       nivolumab 70 mg in sodium chloride 0.9% 107 mL infusion       ipilimumab (YERVOY) 3 mg/kg = 211 mg in sodium chloride 0.9% 100 mL chemo infusion  6/8/2022       Chemotherapy       nivolumab 70 mg in sodium chloride 0.9% 107 mL infusion       ipilimumab (YERVOY) 3 mg/kg = 211 mg in sodium chloride 0.9% 100 mL chemo infusion

## 2022-04-28 NOTE — TELEPHONE ENCOUNTER
----- Message from Arielle Huggins sent at 4/25/2022  8:03 AM CDT -----  Regarding: FW: Patient has No Prescription Coverage at Present    ----- Message -----  From: Praveena Enciso LCSW  Sent: 4/22/2022   1:38 PM CDT  To: Pharmacy Patient Assistance Team  Subject: Patient has No Prescription Coverage at [facility]    Hello,    Patient's [facility] medical insurance plan has termed and [facility] Medicaid [facility] and [facility] is [facility]. He has no prescription coverage in the interim and I wanted to see if you can assist him/his mother in applying for any applicable pharmaceutical  programs in the interim?   Patient's cell phone number is [phone], and his mother Margarita Hernandez's is [phone]. He is no longer able to work, has no income and will be applying for Social Security Disability. He is staying with his mother at the address listed in his chart.    Thanks,            Praveena

## 2022-04-28 NOTE — TELEPHONE ENCOUNTER
Outgoing call to patient regarding Braftovi and Mektovi prescription we received and discussed applying for  PAP. Patient provided consent, HH size, and annual income and requested to have pt portion sent via email. Confirmed email address on file.       Sent patient portion of assistance application to email address on file for patient to review, sign and return to [facility]. Will continue to follow up.

## 2022-04-29 ENCOUNTER — DOCUMENTATION ONLY (OUTPATIENT)
Dept: HEMATOLOGY/ONCOLOGY | Facility: CLINIC | Age: 52
End: 2022-04-29
Payer: MEDICAID

## 2022-04-29 NOTE — PROGRESS NOTES
Received reply message via Epic from Arielle Huggins with the Pharmacy Patient Assistance Team re: her attempt to contact patient - she left voice mail message and mailed a letter.    Replied back that I appreciate her assistance.   Will continue to follow and assist as needs are identified.

## 2022-05-03 ENCOUNTER — TELEPHONE (OUTPATIENT)
Dept: INTERNAL MEDICINE | Facility: CLINIC | Age: 52
End: 2022-05-03
Payer: MEDICAID

## 2022-05-03 NOTE — TELEPHONE ENCOUNTER
----- Message from Baltazar Segovia sent at 5/3/2022 12:10 PM CDT -----  Regarding: Advice  Contact: [phone]  Patient is calling to get a refill on Rx not listed for oxycodone. Please contact pt

## 2022-05-04 ENCOUNTER — TELEPHONE (OUTPATIENT)
Dept: HEMATOLOGY/ONCOLOGY | Facility: CLINIC | Age: 52
End: 2022-05-04
Payer: MEDICAID

## 2022-05-04 DIAGNOSIS — G89.3 CANCER ASSOCIATED PAIN: Primary | ICD-10-CM

## 2022-05-04 RX ORDER — NALOXONE HYDROCHLORIDE 4 MG/.1ML
SPRAY NASAL
Qty: 2 EACH | Refills: 11 | Status: SHIPPED | OUTPATIENT
Start: 2022-05-04 | End: 2022-06-22 | Stop reason: SDUPTHER

## 2022-05-04 RX ORDER — FENTANYL 25 UG/1
1 PATCH TRANSDERMAL
Qty: 10 PATCH | Refills: 0 | Status: SHIPPED | OUTPATIENT
Start: 2022-05-04 | End: 2022-05-18

## 2022-05-04 RX ORDER — OXYCODONE HYDROCHLORIDE 20 MG/1
20 TABLET ORAL EVERY 6 HOURS PRN
Qty: 40 TABLET | Refills: 0 | Status: SHIPPED | OUTPATIENT
Start: 2022-05-04 | End: 2022-05-10

## 2022-05-04 NOTE — TELEPHONE ENCOUNTER
Sent a staff message to [facility] regarding Braftovi/Mektovi assistance application and faxed application prescription to [phone] for provider review and signature. Will continue to follow up.

## 2022-05-04 NOTE — TELEPHONE ENCOUNTER
Called patient to check on the status of his portion of assistance application for Braftovi and Mektovi. Patient states he did not receive the email containing the application. Confirmed email address on file and resent application to [email or web address] for patient to review, sign and return to [facility]. Will continue to follow up.

## 2022-05-04 NOTE — TELEPHONE ENCOUNTER
"----- Message from Becca Braulio sent at 5/4/2022  8:33 AM CDT -----  RX Name and Strength: oxyCODONE immediate release tablet Tab 20 mg     How is the patient currently taking it? Every 6 hours PRN     Is this a 30 day or 90 day Rx?          RX Name and Strength: fentaNYL (DURAGESIC) 25 mcg/hr    How is the patient currently taking it? Place 1 patch onto the skin once. - Transdermal     Is this a 30 day or 90 day Rx?         Preferred Pharmacy with phone number:   [facility]  [address] [address]  Phone: [phone] Fax: [phone]       Local or Mail Order: local         Ordering Provider: Dr Wise         Contact Preference: [phone]                   Additional Information:  "Thank you for all that you do for our patients"     "

## 2022-05-04 NOTE — TELEPHONE ENCOUNTER
Spoke to Kitty, called patient for clarification on dosing and timing of medications currently taking. Oxycodone 20 mg, 1 po every 6 hours PRN pain. Fentanyl 25 mcg patch, change every 72 hours. Kitty will send in prescriptions.

## 2022-05-06 ENCOUNTER — TELEPHONE (OUTPATIENT)
Dept: HEMATOLOGY/ONCOLOGY | Facility: CLINIC | Age: 52
End: 2022-05-06
Payer: MEDICAID

## 2022-05-06 NOTE — TELEPHONE ENCOUNTER
Received provider portion of assistance application. Submission pending patient portion. Will continue to follow up.

## 2022-05-06 NOTE — TELEPHONE ENCOUNTER
Call place to number left, no answer. Left message on voicemail that I would check with Dr. Wise and Kitty to see if it could be refilled today.

## 2022-05-06 NOTE — TELEPHONE ENCOUNTER
"----- Message from Ovi Cook sent at 5/6/2022  9:22 AM CDT -----  Consult/Advisory:          Name Of Caller:  Margarita (Mother)      Contact Preference?: [phone]        Provider Name: Frandy      Does patient feel the need to be seen today? No      What is the nature of the call?: Calling to speak w/ nurse about getting refill (today) of Brastovi and another medication w/ the order # of [id number]. Stating the pt is now completely out of both medications and won't be able to wait until his appt on 5/10.          Additional Notes:  "Thank you for all that you do for our patients"      "

## 2022-05-06 NOTE — TELEPHONE ENCOUNTER
Outgoing call to patient to check on the status of his portion of the assistance application. Patient confirmed he received the application email and he will drop of signed paperwork to [facility] today. Will continue to follow up.     Sent staff message to [employer] to check status of provider portion of assistance application - faxed to [employer] on 5/4/22 for provider review and signature. Will continue to follow up.

## 2022-05-09 LAB
POCT GLUCOSE: 111 MG/DL (ref 70–110)
POCT GLUCOSE: 111 MG/DL (ref 70–110)
POCT GLUCOSE: 113 MG/DL (ref 70–110)
POCT GLUCOSE: 120 MG/DL (ref 70–110)
POCT GLUCOSE: 125 MG/DL (ref 70–110)
POCT GLUCOSE: 136 MG/DL (ref 70–110)
POCT GLUCOSE: 158 MG/DL (ref 70–110)
POCT GLUCOSE: 257 MG/DL (ref 70–110)

## 2022-05-10 ENCOUNTER — LAB VISIT (OUTPATIENT)
Dept: LAB | Facility: HOSPITAL | Age: 52
End: 2022-05-10
Attending: INTERNAL MEDICINE
Payer: MEDICAID

## 2022-05-10 ENCOUNTER — OFFICE VISIT (OUTPATIENT)
Dept: HEMATOLOGY/ONCOLOGY | Facility: CLINIC | Age: 52
End: 2022-05-10
Payer: MEDICAID

## 2022-05-10 VITALS
OXYGEN SATURATION: 100 % | HEART RATE: 61 BPM | SYSTOLIC BLOOD PRESSURE: 133 MMHG | TEMPERATURE: 98 F | BODY MASS INDEX: 20.55 KG/M2 | RESPIRATION RATE: 20 BRPM | WEIGHT: 151.69 LBS | DIASTOLIC BLOOD PRESSURE: 86 MMHG | HEIGHT: 72 IN

## 2022-05-10 DIAGNOSIS — C43.9 METASTATIC MELANOMA: Primary | ICD-10-CM

## 2022-05-10 DIAGNOSIS — F32.A DEPRESSION, UNSPECIFIED DEPRESSION TYPE: ICD-10-CM

## 2022-05-10 DIAGNOSIS — G89.3 CANCER ASSOCIATED PAIN: ICD-10-CM

## 2022-05-10 DIAGNOSIS — E88.09 HYPOALBUMINEMIA: ICD-10-CM

## 2022-05-10 DIAGNOSIS — D63.0 ANEMIA IN NEOPLASTIC DISEASE: ICD-10-CM

## 2022-05-10 DIAGNOSIS — C43.9 METASTATIC MELANOMA: ICD-10-CM

## 2022-05-10 DIAGNOSIS — M79.89 FOOT SWELLING: ICD-10-CM

## 2022-05-10 DIAGNOSIS — C79.31 MALIGNANT MELANOMA METASTATIC TO BRAIN: ICD-10-CM

## 2022-05-10 DIAGNOSIS — Z87.898 HISTORY OF SEIZURE: ICD-10-CM

## 2022-05-10 DIAGNOSIS — R18.0 MALIGNANT ASCITES: ICD-10-CM

## 2022-05-10 LAB
ALBUMIN SERPL BCP-MCNC: 2.7 G/DL (ref 3.5–5.2)
ALP SERPL-CCNC: 56 U/L (ref 55–135)
ALT SERPL W/O P-5'-P-CCNC: 9 U/L (ref 10–44)
ANION GAP SERPL CALC-SCNC: 7 MMOL/L (ref 8–16)
AST SERPL-CCNC: 13 U/L (ref 10–40)
BASOPHILS # BLD AUTO: 0.14 K/UL (ref 0–0.2)
BASOPHILS NFR BLD: 1.9 % (ref 0–1.9)
BILIRUB SERPL-MCNC: 0.2 MG/DL (ref 0.1–1)
BUN SERPL-MCNC: 13 MG/DL (ref 6–20)
CALCIUM SERPL-MCNC: 9.2 MG/DL (ref 8.7–10.5)
CHLORIDE SERPL-SCNC: 104 MMOL/L (ref 95–110)
CO2 SERPL-SCNC: 26 MMOL/L (ref 23–29)
CREAT SERPL-MCNC: 0.6 MG/DL (ref 0.5–1.4)
DIFFERENTIAL METHOD: ABNORMAL
EOSINOPHIL # BLD AUTO: 0.1 K/UL (ref 0–0.5)
EOSINOPHIL NFR BLD: 1.8 % (ref 0–8)
ERYTHROCYTE [DISTWIDTH] IN BLOOD BY AUTOMATED COUNT: 15.6 % (ref 11.5–14.5)
EST. GFR  (AFRICAN AMERICAN): >60 ML/MIN/1.73 M^2
EST. GFR  (NON AFRICAN AMERICAN): >60 ML/MIN/1.73 M^2
GLUCOSE SERPL-MCNC: 96 MG/DL (ref 70–110)
HCT VFR BLD AUTO: 34.8 % (ref 40–54)
HGB BLD-MCNC: 10.8 G/DL (ref 14–18)
IMM GRANULOCYTES # BLD AUTO: 0.02 K/UL (ref 0–0.04)
IMM GRANULOCYTES NFR BLD AUTO: 0.3 % (ref 0–0.5)
LYMPHOCYTES # BLD AUTO: 2 K/UL (ref 1–4.8)
LYMPHOCYTES NFR BLD: 26.5 % (ref 18–48)
MCH RBC QN AUTO: 25.4 PG (ref 27–31)
MCHC RBC AUTO-ENTMCNC: 31 G/DL (ref 32–36)
MCV RBC AUTO: 82 FL (ref 82–98)
MONOCYTES # BLD AUTO: 0.4 K/UL (ref 0.3–1)
MONOCYTES NFR BLD: 5.4 % (ref 4–15)
NEUTROPHILS # BLD AUTO: 4.8 K/UL (ref 1.8–7.7)
NEUTROPHILS NFR BLD: 64.1 % (ref 38–73)
NRBC BLD-RTO: 0 /100 WBC
PLATELET # BLD AUTO: 460 K/UL (ref 150–450)
PMV BLD AUTO: 9.7 FL (ref 9.2–12.9)
POTASSIUM SERPL-SCNC: 4.9 MMOL/L (ref 3.5–5.1)
PROT SERPL-MCNC: 5.8 G/DL (ref 6–8.4)
RBC # BLD AUTO: 4.25 M/UL (ref 4.6–6.2)
SODIUM SERPL-SCNC: 137 MMOL/L (ref 136–145)
WBC # BLD AUTO: 7.4 K/UL (ref 3.9–12.7)

## 2022-05-10 PROCEDURE — 99215 PR OFFICE/OUTPT VISIT, EST, LEVL V, 40-54 MIN: ICD-10-PCS | Mod: S$PBB,,, | Performed by: NURSE PRACTITIONER

## 2022-05-10 PROCEDURE — 85025 COMPLETE CBC W/AUTO DIFF WBC: CPT | Performed by: INTERNAL MEDICINE

## 2022-05-10 PROCEDURE — 36415 COLL VENOUS BLD VENIPUNCTURE: CPT | Performed by: INTERNAL MEDICINE

## 2022-05-10 PROCEDURE — 99999 PR PBB SHADOW E&M-EST. PATIENT-LVL III: ICD-10-PCS | Mod: PBBFAC,,, | Performed by: NURSE PRACTITIONER

## 2022-05-10 PROCEDURE — 99999 PR PBB SHADOW E&M-EST. PATIENT-LVL III: CPT | Mod: PBBFAC,,, | Performed by: NURSE PRACTITIONER

## 2022-05-10 PROCEDURE — 99213 OFFICE O/P EST LOW 20 MIN: CPT | Mod: PBBFAC | Performed by: NURSE PRACTITIONER

## 2022-05-10 PROCEDURE — 99215 OFFICE O/P EST HI 40 MIN: CPT | Mod: S$PBB,,, | Performed by: NURSE PRACTITIONER

## 2022-05-10 PROCEDURE — 80053 COMPREHEN METABOLIC PANEL: CPT | Performed by: INTERNAL MEDICINE

## 2022-05-10 RX ORDER — OXYCODONE HYDROCHLORIDE 15 MG/1
30 TABLET ORAL EVERY 8 HOURS PRN
Qty: 90 TABLET | Refills: 0 | Status: SHIPPED | OUTPATIENT
Start: 2022-05-10 | End: 2022-05-23 | Stop reason: SDUPTHER

## 2022-05-10 RX ORDER — BUPROPION HYDROCHLORIDE 100 MG/1
100 TABLET, EXTENDED RELEASE ORAL 2 TIMES DAILY
Qty: 60 TABLET | Refills: 2 | Status: ON HOLD | OUTPATIENT
Start: 2022-05-10 | End: 2022-09-30 | Stop reason: HOSPADM

## 2022-05-10 NOTE — TELEPHONE ENCOUNTER
Patient portion received, submitted complete application to [employer] via fax @[phone]. Will continue to follow up until final determination is made.

## 2022-05-10 NOTE — PROGRESS NOTES
ESTABLISHED MEDICAL ONCOLOGY VISIT    Reason for visit:  Metastatic melanoma to the lungs, axillary lymph nodes, liver, peritoneal carcinomatosis.     Best Contact Phone Number(s): [phone] (home)      Cancer/Stage/TNM:   Cancer Staging  Metastatic melanoma  Staging form: Melanoma of the Skin, AJCC 8th Edition  - Clinical stage from 1/28/2022: Stage IV (cTX, cNX, cM1) - Signed by Adria Rivera MD on 4/12/2022       Oncology History   Metastatic melanoma   1/28/2022 Cancer Staged    Staging form: Melanoma of the Skin, AJCC 8th Edition  - Clinical stage from 1/28/2022: Stage IV (cTX, cNX, cM1)     4/4/2022 Initial Diagnosis    Metastatic melanoma     4/6/2022 -  Chemotherapy    Treatment Summary   Plan Name: OP NIVOLUMAB 1 MG/KG IPILIMUMAB 3MG/KG FOLLOWED BY NIVOLUMAB 480MG Q4W  Treatment Goal: Control  Status: Active  Start Date: 4/6/2022 (Planned)  End Date: 3/8/2023 (Planned)  Provider: Clayton Wise MD  Chemotherapy: ipilimumab (YERVOY) 3 mg/kg = 211 mg in sodium chloride 0.9% 100 mL chemo infusion, 3 mg/kg, Intravenous, Clinic/HOD 1 time, 0 of 4 cycles  nivolumab 70 mg in sodium chloride 0.9% 107 mL infusion, 1 mg/kg, Intravenous, Clinic/HOD 1 time, 0 of 14 cycles          HPI:   51 y.o. male with history of CAD s/p PCI, seizures, ?substance abuse, stage IV malignant melanoma who presents today to establish care at [facility].   He presented initially with enlarging right axillary lymph nodes that have grown progressively in size since April 2021. He presented to urgent care and was referred for US which showed multiple enlarged masses in the right axilla most likely reflecting abnormal lymph nodes concerning for malignancy.   He was subsequently referred to dermatology who performed a complete exam of the skin that was negative for suspicious cutaneous lesions as well as punch biopsy of one of the lymph node on 01/04/22. Pathology came back as malignant melanoma.   BRAF mutation: positive for  a V600E mutation.   He subsequently underwent a PET/CT on 01/28/22 which showed multiple hypermetabolic soft tissue masses within the right axillary region, metastatic lesions to the lungs and abdomen (VI segment of the liver measuring a max SUV of 6.99, 1.6 x 1.9 cm nodular soft tissue density with a max SUV  7.64 was seen adjacent to the colon within the right lower quadrant. Brain MRI was negative for metastatic disease.  LDH was 307 U/L  He saw Oncology at [facility] on 01/31/22 with plan to start First line Nivolumab / ipilimumab. He received C1 on 03/03/22.     He was admitted on 03/18 at [facility] for abdominal pain, and distension. He underwent therapeutic paracentesis with removal of 3L, and was discharged on 03/22. CT abdomen during that admission showed soft tissue attenuation throughout the peritoneum consistent with innumerable peritoneal implants.     He was readmitted the following day 03/23 for re accumulation and had another paracentesis with removal of 4.7L.  Unfortunately, he had rapid re accumulation of his ascites and was readmitted again between 03/30 and 04/02 and underwent paracentesis. He was planned to receive pleur-X catheter as outpatient.     He presented again to [facility] on 04/04 with worsening abdominal distension and pain. He underwent therapeutic paracentesis with removal of 5L. Cytology was positive for malignant cells. He was discharged on 04/05 on Cipro for SBP coverage.       Interval history:   Today, he reports he tolerating BRAFi well - received new samples on Friday as he still waiting on patient assistance for drug coverage. Nausea has resolved. Leg swelling is stable. Worse during the day while up on his feet and improves overnight with elevating his legs. He has been able to increase protein intake with supplemental drinks. Pain moderately controlled with taking oxy IR 40 mg BID in addition to fentanyl patch 25 mcg. Mother reports he has been emotional and weepy. Abdominal distension has  resolved.       Review of Systems   Constitutional: Positive for fatigue. Negative for activity change, appetite change, chills, fever and unexpected weight change.   HENT: Negative for ear pain, facial swelling, hearing loss, mouth sores, nosebleeds, sore throat and trouble swallowing.    Eyes: Negative for pain, discharge, redness and visual disturbance.   Respiratory: Negative for cough, choking, chest tightness and shortness of breath.    Cardiovascular: Positive for leg swelling. Negative for chest pain and palpitations.   Gastrointestinal: Positive for abdominal pain (cramping quality). Negative for abdominal distention, blood in stool, constipation, diarrhea, nausea and vomiting.   Endocrine: Negative for cold intolerance and heat intolerance.   Genitourinary: Negative for difficulty urinating, dysuria, frequency and urgency.   Musculoskeletal: Negative for arthralgias, gait problem, leg pain and myalgias.   Integumentary:  Negative for pallor, rash and wound.   Allergic/Immunologic: Negative for frequent infections.   Neurological: Negative for dizziness, tremors, weakness, light-headedness, numbness and headaches.   Hematological: Negative for adenopathy. Does not bruise/bleed easily.   Psychiatric/Behavioral: Positive for dysphoric mood. Negative for agitation, confusion and sleep disturbance. The patient is not nervous/anxious.          Past Medical History:   Past Medical History:   Diagnosis Date    Seizures         Past Surgical History:   No past surgical history on file.     Family History:   No family history on file.     Social History:   Social History     Tobacco Use    Smoking status: Current Every Day Smoker    Smokeless tobacco: Never Used   Substance Use Topics    Alcohol use: Yes        I have reviewed and updated the patient's past medical, surgical, family and social histories.    Allergies:   Review of patient's allergies indicates:  No Known Allergies     Medications:   Current  Outpatient Medications   Medication Sig Dispense Refill    binimetinib 15 mg Tab Take 45 mg by mouth 2 (two) times daily. 180 tablet 3    buPROPion (WELLBUTRIN SR) 100 MG TBSR 12 hr tablet Take 1 tablet (100 mg total) by mouth 2 (two) times daily. 60 tablet 2    divalproex (DEPAKOTE) 250 MG EC tablet Take 1 tablet (250 mg total) by mouth every 8 (eight) hours. 90 tablet 11    encorafenib 75 mg Cap Take 450 mg by mouth once daily. 180 capsule 3    famotidine (PEPCID) 20 MG tablet Take 1 tablet (20 mg total) by mouth 2 (two) times daily. 60 tablet 11    fentaNYL (DURAGESIC) 25 mcg/hr Place 1 patch onto the skin every 72 hours. 10 patch 0    levETIRAcetam (KEPPRA) 1000 MG tablet Take 1,000 mg by mouth 2 (two) times a day.      naloxone (NARCAN) 4 mg/actuation Spry 4mg by nasal route as needed for opioid overdose; may repeat every 2-3 minutes in alternating nostrils until medical help arrives. Call 911 2 each 11    ondansetron (ZOFRAN-ODT) 8 MG TbDL Dissolve 1 tablet (8 mg total) by mouth every 8 (eight) hours as needed. 20 tablet 1    oxyCODONE (ROXICODONE) 15 MG Tab Take 2 tablets (30 mg total) by mouth every 8 (eight) hours as needed for Pain. 90 tablet 0    polyethylene glycol (GLYCOLAX) 17 gram/dose powder Dissolve one capful (17 g) in liquid and take by mouth once daily. 510 g 3    senna-docusate 8.6-50 mg (PERICOLACE) 8.6-50 mg per tablet Take 1 tablet by mouth daily as needed for Constipation. 30 tablet 3     No current facility-administered medications for this visit.        Physical Exam:   /86 (BP Location: Left arm, Patient Position: Sitting, BP Method: Medium (Automatic))   Pulse 61   Temp 97.6 °F (36.4 °C) (Oral)   Resp 20   Ht 6' (1.829 m)   Wt 68.8 kg (151 lb 10.8 oz)   SpO2 100%   BMI 20.57 kg/m²      ECOG Performance status: 2            Physical Exam  Vitals and nursing note reviewed.   Constitutional:       General: He is not in acute distress.     Appearance: Normal  appearance. He is well-developed.   HENT:      Head: Normocephalic and atraumatic.   Eyes:      Conjunctiva/sclera: Conjunctivae normal.      Pupils: Pupils are equal, round, and reactive to light.   Pulmonary:      Effort: Pulmonary effort is normal. No respiratory distress.   Abdominal:      General: There is no distension.      Palpations: Abdomen is soft.   Musculoskeletal:         General: No swelling. Normal range of motion.      Cervical back: Normal range of motion and neck supple.      Right lower le+ Edema present.      Left lower le+ Edema present.   Skin:     General: Skin is warm and dry.   Neurological:      General: No focal deficit present.      Mental Status: He is alert and oriented to person, place, and time.   Psychiatric:         Mood and Affect: Mood normal.         Behavior: Behavior normal.         Thought Content: Thought content normal.         Judgment: Judgment normal.           Labs:   Recent Results (from the past 48 hour(s))   CBC Auto Differential    Collection Time: 05/10/22 10:34 AM   Result Value Ref Range    WBC 7.40 3.90 - 12.70 K/uL    RBC 4.25 (L) 4.60 - 6.20 M/uL    Hemoglobin 10.8 (L) 14.0 - 18.0 g/dL    Hematocrit 34.8 (L) 40.0 - 54.0 %    MCV 82 82 - 98 fL    MCH 25.4 (L) 27.0 - 31.0 pg    MCHC 31.0 (L) 32.0 - 36.0 g/dL    RDW 15.6 (H) 11.5 - 14.5 %    Platelets 460 (H) 150 - 450 K/uL    MPV 9.7 9.2 - 12.9 fL    Immature Granulocytes 0.3 0.0 - 0.5 %    Gran # (ANC) 4.8 1.8 - 7.7 K/uL    Immature Grans (Abs) 0.02 0.00 - 0.04 K/uL    Lymph # 2.0 1.0 - 4.8 K/uL    Mono # 0.4 0.3 - 1.0 K/uL    Eos # 0.1 0.0 - 0.5 K/uL    Baso # 0.14 0.00 - 0.20 K/uL    nRBC 0 0 /100 WBC    Gran % 64.1 38.0 - 73.0 %    Lymph % 26.5 18.0 - 48.0 %    Mono % 5.4 4.0 - 15.0 %    Eosinophil % 1.8 0.0 - 8.0 %    Basophil % 1.9 0.0 - 1.9 %    Differential Method Automated    Comprehensive Metabolic Panel    Collection Time: 05/10/22 10:34 AM   Result Value Ref Range    Sodium 137 136 - 145  mmol/L    Potassium 4.9 3.5 - 5.1 mmol/L    Chloride 104 95 - 110 mmol/L    CO2 26 23 - 29 mmol/L    Glucose 96 70 - 110 mg/dL    BUN 13 6 - 20 mg/dL    Creatinine 0.6 0.5 - 1.4 mg/dL    Calcium 9.2 8.7 - 10.5 mg/dL    Total Protein 5.8 (L) 6.0 - 8.4 g/dL    Albumin 2.7 (L) 3.5 - 5.2 g/dL    Total Bilirubin 0.2 0.1 - 1.0 mg/dL    Alkaline Phosphatase 56 55 - 135 U/L    AST 13 10 - 40 U/L    ALT 9 (L) 10 - 44 U/L    Anion Gap 7 (L) 8 - 16 mmol/L    eGFR if African American >60.0 >60 mL/min/1.73 m^2    eGFR if non African American >60.0 >60 mL/min/1.73 m^2        Imaging:    CT abdomen pelvis 03/18/2022  FINDINGS:   01. LIVER: There is a 2.1 x 1.5 cm hypoattenuating lesion in the anterior left hepatic lobe (series 201, image 70). Portal vein is patent.   02. SPLEEN: Normal.   03. PANCREAS: Normal.   04. BILIARY TREE: The gallbladder is distended, otherwise within normal limits. Biliary tree is not dilated.   05. ADRENALS: Normal.   06. KIDNEYS: The kidneys enhance symmetrically. No evidence of calcification, hydronephrosis or solid renal mass.   07. LYMPHADENOPATHY/RETROPERITONEUM:The aorta is normal caliber with scattered atherosclerotic changes including major side branches. There are multiple nonenlarged retroperitoneal lymph nodes, for reference a para-aortic node is seen (series 201, image 61) which measures 0.7 cm in short axis dimension.   08. BOWEL: No bowel related abnormalities.   09. PELVIC VISCERA: Normal CT appearance of the pelvic viscera.   10. PELVIC LYMPH NODES: No lymphadenopathy.   11. PERITONEUM/ABDOMINAL WALL: There is soft tissue attenuation throughout the peritoneum consistent with innumerable peritoneal implants. Large volume ascites. There are multiple large heterogeneous enhancing masses within the right lateral chest wall soft tissues. The largest measures 4.3 x 5.7 centimeters) series 201, image 1).   12. SKELETAL: No aggressive appearing lytic/blastic lesions. No acute osseous abnormality.  Moderate degenerative changes of the thoracolumbar spine.   13. LUNG BASES: There is a 2.7 cm juxtapleural nodular density in the posterolateral right lower lobe (series 201, image 3). There is a 2.9 x 2.1 cm juxtapleural soft tissue mass within the posterior right lower lobe (series 201, image 17) as well as a 1.9 x 1.9 cm nodule within the anterior middle lobe (series 201, image 25). Multiple smaller nodular opacities are seen about the left lower lobe. There is a 2.0 x 1.8 cm juxtapleural nodule in the posterior left lower lobe (series 201, image 35). There is trace dependent subsegmental atelectasis. Heart size is normal. Trace pericardial effusion. A stent is visualized within the LAD.            Path:   Skin, right axilla, biopsy:  -  Malignant melanoma.     Note:  The tumor measures approximately 3 x 4 mm and involves the peripheral margins of the specimen. Given no visualized connection to the overlying epidermis, a metastasis or satellitosis of a larger nearby melanoma is a consideration. Clear cell sarcoma is a differential diagnosis.     Microscopic Description:  Skin with a poorly circumscribed dermal nodule of atypical melanocytes. The tumor cells are hyperchromatic with a high nuclear to cytoplasm ratio. The tumor cells are strongly positive for Sox10 stain. CD43 demonstrates scattered tumor infiltrating lymphocytes, but does not stain tumor cells. No lymphovascular invasion is seen with CD31 and D2-40 stains. All controls are adequate.     A PHH3 stain is pending with addendum to follow.     Mitotic counts:  3/mm2.  Ulceration:   Not identified.  Regression:  Not identified.  Tumor infiltrating lymphocytes:  Mild.  Angiolymphatic invasion:  Not identified.  Associated nevus:  Not identified.  Predominant cytology:  Epithelioid cell.  Margin involvement:  Involved.    Assessment:       1. Metastatic melanoma    2. Malignant ascites    3. Malignant melanoma metastatic to brain    4. Cancer associated  pain    5. History of seizure    6. Hypoalbuminemia    7. Foot swelling    8. Depression, unspecified depression type    9. Anemia in neoplastic disease          Plan:     # Metastatic melanoma to the lungs, liver, and peritoneum   51 year-old-male patient with metastatic melanoma to the axillary LNs, lungs, liver, and peritoneum, BRAF V600E mutant, who is status post C1 of first line immunotherapy with Nivolumab+Ipilimumab on 03/03. Unfortunately, since then he has developed peritoneal carcinomatosis leading to recurrent accumulation of malignant ascites. Over the past month, he required admission 4 times to the hospital for therapeutic paracentesis.   He is seen in clinic today. We have discussed the nature of his cancer, our treatment goals which are palliative in nature and to prolong his life.     He is very symptomatic today with worsening abdominal distension and notable worsening R axillary swelling. He cannot keep anything down, with persistent nausea and vomiting; likely mechanical from the distension.     With his rapidly progressive disease, we recommend switching him to targeted therapy with Encorafinib and Binimitinib. Samples were given Friday while still pending Medicaid acceptance and patient assistance through [employer].   - Patient tolerating BRAFi well. Labs acceptable to continue treatment.    #Brain mets  Repeat brain MRI in 6 weeks per Dr. Levine's inpatient consult recommendation. Will consider SRS at that time.     # Malignant ascites   - Currently resolved    # Cancer associated pain   - Pain not currently controlled on current regimen  - increase Fentanyl patch to 50 mcg/hr every 72 hours   - Oxy IR 30 mg Q8H PRN  - Schedule palliative care referral for pain and symptom management     # Seizures   - On Keppra 750 mg daily and Depakote     #Hypoalbunemia  Improving. Continue protein supplementation. Will try premier protein clear.    #Depression  - Start Wellbutrin. Referred to Hem/Onc psych  -  Avoid SSRIs as they increase QT prolongation risk while taking encorafenib.     #BLE edema  - Continue lasix  - elevate extremities and wear compression stockings during the day.    #Anemia  - Mild. Will continue to monitor.     Patient is in agreement with the proposed treatment plan. All questions were answered to the patient's satisfaction. Pt knows to call clinic if anything is needed before the next clinic visit.    Kitty Segura, MSN, APRN, FNP-C  Hematology and Medical Oncology  Nurse Practitioner to Dr. Clayton Wise  Nurse Practitioner, [facility]            Route Chart for Scheduling    Med Onc Chart Routing      Follow up with physician 4 weeks. Tox check and review brain MRI   Follow up with RAYMUNDO 2 weeks. Tox check   Labs CBC and CMP   Lab interval: every 2 weeks  Prior visits   Imaging    Pharmacy appointment    Other referrals          Treatment Plan Information   OP NIVOLUMAB 1 MG/KG IPILIMUMAB 3MG/KG FOLLOWED BY NIVOLUMAB 480MG Q4W   Clayton Wise MD   Upcoming Treatment Dates - OP NIVOLUMAB 1 MG/KG IPILIMUMAB 3MG/KG FOLLOWED BY NIVOLUMAB 480MG Q4W    4/6/2022       Chemotherapy       nivolumab 70 mg in sodium chloride 0.9% 107 mL infusion       ipilimumab (YERVOY) 3 mg/kg = 211 mg in sodium chloride 0.9% 100 mL chemo infusion  4/27/2022       Chemotherapy       nivolumab 70 mg in sodium chloride 0.9% 107 mL infusion       ipilimumab (YERVOY) 3 mg/kg = 211 mg in sodium chloride 0.9% 100 mL chemo infusion  5/18/2022       Chemotherapy       nivolumab 70 mg in sodium chloride 0.9% 107 mL infusion       ipilimumab (YERVOY) 3 mg/kg = 211 mg in sodium chloride 0.9% 100 mL chemo infusion  6/8/2022       Chemotherapy       nivolumab 70 mg in sodium chloride 0.9% 107 mL infusion       ipilimumab (YERVOY) 3 mg/kg = 211 mg in sodium chloride 0.9% 100 mL chemo infusion

## 2022-05-11 ENCOUNTER — OFFICE VISIT (OUTPATIENT)
Dept: PALLIATIVE MEDICINE | Facility: CLINIC | Age: 52
End: 2022-05-11
Payer: MEDICAID

## 2022-05-11 VITALS — SYSTOLIC BLOOD PRESSURE: 160 MMHG | DIASTOLIC BLOOD PRESSURE: 98 MMHG | HEART RATE: 84 BPM

## 2022-05-11 DIAGNOSIS — C43.9 METASTATIC MELANOMA: Primary | ICD-10-CM

## 2022-05-11 DIAGNOSIS — F43.23 ADJUSTMENT DISORDER WITH MIXED ANXIETY AND DEPRESSED MOOD: ICD-10-CM

## 2022-05-11 DIAGNOSIS — Z51.5 ENCOUNTER FOR PALLIATIVE CARE: ICD-10-CM

## 2022-05-11 DIAGNOSIS — G89.3 CANCER RELATED PAIN: ICD-10-CM

## 2022-05-11 DIAGNOSIS — K59.00 CONSTIPATION, UNSPECIFIED CONSTIPATION TYPE: ICD-10-CM

## 2022-05-11 DIAGNOSIS — G47.00 INSOMNIA, UNSPECIFIED TYPE: ICD-10-CM

## 2022-05-11 DIAGNOSIS — R53.0 NEOPLASTIC (MALIGNANT) RELATED FATIGUE: ICD-10-CM

## 2022-05-11 DIAGNOSIS — R63.0 ANOREXIA: ICD-10-CM

## 2022-05-11 DIAGNOSIS — Z71.89 ADVANCED CARE PLANNING/COUNSELING DISCUSSION: ICD-10-CM

## 2022-05-11 DIAGNOSIS — R11.0 NAUSEA: ICD-10-CM

## 2022-05-11 PROCEDURE — 99204 OFFICE O/P NEW MOD 45 MIN: CPT | Mod: S$PBB,,, | Performed by: STUDENT IN AN ORGANIZED HEALTH CARE EDUCATION/TRAINING PROGRAM

## 2022-05-11 PROCEDURE — 99204 PR OFFICE/OUTPT VISIT, NEW, LEVL IV, 45-59 MIN: ICD-10-PCS | Mod: S$PBB,,, | Performed by: STUDENT IN AN ORGANIZED HEALTH CARE EDUCATION/TRAINING PROGRAM

## 2022-05-11 PROCEDURE — 99999 PR PBB SHADOW E&M-EST. PATIENT-LVL IV: ICD-10-PCS | Mod: PBBFAC,,, | Performed by: STUDENT IN AN ORGANIZED HEALTH CARE EDUCATION/TRAINING PROGRAM

## 2022-05-11 PROCEDURE — 99497 PR ADVNCD CARE PLAN 30 MIN: ICD-10-PCS | Mod: S$PBB,,, | Performed by: STUDENT IN AN ORGANIZED HEALTH CARE EDUCATION/TRAINING PROGRAM

## 2022-05-11 PROCEDURE — 99497 ADVNCD CARE PLAN 30 MIN: CPT | Mod: PBBFAC | Performed by: STUDENT IN AN ORGANIZED HEALTH CARE EDUCATION/TRAINING PROGRAM

## 2022-05-11 PROCEDURE — 99497 ADVNCD CARE PLAN 30 MIN: CPT | Mod: S$PBB,,, | Performed by: STUDENT IN AN ORGANIZED HEALTH CARE EDUCATION/TRAINING PROGRAM

## 2022-05-11 PROCEDURE — 99214 OFFICE O/P EST MOD 30 MIN: CPT | Mod: PBBFAC | Performed by: STUDENT IN AN ORGANIZED HEALTH CARE EDUCATION/TRAINING PROGRAM

## 2022-05-11 PROCEDURE — 99999 PR PBB SHADOW E&M-EST. PATIENT-LVL IV: CPT | Mod: PBBFAC,,, | Performed by: STUDENT IN AN ORGANIZED HEALTH CARE EDUCATION/TRAINING PROGRAM

## 2022-05-11 RX ORDER — DICYCLOMINE HYDROCHLORIDE 10 MG/1
10 CAPSULE ORAL
Qty: 60 CAPSULE | Refills: 0 | Status: SHIPPED | OUTPATIENT
Start: 2022-05-11 | End: 2022-06-02

## 2022-05-11 NOTE — Clinical Note
Good morning,    I saw Mr. Garner on Wednesday afternoon. I didn't make many changes as Kitty increased his long and short acting on Tuesday. I did start bentyl for 2 weeks as he was having cramping pain, so hopefully this helps. Also I saw to avoid SSRIs given his chemo, but are other classes an option? Would zyprexa be an option for him or mirtazapine? I am just trying to have a plan for the future. Also can you SW reach out to him? He is having some financial difficulty and trying to get all the paperwork submitted. Maybe having someone just review with him could be beneficial.   Thanks, Guerline

## 2022-05-11 NOTE — PROGRESS NOTES
Consult Note  Palliative Care      Consult Requested By: Dr. Tl Garibay  Reason for Consult: symptom management and ACP      ASSESSMENT/PLAN:     Plan/Recommendations:  Diagnoses and all orders for this visit:    Metastatic melanoma  - patient followed by Dr. Wise and KANDICE Segura  - currently on disease directed therapy    Encounter for palliative care/Advanced care planning  - patient decisional  - patient by himself in clinic today  - no ACP documents uploaded into EMR  - goals: life prolonging  - philosophy of Palliative Medicine reviewed with patient today  - new patient folder given to and reviewed with patient today  - ACP booklet given to and reviewed with patient today including HPCOA and living will  - code status not specifically discussed today  - patient states his mother will likely want to join at next visit; discussed that if patient wants to have his mother present and gives this clinic permission, this clinic will be glad to discuss any questions/concerns with his mother    Cancer related pain  - patient reporting severe pain all over his body  - patient reporting medication regimen makes it tolerable for him to do some activities, but he still having significant pain  - patient saw oncology yesterday and regimen was increased to Fentanyl 50 mcg/hr TD patch q72 hours and oxycodone IR 35 mg q4h prn  - patient currently using all six doses of IR breakthrough  - opioid education and safety provided to patient today  - given increase about 24 hours ago, will continue regimen for now  - will add bentyl 10 mg qid prn for abdominal cramps today  - will ask that pall med RN call patient in a couple of weeks for check in on pain  - narcan previously ordered     Adjustment disorder with mixed anxiety and depressed mood  - patient having severe anxiety and depression  - he recounted the various changes in his life recently as well as how the pain and anxiety have kept him from engaging in activities he  enjoys  - patient also having increased financial difficulties and is currently working on gathering all the necessary paperwork  - patient also started recently on Wellbutrin BID  - referral placed by oncology for onc-psych  - emotional support provided along with pall med sw; please see GULSHAN note for full details    Nausea/Anorexia  - patient having nausea every couple of days; he denies any significant change in his appetite  - patient has been using protein supplementation already  - discussed with patient about oncology-nutrition referral for full support; referral placed today  - continue zofran as previously prescribed  - will continue close monitoring    Neoplastic (malignant) related fatigue/Insomnia  - Patient having increased fatigue and very poor sleep  - patient does not have energy to do things he enjoys secondary to increased pain and anxiety  - patient also waking up about every two hours or so  - discussed ways to help improve sleep, including body scan  - will work on better pain control as well  - discussed finding a balance between rest and activity  - tips for better sleep in new patient folder for patient to review    Constipation  - patient reporting his bowel movements are well controlled  - patient currently on daily bowel regimen  - tip sheet for constipation in new patient folder for patient to review    Understanding of illness/Prognosis: patient has a good understanding of his illness; prognosis is guarded at this time    Goals of care: life prolonging    Follow up: ~ 2 weeks for telephone call for symptom check    Patient's encounter and above plan of care discussed with patient's oncologist    SUBJECTIVE:     History of Present Illness:  Patient is a 51 y.o. year old male with CAD s/p PCI, seizures, ?substance abuse, and stage IV malignant melanoma who presents to Palliative Medicine for symptom management and ACP. Please see oncology notes for full details of oncologic history and  treatment course.     05/11/2022:  LA  reviewed and summarized:  05/04/2022 Fentanyl 25 mcg/hr patch  05/04/2022 Oxycodone 20 mg Disp: 40 for 10 days    Patient presents today by himself. Patient reporting severe uncontrolled pain all over his body. Patient reporting his regimen was just increased by oncology yesterday. He reports severe anxiety and depression, which is affecting his quality of life. Patient having nausea every couple of days as well. His appetite is okay, but he is open to meeting with nutrition to have a plan if things change. He reports increased fatigued and only able to sleep every two hours at a time. Patient reports his bowel regimen is working well. He is open to learning about ACP.     Past Medical History:   Diagnosis Date    Seizures      Past surgical history: punch biopsy in January 2022; previous PCI    No family history on file.     Review of patient's allergies indicates:  No Known Allergies    Medications:    Current Outpatient Medications:     binimetinib 15 mg Tab, Take 45 mg by mouth 2 (two) times daily., Disp: 180 tablet, Rfl: 3    buPROPion (WELLBUTRIN SR) 100 MG TBSR 12 hr tablet, Take 1 tablet (100 mg total) by mouth 2 (two) times daily., Disp: 60 tablet, Rfl: 2    divalproex (DEPAKOTE) 250 MG EC tablet, Take 1 tablet (250 mg total) by mouth every 8 (eight) hours., Disp: 90 tablet, Rfl: 11    encorafenib 75 mg Cap, Take 450 mg by mouth once daily., Disp: 180 capsule, Rfl: 3    famotidine (PEPCID) 20 MG tablet, Take 1 tablet (20 mg total) by mouth 2 (two) times daily., Disp: 60 tablet, Rfl: 11    fentaNYL (DURAGESIC) 25 mcg/hr, Place 1 patch onto the skin every 72 hours., Disp: 10 patch, Rfl: 0    levETIRAcetam (KEPPRA) 1000 MG tablet, Take 1,000 mg by mouth 2 (two) times a day., Disp: , Rfl:     naloxone (NARCAN) 4 mg/actuation Spry, 4mg by nasal route as needed for opioid overdose; may repeat every 2-3 minutes in alternating nostrils until medical help arrives.  Call 911, Disp: 2 each, Rfl: 11    ondansetron (ZOFRAN-ODT) 8 MG TbDL, Dissolve 1 tablet (8 mg total) by mouth every 8 (eight) hours as needed., Disp: 20 tablet, Rfl: 1    oxyCODONE (ROXICODONE) 15 MG Tab, Take 2 tablets (30 mg total) by mouth every 8 (eight) hours as needed for Pain., Disp: 90 tablet, Rfl: 0    polyethylene glycol (GLYCOLAX) 17 gram/dose powder, Dissolve one capful (17 g) in liquid and take by mouth once daily., Disp: 510 g, Rfl: 3    senna-docusate 8.6-50 mg (PERICOLACE) 8.6-50 mg per tablet, Take 1 tablet by mouth daily as needed for Constipation., Disp: 30 tablet, Rfl: 3    dicyclomine (BENTYL) 10 MG capsule, Take 1 capsule (10 mg total) by mouth 4 (four) times daily before meals and nightly. for 15 days, Disp: 60 capsule, Rfl: 0    OBJECTIVE:       ROS:  Review of Systems   Constitutional: Positive for activity change, appetite change and fatigue.   HENT: Negative.    Eyes: Negative.    Respiratory: Negative.    Cardiovascular: Negative.    Gastrointestinal: Positive for abdominal pain, constipation and nausea.   Genitourinary: Negative.    Musculoskeletal: Positive for arthralgias and myalgias.   Skin: Negative.    Neurological: Negative.    Psychiatric/Behavioral: Positive for dysphoric mood and sleep disturbance. The patient is nervous/anxious.    All other systems reviewed and are negative.      Review of Symptoms    Symptom Assessment (ESAS 0-10 Scale)  Pain:  9  Dyspnea:  0  Anxiety:  10  Nausea:  5  Depression:  7  Anorexia:  0  Fatigue:  9  Insomnia:  9  Restlessness:  0  Agitation:  0     CAM / Delirium:  Negative  Constipation:  Positive  Diarrhea:  Negative    Bowel Management Plan (BMP):  Yes      Pain Assessment:  OME in 24 hours:  ~260  Location(s): generalized    Generalized       Location: generalized        Quality: aching and cramping        Quantity: 9/10 in intensity        Chronicity: Onset 3 month(s) ago, unchanged        Aggravating Factors: none        Alleviating  Factors: opiates        Associated Symptoms: none    Modified Dagoberto Scale:  0    ECOG Performance Status ndGndrndanddndend:nd nd2nd Living Arrangements:  Lives with family    Psychosocial/Cultural: Patient lives with his mother; he has a sister as well.     Spiritual:  F - Robyn and Belief:  Not discussed today      Advance Care Planning   Advance Directives:   Living Will: No    [place]POST: No    Do Not Resuscitate Status: No    Medical Power of : No    Agent's Name:  Margarita Prather   Agent's Contact Number:  [phone]    Decision Making:  Patient answered questions          Physical Exam:  Vitals: Pulse: 84 (05/11/22 1410)  BP: (!) 160/98 (05/11/22 1410)  Physical Exam  Vitals reviewed.   Constitutional:       General: He is not in acute distress.     Appearance: He is not toxic-appearing.   HENT:      Head: Normocephalic and atraumatic.      Right Ear: External ear normal.      Left Ear: External ear normal.   Eyes:      General: No scleral icterus.        Right eye: No discharge.         Left eye: No discharge.   Neck:      Comments: Trachea midline  Pulmonary:      Effort: Pulmonary effort is normal. No respiratory distress.   Abdominal:      General: Abdomen is flat. There is no distension.   Musculoskeletal:         General: No signs of injury.      Cervical back: Normal range of motion.      Right lower leg: No edema.      Left lower leg: No edema.   Skin:     General: Skin is dry.      Findings: No lesion or rash.   Neurological:      Mental Status: He is alert and oriented to person, place, and time.      Gait: Gait normal.   Psychiatric:         Mood and Affect: Mood is anxious and depressed. Affect is tearful.         Speech: Speech normal.         Behavior: Behavior normal.         Cognition and Memory: Cognition normal.         Labs:  CBC:   WBC   Date Value Ref Range Status   05/10/2022 7.40 3.90 - 12.70 K/uL Final     Hemoglobin   Date Value Ref Range Status   05/10/2022 10.8 (L) 14.0 - 18.0 g/dL Final  "    POC Hematocrit   Date Value Ref Range Status   04/14/2022 45 36 - 54 %PCV Final     Hematocrit   Date Value Ref Range Status   05/10/2022 34.8 (L) 40.0 - 54.0 % Final     MCV   Date Value Ref Range Status   05/10/2022 82 82 - 98 fL Final     Platelets   Date Value Ref Range Status   05/10/2022 460 (H) 150 - 450 K/uL Final       LFT:   Lab Results   Component Value Date    AST 13 05/10/2022    GGT 16 04/17/2022    ALKPHOS 56 05/10/2022    BILITOT 0.2 05/10/2022       Albumin:   Albumin   Date Value Ref Range Status   05/10/2022 2.7 (L) 3.5 - 5.2 g/dL Final     Protein:   Total Protein   Date Value Ref Range Status   05/10/2022 5.8 (L) 6.0 - 8.4 g/dL Final       Radiology:I have reviewed all pertinent imaging results/findings within the past 24 hours.    04/20/2022 MRI Brain: "Two subcentimeter enhancing foci noted along the left frontal and right parietal parasagittal cortex/subcortical white matter suspicious for metastatic disease.  Please note the thin-section images are too degraded by motion artifact."    04/16/2022 CT C/A/P: "In this patient with a reported history of metastatic melanoma, there is diffuse metastatic disease involving the right axilla, bilateral lungs, and peritoneal metastasis as described in detail above.  Additional subcutaneous soft tissue nodule which could represent metastatic focus. Mild wedging of L2 superior endplate suggestive of mild compression fracture. Recommend correlation with outside imaging. Diverticulosis.  No evidence of diverticulitis. Hepatomegaly. Additional findings as above."      16 minutes spent in discussing ACP    Signature: Guerline Crouch MD      "

## 2022-05-12 ENCOUNTER — TELEPHONE (OUTPATIENT)
Dept: INFUSION THERAPY | Facility: HOSPITAL | Age: 52
End: 2022-05-12
Payer: MEDICAID

## 2022-05-12 NOTE — PROGRESS NOTES
[facility]  Palliative Care   Psychosocial Assessment    Patient Name: Joseluis Garner  MRN: [id number]  Palliative Care Provider: Guerline Crouch MD   Primary Care Physician: Primary Doctor No  Principal Problem: Metastatic Melanoma     Reason for Referral: Advanced Care Planning and Psychosocial Support       Present during Interview: patient and ER records.      Primary Language:English   Needed: no      Past Medical Situation:   PMH:   Past Medical History:   Diagnosis Date    Seizures      Mental Health/Substance Use History: Medical record indicates history of substance abuse   Risk of Abuse, neglect or exploitation: None identified   Current or Previous Trauma and/or evidence of PTSD: None identified   Non-traditional Health practices: None identified     Understanding of diagnosis and prognosis: Fair   Experience/Comfort level with health care system: Fair     Patients Mental Status: Alert and oriented to person, place, time and situation with dysphoric mood.     Socio-Economic Factors/Resources:  Address: [address] [place] [address]  Phone Number: [phone] (home)     Marital Status: Single  Household composition: Resides with mother   Children: none     Patient/Family perceptions about Caregiving Needs; availability and capacity: Patient independent at this time.  Patient admits to having difficulty requesting/accepting assistance from his mother and sister.      Family Dynamics/Relationships: Healthy     Patient/Family Strengths/Resilience: Patient presents extremely goal-oriented and motivated to complete the tasks necessary to manage his medical and financial affairs.   Patient/Family Coping: Fair.  Patient admits to feelings of depression.  Patient appears willing to process his emotions but would benefit from additional support.     Activities of Daily Living:Independent   Support Systems-Family & Community (Home Health, HME etc): n/a      Transportation:  yes    Work/Education History: unemployed  Self-Care Activities/Hobbies: Playing with dog, watching movies.  Patient enjoyed an extremely active lifestyle which included jet skiing and mountain biking prior to his diagnosis.      History: no    Financial Resources:uninsured (Medicaid application pending)      Advanced Care Planning & Legal Concerns:   Advanced Directives/Living Will: no  [place]POST/POLST: no   Planning:  no    Power of : no      Emergency Contacts: Mother/Margarita Laguerre     Spirituality, Culture & Coping Mechanisms:  F- Robyn and Belief: Unknown     I - Importance: not discussed     C - Community/Culture Values:n/a      A - Address in Care: TBD       Goals/Hopes/Expectations: Patient hopes to manage his symptoms and return to his active lifestyle.   Fears/Anxiety/Concerns: Patient admits to difficulty adjusting to the limitations placed upon his due to his disease. (I.e, lack of income, pain, sedentary lifestyle)         Complicated Bereavement Risk Assessment Tool (CBRAT)  Reference:  [facility] Clinical Practice Group (May 2016). Bereavement Risk Screening and Management Guidelines.  Retrieved from: http://www.[facility].com.au/wp-content/uploads//QSEDA-Teztlhsnhap-Ddyzxkgwf-and-Management-Guideline-2016.pdf      Bereaved Client Characteristics   ? Under 18      no  ? Was a Twin   no  ? Young Spouse   no  ? Elderly Spouse    no  ? Isolated    no  ? Lacks Meaningful Social Support   no  ? Dissatisfied with help available during illness   no  ? New to Financial [place] no  ? New to Decision-Making   no    Illness  ? Inherited Disorder   no  ? Stigmatized Disease in the family/community   no  ? Lengthy/Burdensome   no     Bereaved Client's History of Loss   ? Cumulative Multiple Losses   no  ? Previous Mental Health Illnesses   no  ? Current Mental Health Illness   no  ? Other Significant Health Issues   no    ? Migrant/Refugee   no Death  ? Sudden or Unexpected   no  ? Traumatic Circumstances Associated with Death   no  ? Significant Cultural/Social Burdens as a result of Death   yes   Relationship with   ? Profound Lifelong Partner   no  ? Highly Dependent    no  ? Antagonistic   no  ? Ambivalent   no  ? Deeply Connected   yes  ? Culturally Defined   yes   Risk Factors Scores  0-2  Low  3-5  Moderate  5+  High  All persons scoring moderate to high presume to be at risk**    (** It is acknowledged that protective factors and resilience may outweigh apparent risk factors.      Total Risk Factors Score:   Moderate.  Patient's primary caregiver (his mother) may benefit from additional support following patient's passing.     SW accompanied MD during initial visit. Patient presents AAOx4 with dysphoric mood.  Patient appears to have a fair understanding of his illness. Patient appears to have adjusted to his illness with moderate difficulty.  Patient reflected upon the ways his life has changed since his diagnosis in February.  Patient reports pain and anxiety have kept him from engaging in activities such as mountain biking and jet skiing.  Patient notes due to his inability to work, he has experienced financial difficulties. Patient notes he is diligently working on gathering the documents needed to apply for benefits, and feels a release of some anxiety each time another tasks is completed.  SW and MD normalized patient's concerns and reviewed techniques to manage anxiety (body scan, visualization).  Patient amenable to continuing usage of Wellbutrin (began treatment this week) and consultation with Onc-Psych for further support.  MD and SW encouraged patient to reach out to clinic between visits for assistance with concerns.  ACP pamphlet introduced by RN at start of visit.  Patient reports he has considered his wishes however his family (mother and sister) are apprehensive to discuss.  Further discussions of  ACP and GoC to be conducted a future visits.  Patient denies further psychosocial concerns. SW remains available to provide emotional support and psychosocial assistance. SW will continue to follow.    Marlene Brock, LEWISW  Outpatient   Palliative Medicine

## 2022-05-16 NOTE — TELEPHONE ENCOUNTER
Called [employer] PAP to check status of patients assistance application and Rep Ayo reports that patients application was received but they were unable to verify patients income during electronic income verification and proof of income is required. Patient is currently unemployed. Faxed copy of FMLA leave letter to [employer] at [phone]. Will continue to follow up until final determination is made.

## 2022-05-18 ENCOUNTER — TELEPHONE (OUTPATIENT)
Dept: PALLIATIVE MEDICINE | Facility: CLINIC | Age: 52
End: 2022-05-18
Payer: MEDICAID

## 2022-05-18 DIAGNOSIS — C43.9 METASTATIC MELANOMA: Primary | ICD-10-CM

## 2022-05-18 DIAGNOSIS — G89.3 CANCER RELATED PAIN: ICD-10-CM

## 2022-05-18 RX ORDER — MORPHINE SULFATE 30 MG/1
30 TABLET, FILM COATED, EXTENDED RELEASE ORAL 3 TIMES DAILY
Qty: 90 TABLET | Refills: 0 | Status: SHIPPED | OUTPATIENT
Start: 2022-05-18 | End: 2022-06-22 | Stop reason: SDUPTHER

## 2022-05-18 NOTE — TELEPHONE ENCOUNTER
Patient called clinic stating that he has had no relief from pain since increasing Fentanyl patch to 50mcg. He continues to take oxycodone 35mg every 4 hrs. Complains of pain all over in joints. Relayed information to Dr. Crouch.    Called patient back after Dr. Crouch spoke with pt's oncologist Dr. Wise. Informed patient that Dr. Wise is very concerned and would like him to go to ED for scans. Patient states there is no way he is going to the ED. He states that he is never going to the ED again. Dr. Crouch to change long acting medication. Advised him to discontinue patch and start new long acting oral medication. Verbalizes understanding.

## 2022-05-23 ENCOUNTER — TELEPHONE (OUTPATIENT)
Dept: INFUSION THERAPY | Facility: HOSPITAL | Age: 52
End: 2022-05-23
Payer: MEDICAID

## 2022-05-23 ENCOUNTER — PATIENT MESSAGE (OUTPATIENT)
Dept: HEMATOLOGY/ONCOLOGY | Facility: CLINIC | Age: 52
End: 2022-05-23
Payer: MEDICAID

## 2022-05-23 DIAGNOSIS — G89.3 CANCER ASSOCIATED PAIN: Primary | ICD-10-CM

## 2022-05-23 DIAGNOSIS — C43.9 METASTATIC MELANOMA: ICD-10-CM

## 2022-05-23 DIAGNOSIS — G89.3 CANCER ASSOCIATED PAIN: ICD-10-CM

## 2022-05-23 RX ORDER — OXYCODONE HYDROCHLORIDE 30 MG/1
30 TABLET ORAL EVERY 4 HOURS PRN
Qty: 180 TABLET | Refills: 0 | Status: SHIPPED | OUTPATIENT
Start: 2022-05-23 | End: 2022-06-22 | Stop reason: SDUPTHER

## 2022-05-23 NOTE — TELEPHONE ENCOUNTER
----- Message from [facility] sent at 5/23/2022 11:31 AM CDT -----  Type:  Needs Medical Advice    Who Called: PT   Would the patient rather a call back or a response via [facility]? Callback   Best Call Back Number: [phone]  Additional Information: Pt is out of a couple of medications, requesting callback to discuss and have them sent in.

## 2022-05-24 ENCOUNTER — PATIENT MESSAGE (OUTPATIENT)
Dept: HEMATOLOGY/ONCOLOGY | Facility: CLINIC | Age: 52
End: 2022-05-24
Payer: MEDICAID

## 2022-05-24 RX ORDER — OXYCODONE HYDROCHLORIDE 30 MG/1
30 TABLET ORAL EVERY 4 HOURS PRN
Qty: 180 TABLET | Refills: 0 | OUTPATIENT
Start: 2022-05-24

## 2022-05-24 RX ORDER — AMOXICILLIN 250 MG
1 CAPSULE ORAL DAILY PRN
Qty: 30 TABLET | Refills: 3 | OUTPATIENT
Start: 2022-05-24

## 2022-05-25 ENCOUNTER — PATIENT MESSAGE (OUTPATIENT)
Dept: HEMATOLOGY/ONCOLOGY | Facility: CLINIC | Age: 52
End: 2022-05-25
Payer: MEDICAID

## 2022-05-25 RX ORDER — AMOXICILLIN 250 MG
1 CAPSULE ORAL DAILY PRN
Qty: 30 TABLET | Refills: 3 | Status: SHIPPED | OUTPATIENT
Start: 2022-05-25

## 2022-05-25 NOTE — TELEPHONE ENCOUNTER
Refaxed copy of non FMLA work leave letter along with zero income attestation letter to [employer] at [phone] as requested for PAP application. Will continue to follow up until final determination is made.

## 2022-05-26 ENCOUNTER — DOCUMENTATION ONLY (OUTPATIENT)
Dept: HEMATOLOGY/ONCOLOGY | Facility: CLINIC | Age: 52
End: 2022-05-26
Payer: MEDICAID

## 2022-05-26 ENCOUNTER — PATIENT MESSAGE (OUTPATIENT)
Dept: HEMATOLOGY/ONCOLOGY | Facility: CLINIC | Age: 52
End: 2022-05-26
Payer: MEDICAID

## 2022-05-26 NOTE — TELEPHONE ENCOUNTER
Notified patient of PAP approval for Braktovi and Mektovi and provided program and approval information. See note below. Patient voiced understanding.

## 2022-05-26 NOTE — PROGRESS NOTES
"Received a Secure Chat message yesterday afternoon from Meme Hunt RN,  inquiring about patient's Medicaid application status.  Replied to her that the [facility] Dept. staff had assisted him in applying for Medicaid. Saw Medicaid [employer] [place] listed in Patient Station with an effective date of 4/1/22. Sent a message via Epic (with Meme included in it) to our  Nohemy Davis and asked her to verify, and she replied back "I'm showing Medicaid [employer] effective 4/22 to present as well".   Will continue to follow and assist as needs are identified.   .  "

## 2022-05-26 NOTE — TELEPHONE ENCOUNTER
Patient is approved for [employer] Patient Assistance Program as of 2/25/22 through 12/31/22 and will receive Braftovi and Mektovi free of charge through the programs dispensing pharmacy, [facility]. Someone from the program's pharmacy will be reaching out to patient to coordinate their first shipment. Patient can also contact [employer] Patient Assistance Program at [phone] to check on the status of their prescription and schedule shipment for medication.        FOR DOCUMENTATION ONLY:  Financial Assistance for Braftovi/Mektovi is approved from 5/25/22 to 12/31/22  Source: [employer] Patient Assistance Program  Case ID: [id number]  Phone: [phone]  Fax: [phone]   Pharmacy: [facility]

## 2022-05-27 ENCOUNTER — PATIENT MESSAGE (OUTPATIENT)
Dept: HEMATOLOGY/ONCOLOGY | Facility: CLINIC | Age: 52
End: 2022-05-27
Payer: MEDICAID

## 2022-05-30 ENCOUNTER — PATIENT MESSAGE (OUTPATIENT)
Dept: PALLIATIVE MEDICINE | Facility: CLINIC | Age: 52
End: 2022-05-30
Payer: MEDICAID

## 2022-05-31 ENCOUNTER — OFFICE VISIT (OUTPATIENT)
Dept: HEMATOLOGY/ONCOLOGY | Facility: CLINIC | Age: 52
End: 2022-05-31
Payer: MEDICAID

## 2022-05-31 ENCOUNTER — TELEPHONE (OUTPATIENT)
Dept: INFUSION THERAPY | Facility: HOSPITAL | Age: 52
End: 2022-05-31
Payer: MEDICAID

## 2022-05-31 ENCOUNTER — LAB VISIT (OUTPATIENT)
Dept: LAB | Facility: HOSPITAL | Age: 52
End: 2022-05-31
Attending: INTERNAL MEDICINE
Payer: MEDICAID

## 2022-05-31 VITALS — HEIGHT: 72 IN | WEIGHT: 142 LBS | BODY MASS INDEX: 19.23 KG/M2

## 2022-05-31 DIAGNOSIS — C79.31 MALIGNANT MELANOMA METASTATIC TO BRAIN: ICD-10-CM

## 2022-05-31 DIAGNOSIS — C43.9 METASTATIC MELANOMA: Primary | ICD-10-CM

## 2022-05-31 DIAGNOSIS — R18.0 MALIGNANT ASCITES: ICD-10-CM

## 2022-05-31 DIAGNOSIS — R11.2 NAUSEA AND VOMITING, INTRACTABILITY OF VOMITING NOT SPECIFIED, UNSPECIFIED VOMITING TYPE: ICD-10-CM

## 2022-05-31 DIAGNOSIS — G89.3 CANCER ASSOCIATED PAIN: ICD-10-CM

## 2022-05-31 DIAGNOSIS — C43.9 METASTATIC MELANOMA: ICD-10-CM

## 2022-05-31 LAB
ALBUMIN SERPL BCP-MCNC: 3.5 G/DL (ref 3.5–5.2)
ALP SERPL-CCNC: 61 U/L (ref 55–135)
ALT SERPL W/O P-5'-P-CCNC: <5 U/L (ref 10–44)
ANION GAP SERPL CALC-SCNC: 14 MMOL/L (ref 8–16)
AST SERPL-CCNC: 10 U/L (ref 10–40)
BASOPHILS # BLD AUTO: 0.15 K/UL (ref 0–0.2)
BASOPHILS NFR BLD: 0.8 % (ref 0–1.9)
BILIRUB SERPL-MCNC: 0.6 MG/DL (ref 0.1–1)
BUN SERPL-MCNC: 23 MG/DL (ref 6–20)
CALCIUM SERPL-MCNC: 9.7 MG/DL (ref 8.7–10.5)
CHLORIDE SERPL-SCNC: 93 MMOL/L (ref 95–110)
CO2 SERPL-SCNC: 26 MMOL/L (ref 23–29)
CREAT SERPL-MCNC: 1.4 MG/DL (ref 0.5–1.4)
DIFFERENTIAL METHOD: ABNORMAL
EOSINOPHIL # BLD AUTO: 0.1 K/UL (ref 0–0.5)
EOSINOPHIL NFR BLD: 0.5 % (ref 0–8)
ERYTHROCYTE [DISTWIDTH] IN BLOOD BY AUTOMATED COUNT: 18.8 % (ref 11.5–14.5)
EST. GFR  (AFRICAN AMERICAN): >60 ML/MIN/1.73 M^2
EST. GFR  (NON AFRICAN AMERICAN): 57.4 ML/MIN/1.73 M^2
GLUCOSE SERPL-MCNC: 134 MG/DL (ref 70–110)
HCT VFR BLD AUTO: 51.9 % (ref 40–54)
HGB BLD-MCNC: 17.2 G/DL (ref 14–18)
IMM GRANULOCYTES # BLD AUTO: 0.11 K/UL (ref 0–0.04)
IMM GRANULOCYTES NFR BLD AUTO: 0.6 % (ref 0–0.5)
LYMPHOCYTES # BLD AUTO: 2 K/UL (ref 1–4.8)
LYMPHOCYTES NFR BLD: 10.1 % (ref 18–48)
MCH RBC QN AUTO: 26.3 PG (ref 27–31)
MCHC RBC AUTO-ENTMCNC: 33.1 G/DL (ref 32–36)
MCV RBC AUTO: 79 FL (ref 82–98)
MONOCYTES # BLD AUTO: 0.9 K/UL (ref 0.3–1)
MONOCYTES NFR BLD: 4.5 % (ref 4–15)
NEUTROPHILS # BLD AUTO: 16.3 K/UL (ref 1.8–7.7)
NEUTROPHILS NFR BLD: 83.5 % (ref 38–73)
NRBC BLD-RTO: 0 /100 WBC
PLATELET # BLD AUTO: 431 K/UL (ref 150–450)
PMV BLD AUTO: 10.3 FL (ref 9.2–12.9)
POTASSIUM SERPL-SCNC: 4.5 MMOL/L (ref 3.5–5.1)
PROT SERPL-MCNC: 6.9 G/DL (ref 6–8.4)
RBC # BLD AUTO: 6.55 M/UL (ref 4.6–6.2)
SODIUM SERPL-SCNC: 133 MMOL/L (ref 136–145)
WBC # BLD AUTO: 19.46 K/UL (ref 3.9–12.7)

## 2022-05-31 PROCEDURE — 99215 OFFICE O/P EST HI 40 MIN: CPT | Mod: S$PBB,,, | Performed by: INTERNAL MEDICINE

## 2022-05-31 PROCEDURE — 1159F MED LIST DOCD IN RCRD: CPT | Mod: CPTII,,, | Performed by: INTERNAL MEDICINE

## 2022-05-31 PROCEDURE — 3008F PR BODY MASS INDEX (BMI) DOCUMENTED: ICD-10-PCS | Mod: CPTII,,, | Performed by: INTERNAL MEDICINE

## 2022-05-31 PROCEDURE — 99999 PR PBB SHADOW E&M-EST. PATIENT-LVL III: CPT | Mod: PBBFAC,,, | Performed by: INTERNAL MEDICINE

## 2022-05-31 PROCEDURE — 1159F PR MEDICATION LIST DOCUMENTED IN MEDICAL RECORD: ICD-10-PCS | Mod: CPTII,,, | Performed by: INTERNAL MEDICINE

## 2022-05-31 PROCEDURE — 36415 COLL VENOUS BLD VENIPUNCTURE: CPT | Performed by: INTERNAL MEDICINE

## 2022-05-31 PROCEDURE — 85025 COMPLETE CBC W/AUTO DIFF WBC: CPT | Performed by: INTERNAL MEDICINE

## 2022-05-31 PROCEDURE — 99213 OFFICE O/P EST LOW 20 MIN: CPT | Mod: PBBFAC | Performed by: INTERNAL MEDICINE

## 2022-05-31 PROCEDURE — 99999 PR PBB SHADOW E&M-EST. PATIENT-LVL III: ICD-10-PCS | Mod: PBBFAC,,, | Performed by: INTERNAL MEDICINE

## 2022-05-31 PROCEDURE — 80053 COMPREHEN METABOLIC PANEL: CPT | Performed by: INTERNAL MEDICINE

## 2022-05-31 PROCEDURE — 3008F BODY MASS INDEX DOCD: CPT | Mod: CPTII,,, | Performed by: INTERNAL MEDICINE

## 2022-05-31 PROCEDURE — 99215 PR OFFICE/OUTPT VISIT, EST, LEVL V, 40-54 MIN: ICD-10-PCS | Mod: S$PBB,,, | Performed by: INTERNAL MEDICINE

## 2022-05-31 RX ORDER — ONDANSETRON 8 MG/1
8 TABLET, ORALLY DISINTEGRATING ORAL EVERY 12 HOURS PRN
Qty: 30 TABLET | Refills: 1 | Status: SHIPPED | OUTPATIENT
Start: 2022-05-31 | End: 2022-07-13 | Stop reason: SDUPTHER

## 2022-05-31 RX ORDER — PROCHLORPERAZINE MALEATE 5 MG
5 TABLET ORAL EVERY 6 HOURS PRN
Qty: 30 TABLET | Refills: 1 | Status: SHIPPED | OUTPATIENT
Start: 2022-05-31 | End: 2022-08-26 | Stop reason: SDUPTHER

## 2022-05-31 RX ORDER — ALPRAZOLAM 0.5 MG/1
0.5 TABLET ORAL 3 TIMES DAILY PRN
Qty: 15 TABLET | Refills: 0 | Status: SHIPPED | OUTPATIENT
Start: 2022-05-31 | End: 2022-06-13 | Stop reason: SDUPTHER

## 2022-05-31 NOTE — Clinical Note
- schedule PETCT next available - schedule C1 ipi nivo next available, need CBC, CMP, TSH, free T4 prior. - RTC with CBC, CMP, TSH, free T4 prior to C2

## 2022-05-31 NOTE — PROGRESS NOTES
ESTABLISHED MEDICAL ONCOLOGY VISIT    Reason for visit:  Metastatic melanoma to the lungs, axillary lymph nodes, liver, peritoneal carcinomatosis.     Cancer/Stage/TNM:   Cancer Staging  Metastatic melanoma  Staging form: Melanoma of the Skin, AJCC 8th Edition  - Clinical stage from 1/28/2022: Stage IV (cTX, cNX, cM1) - Signed by Adria Rivera MD on 4/12/2022       Oncology History   Metastatic melanoma   1/28/2022 Cancer Staged    Staging form: Melanoma of the Skin, AJCC 8th Edition  - Clinical stage from 1/28/2022: Stage IV (cTX, cNX, cM1)     4/4/2022 Initial Diagnosis    Metastatic melanoma     4/6/2022 -  Chemotherapy    Treatment Summary   Plan Name: OP NIVOLUMAB 1 MG/KG IPILIMUMAB 3MG/KG FOLLOWED BY NIVOLUMAB 480MG Q4W  Treatment Goal: Control  Status: Active  Start Date: 4/6/2022 (Planned)  End Date: 3/8/2023 (Planned)  Provider: Clayton Wise MD  Chemotherapy: ipilimumab (YERVOY) 3 mg/kg = 211 mg in sodium chloride 0.9% 100 mL chemo infusion, 3 mg/kg, Intravenous, Clinic/HOD 1 time, 0 of 4 cycles  nivolumab 70 mg in sodium chloride 0.9% 107 mL infusion, 1 mg/kg, Intravenous, Clinic/HOD 1 time, 0 of 14 cycles          HPI:   51 y.o. male with history of CAD s/p PCI, seizures, ?substance abuse, stage IV malignant melanoma who presents today to establish care at [facility].   He presented initially with enlarging right axillary lymph nodes that have grown progressively in size since April 2021. He presented to urgent care and was referred for US which showed multiple enlarged masses in the right axilla most likely reflecting abnormal lymph nodes concerning for malignancy.   He was subsequently referred to dermatology who performed a complete exam of the skin that was negative for suspicious cutaneous lesions as well as punch biopsy of one of the lymph node on 01/04/22. Pathology came back as malignant melanoma.   BRAF mutation: positive for a V600E mutation.   He subsequently underwent a PET/CT  on 01/28/22 which showed multiple hypermetabolic soft tissue masses within the right axillary region, metastatic lesions to the lungs and abdomen (VI segment of the liver measuring a max SUV of 6.99, 1.6 x 1.9 cm nodular soft tissue density with a max SUV  7.64 was seen adjacent to the colon within the right lower quadrant. Brain MRI was negative for metastatic disease.  LDH was 307 U/L  He saw Oncology at [facility] on 01/31/22 with plan to start First line Nivolumab / ipilimumab. He received C1 on 03/03/22.     He was admitted on 03/18 at [facility] for abdominal pain, and distension. He underwent therapeutic paracentesis with removal of 3L, and was discharged on 03/22. CT abdomen during that admission showed soft tissue attenuation throughout the peritoneum consistent with innumerable peritoneal implants.     He was readmitted the following day 03/23 for re accumulation and had another paracentesis with removal of 4.7L.  Unfortunately, he had rapid re accumulation of his ascites and was readmitted again between 03/30 and 04/02 and underwent paracentesis. He was planned to receive pleur-X catheter as outpatient.     He presented again to [facility] on 04/04 with worsening abdominal distension and pain. He underwent therapeutic paracentesis with removal of 5L. Cytology was positive for malignant cells. He was discharged on 04/05 on Cipro for SBP coverage.       Interval history:   Patient ran out of targeted therapy samples 1 week ago. He said 3 days ago nausea and vomiting developed and panic attacks.  He denies other symptoms including HA, vision changes, weakness, diarrhea, abdominal swelling.  His axillary lymph node response has persisted despite stopping meds. Pain is better controlled.     Review of Systems   Constitutional: Positive for fatigue. Negative for activity change, appetite change, chills, fever and unexpected weight change.   HENT: Negative for ear pain, facial swelling, hearing loss, mouth sores, nosebleeds, sore  throat and trouble swallowing.    Eyes: Negative for pain, discharge, redness and visual disturbance.   Respiratory: Negative for cough, choking, chest tightness and shortness of breath.    Cardiovascular: Positive for leg swelling. Negative for chest pain and palpitations.   Gastrointestinal: Positive for nausea and vomiting. Negative for abdominal distention, abdominal pain, blood in stool, constipation and diarrhea.   Endocrine: Negative for cold intolerance and heat intolerance.   Genitourinary: Negative for difficulty urinating, dysuria, frequency and urgency.   Musculoskeletal: Negative for arthralgias, gait problem, leg pain and myalgias.   Integumentary:  Negative for pallor, rash and wound.   Allergic/Immunologic: Negative for frequent infections.   Neurological: Negative for dizziness, tremors, weakness, light-headedness, numbness and headaches.   Hematological: Negative for adenopathy. Does not bruise/bleed easily.   Psychiatric/Behavioral: Positive for dysphoric mood. Negative for agitation, confusion and sleep disturbance. The patient is not nervous/anxious.          Past Medical History:   Past Medical History:   Diagnosis Date    Seizures         Past Surgical History:   No past surgical history on file.     Family History:   No family history on file.     Social History:   Social History     Tobacco Use    Smoking status: Current Every Day Smoker    Smokeless tobacco: Never Used   Substance Use Topics    Alcohol use: Yes        I have reviewed and updated the patient's past medical, surgical, family and social histories.    Allergies:   Review of patient's allergies indicates:  No Known Allergies     Medications:   Current Outpatient Medications   Medication Sig Dispense Refill    buPROPion (WELLBUTRIN SR) 100 MG TBSR 12 hr tablet Take 1 tablet (100 mg total) by mouth 2 (two) times daily. 60 tablet 2    dicyclomine (BENTYL) 10 MG capsule Take 1 capsule (10 mg total) by mouth 4 (four) times  daily before meals and nightly. for 15 days 60 capsule 0    divalproex (DEPAKOTE) 250 MG EC tablet Take 1 tablet (250 mg total) by mouth every 8 (eight) hours. 90 tablet 11    famotidine (PEPCID) 20 MG tablet Take 1 tablet (20 mg total) by mouth 2 (two) times daily. 60 tablet 11    levETIRAcetam (KEPPRA) 1000 MG tablet Take 1,000 mg by mouth 2 (two) times a day.      morphine (MS CONTIN) 30 MG 12 hr tablet Take 1 tablet (30 mg total) by mouth 3 (three) times daily. 90 tablet 0    naloxone (NARCAN) 4 mg/actuation Spry 4mg by nasal route as needed for opioid overdose; may repeat every 2-3 minutes in alternating nostrils until medical help arrives. Call 911 2 each 11    oxyCODONE (ROXICODONE) 30 MG Tab Take 1 tablet (30 mg total) by mouth every 4 (four) hours as needed for Pain. 180 tablet 0    polyethylene glycol (GLYCOLAX) 17 gram/dose powder Dissolve one capful (17 g) in liquid and take by mouth once daily. 510 g 3    senna-docusate 8.6-50 mg (PERICOLACE) 8.6-50 mg per tablet Take 1 tablet by mouth daily as needed for Constipation. 30 tablet 3    ALPRAZolam (XANAX) 0.5 MG tablet Take 1 tablet (0.5 mg total) by mouth 3 (three) times daily as needed for Insomnia or Anxiety (panic attacks). 15 tablet 0    binimetinib 15 mg Tab Take 45 mg by mouth 2 (two) times daily. (Patient not taking: Reported on 5/31/2022.) 180 tablet 3    encorafenib 75 mg Cap Take 450 mg by mouth once daily. (Patient not taking: Reported on 5/31/2022.) 180 capsule 3    ondansetron (ZOFRAN-ODT) 8 MG TbDL Take 1 tablet (8 mg total) by mouth every 12 (twelve) hours as needed. 30 tablet 1    prochlorperazine (COMPAZINE) 5 MG tablet Take 1 tablet (5 mg total) by mouth every 6 (six) hours as needed for Nausea. 30 tablet 1     No current facility-administered medications for this visit.        Physical Exam:   Ht 6' (1.829 m)   Wt 64.4 kg (141 lb 15.6 oz)   BMI 19.26 kg/m²      ECOG Performance status: 2            Physical Exam  Vitals  and nursing note reviewed.   Constitutional:       General: He is not in acute distress.     Appearance: Normal appearance. He is well-developed.   HENT:      Head: Normocephalic and atraumatic.   Eyes:      Conjunctiva/sclera: Conjunctivae normal.      Pupils: Pupils are equal, round, and reactive to light.   Pulmonary:      Effort: Pulmonary effort is normal. No respiratory distress.   Abdominal:      General: There is no distension.      Palpations: Abdomen is soft.   Musculoskeletal:         General: No swelling. Normal range of motion.      Cervical back: Normal range of motion and neck supple.      Right lower le+ Edema present.      Left lower le+ Edema present.   Skin:     General: Skin is warm and dry.   Neurological:      General: No focal deficit present.      Mental Status: He is alert and oriented to person, place, and time.   Psychiatric:         Mood and Affect: Mood normal.         Behavior: Behavior normal.         Thought Content: Thought content normal.         Judgment: Judgment normal.           Labs:   Recent Results (from the past 48 hour(s))   CBC Auto Differential    Collection Time: 22  9:29 AM   Result Value Ref Range    WBC 19.46 (H) 3.90 - 12.70 K/uL    RBC 6.55 (H) 4.60 - 6.20 M/uL    Hemoglobin 17.2 14.0 - 18.0 g/dL    Hematocrit 51.9 40.0 - 54.0 %    MCV 79 (L) 82 - 98 fL    MCH 26.3 (L) 27.0 - 31.0 pg    MCHC 33.1 32.0 - 36.0 g/dL    RDW 18.8 (H) 11.5 - 14.5 %    Platelets 431 150 - 450 K/uL    MPV 10.3 9.2 - 12.9 fL    Immature Granulocytes 0.6 (H) 0.0 - 0.5 %    Gran # (ANC) 16.3 (H) 1.8 - 7.7 K/uL    Immature Grans (Abs) 0.11 (H) 0.00 - 0.04 K/uL    Lymph # 2.0 1.0 - 4.8 K/uL    Mono # 0.9 0.3 - 1.0 K/uL    Eos # 0.1 0.0 - 0.5 K/uL    Baso # 0.15 0.00 - 0.20 K/uL    nRBC 0 0 /100 WBC    Gran % 83.5 (H) 38.0 - 73.0 %    Lymph % 10.1 (L) 18.0 - 48.0 %    Mono % 4.5 4.0 - 15.0 %    Eosinophil % 0.5 0.0 - 8.0 %    Basophil % 0.8 0.0 - 1.9 %    Differential Method  Automated    Comprehensive Metabolic Panel    Collection Time: 05/31/22  9:29 AM   Result Value Ref Range    Sodium 133 (L) 136 - 145 mmol/L    Potassium 4.5 3.5 - 5.1 mmol/L    Chloride 93 (L) 95 - 110 mmol/L    CO2 26 23 - 29 mmol/L    Glucose 134 (H) 70 - 110 mg/dL    BUN 23 (H) 6 - 20 mg/dL    Creatinine 1.4 0.5 - 1.4 mg/dL    Calcium 9.7 8.7 - 10.5 mg/dL    Total Protein 6.9 6.0 - 8.4 g/dL    Albumin 3.5 3.5 - 5.2 g/dL    Total Bilirubin 0.6 0.1 - 1.0 mg/dL    Alkaline Phosphatase 61 55 - 135 U/L    AST 10 10 - 40 U/L    ALT <5 (L) 10 - 44 U/L    Anion Gap 14 8 - 16 mmol/L    eGFR if African American >60.0 >60 mL/min/1.73 m^2    eGFR if non  57.4 (A) >60 mL/min/1.73 m^2        Imaging:    CT abdomen pelvis 03/18/2022  FINDINGS:   01. LIVER: There is a 2.1 x 1.5 cm hypoattenuating lesion in the anterior left hepatic lobe (series 201, image 70). Portal vein is patent.   02. SPLEEN: Normal.   03. PANCREAS: Normal.   04. BILIARY TREE: The gallbladder is distended, otherwise within normal limits. Biliary tree is not dilated.   05. ADRENALS: Normal.   06. KIDNEYS: The kidneys enhance symmetrically. No evidence of calcification, hydronephrosis or solid renal mass.   07. LYMPHADENOPATHY/RETROPERITONEUM:The aorta is normal caliber with scattered atherosclerotic changes including major side branches. There are multiple nonenlarged retroperitoneal lymph nodes, for reference a para-aortic node is seen (series 201, image 61) which measures 0.7 cm in short axis dimension.   08. BOWEL: No bowel related abnormalities.   09. PELVIC VISCERA: Normal CT appearance of the pelvic viscera.   10. PELVIC LYMPH NODES: No lymphadenopathy.   11. PERITONEUM/ABDOMINAL WALL: There is soft tissue attenuation throughout the peritoneum consistent with innumerable peritoneal implants. Large volume ascites. There are multiple large heterogeneous enhancing masses within the right lateral chest wall soft tissues. The largest  measures 4.3 x 5.7 centimeters) series 201, image 1).   12. SKELETAL: No aggressive appearing lytic/blastic lesions. No acute osseous abnormality. Moderate degenerative changes of the thoracolumbar spine.   13. LUNG BASES: There is a 2.7 cm juxtapleural nodular density in the posterolateral right lower lobe (series 201, image 3). There is a 2.9 x 2.1 cm juxtapleural soft tissue mass within the posterior right lower lobe (series 201, image 17) as well as a 1.9 x 1.9 cm nodule within the anterior middle lobe (series 201, image 25). Multiple smaller nodular opacities are seen about the left lower lobe. There is a 2.0 x 1.8 cm juxtapleural nodule in the posterior left lower lobe (series 201, image 35). There is trace dependent subsegmental atelectasis. Heart size is normal. Trace pericardial effusion. A stent is visualized within the LAD.            Path:   Skin, right axilla, biopsy:  -  Malignant melanoma.     Note:  The tumor measures approximately 3 x 4 mm and involves the peripheral margins of the specimen. Given no visualized connection to the overlying epidermis, a metastasis or satellitosis of a larger nearby melanoma is a consideration. Clear cell sarcoma is a differential diagnosis.     Microscopic Description:  Skin with a poorly circumscribed dermal nodule of atypical melanocytes. The tumor cells are hyperchromatic with a high nuclear to cytoplasm ratio. The tumor cells are strongly positive for Sox10 stain. CD43 demonstrates scattered tumor infiltrating lymphocytes, but does not stain tumor cells. No lymphovascular invasion is seen with CD31 and D2-40 stains. All controls are adequate.     A PHH3 stain is pending with addendum to follow.     Mitotic counts:  3/mm2.  Ulceration:   Not identified.  Regression:  Not identified.  Tumor infiltrating lymphocytes:  Mild.  Angiolymphatic invasion:  Not identified.  Associated nevus:  Not identified.  Predominant cytology:  Epithelioid cell.  Margin involvement:   Involved.    Assessment:       1. Metastatic melanoma    2. Malignant melanoma metastatic to brain    3. Nausea and vomiting, intractability of vomiting not specified, unspecified vomiting type    4. Cancer associated pain    5. Malignant ascites          Plan:     1 Metastatic melanoma to the lungs, liver, and peritoneum   51 year-old-male patient with metastatic melanoma to the axillary LNs, lungs, liver, and peritoneum, BRAF V600E mutant, who is status post C1 of first line immunotherapy with Nivolumab+Ipilimumab on 03/03. Unfortunately, since then he has developed peritoneal carcinomatosis leading to recurrent accumulation of malignant ascites. Over the past month, he required admission 4 times to the hospital for therapeutic paracentesis.   He is seen in clinic today. We have discussed the nature of his cancer, our treatment goals which are palliative in nature and to prolong his life.     He is very symptomatic today with worsening abdominal distension and notable worsening R axillary swelling. He cannot keep anything down, with persistent nausea and vomiting; likely mechanical from the distension.     With his rapidly progressive disease, we recommend switching him to targeted therapy with Encorafinib and Binimitinib. Samples were given Friday while still pending Medicaid acceptance and patient assistance through [employer]. He still has not had prescription approved through specialty pharmacy.  - will initiate C2 Ipilimumab and nivolumab next available as this has been approved by insurance  - Patient consented for immunotherapy 05/31/2022 . An extensive discussion was had which included a thorough discussion of the risk and benefits of treatment and alternatives.  Risks, including but not limited to, fatigue, diarrhea, nausea/vomiting, loss of appetite, skin reactions and rashes, flu-like symptoms, fever, infusion-related reactions including allergic reactions, inflammation of stomach or intestines,  inflammation of liver, inflammation of brain or nervous system, inflammation of lungs, inflammation of pancreas, inflammation of kidneys, inflammation of the eyes, inflammation of hormone producing glands, inflammation of the joints including activation of arthritis, impaired kidney function, impaired liver function, problems with sleep, unstable blood sugars, blood pressure changes, infertility, bone marrow damage (anemia, thrombocytopenia, immune suppression, neutropenia), sterility, local damage at possible injection sites, and rarely death were all discussed.  The patient agrees with the plan, and all questions have been answered to their satisfaction.  Consent was signed the patient, provider, and a third party witness.        2,3 Brain mets  Repeat brain MRI on Monday. Will consider SRS at that time.  Prescribed zofran and compazine, he will restart his targeted therapy in case this is progression in the brain after stopping drug.     # Malignant ascites   - Currently resolved    # Cancer associated pain   - Pain better controlled on Fentanyl patch to 50 mcg/hr every 72 hours   - Oxy IR 30 mg Q8H PRN  - Schedule palliative care referral for pain and symptom management       Patient is in agreement with the proposed treatment plan. All questions were answered to the patient's satisfaction. Pt knows to call clinic if anything is needed before the next clinic visit.    Clayton Wise MD  Medical Oncology  [facility]

## 2022-06-01 ENCOUNTER — OFFICE VISIT (OUTPATIENT)
Dept: PSYCHIATRY | Facility: CLINIC | Age: 52
End: 2022-06-01
Payer: MEDICAID

## 2022-06-01 ENCOUNTER — PATIENT MESSAGE (OUTPATIENT)
Dept: PSYCHIATRY | Facility: CLINIC | Age: 52
End: 2022-06-01
Payer: MEDICAID

## 2022-06-01 DIAGNOSIS — C43.9 METASTATIC MELANOMA: ICD-10-CM

## 2022-06-01 DIAGNOSIS — R45.1 AGITATION: Primary | ICD-10-CM

## 2022-06-01 DIAGNOSIS — F32.A DEPRESSION, UNSPECIFIED DEPRESSION TYPE: ICD-10-CM

## 2022-06-01 PROCEDURE — 90791 PR PSYCHIATRIC DIAGNOSTIC EVALUATION: ICD-10-PCS | Mod: AH,HB,, | Performed by: PSYCHOLOGIST

## 2022-06-01 PROCEDURE — 1159F MED LIST DOCD IN RCRD: CPT | Mod: AH,HB,CPTII, | Performed by: PSYCHOLOGIST

## 2022-06-01 PROCEDURE — 90791 PSYCH DIAGNOSTIC EVALUATION: CPT | Mod: AH,HB,, | Performed by: PSYCHOLOGIST

## 2022-06-01 PROCEDURE — 99212 OFFICE O/P EST SF 10 MIN: CPT | Mod: PBBFAC | Performed by: PSYCHOLOGIST

## 2022-06-01 PROCEDURE — 1159F PR MEDICATION LIST DOCUMENTED IN MEDICAL RECORD: ICD-10-PCS | Mod: AH,HB,CPTII, | Performed by: PSYCHOLOGIST

## 2022-06-01 PROCEDURE — 99999 PR PBB SHADOW E&M-EST. PATIENT-LVL II: ICD-10-PCS | Mod: PBBFAC,HB,, | Performed by: PSYCHOLOGIST

## 2022-06-01 PROCEDURE — 99999 PR PBB SHADOW E&M-EST. PATIENT-LVL II: CPT | Mod: PBBFAC,HB,, | Performed by: PSYCHOLOGIST

## 2022-06-01 NOTE — PROGRESS NOTES
INFORMED CONSENT/ LIMITS of CONFIDENTIALITY: Prior to beginning the interview, the patient's identification was confirmed via name and date of birth. Joseluis Garner  was informed of the possible risks and benefits of psychological interventions (e.g., counseling, psychotherapy, testing) and provided information regarding the handling of protected health records and   the limits of confidentiality, including the importance of reporting any suicidal or homicidal ideation to ensure safety of all parties. This provider explained the purpose of today's appointment and the patient was provided with time to ask questions regarding this information.  Acceptance and understanding of these conditions was expressed, and Joseluis Garner freely consented to this evaluation.     PSYCHO-ONCOLOGY INTAKE    Diagnostic Interview - CPT 66721    Date: 6/1/2022  Site: [facility]     Evaluation Length (direct face-to-face time):  1 hour     Referral Source: Kitty Segura NP   Oncologist:   PCP: Primary Doctor No    Clinical status of patient: Outpatient    Joseluis Garner, a 51 y.o. male, seen for initial evaluation visit.  Met with patient.    Chief complaint/reason for encounter: adjustment to illness, anxiety    Medical/Surgical History:    Patient Active Problem List   Diagnosis    Migraine    History of seizure    Altered mental status    Post-ictal confusion    Ascites    Metastatic melanoma    Hyponatremia    PAULA (acute kidney injury)    Hyperkalemia    Intractable nausea and vomiting    Ileus    Agitation       Health Behaviors:       ETOH Use: No Patient denied history of problematic ETOH use- but records are inconsistent      Tobacco Use: No   Illicit Drug Use:  No     Prescription Misuse:No   Caffeine: minimal   Exercise:The patient engages in environmental activity only. Some walking    Firearms:  No   Advanced directives:No     Family History:   Psychiatric illness: Yes  Sister with  Anxiety   Alcohol/Drug Abuse: No     Suicide: No      Past Psychiatric History:   Inpatient treatment: No     Outpatient treatment: No     Prior substance abuse treatment: No     Suicide Attempts: No     Psychotropic Medications:  Current: Xanax, Wellbutrin, Depakote- not taking (Keppra)       Past: multiple other psychotropics (could not recall names)    Current medications as per below, allergies reviewed in chart.    Current Outpatient Medications   Medication    ALPRAZolam (XANAX) 0.5 MG tablet    binimetinib 15 mg Tab    buPROPion (WELLBUTRIN SR) 100 MG TBSR 12 hr tablet    dicyclomine (BENTYL) 10 MG capsule    divalproex (DEPAKOTE) 250 MG EC tablet    encorafenib 75 mg Cap    famotidine (PEPCID) 20 MG tablet    levETIRAcetam (KEPPRA) 1000 MG tablet    morphine (MS CONTIN) 30 MG 12 hr tablet    naloxone (NARCAN) 4 mg/actuation Spry    ondansetron (ZOFRAN-ODT) 8 MG TbDL    oxyCODONE (ROXICODONE) 30 MG Tab    polyethylene glycol (GLYCOLAX) 17 gram/dose powder    prochlorperazine (COMPAZINE) 5 MG tablet    senna-docusate 8.6-50 mg (PERICOLACE) 8.6-50 mg per tablet     No current facility-administered medications for this visit.          Social situation/Stressors: Joseluis Garner lives with mother in [facility].  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 Family friends help with income.      Joseluis Garner has been  once ending in divorce and has 0 children.  The patient reports good social support. Joseluis Garner is an active member of a [employer].     Additional stressors:  Financial    Strengths:Able to vocalize needs, Values and traditions and Resources - social, interpersonal, monetary  Liabilities: Complicated medical illness and Other: AMS- inconsistent reporting        Current Evaluation:     Mental Status Exam:  General Appearance:  unremarkable, age appropriate   Speech: slowed      Level of Cooperation: cooperative     "  Thought Processes: normal and logical   Mood: euthymic      Thought Content: normal, no suicidality, no homicidality, delusions, or paranoia   Affect: congruent and appropriate   Orientation: Oriented x3   Memory: recent >  intact, remote >  intact   Attention Span & Concentration: spelled "WORLD" forwards and backwards   Fund of General Knowledge: 4 of 4 recent presidents   Abstract Reasoning: interpretation of similarities was abstract, interpretation of proverbs was abstract   Judgment & Insight: fair     Language  intact       Distress Score 9            Practical Problems Physical Problems                                                   Family Problems                                         Emotional Problems                                                         Spiritual/Religions Concerns               Other Problems                Patient Health Questionnaire-9 (PHQ-9)     1.  Little interest or pleasure in doing things: Several days                = 1   2.  Feeling down, depressed or hopeless: Several days                = 1   3.  Trouble falling or staying asleep, or sleeping too much: Several days                = 1   4.  Feeling tired or having little energy: More than half of days  = 2   5.  Poor appetite or overeating: Not at all                       = 0   6.  Feeling bad about yourself - or that you are a failure or have let yourself or your family down: Several days                = 1   7.  Trouble concentrating on things, such as reading the newspaper or watching television: Not at all                       = 0   8.  Moving or speaking so slowly that other people could have noticed.  Or the opposite - being fidgety or restless that you have been moving around a lot more than usual: Not at all                       = 0   9.  Thoughts that you would be better off dead, or of hurting yourself: Not at all                       = 0    PHQ-9 Total:  6     Generalized Anxiety Disorder " Questionnaire-7 (DULCE MARIA-7)  The patient completed the General Anxiety Disorder, 7 Items (GAD7)and received a score of 14, indicating moderate anxiety symptoms presently impacting current functioning.       History of present illness:    Oncology History   Metastatic melanoma   1/28/2022 Cancer Staged    Staging form: Melanoma of the Skin, AJCC 8th Edition  - Clinical stage from 1/28/2022: Stage IV (cTX, cNX, cM1)     4/4/2022 Initial Diagnosis    Metastatic melanoma     4/6/2022 -  Chemotherapy    Treatment Summary   Plan Name: OP NIVOLUMAB 1 MG/KG IPILIMUMAB 3MG/KG FOLLOWED BY NIVOLUMAB 480MG Q4W  Treatment Goal: Control  Status: Active  Start Date: 4/6/2022 (Planned)  End Date: 3/8/2023 (Planned)  Provider: Clayton Wise MD  Chemotherapy: ipilimumab (YERVOY) 3 mg/kg = 211 mg in sodium chloride 0.9% 100 mL chemo infusion, 3 mg/kg, Intravenous, Clinic/HOD 1 time, 0 of 4 cycles  nivolumab 70 mg in sodium chloride 0.9% 107 mL infusion, 1 mg/kg, Intravenous, Clinic/HOD 1 time, 0 of 14 cycles       Sister takes Xanax-feels this helps after she gave him one. Patient with anxiety and panic. Perseverated on efectively of XANAX- downplayed SHANIQUE history.     Joseluis Garner has adjusted to illness with moderate difficulty primarily through passive coping strategies. He has engaged in appropriate information gathering.  The patient has satisfactory family/friend support.  His support system is coping adequately with the diagnosis/treatment/prognosis. Illness-related psychosocial stressors include financial strain , absence from work and difficulty meeting family responsibilities.  The patient has a satisfactory partnership with his [facility] oncology treatment team. The patient reports the following barriers to cancer care:financial limitations and distance from the hospital.       Patient Reported Cancer Treatment Symptoms:  symptoms which would preclude continuation of therapy, abdominal bloating, appetite changes and pain -  diffuse- mostly abdominal    Behavioral Health Symptoms:   · Mood: Depression: depressed mood, diminished interest, insomnia and psychomotor agitation;  no prior; no SI/HI  · Kylah: Denies  · Psychosis: Denies   · Anxiety: Feeling nervous, anxious, or on edge, Uncontrollable worry (about health), Excessive worry (interfering with mood), Difficulty relaxing, Restlessness and Irritability; no prior  · Generalized anxiety: Denies    · Panic Disorder: Denies  · Social/specific phobia: Denies   · OCD: Denies  · Trauma: Denies  · Sexual Dysfunction:  Denies  · Substance abuse: denied  · Cognitive functioning: Recently with AMS- clearing up  · Health behaviors: noncontributory  · Sleep: Very poor restless sleep  and interrupted sleep ,  (+) use of benzodiazepines Xanax   · Pain: Mr. Garner reports diffuse pain. Symptoms interfere with daily activity, sleeping and work.   · CAM Therapies: THC- nausea and pain     ICD-10-CM ICD-9-CM   1. Agitation  R45.1 307.9   2. Metastatic melanoma  C79.9 172.9   3. Depression, unspecified depression type  F32.A 311     Plan:consult psychiatrist for medication evaluation    Summary and Recommendations  Joseluis Garner is a 51 y.o. male referred by Kitty Segura NP for psychological evaluation and treatment.  Mr. Garner appears to be coping poorly with his diagnosis and proposed treatment course. Patient interested in medication management and less in psychological intervention, but patient did agree to a follow up appointment  Patient has good support system. Mood protective strategies during cancer treatment were discussed.   He is not currently interested in regular CBT or supportive therapy, but is aware of available resources to address future needs.. Team to exercise extreme caution related to use of medication with addictive properties.     GOALS:   STOP/TIP Skill  Discuss pharmacotherapy options with [facility] and/or referral to psychaitry  Pleasant events scheduling and  increased social interaction  Learn and utilize emotion management strategies  Declined Psychiatry     Next Session:  Reassessment of needs    Lucila Esparza, PhD  Clinical Psychologist  [place] License #[id number]  [place] License #[id number]

## 2022-06-03 ENCOUNTER — PATIENT MESSAGE (OUTPATIENT)
Dept: HEMATOLOGY/ONCOLOGY | Facility: CLINIC | Age: 52
End: 2022-06-03
Payer: MEDICAID

## 2022-06-07 ENCOUNTER — PATIENT MESSAGE (OUTPATIENT)
Dept: HEMATOLOGY/ONCOLOGY | Facility: CLINIC | Age: 52
End: 2022-06-07
Payer: MEDICAID

## 2022-06-07 DIAGNOSIS — C43.9 METASTATIC MELANOMA: Primary | ICD-10-CM

## 2022-06-07 DIAGNOSIS — R11.0 NAUSEA: ICD-10-CM

## 2022-06-07 DIAGNOSIS — R11.2 NAUSEA AND VOMITING, INTRACTABILITY OF VOMITING NOT SPECIFIED, UNSPECIFIED VOMITING TYPE: ICD-10-CM

## 2022-06-07 DIAGNOSIS — F41.9 ANXIETY: ICD-10-CM

## 2022-06-07 RX ORDER — OLANZAPINE 2.5 MG/1
2.5 TABLET ORAL EVERY 6 HOURS PRN
Qty: 60 TABLET | Refills: 0 | Status: SHIPPED | OUTPATIENT
Start: 2022-06-07 | End: 2022-08-09 | Stop reason: SDUPTHER

## 2022-06-08 ENCOUNTER — TELEPHONE (OUTPATIENT)
Dept: HEMATOLOGY/ONCOLOGY | Facility: CLINIC | Age: 52
End: 2022-06-08
Payer: MEDICAID

## 2022-06-08 RX ORDER — ALPRAZOLAM 0.5 MG/1
0.5 TABLET ORAL 3 TIMES DAILY PRN
Qty: 15 TABLET | Refills: 0 | OUTPATIENT
Start: 2022-06-08 | End: 2023-06-08

## 2022-06-09 NOTE — TELEPHONE ENCOUNTER
Call placed to patient, no answer. Left message on voicemail that xanax refill had been called in by Dr. Wise to use prior to his scans if needed. Left my call back number.

## 2022-06-10 ENCOUNTER — TELEPHONE (OUTPATIENT)
Dept: PALLIATIVE MEDICINE | Facility: CLINIC | Age: 52
End: 2022-06-10
Payer: MEDICAID

## 2022-06-10 NOTE — TELEPHONE ENCOUNTER
SW reached out to patient to follow up and offer support.  Call went to voicemail. SW left  encouraging patient to call with any concerns. SW remains available to offer support.    Marlene Brock, [facility]  Outpatient   Palliative Medicine

## 2022-06-13 ENCOUNTER — PATIENT MESSAGE (OUTPATIENT)
Dept: HEMATOLOGY/ONCOLOGY | Facility: CLINIC | Age: 52
End: 2022-06-13
Payer: MEDICAID

## 2022-06-13 ENCOUNTER — HOSPITAL ENCOUNTER (OUTPATIENT)
Dept: RADIOLOGY | Facility: HOSPITAL | Age: 52
Discharge: HOME | End: 2022-06-13
Attending: NURSE PRACTITIONER
Payer: MEDICAID

## 2022-06-13 DIAGNOSIS — R11.2 NAUSEA AND VOMITING, INTRACTABILITY OF VOMITING NOT SPECIFIED, UNSPECIFIED VOMITING TYPE: ICD-10-CM

## 2022-06-13 PROCEDURE — 70553 MRI BRAIN STEM W/O & W/DYE: CPT | Mod: TC

## 2022-06-13 PROCEDURE — 25500020 PHARM REV CODE 255: Performed by: NURSE PRACTITIONER

## 2022-06-13 PROCEDURE — 70553 MRI BRAIN W WO CONTRAST: ICD-10-PCS | Mod: 26,,, | Performed by: RADIOLOGY

## 2022-06-13 PROCEDURE — A9585 GADOBUTROL INJECTION: HCPCS | Performed by: NURSE PRACTITIONER

## 2022-06-13 PROCEDURE — 70553 MRI BRAIN STEM W/O & W/DYE: CPT | Mod: 26,,, | Performed by: RADIOLOGY

## 2022-06-13 RX ORDER — GADOBUTROL 604.72 MG/ML
7 INJECTION INTRAVENOUS
Status: COMPLETED | OUTPATIENT
Start: 2022-06-13 | End: 2022-06-13

## 2022-06-13 RX ORDER — ALPRAZOLAM 0.5 MG/1
0.5 TABLET ORAL
Qty: 2 TABLET | Refills: 0 | Status: ON HOLD | OUTPATIENT
Start: 2022-06-13 | End: 2022-08-21 | Stop reason: HOSPADM

## 2022-06-13 RX ADMIN — GADOBUTROL 7 ML: 604.72 INJECTION INTRAVENOUS at 06:06

## 2022-06-14 ENCOUNTER — INFUSION (OUTPATIENT)
Dept: INFUSION THERAPY | Facility: HOSPITAL | Age: 52
End: 2022-06-14
Payer: MEDICAID

## 2022-06-14 ENCOUNTER — TELEPHONE (OUTPATIENT)
Dept: HEMATOLOGY/ONCOLOGY | Facility: CLINIC | Age: 52
End: 2022-06-14
Payer: MEDICAID

## 2022-06-14 ENCOUNTER — OFFICE VISIT (OUTPATIENT)
Dept: HEMATOLOGY/ONCOLOGY | Facility: CLINIC | Age: 52
End: 2022-06-14
Payer: MEDICAID

## 2022-06-14 VITALS
TEMPERATURE: 98 F | DIASTOLIC BLOOD PRESSURE: 77 MMHG | SYSTOLIC BLOOD PRESSURE: 127 MMHG | RESPIRATION RATE: 18 BRPM | HEART RATE: 61 BPM | OXYGEN SATURATION: 96 %

## 2022-06-14 VITALS
HEART RATE: 63 BPM | BODY MASS INDEX: 20.39 KG/M2 | HEIGHT: 72 IN | SYSTOLIC BLOOD PRESSURE: 142 MMHG | DIASTOLIC BLOOD PRESSURE: 80 MMHG | WEIGHT: 150.56 LBS | OXYGEN SATURATION: 96 %

## 2022-06-14 DIAGNOSIS — G89.3 CANCER ASSOCIATED PAIN: ICD-10-CM

## 2022-06-14 DIAGNOSIS — F51.05 INSOMNIA SECONDARY TO ANXIETY: ICD-10-CM

## 2022-06-14 DIAGNOSIS — R18.0 MALIGNANT ASCITES: ICD-10-CM

## 2022-06-14 DIAGNOSIS — G93.9 HEADACHE DUE TO INTRACRANIAL DISEASE: ICD-10-CM

## 2022-06-14 DIAGNOSIS — C43.9 METASTATIC MELANOMA: Primary | ICD-10-CM

## 2022-06-14 DIAGNOSIS — C79.31 MALIGNANT MELANOMA METASTATIC TO BRAIN: ICD-10-CM

## 2022-06-14 DIAGNOSIS — R11.2 NAUSEA AND VOMITING, INTRACTABILITY OF VOMITING NOT SPECIFIED, UNSPECIFIED VOMITING TYPE: ICD-10-CM

## 2022-06-14 DIAGNOSIS — F41.9 INSOMNIA SECONDARY TO ANXIETY: ICD-10-CM

## 2022-06-14 DIAGNOSIS — R51.9 HEADACHE DUE TO INTRACRANIAL DISEASE: ICD-10-CM

## 2022-06-14 PROCEDURE — 25000003 PHARM REV CODE 250: Performed by: INTERNAL MEDICINE

## 2022-06-14 PROCEDURE — 3079F DIAST BP 80-89 MM HG: CPT | Mod: CPTII,,, | Performed by: INTERNAL MEDICINE

## 2022-06-14 PROCEDURE — 99999 PR PBB SHADOW E&M-EST. PATIENT-LVL II: CPT | Mod: PBBFAC,,, | Performed by: INTERNAL MEDICINE

## 2022-06-14 PROCEDURE — 99215 OFFICE O/P EST HI 40 MIN: CPT | Mod: S$PBB,,, | Performed by: INTERNAL MEDICINE

## 2022-06-14 PROCEDURE — 96415 CHEMO IV INFUSION ADDL HR: CPT

## 2022-06-14 PROCEDURE — 3077F SYST BP >= 140 MM HG: CPT | Mod: CPTII,,, | Performed by: INTERNAL MEDICINE

## 2022-06-14 PROCEDURE — 3008F BODY MASS INDEX DOCD: CPT | Mod: CPTII,,, | Performed by: INTERNAL MEDICINE

## 2022-06-14 PROCEDURE — 3077F PR MOST RECENT SYSTOLIC BLOOD PRESSURE >= 140 MM HG: ICD-10-PCS | Mod: CPTII,,, | Performed by: INTERNAL MEDICINE

## 2022-06-14 PROCEDURE — 63600175 PHARM REV CODE 636 W HCPCS: Mod: JG | Performed by: INTERNAL MEDICINE

## 2022-06-14 PROCEDURE — 96417 CHEMO IV INFUS EACH ADDL SEQ: CPT

## 2022-06-14 PROCEDURE — 99999 PR PBB SHADOW E&M-EST. PATIENT-LVL II: ICD-10-PCS | Mod: PBBFAC,,, | Performed by: INTERNAL MEDICINE

## 2022-06-14 PROCEDURE — 3079F PR MOST RECENT DIASTOLIC BLOOD PRESSURE 80-89 MM HG: ICD-10-PCS | Mod: CPTII,,, | Performed by: INTERNAL MEDICINE

## 2022-06-14 PROCEDURE — 96413 CHEMO IV INFUSION 1 HR: CPT

## 2022-06-14 PROCEDURE — 3008F PR BODY MASS INDEX (BMI) DOCUMENTED: ICD-10-PCS | Mod: CPTII,,, | Performed by: INTERNAL MEDICINE

## 2022-06-14 PROCEDURE — 99212 OFFICE O/P EST SF 10 MIN: CPT | Mod: PBBFAC,25 | Performed by: INTERNAL MEDICINE

## 2022-06-14 PROCEDURE — 99215 PR OFFICE/OUTPT VISIT, EST, LEVL V, 40-54 MIN: ICD-10-PCS | Mod: S$PBB,,, | Performed by: INTERNAL MEDICINE

## 2022-06-14 RX ORDER — EPINEPHRINE 0.3 MG/.3ML
0.3 INJECTION SUBCUTANEOUS ONCE AS NEEDED
Status: CANCELLED | OUTPATIENT
Start: 2022-06-17

## 2022-06-14 RX ORDER — HEPARIN 100 UNIT/ML
500 SYRINGE INTRAVENOUS
Status: DISCONTINUED | OUTPATIENT
Start: 2022-06-14 | End: 2022-06-14 | Stop reason: HOSPADM

## 2022-06-14 RX ORDER — METHYLPREDNISOLONE SOD SUCC 125 MG
125 VIAL (EA) INJECTION ONCE AS NEEDED
Status: CANCELLED | OUTPATIENT
Start: 2022-06-17

## 2022-06-14 RX ORDER — SODIUM CHLORIDE 0.9 % (FLUSH) 0.9 %
10 SYRINGE (ML) INJECTION
Status: CANCELLED | OUTPATIENT
Start: 2022-06-17

## 2022-06-14 RX ORDER — METHYLPREDNISOLONE SOD SUCC 125 MG
125 VIAL (EA) INJECTION ONCE AS NEEDED
Status: DISCONTINUED | OUTPATIENT
Start: 2022-06-14 | End: 2022-06-14 | Stop reason: HOSPADM

## 2022-06-14 RX ORDER — SODIUM CHLORIDE 0.9 % (FLUSH) 0.9 %
10 SYRINGE (ML) INJECTION
Status: DISCONTINUED | OUTPATIENT
Start: 2022-06-14 | End: 2022-06-14 | Stop reason: HOSPADM

## 2022-06-14 RX ORDER — DIPHENHYDRAMINE HYDROCHLORIDE 50 MG/ML
25 INJECTION INTRAMUSCULAR; INTRAVENOUS EVERY 10 MIN PRN
Status: CANCELLED | OUTPATIENT
Start: 2022-06-17 | End: 2022-06-18

## 2022-06-14 RX ORDER — HEPARIN 100 UNIT/ML
500 SYRINGE INTRAVENOUS
Status: CANCELLED | OUTPATIENT
Start: 2022-06-17

## 2022-06-14 RX ORDER — EPINEPHRINE 0.3 MG/.3ML
0.3 INJECTION SUBCUTANEOUS ONCE AS NEEDED
Status: DISCONTINUED | OUTPATIENT
Start: 2022-06-14 | End: 2022-06-14 | Stop reason: HOSPADM

## 2022-06-14 RX ORDER — DIPHENHYDRAMINE HYDROCHLORIDE 50 MG/ML
25 INJECTION INTRAMUSCULAR; INTRAVENOUS EVERY 10 MIN PRN
Status: DISCONTINUED | OUTPATIENT
Start: 2022-06-14 | End: 2022-06-14 | Stop reason: HOSPADM

## 2022-06-14 RX ADMIN — SODIUM CHLORIDE: 9 INJECTION, SOLUTION INTRAVENOUS at 01:06

## 2022-06-14 RX ADMIN — SODIUM CHLORIDE 70 MG: 9 INJECTION, SOLUTION INTRAVENOUS at 01:06

## 2022-06-14 RX ADMIN — SODIUM CHLORIDE 200 MG: 9 INJECTION, SOLUTION INTRAVENOUS at 02:06

## 2022-06-14 NOTE — TELEPHONE ENCOUNTER
Telephone Note    Discussed on a peer to peer with Palak Santana, radiologist with [facility], patient's PETCT denial.  Dr. Santana notified me that PETCT is only covered for inconclusive findings on PETCT.    Patient is due for re-staging scans for an aggressive stage IV melanoma and scheduled today for PETCT.  We only have baseline PETCT and CT scans for comparison. Due to CT contrast shortage and likely inadequate evaluation of peritoneal metastases with a non-contrasted CT study, we had ordered a PETCT.     Dr. Santana continued to deny the PETCT despite this unfortunate situation of CT contrast shortage.  She said an MRI abdomen/pelvis could be ordered instead, but I informed her that we do not have a baseline MRI for comparison.  Dr. Santana then said that the only option they would approve would be a CT without contrast.    We will order CT without contrast due to denial of PETCT by the patient's payer.    Clayton Wise MD  Medical Oncology  [facility]

## 2022-06-14 NOTE — PLAN OF CARE
1300- Patient arrived to clinic following labs and MD appt for C1D1 opdivo/yervoy. Labs and assessment appropriate for treatment. Orders reviewed and signed by MD Frandy. PIV started, flushes easily, NS infusing.

## 2022-06-14 NOTE — TELEPHONE ENCOUNTER
Outgoing call to patient to inform him that he needs to call [employer] Patient Assistance Program at [phone] to coordinate a shipment of his Braftovi and Mektovi prescriptions. Explained to patient that he is approved to receive both prescriptions free of charge from the program through the end of the year and he must contact the program each month for refills. Patient voiced understanding.

## 2022-06-14 NOTE — PROGRESS NOTES
ESTABLISHED MEDICAL ONCOLOGY VISIT    Reason for visit:  Metastatic melanoma to the lungs, axillary lymph nodes, liver, peritoneal carcinomatosis, and brain.     Cancer/Stage/TNM:   Cancer Staging  Metastatic melanoma  Staging form: Melanoma of the Skin, AJCC 8th Edition  - Clinical stage from 1/28/2022: Stage IV (cTX, cNX, cM1) - Signed by Adria Rivera MD on 4/12/2022       Oncology History   Metastatic melanoma   1/28/2022 Cancer Staged    Staging form: Melanoma of the Skin, AJCC 8th Edition  - Clinical stage from 1/28/2022: Stage IV (cTX, cNX, cM1)     4/4/2022 Initial Diagnosis    Metastatic melanoma     6/14/2022 -  Chemotherapy    Treatment Summary   Plan Name: OP NIVOLUMAB 1 MG/KG IPILIMUMAB 3MG/KG FOLLOWED BY NIVOLUMAB 480MG Q4W  Treatment Goal: Control  Status: Active  Start Date: 6/14/2022  End Date: 5/19/2023 (Planned)  Provider: Clayton Wise MD  Chemotherapy: ipilimumab (YERVOY) 3 mg/kg = 211 mg in sodium chloride 0.9% 100 mL chemo infusion, 3 mg/kg = 211 mg, Intravenous, Clinic/HOD 1 time, 1 of 4 cycles  nivolumab 70 mg in sodium chloride 0.9% 107 mL infusion, 1 mg/kg = 70 mg, Intravenous, Clinic/HOD 1 time, 1 of 14 cycles          HPI:   51 y.o. male with history of CAD s/p PCI, seizures, ?substance abuse, stage IV malignant melanoma who presents today to establish care at [facility].   He presented initially with enlarging right axillary lymph nodes that have grown progressively in size since April 2021. He presented to urgent care and was referred for US which showed multiple enlarged masses in the right axilla most likely reflecting abnormal lymph nodes concerning for malignancy.   He was subsequently referred to dermatology who performed a complete exam of the skin that was negative for suspicious cutaneous lesions as well as punch biopsy of one of the lymph node on 01/04/22. Pathology came back as malignant melanoma.   BRAF mutation: positive for a V600E mutation.   He  subsequently underwent a PET/CT on 01/28/22 which showed multiple hypermetabolic soft tissue masses within the right axillary region, metastatic lesions to the lungs and abdomen (VI segment of the liver measuring a max SUV of 6.99, 1.6 x 1.9 cm nodular soft tissue density with a max SUV  7.64 was seen adjacent to the colon within the right lower quadrant. Brain MRI was negative for metastatic disease.  LDH was 307 U/L  He saw Oncology at [facility] on 01/31/22 with plan to start First line Nivolumab / ipilimumab. He received C1 on 03/03/22.     He was admitted on 03/18 at [facility] for abdominal pain, and distension. He underwent therapeutic paracentesis with removal of 3L, and was discharged on 03/22. CT abdomen during that admission showed soft tissue attenuation throughout the peritoneum consistent with innumerable peritoneal implants.     He was readmitted the following day 03/23 for re accumulation and had another paracentesis with removal of 4.7L.  Unfortunately, he had rapid re accumulation of his ascites and was readmitted again between 03/30 and 04/02 and underwent paracentesis. He was planned to receive pleur-X catheter as outpatient.     He presented again to [facility] on 04/04 with worsening abdominal distension and pain. He underwent therapeutic paracentesis with removal of 5L. Cytology was positive for malignant cells. He was discharged on 04/05 on Cipro for SBP coverage.       Interval history:   Patient ran out of targeted therapy samples for 1-2 weeks.  Over that time he developed nausea and vomiting, headaches, and panic attacks. These symptoms improved after re-initiating samples of BRAFi + MEKi.  He also said zyprexa has improved his anxiety, insomnia, and nausea as well. He denies other symptoms including vision changes, weakness, diarrhea, abdominal swelling.  His axillary lymph node response has persisted despite stopping meds. Pain is better controlled.     Review of Systems   Constitutional: Positive for  fatigue. Negative for activity change, appetite change, chills, fever and unexpected weight change.   HENT: Negative for ear pain, facial swelling, hearing loss, mouth sores, nosebleeds, sore throat and trouble swallowing.    Eyes: Negative for pain, discharge, redness and visual disturbance.   Respiratory: Negative for cough, choking, chest tightness and shortness of breath.    Cardiovascular: Positive for leg swelling. Negative for chest pain and palpitations.   Gastrointestinal: Positive for nausea and vomiting. Negative for abdominal distention, abdominal pain, blood in stool, constipation and diarrhea.   Endocrine: Negative for cold intolerance and heat intolerance.   Genitourinary: Negative for difficulty urinating, dysuria, frequency and urgency.   Musculoskeletal: Negative for arthralgias, gait problem, leg pain and myalgias.   Integumentary:  Negative for pallor, rash and wound.   Allergic/Immunologic: Negative for frequent infections.   Neurological: Negative for dizziness, tremors, weakness, light-headedness, numbness and headaches.   Hematological: Negative for adenopathy. Does not bruise/bleed easily.   Psychiatric/Behavioral: Positive for dysphoric mood. Negative for agitation, confusion and sleep disturbance. The patient is not nervous/anxious.          Past Medical History:   Past Medical History:   Diagnosis Date    Seizures         Past Surgical History:   No past surgical history on file.     Family History:   No family history on file.     Social History:   Social History     Tobacco Use    Smoking status: Current Every Day Smoker    Smokeless tobacco: Never Used   Substance Use Topics    Alcohol use: Yes        I have reviewed and updated the patient's past medical, surgical, family and social histories.    Allergies:   Review of patient's allergies indicates:  No Known Allergies     Medications:   Current Outpatient Medications   Medication Sig Dispense Refill    ALPRAZolam (XANAX) 0.5 MG  tablet Take 1 tablet (0.5 mg total) by mouth On call Procedure (Claustrophobia in MRI). 2 tablet 0    binimetinib 15 mg Tab Take 45 mg by mouth 2 (two) times daily. (Patient not taking: Reported on 5/31/2022.) 180 tablet 3    buPROPion (WELLBUTRIN SR) 100 MG TBSR 12 hr tablet Take 1 tablet (100 mg total) by mouth 2 (two) times daily. 60 tablet 2    divalproex (DEPAKOTE) 250 MG EC tablet Take 1 tablet (250 mg total) by mouth every 8 (eight) hours. 90 tablet 11    encorafenib 75 mg Cap Take 450 mg by mouth once daily. (Patient not taking: Reported on 5/31/2022.) 180 capsule 3    famotidine (PEPCID) 20 MG tablet Take 1 tablet (20 mg total) by mouth 2 (two) times daily. 60 tablet 11    levETIRAcetam (KEPPRA) 1000 MG tablet Take 1,000 mg by mouth 2 (two) times a day.      morphine (MS CONTIN) 30 MG 12 hr tablet Take 1 tablet (30 mg total) by mouth 3 (three) times daily. 90 tablet 0    naloxone (NARCAN) 4 mg/actuation Spry 4mg by nasal route as needed for opioid overdose; may repeat every 2-3 minutes in alternating nostrils until medical help arrives. Call 911 2 each 11    OLANZapine (ZYPREXA) 2.5 MG tablet Take 1 tablet (2.5 mg total) by mouth every 6 (six) hours as needed (anxiety). 60 tablet 0    ondansetron (ZOFRAN-ODT) 8 MG TbDL Dissolve 1 tablet (8 mg total) by mouth every 12 (twelve) hours as needed. 30 tablet 1    oxyCODONE (ROXICODONE) 30 MG Tab Take 1 tablet (30 mg total) by mouth every 4 (four) hours as needed for Pain. 180 tablet 0    polyethylene glycol (GLYCOLAX) 17 gram/dose powder Dissolve one capful (17 g) in liquid and take by mouth once daily. 510 g 3    prochlorperazine (COMPAZINE) 5 MG tablet Take 1 tablet (5 mg total) by mouth every 6 (six) hours as needed for Nausea. 30 tablet 1    senna-docusate 8.6-50 mg (PERICOLACE) 8.6-50 mg per tablet Take 1 tablet by mouth daily as needed for Constipation. 30 tablet 3     No current facility-administered medications for this visit.      Facility-Administered Medications Ordered in Other Visits   Medication Dose Route Frequency Provider Last Rate Last Admin    alteplase injection 2 mg  2 mg Intra-Catheter PRN Clayton Wise MD        diphenhydrAMINE injection 25 mg  25 mg Intravenous Q10 Min PRN Clayton Wise MD        EPINEPHrine (EPIPEN) 0.3 mg/0.3 mL pen injection 0.3 mg  0.3 mg Intramuscular Once PRN Clayton Wise MD        heparin, porcine (PF) 100 unit/mL injection flush 500 Units  500 Units Intravenous PRN Clayton Wise MD        ipilimumab (YERVOY) 3 mg/kg = 211 mg in sodium chloride 0.9% 100 mL chemo infusion  3 mg/kg (Treatment Plan Recorded) Intravenous 1 time in Clinic/HOD Clayton Wise MD        methylPREDNISolone sodium succinate injection 125 mg  125 mg Intravenous Once PRN Clayton Wise MD        nivolumab 70 mg in sodium chloride 0.9% 107 mL infusion  1 mg/kg (Treatment Plan Recorded) Intravenous 1 time in Clinic/HOD Clayton Wise MD        sodium chloride 0.9% 250 mL flush bag   Intravenous 1 time in Clinic/HOD Clayton Wise MD        sodium chloride 0.9% flush 10 mL  10 mL Intravenous PRN Clayton Wise MD            Physical Exam:   BP (!) 142/80   Pulse 63   Ht 6' (1.829 m)   Wt 68.3 kg (150 lb 9.2 oz)   SpO2 96%   BMI 20.42 kg/m²      ECOG Performance status: 2            Physical Exam  Vitals and nursing note reviewed.   Constitutional:       General: He is not in acute distress.     Appearance: Normal appearance. He is well-developed.   HENT:      Head: Normocephalic and atraumatic.   Eyes:      Conjunctiva/sclera: Conjunctivae normal.      Pupils: Pupils are equal, round, and reactive to light.   Pulmonary:      Effort: Pulmonary effort is normal. No respiratory distress.   Abdominal:      General: There is no distension.      Palpations: Abdomen is soft.   Musculoskeletal:         General: No swelling. Normal range of motion.      Cervical back: Normal range of  motion and neck supple.      Right lower le+ Edema present.      Left lower le+ Edema present.   Skin:     General: Skin is warm and dry.   Neurological:      General: No focal deficit present.      Mental Status: He is alert and oriented to person, place, and time.   Psychiatric:         Mood and Affect: Mood normal.         Behavior: Behavior normal.         Thought Content: Thought content normal.         Judgment: Judgment normal.           Labs:   Recent Results (from the past 48 hour(s))   CBC Auto Differential    Collection Time: 22 12:09 PM   Result Value Ref Range    WBC 8.89 3.90 - 12.70 K/uL    RBC 4.19 (L) 4.60 - 6.20 M/uL    Hemoglobin 11.6 (L) 14.0 - 18.0 g/dL    Hematocrit 35.0 (L) 40.0 - 54.0 %    MCV 84 82 - 98 fL    MCH 27.7 27.0 - 31.0 pg    MCHC 33.1 32.0 - 36.0 g/dL    RDW 17.7 (H) 11.5 - 14.5 %    Platelets 365 150 - 450 K/uL    MPV 9.5 9.2 - 12.9 fL    Immature Granulocytes 0.2 0.0 - 0.5 %    Gran # (ANC) 5.9 1.8 - 7.7 K/uL    Immature Grans (Abs) 0.02 0.00 - 0.04 K/uL    Lymph # 1.9 1.0 - 4.8 K/uL    Mono # 0.7 0.3 - 1.0 K/uL    Eos # 0.3 0.0 - 0.5 K/uL    Baso # 0.13 0.00 - 0.20 K/uL    nRBC 0 0 /100 WBC    Gran % 66.9 38.0 - 73.0 %    Lymph % 20.8 18.0 - 48.0 %    Mono % 7.5 4.0 - 15.0 %    Eosinophil % 3.1 0.0 - 8.0 %    Basophil % 1.5 0.0 - 1.9 %    Differential Method Automated    Comprehensive Metabolic Panel    Collection Time: 22 12:09 PM   Result Value Ref Range    Sodium 142 136 - 145 mmol/L    Potassium 4.2 3.5 - 5.1 mmol/L    Chloride 105 95 - 110 mmol/L    CO2 24 23 - 29 mmol/L    Glucose 90 70 - 110 mg/dL    BUN 13 6 - 20 mg/dL    Creatinine 0.8 0.5 - 1.4 mg/dL    Calcium 9.2 8.7 - 10.5 mg/dL    Total Protein 6.5 6.0 - 8.4 g/dL    Albumin 3.6 3.5 - 5.2 g/dL    Total Bilirubin 0.2 0.1 - 1.0 mg/dL    Alkaline Phosphatase 52 (L) 55 - 135 U/L    AST 13 10 - 40 U/L    ALT 7 (L) 10 - 44 U/L    Anion Gap 13 8 - 16 mmol/L    eGFR if African American >60.0 >60  mL/min/1.73 m^2    eGFR if non African American >60.0 >60 mL/min/1.73 m^2        Imaging:  Reviewed imaging personally and with Dr. Adam Levine, radiation oncology, discussed with patient MRI results showing mixed response compared to 4/20 exam with possible early leptomeningeal disease.     Results for orders placed or performed during the hospital encounter of 06/06/22 (from the past 2160 hour(s))   MRI Brain W WO Contrast    Impression    Scattered subcentimeter regions of nodular enhancement again identified.  Left frontal focus overall less conspicuous from prior.  However there are new or more conspicuous regions of enhancement involving the left posterior frontal cortex and left caudate nucleus overall remains concerning for metastatic disease in light of history.    There is no restricted diffusion to suggest acute infarction.  There is no significant parenchymal edema signal abnormality associated with the enhancing lesions.    Clinical correlation and close follow-up recommended.      Electronically signed by: Felix Gaitan DO  Date:    06/14/2022  Time:    06:39                 Path:   Skin, right axilla, biopsy:  -  Malignant melanoma.     Note:  The tumor measures approximately 3 x 4 mm and involves the peripheral margins of the specimen. Given no visualized connection to the overlying epidermis, a metastasis or satellitosis of a larger nearby melanoma is a consideration. Clear cell sarcoma is a differential diagnosis.     Microscopic Description:  Skin with a poorly circumscribed dermal nodule of atypical melanocytes. The tumor cells are hyperchromatic with a high nuclear to cytoplasm ratio. The tumor cells are strongly positive for Sox10 stain. CD43 demonstrates scattered tumor infiltrating lymphocytes, but does not stain tumor cells. No lymphovascular invasion is seen with CD31 and D2-40 stains. All controls are adequate.     A PHH3 stain is pending with addendum to follow.     Mitotic counts:   3/mm2.  Ulceration:   Not identified.  Regression:  Not identified.  Tumor infiltrating lymphocytes:  Mild.  Angiolymphatic invasion:  Not identified.  Associated nevus:  Not identified.  Predominant cytology:  Epithelioid cell.  Margin involvement:  Involved.    Assessment:       1. Metastatic melanoma    2. Malignant melanoma metastatic to brain    3. Malignant ascites    4. Cancer associated pain    5. Nausea and vomiting, intractability of vomiting not specified, unspecified vomiting type    6. Headache due to intracranial disease    7. Insomnia secondary to anxiety          Plan:     1,2,3 Metastatic melanoma to the lungs, liver, and peritoneum   51 year-old-male patient with metastatic melanoma to the axillary LNs, lungs, liver, and peritoneum, BRAF V600E mutant, who is status post C1 of first line immunotherapy with Nivolumab+Ipilimumab on 03/03. Unfortunately, since then he has developed peritoneal carcinomatosis leading to recurrent accumulation of malignant ascites. He required admission 4 times to the hospital for therapeutic paracentesis. He was very symptomatic on presentation with worsening abdominal distension and notable worsening R axillary swelling and not tolerating po.  We discussed stage and prognosis of this aggressive melanoma.    With his rapidly progressive disease, we recommend switching him to targeted therapy with Encorafinib and Binimitinib. Samples were given and patient had a dramatic clinical response. He is now approved for patient assistance through [employer]. Patient had a mixed response on MRI brain.  Patient had symptoms of increased intra-cranial pressure with only 1 week off of targeted therapy.  For this reason, we will continue targeted therapy during the early period of re-initiation of his best systemic option, being combination immunotherapy.  - will initiate C2 Ipilimumab and nivolumab today.    4 Cancer associated pain   - Pain better controlled on Fentanyl patch to 50  mcg/hr every 72 hours   - Oxy IR 30 mg Q8H PRN  - Following with palliative care    5,6,7 Improved with re-initiation of targeted therapy and initiation of low dose zyprexa at night.  Also still taking wellbutrin.      Patient is in agreement with the proposed treatment plan. All questions were answered to the patient's satisfaction. Pt knows to call clinic if anything is needed before the next clinic visit.    Clayton Wise MD  Medical Oncology  [facility]

## 2022-06-14 NOTE — PLAN OF CARE
Patient tolerated treatment with no complications. Aware of side effects to notify provider vs report to ER. PIV removed and patient discharged in NAD. No upcoming infusion appts scheduled at this time, patient aware to check mychart for future appts.

## 2022-06-14 NOTE — TELEPHONE ENCOUNTER
----- Message from Radha Winchester sent at 6/14/2022 11:09 AM CDT -----  Pt would like to be called back regarding rechedule  Pet scan    Pt can be reached at  [phone]

## 2022-06-21 ENCOUNTER — TELEPHONE (OUTPATIENT)
Dept: PALLIATIVE MEDICINE | Facility: CLINIC | Age: 52
End: 2022-06-21
Payer: MEDICAID

## 2022-06-22 ENCOUNTER — OFFICE VISIT (OUTPATIENT)
Dept: PALLIATIVE MEDICINE | Facility: CLINIC | Age: 52
End: 2022-06-22
Payer: MEDICAID

## 2022-06-22 ENCOUNTER — PATIENT MESSAGE (OUTPATIENT)
Dept: HEMATOLOGY/ONCOLOGY | Facility: CLINIC | Age: 52
End: 2022-06-22
Payer: MEDICAID

## 2022-06-22 VITALS — SYSTOLIC BLOOD PRESSURE: 111 MMHG | HEART RATE: 71 BPM | DIASTOLIC BLOOD PRESSURE: 74 MMHG

## 2022-06-22 DIAGNOSIS — G89.3 CANCER RELATED PAIN: ICD-10-CM

## 2022-06-22 DIAGNOSIS — K59.00 CONSTIPATION, UNSPECIFIED CONSTIPATION TYPE: ICD-10-CM

## 2022-06-22 DIAGNOSIS — G89.3 CANCER ASSOCIATED PAIN: ICD-10-CM

## 2022-06-22 DIAGNOSIS — F43.23 ADJUSTMENT DISORDER WITH MIXED ANXIETY AND DEPRESSED MOOD: ICD-10-CM

## 2022-06-22 DIAGNOSIS — R63.0 ANOREXIA: ICD-10-CM

## 2022-06-22 DIAGNOSIS — Z71.89 ADVANCED CARE PLANNING/COUNSELING DISCUSSION: ICD-10-CM

## 2022-06-22 DIAGNOSIS — R53.0 NEOPLASTIC (MALIGNANT) RELATED FATIGUE: ICD-10-CM

## 2022-06-22 DIAGNOSIS — C43.9 METASTATIC MELANOMA: Primary | ICD-10-CM

## 2022-06-22 DIAGNOSIS — F41.9 ANXIETY: ICD-10-CM

## 2022-06-22 DIAGNOSIS — Z51.5 ENCOUNTER FOR PALLIATIVE CARE: ICD-10-CM

## 2022-06-22 DIAGNOSIS — R11.0 NAUSEA: ICD-10-CM

## 2022-06-22 DIAGNOSIS — G47.00 INSOMNIA, UNSPECIFIED TYPE: ICD-10-CM

## 2022-06-22 PROCEDURE — 1160F PR REVIEW ALL MEDS BY PRESCRIBER/CLIN PHARMACIST DOCUMENTED: ICD-10-PCS | Mod: CPTII,,, | Performed by: STUDENT IN AN ORGANIZED HEALTH CARE EDUCATION/TRAINING PROGRAM

## 2022-06-22 PROCEDURE — 1159F MED LIST DOCD IN RCRD: CPT | Mod: CPTII,,, | Performed by: STUDENT IN AN ORGANIZED HEALTH CARE EDUCATION/TRAINING PROGRAM

## 2022-06-22 PROCEDURE — 99999 PR PBB SHADOW E&M-EST. PATIENT-LVL III: CPT | Mod: PBBFAC,,, | Performed by: STUDENT IN AN ORGANIZED HEALTH CARE EDUCATION/TRAINING PROGRAM

## 2022-06-22 PROCEDURE — 99215 PR OFFICE/OUTPT VISIT, EST, LEVL V, 40-54 MIN: ICD-10-PCS | Mod: S$PBB,,, | Performed by: STUDENT IN AN ORGANIZED HEALTH CARE EDUCATION/TRAINING PROGRAM

## 2022-06-22 PROCEDURE — 99999 PR PBB SHADOW E&M-EST. PATIENT-LVL III: ICD-10-PCS | Mod: PBBFAC,,, | Performed by: STUDENT IN AN ORGANIZED HEALTH CARE EDUCATION/TRAINING PROGRAM

## 2022-06-22 PROCEDURE — 3074F PR MOST RECENT SYSTOLIC BLOOD PRESSURE < 130 MM HG: ICD-10-PCS | Mod: CPTII,,, | Performed by: STUDENT IN AN ORGANIZED HEALTH CARE EDUCATION/TRAINING PROGRAM

## 2022-06-22 PROCEDURE — 1160F RVW MEDS BY RX/DR IN RCRD: CPT | Mod: CPTII,,, | Performed by: STUDENT IN AN ORGANIZED HEALTH CARE EDUCATION/TRAINING PROGRAM

## 2022-06-22 PROCEDURE — 3078F DIAST BP <80 MM HG: CPT | Mod: CPTII,,, | Performed by: STUDENT IN AN ORGANIZED HEALTH CARE EDUCATION/TRAINING PROGRAM

## 2022-06-22 PROCEDURE — 99213 OFFICE O/P EST LOW 20 MIN: CPT | Mod: PBBFAC | Performed by: STUDENT IN AN ORGANIZED HEALTH CARE EDUCATION/TRAINING PROGRAM

## 2022-06-22 PROCEDURE — 3074F SYST BP LT 130 MM HG: CPT | Mod: CPTII,,, | Performed by: STUDENT IN AN ORGANIZED HEALTH CARE EDUCATION/TRAINING PROGRAM

## 2022-06-22 PROCEDURE — 1159F PR MEDICATION LIST DOCUMENTED IN MEDICAL RECORD: ICD-10-PCS | Mod: CPTII,,, | Performed by: STUDENT IN AN ORGANIZED HEALTH CARE EDUCATION/TRAINING PROGRAM

## 2022-06-22 PROCEDURE — 3078F PR MOST RECENT DIASTOLIC BLOOD PRESSURE < 80 MM HG: ICD-10-PCS | Mod: CPTII,,, | Performed by: STUDENT IN AN ORGANIZED HEALTH CARE EDUCATION/TRAINING PROGRAM

## 2022-06-22 PROCEDURE — 99215 OFFICE O/P EST HI 40 MIN: CPT | Mod: S$PBB,,, | Performed by: STUDENT IN AN ORGANIZED HEALTH CARE EDUCATION/TRAINING PROGRAM

## 2022-06-22 RX ORDER — NALOXONE HYDROCHLORIDE 4 MG/.1ML
SPRAY NASAL
Qty: 2 EACH | Refills: 2 | Status: ON HOLD | OUTPATIENT
Start: 2022-06-22 | End: 2022-08-21 | Stop reason: HOSPADM

## 2022-06-22 RX ORDER — OXYCODONE HYDROCHLORIDE 30 MG/1
30 TABLET ORAL EVERY 4 HOURS PRN
Qty: 180 TABLET | Refills: 0 | Status: SHIPPED | OUTPATIENT
Start: 2022-06-22 | End: 2022-07-17 | Stop reason: SDUPTHER

## 2022-06-22 RX ORDER — MORPHINE SULFATE 30 MG/1
30 TABLET, FILM COATED, EXTENDED RELEASE ORAL 3 TIMES DAILY
Qty: 90 TABLET | Refills: 0 | Status: SHIPPED | OUTPATIENT
Start: 2022-06-22 | End: 2022-07-08
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
## 2022-07-08 ENCOUNTER — INFUSION (OUTPATIENT)
Dept: INFUSION THERAPY | Facility: HOSPITAL | Age: 52
End: 2022-07-08
Payer: MEDICAID

## 2022-07-08 ENCOUNTER — OFFICE VISIT (OUTPATIENT)
Dept: HEMATOLOGY/ONCOLOGY | Facility: CLINIC | Age: 52
End: 2022-07-08
Payer: MEDICAID

## 2022-07-08 VITALS
RESPIRATION RATE: 18 BRPM | WEIGHT: 146.38 LBS | BODY MASS INDEX: 19.83 KG/M2 | HEIGHT: 72 IN | OXYGEN SATURATION: 97 % | SYSTOLIC BLOOD PRESSURE: 135 MMHG | DIASTOLIC BLOOD PRESSURE: 99 MMHG | TEMPERATURE: 98 F | HEART RATE: 103 BPM

## 2022-07-08 VITALS — HEART RATE: 75 BPM | DIASTOLIC BLOOD PRESSURE: 97 MMHG | SYSTOLIC BLOOD PRESSURE: 154 MMHG

## 2022-07-08 DIAGNOSIS — F51.05 INSOMNIA SECONDARY TO ANXIETY: ICD-10-CM

## 2022-07-08 DIAGNOSIS — C43.9 METASTATIC MELANOMA: Primary | ICD-10-CM

## 2022-07-08 DIAGNOSIS — L29.9 ITCHING: ICD-10-CM

## 2022-07-08 DIAGNOSIS — R51.9 HEADACHE DUE TO INTRACRANIAL DISEASE: ICD-10-CM

## 2022-07-08 DIAGNOSIS — R11.2 NAUSEA AND VOMITING, INTRACTABILITY OF VOMITING NOT SPECIFIED, UNSPECIFIED VOMITING TYPE: ICD-10-CM

## 2022-07-08 DIAGNOSIS — R18.0 MALIGNANT ASCITES: ICD-10-CM

## 2022-07-08 DIAGNOSIS — G89.3 CANCER ASSOCIATED PAIN: ICD-10-CM

## 2022-07-08 DIAGNOSIS — F41.9 INSOMNIA SECONDARY TO ANXIETY: ICD-10-CM

## 2022-07-08 DIAGNOSIS — C79.31 MALIGNANT MELANOMA METASTATIC TO BRAIN: ICD-10-CM

## 2022-07-08 DIAGNOSIS — G93.9 HEADACHE DUE TO INTRACRANIAL DISEASE: ICD-10-CM

## 2022-07-08 PROCEDURE — 96413 CHEMO IV INFUSION 1 HR: CPT

## 2022-07-08 PROCEDURE — 3075F SYST BP GE 130 - 139MM HG: CPT | Mod: CPTII,,, | Performed by: INTERNAL MEDICINE

## 2022-07-08 PROCEDURE — 99999 PR PBB SHADOW E&M-EST. PATIENT-LVL IV: CPT | Mod: PBBFAC,,, | Performed by: INTERNAL MEDICINE

## 2022-07-08 PROCEDURE — 1159F PR MEDICATION LIST DOCUMENTED IN MEDICAL RECORD: ICD-10-PCS | Mod: CPTII,,, | Performed by: INTERNAL MEDICINE

## 2022-07-08 PROCEDURE — 99214 OFFICE O/P EST MOD 30 MIN: CPT | Mod: PBBFAC,25 | Performed by: INTERNAL MEDICINE

## 2022-07-08 PROCEDURE — 3080F PR MOST RECENT DIASTOLIC BLOOD PRESSURE >= 90 MM HG: ICD-10-PCS | Mod: CPTII,,, | Performed by: INTERNAL MEDICINE

## 2022-07-08 PROCEDURE — 3008F PR BODY MASS INDEX (BMI) DOCUMENTED: ICD-10-PCS | Mod: CPTII,,, | Performed by: INTERNAL MEDICINE

## 2022-07-08 PROCEDURE — 99215 PR OFFICE/OUTPT VISIT, EST, LEVL V, 40-54 MIN: ICD-10-PCS | Mod: S$PBB,,, | Performed by: INTERNAL MEDICINE

## 2022-07-08 PROCEDURE — 1159F MED LIST DOCD IN RCRD: CPT | Mod: CPTII,,, | Performed by: INTERNAL MEDICINE

## 2022-07-08 PROCEDURE — 3075F PR MOST RECENT SYSTOLIC BLOOD PRESS GE 130-139MM HG: ICD-10-PCS | Mod: CPTII,,, | Performed by: INTERNAL MEDICINE

## 2022-07-08 PROCEDURE — 3008F BODY MASS INDEX DOCD: CPT | Mod: CPTII,,, | Performed by: INTERNAL MEDICINE

## 2022-07-08 PROCEDURE — 99215 OFFICE O/P EST HI 40 MIN: CPT | Mod: S$PBB,,, | Performed by: INTERNAL MEDICINE

## 2022-07-08 PROCEDURE — 3080F DIAST BP >= 90 MM HG: CPT | Mod: CPTII,,, | Performed by: INTERNAL MEDICINE

## 2022-07-08 PROCEDURE — 25000003 PHARM REV CODE 250: Performed by: INTERNAL MEDICINE

## 2022-07-08 PROCEDURE — 99999 PR PBB SHADOW E&M-EST. PATIENT-LVL IV: ICD-10-PCS | Mod: PBBFAC,,, | Performed by: INTERNAL MEDICINE

## 2022-07-08 PROCEDURE — 96417 CHEMO IV INFUS EACH ADDL SEQ: CPT

## 2022-07-08 PROCEDURE — 96415 CHEMO IV INFUSION ADDL HR: CPT

## 2022-07-08 PROCEDURE — 63600175 PHARM REV CODE 636 W HCPCS: Mod: JG | Performed by: INTERNAL MEDICINE

## 2022-07-08 RX ORDER — SODIUM CHLORIDE 0.9 % (FLUSH) 0.9 %
10 SYRINGE (ML) INJECTION
Status: DISCONTINUED | OUTPATIENT
Start: 2022-07-08 | End: 2022-07-08 | Stop reason: HOSPADM

## 2022-07-08 RX ORDER — METHYLPREDNISOLONE SOD SUCC 125 MG
125 VIAL (EA) INJECTION ONCE AS NEEDED
Status: DISCONTINUED | OUTPATIENT
Start: 2022-07-08 | End: 2022-07-08 | Stop reason: HOSPADM

## 2022-07-08 RX ORDER — SODIUM CHLORIDE 0.9 % (FLUSH) 0.9 %
10 SYRINGE (ML) INJECTION
Status: CANCELLED | OUTPATIENT
Start: 2022-07-08

## 2022-07-08 RX ORDER — MORPHINE SULFATE 30 MG/1
30 TABLET, FILM COATED, EXTENDED RELEASE ORAL 2 TIMES DAILY
Qty: 60 TABLET | Refills: 0
Start: 2022-07-08 | End: 2022-07-17 | Stop reason: SDUPTHER

## 2022-07-08 RX ORDER — CLOBETASOL PROPIONATE 0.5 MG/G
CREAM TOPICAL 2 TIMES DAILY
Qty: 60 G | Refills: 3 | Status: SHIPPED | OUTPATIENT
Start: 2022-07-08

## 2022-07-08 RX ORDER — METHYLPREDNISOLONE SOD SUCC 125 MG
125 VIAL (EA) INJECTION ONCE AS NEEDED
Status: CANCELLED | OUTPATIENT
Start: 2022-07-08

## 2022-07-08 RX ORDER — HEPARIN 100 UNIT/ML
500 SYRINGE INTRAVENOUS
Status: CANCELLED | OUTPATIENT
Start: 2022-07-08

## 2022-07-08 RX ORDER — EPINEPHRINE 0.3 MG/.3ML
0.3 INJECTION SUBCUTANEOUS ONCE AS NEEDED
Status: CANCELLED | OUTPATIENT
Start: 2022-07-08

## 2022-07-08 RX ORDER — DIPHENHYDRAMINE HYDROCHLORIDE 50 MG/ML
25 INJECTION INTRAMUSCULAR; INTRAVENOUS EVERY 10 MIN PRN
Status: DISCONTINUED | OUTPATIENT
Start: 2022-07-08 | End: 2022-07-08 | Stop reason: HOSPADM

## 2022-07-08 RX ORDER — EPINEPHRINE 0.3 MG/.3ML
0.3 INJECTION SUBCUTANEOUS ONCE AS NEEDED
Status: DISCONTINUED | OUTPATIENT
Start: 2022-07-08 | End: 2022-07-08 | Stop reason: HOSPADM

## 2022-07-08 RX ORDER — DIPHENHYDRAMINE HYDROCHLORIDE 50 MG/ML
25 INJECTION INTRAMUSCULAR; INTRAVENOUS EVERY 10 MIN PRN
Status: CANCELLED | OUTPATIENT
Start: 2022-07-08 | End: 2022-07-09

## 2022-07-08 RX ORDER — HEPARIN 100 UNIT/ML
500 SYRINGE INTRAVENOUS
Status: DISCONTINUED | OUTPATIENT
Start: 2022-07-08 | End: 2022-07-08 | Stop reason: HOSPADM

## 2022-07-08 RX ADMIN — SODIUM CHLORIDE 200 MG: 9 INJECTION, SOLUTION INTRAVENOUS at 03:07

## 2022-07-08 RX ADMIN — SODIUM CHLORIDE: 9 INJECTION, SOLUTION INTRAVENOUS at 02:07

## 2022-07-08 RX ADMIN — SODIUM CHLORIDE 70 MG: 9 INJECTION, SOLUTION INTRAVENOUS at 03:07

## 2022-07-08 NOTE — PLAN OF CARE
1700 Pt tolerated Opdivo/Yervoy infusion well today, no complaints or complications,. VSS through duration of treatment. Pt aware to call provider with any questions or concerns and is aware of upcoming appts. Pt ambulatory from clinic with steady gait, no distress noted.

## 2022-07-08 NOTE — PROGRESS NOTES
ESTABLISHED MEDICAL ONCOLOGY VISIT    Reason for visit:  Metastatic melanoma to the lungs, axillary lymph nodes, liver, peritoneal carcinomatosis, and brain.     Cancer/Stage/TNM:   Cancer Staging  Metastatic melanoma  Staging form: Melanoma of the Skin, AJCC 8th Edition  - Clinical stage from 1/28/2022: Stage IV (cTX, cNX, cM1) - Signed by Adria Rivera MD on 4/12/2022       Oncology History   Metastatic melanoma   1/28/2022 Cancer Staged    Staging form: Melanoma of the Skin, AJCC 8th Edition  - Clinical stage from 1/28/2022: Stage IV (cTX, cNX, cM1)     4/4/2022 Initial Diagnosis    Metastatic melanoma     6/14/2022 -  Chemotherapy    Treatment Summary   Plan Name: OP NIVOLUMAB 1 MG/KG IPILIMUMAB 3MG/KG FOLLOWED BY NIVOLUMAB 480MG Q4W  Treatment Goal: Control  Status: Active  Start Date: 6/14/2022  End Date: 5/19/2023 (Planned)  Provider: Clayton Wise MD  Chemotherapy: ipilimumab (YERVOY) 200 mg in sodium chloride 0.9% 140 mL chemo infusion, 211 mg, Intravenous, Clinic/HOD 1 time, 1 of 4 cycles  Administration: 200 mg (6/14/2022)  nivolumab 70 mg in sodium chloride 0.9% 57 mL infusion, 1 mg/kg = 70 mg, Intravenous, Clinic/HOD 1 time, 1 of 14 cycles  Administration: 70 mg (6/14/2022)          HPI:   51 y.o. male with history of CAD s/p PCI, seizures, ?substance abuse, stage IV malignant melanoma who presents today to establish care at [facility].   He presented initially with enlarging right axillary lymph nodes that have grown progressively in size since April 2021. He presented to urgent care and was referred for US which showed multiple enlarged masses in the right axilla most likely reflecting abnormal lymph nodes concerning for malignancy.   He was subsequently referred to dermatology who performed a complete exam of the skin that was negative for suspicious cutaneous lesions as well as punch biopsy of one of the lymph node on 01/04/22. Pathology came back as malignant melanoma.   BRAF  mutation: positive for a V600E mutation.   He subsequently underwent a PET/CT on 01/28/22 which showed multiple hypermetabolic soft tissue masses within the right axillary region, metastatic lesions to the lungs and abdomen (VI segment of the liver measuring a max SUV of 6.99, 1.6 x 1.9 cm nodular soft tissue density with a max SUV  7.64 was seen adjacent to the colon within the right lower quadrant. Brain MRI was negative for metastatic disease.  LDH was 307 U/L  He saw Oncology at [facility] on 01/31/22 with plan to start First line Nivolumab / ipilimumab. He received C1 on 03/03/22.     He was admitted on 03/18 at [facility] for abdominal pain, and distension. He underwent therapeutic paracentesis with removal of 3L, and was discharged on 03/22. CT abdomen during that admission showed soft tissue attenuation throughout the peritoneum consistent with innumerable peritoneal implants.     He was readmitted the following day 03/23 for re accumulation and had another paracentesis with removal of 4.7L.  Unfortunately, he had rapid re accumulation of his ascites and was readmitted again between 03/30 and 04/02 and underwent paracentesis. He was planned to receive pleur-X catheter as outpatient.     He presented again to [facility] on 04/04 with worsening abdominal distension and pain. He underwent therapeutic paracentesis with removal of 5L. Cytology was positive for malignant cells. He was discharged on 04/05 on Cipro for SBP coverage.       Interval history:   Today, patient reports itching to his arms, back, and the back of his legs. He is on targeted therapy still without any issues. Still has mild nausea after meals - zofran BID has been working well. Pain is in his back, knees, and elbows (joints). Fentanyl patches were not effective and now off. MD Contin and oxycodone controlling pain. Needing BTP 3x a day. He denies other symptoms including vision changes, weakness, diarrhea, abdominal swelling.      Review of Systems    Constitutional: Positive for fatigue. Negative for activity change, appetite change, chills, fever and unexpected weight change.   HENT: Negative for ear pain, facial swelling, hearing loss, mouth sores, nosebleeds, sore throat and trouble swallowing.    Eyes: Negative for pain, discharge, redness and visual disturbance.   Respiratory: Negative for cough, choking, chest tightness and shortness of breath.    Cardiovascular: Negative for chest pain, palpitations and leg swelling.   Gastrointestinal: Positive for nausea (mild). Negative for abdominal distention, abdominal pain, blood in stool, constipation, diarrhea and vomiting.   Endocrine: Negative for cold intolerance and heat intolerance.   Genitourinary: Negative for difficulty urinating, dysuria, frequency and urgency.   Musculoskeletal: Negative for arthralgias, gait problem, leg pain and myalgias.   Integumentary:  Negative for pallor, rash and wound.        Itching   Allergic/Immunologic: Negative for frequent infections.   Neurological: Negative for dizziness, tremors, weakness, light-headedness, numbness and headaches.   Hematological: Negative for adenopathy. Does not bruise/bleed easily.   Psychiatric/Behavioral: Negative for agitation, confusion, dysphoric mood and sleep disturbance. The patient is not nervous/anxious.          Past Medical History:   Past Medical History:   Diagnosis Date    Seizures         Past Surgical History:   No past surgical history on file.     Family History:   No family history on file.     Social History:   Social History     Tobacco Use    Smoking status: Current Every Day Smoker    Smokeless tobacco: Never Used   Substance Use Topics    Alcohol use: Yes        I have reviewed and updated the patient's past medical, surgical, family and social histories.    Allergies:   Review of patient's allergies indicates:  No Known Allergies     Medications:   Current Outpatient Medications   Medication Sig Dispense Refill     ALPRAZolam (XANAX) 0.5 MG tablet Take 1 tablet (0.5 mg total) by mouth On call Procedure (Claustrophobia in MRI). 2 tablet 0    binimetinib 15 mg Tab Take 45 mg by mouth 2 (two) times daily. 180 tablet 3    buPROPion (WELLBUTRIN SR) 100 MG TBSR 12 hr tablet Take 1 tablet (100 mg total) by mouth 2 (two) times daily. 60 tablet 2    encorafenib 75 mg Cap Take 450 mg by mouth once daily. 180 capsule 3    famotidine (PEPCID) 20 MG tablet Take 1 tablet (20 mg total) by mouth 2 (two) times daily. 60 tablet 11    levETIRAcetam (KEPPRA) 1000 MG tablet Take 1,000 mg by mouth 2 (two) times a day.      naloxone (NARCAN) 4 mg/actuation Spry 4mg by nasal route as needed for opioid overdose; may repeat every 2-3 minutes in alternating nostrils until medical help arrives. Call 911 2 each 2    OLANZapine (ZYPREXA) 2.5 MG tablet Take 1 tablet (2.5 mg total) by mouth every 6 (six) hours as needed (anxiety). 60 tablet 0    ondansetron (ZOFRAN-ODT) 8 MG TbDL Dissolve 1 tablet (8 mg total) by mouth every 12 (twelve) hours as needed. 30 tablet 1    oxyCODONE (ROXICODONE) 30 MG Tab Take 1 tablet (30 mg total) by mouth every 4 (four) hours as needed (cancer related pain). 180 tablet 0    polyethylene glycol (GLYCOLAX) 17 gram/dose powder Dissolve one capful (17 g) in liquid and take by mouth once daily. 510 g 3    prochlorperazine (COMPAZINE) 5 MG tablet Take 1 tablet (5 mg total) by mouth every 6 (six) hours as needed for Nausea. 30 tablet 1    senna (SENNA) 8.6 mg tablet Take 1 tablet by mouth twice daily 60 tablet 10    senna-docusate 8.6-50 mg (PERICOLACE) 8.6-50 mg per tablet Take 1 tablet by mouth daily as needed for Constipation. 30 tablet 3    clobetasoL (TEMOVATE) 0.05 % cream Apply topically 2 (two) times daily. 60 g 3    morphine (MS CONTIN) 30 MG 12 hr tablet Take 1 tablet (30 mg total) by mouth 2 (two) times daily. 60 tablet 0     No current facility-administered medications for this visit.        Physical Exam:    BP (!) 135/99 (BP Location: Right arm, Patient Position: Sitting, BP Method: Medium (Automatic))   Pulse 103   Temp 97.9 °F (36.6 °C) (Oral)   Resp 18   Ht 6' (1.829 m)   Wt 66.4 kg (146 lb 6.2 oz)   SpO2 97%   BMI 19.85 kg/m²      ECOG Performance status: 1            Physical Exam  Vitals and nursing note reviewed.   Constitutional:       General: He is not in acute distress.     Appearance: Normal appearance. He is well-developed.   HENT:      Head: Normocephalic and atraumatic.   Eyes:      Conjunctiva/sclera: Conjunctivae normal.      Pupils: Pupils are equal, round, and reactive to light.   Pulmonary:      Effort: Pulmonary effort is normal. No respiratory distress.   Abdominal:      General: There is no distension.      Palpations: Abdomen is soft.   Musculoskeletal:         General: No swelling. Normal range of motion.      Cervical back: Normal range of motion and neck supple.      Right lower leg: No edema.      Left lower leg: No edema.   Skin:     General: Skin is warm and dry.   Neurological:      General: No focal deficit present.      Mental Status: He is alert and oriented to person, place, and time.   Psychiatric:         Mood and Affect: Mood normal.         Behavior: Behavior normal.         Thought Content: Thought content normal.         Judgment: Judgment normal.           Labs:   Recent Results (from the past 48 hour(s))   CBC Auto Differential    Collection Time: 07/08/22 12:30 PM   Result Value Ref Range    WBC 10.85 3.90 - 12.70 K/uL    RBC 4.65 4.60 - 6.20 M/uL    Hemoglobin 12.9 (L) 14.0 - 18.0 g/dL    Hematocrit 40.0 40.0 - 54.0 %    MCV 86 82 - 98 fL    MCH 27.7 27.0 - 31.0 pg    MCHC 32.3 32.0 - 36.0 g/dL    RDW 16.2 (H) 11.5 - 14.5 %    Platelets 361 150 - 450 K/uL    MPV 9.8 9.2 - 12.9 fL    Immature Granulocytes 0.3 0.0 - 0.5 %    Gran # (ANC) 6.3 1.8 - 7.7 K/uL    Immature Grans (Abs) 0.03 0.00 - 0.04 K/uL    Lymph # 3.6 1.0 - 4.8 K/uL    Mono # 0.6 0.3 - 1.0 K/uL    Eos #  0.2 0.0 - 0.5 K/uL    Baso # 0.15 0.00 - 0.20 K/uL    nRBC 0 0 /100 WBC    Gran % 58.1 38.0 - 73.0 %    Lymph % 32.7 18.0 - 48.0 %    Mono % 5.7 4.0 - 15.0 %    Eosinophil % 1.8 0.0 - 8.0 %    Basophil % 1.4 0.0 - 1.9 %    Differential Method Automated    Comprehensive Metabolic Panel    Collection Time: 07/08/22 12:30 PM   Result Value Ref Range    Sodium 131 (L) 136 - 145 mmol/L    Potassium 4.8 3.5 - 5.1 mmol/L    Chloride 102 95 - 110 mmol/L    CO2 24 23 - 29 mmol/L    Glucose 133 (H) 70 - 110 mg/dL    BUN 22 (H) 6 - 20 mg/dL    Creatinine 0.8 0.5 - 1.4 mg/dL    Calcium 9.9 8.7 - 10.5 mg/dL    Total Protein 7.4 6.0 - 8.4 g/dL    Albumin 4.0 3.5 - 5.2 g/dL    Total Bilirubin 0.3 0.1 - 1.0 mg/dL    Alkaline Phosphatase 76 55 - 135 U/L    AST 16 10 - 40 U/L    ALT 10 10 - 44 U/L    Anion Gap 5 (L) 8 - 16 mmol/L    eGFR if African American >60.0 >60 mL/min/1.73 m^2    eGFR if non African American >60.0 >60 mL/min/1.73 m^2   T4, Free    Collection Time: 07/08/22 12:30 PM   Result Value Ref Range    Free T4 0.73 0.71 - 1.51 ng/dL   TSH    Collection Time: 07/08/22 12:30 PM   Result Value Ref Range    TSH 0.640 0.400 - 4.000 uIU/mL        Imaging:  Reviewed imaging personally and with Dr. Adam Levine, radiation oncology, discussed with patient MRI results showing mixed response compared to 4/20 exam with possible early leptomeningeal disease.     Results for orders placed or performed during the hospital encounter of 06/06/22 (from the past 2160 hour(s))   MRI Brain W WO Contrast    Impression    Scattered subcentimeter regions of nodular enhancement again identified.  Left frontal focus overall less conspicuous from prior.  However there are new or more conspicuous regions of enhancement involving the left posterior frontal cortex and left caudate nucleus overall remains concerning for metastatic disease in light of history.    There is no restricted diffusion to suggest acute infarction.  There is no significant  parenchymal edema signal abnormality associated with the enhancing lesions.    Clinical correlation and close follow-up recommended.      Electronically signed by: Felix DO Arnie  Date:    06/14/2022  Time:    06:39                 Path:   Skin, right axilla, biopsy:  -  Malignant melanoma.     Note:  The tumor measures approximately 3 x 4 mm and involves the peripheral margins of the specimen. Given no visualized connection to the overlying epidermis, a metastasis or satellitosis of a larger nearby melanoma is a consideration. Clear cell sarcoma is a differential diagnosis.     Microscopic Description:  Skin with a poorly circumscribed dermal nodule of atypical melanocytes. The tumor cells are hyperchromatic with a high nuclear to cytoplasm ratio. The tumor cells are strongly positive for Sox10 stain. CD43 demonstrates scattered tumor infiltrating lymphocytes, but does not stain tumor cells. No lymphovascular invasion is seen with CD31 and D2-40 stains. All controls are adequate.     A PHH3 stain is pending with addendum to follow.     Mitotic counts:  3/mm2.  Ulceration:   Not identified.  Regression:  Not identified.  Tumor infiltrating lymphocytes:  Mild.  Angiolymphatic invasion:  Not identified.  Associated nevus:  Not identified.  Predominant cytology:  Epithelioid cell.  Margin involvement:  Involved.    Assessment:       1. Metastatic melanoma    2. Malignant melanoma metastatic to brain    3. Malignant ascites    4. Cancer associated pain    5. Nausea and vomiting, intractability of vomiting not specified, unspecified vomiting type    6. Headache due to intracranial disease    7. Insomnia secondary to anxiety    8. Itching          Plan:     1,2,3 Metastatic melanoma to the lungs, liver, and peritoneum   51 year-old-male patient with metastatic melanoma to the axillary LNs, lungs, liver, and peritoneum, BRAF V600E mutant, who is status post C1 of first line immunotherapy with Nivolumab+Ipilimumab on  03/03. Unfortunately, since then he has developed peritoneal carcinomatosis leading to recurrent accumulation of malignant ascites. He required admission 4 times to the hospital for therapeutic paracentesis. He was very symptomatic on presentation with worsening abdominal distension and notable worsening R axillary swelling and not tolerating po.  We discussed stage and prognosis of this aggressive melanoma.    With his rapidly progressive disease, we recommend switching him to targeted therapy with Encorafinib and Binimitinib. Samples were given and patient had a dramatic clinical response. He is now approved for patient assistance through [employer]. Patient had a mixed response on MRI brain.  Patient had symptoms of increased intra-cranial pressure with only 1 week off of targeted therapy.  For this reason, we will continue targeted therapy during the early period of re-initiation of his best systemic option, being combination immunotherapy.  - Labs acceptable for C3 Ipilimumab and nivolumab today. Re-staging scans in August.     4 Cancer associated pain   - Pain better controlled  - Oxy IR 30 mg Q8H PRN - patient in agreement to try to use less. Continue MS Contin 30 mg BID.  - Following with palliative care    5,6,7 Improved with re-initiation of targeted therapy and initiation of low dose zyprexa at night.  Also still taking wellbutrin.    8. Prescribed clobetasol for affected areas.     Patient was also seen and examined by Dr. Wise. Patient is in agreement with the proposed treatment plan. All questions were answered to the patient's satisfaction. Pt knows to call clinic if anything is needed before the next clinic visit.    Kitty Segura, MSN, APRN, FNP-C  Hematology and Medical Oncology  Nurse Practitioner to Dr. Clayton Wise  Nurse Practitioner, [facility]      I have reviewed the notes, assessments, and/or procedures performed by Kitty ATKINSON, as above.  I have  personally interviewed and examined the patient at the beside, and rounded with Kitty. I concur with her assessment and plan and the documentation of Joseluis Garner.    Clayton Wise M.D.  Hematology/Oncology Attending  [facility]  [facility]        Route Chart for Scheduling    Med Onc Chart Routing      Follow up with physician 3 weeks. Prior to Opdivo+Yervoy   Follow up with RAYMUNDO    Infusion scheduling note    Injection scheduling note    Labs CBC, CMP, LDH, TSH and free T4   Lab interval: every 3 weeks     Imaging    Pharmacy appointment    Other referrals          Treatment Plan Information   OP NIVOLUMAB 1 MG/KG IPILIMUMAB 3MG/KG FOLLOWED BY NIVOLUMAB 480MG Q4W   Clayton Wise MD   Upcoming Treatment Dates - OP NIVOLUMAB 1 MG/KG IPILIMUMAB 3MG/KG FOLLOWED BY NIVOLUMAB 480MG Q4W    7/8/2022       Chemotherapy       nivolumab 70 mg in sodium chloride 0.9% 107 mL infusion       ipilimumab (YERVOY) 3 mg/kg = 211 mg in sodium chloride 0.9% 100 mL chemo infusion  7/29/2022       Chemotherapy       nivolumab 70 mg in sodium chloride 0.9% 107 mL infusion       ipilimumab (YERVOY) 3 mg/kg = 211 mg in sodium chloride 0.9% 100 mL chemo infusion  8/19/2022       Chemotherapy       nivolumab 70 mg in sodium chloride 0.9% 107 mL infusion       ipilimumab (YERVOY) 3 mg/kg = 211 mg in sodium chloride 0.9% 100 mL chemo infusion  9/9/2022       Chemotherapy       nivolumab 480 mg in sodium chloride 0.9% 148 mL infusion

## 2022-07-08 NOTE — PLAN OF CARE
1400 Labs, hx, and medications reviewed, pt meets parameters for treatment today. Assessment completed and plan of care reviewed. Pt verbalized understanding. PIV accessed with no complications. Pt voices no new complaints or concerns, will continue to monitor for safety.

## 2022-07-13 DIAGNOSIS — R11.2 NAUSEA AND VOMITING, INTRACTABILITY OF VOMITING NOT SPECIFIED, UNSPECIFIED VOMITING TYPE: ICD-10-CM

## 2022-07-13 RX ORDER — ONDANSETRON 8 MG/1
8 TABLET, ORALLY DISINTEGRATING ORAL EVERY 12 HOURS PRN
Qty: 30 TABLET | Refills: 1 | Status: SHIPPED | OUTPATIENT
Start: 2022-07-13 | End: 2022-08-10 | Stop reason: SDUPTHER

## 2022-07-17 DIAGNOSIS — G89.3 CANCER ASSOCIATED PAIN: ICD-10-CM

## 2022-07-18 RX ORDER — OXYCODONE HYDROCHLORIDE 30 MG/1
30 TABLET ORAL EVERY 4 HOURS PRN
Qty: 180 TABLET | Refills: 0 | Status: SHIPPED | OUTPATIENT
Start: 2022-07-18 | End: 2022-08-12 | Stop reason: SDUPTHER

## 2022-07-29 ENCOUNTER — INFUSION (OUTPATIENT)
Dept: INFUSION THERAPY | Facility: HOSPITAL | Age: 52
End: 2022-07-29
Payer: MEDICAID

## 2022-07-29 ENCOUNTER — OFFICE VISIT (OUTPATIENT)
Dept: HEMATOLOGY/ONCOLOGY | Facility: CLINIC | Age: 52
End: 2022-07-29
Payer: MEDICAID

## 2022-07-29 VITALS
OXYGEN SATURATION: 99 % | HEART RATE: 62 BPM | DIASTOLIC BLOOD PRESSURE: 65 MMHG | TEMPERATURE: 98 F | SYSTOLIC BLOOD PRESSURE: 109 MMHG | RESPIRATION RATE: 18 BRPM

## 2022-07-29 VITALS
BODY MASS INDEX: 19.5 KG/M2 | HEIGHT: 72 IN | SYSTOLIC BLOOD PRESSURE: 104 MMHG | OXYGEN SATURATION: 100 % | WEIGHT: 143.94 LBS | RESPIRATION RATE: 18 BRPM | HEART RATE: 71 BPM | TEMPERATURE: 99 F | DIASTOLIC BLOOD PRESSURE: 68 MMHG

## 2022-07-29 DIAGNOSIS — R11.2 NAUSEA AND VOMITING, INTRACTABILITY OF VOMITING NOT SPECIFIED, UNSPECIFIED VOMITING TYPE: ICD-10-CM

## 2022-07-29 DIAGNOSIS — C43.9 METASTATIC MELANOMA: Primary | ICD-10-CM

## 2022-07-29 DIAGNOSIS — G89.3 CANCER ASSOCIATED PAIN: ICD-10-CM

## 2022-07-29 DIAGNOSIS — F51.05 INSOMNIA SECONDARY TO ANXIETY: ICD-10-CM

## 2022-07-29 DIAGNOSIS — L29.9 ITCHING: ICD-10-CM

## 2022-07-29 DIAGNOSIS — R18.0 MALIGNANT ASCITES: ICD-10-CM

## 2022-07-29 DIAGNOSIS — F41.9 INSOMNIA SECONDARY TO ANXIETY: ICD-10-CM

## 2022-07-29 DIAGNOSIS — R51.9 HEADACHE DUE TO INTRACRANIAL DISEASE: ICD-10-CM

## 2022-07-29 DIAGNOSIS — C79.31 MALIGNANT MELANOMA METASTATIC TO BRAIN: ICD-10-CM

## 2022-07-29 DIAGNOSIS — G93.9 HEADACHE DUE TO INTRACRANIAL DISEASE: ICD-10-CM

## 2022-07-29 PROCEDURE — 96417 CHEMO IV INFUS EACH ADDL SEQ: CPT

## 2022-07-29 PROCEDURE — 99215 OFFICE O/P EST HI 40 MIN: CPT | Mod: PBBFAC | Performed by: INTERNAL MEDICINE

## 2022-07-29 PROCEDURE — 96415 CHEMO IV INFUSION ADDL HR: CPT

## 2022-07-29 PROCEDURE — 1160F RVW MEDS BY RX/DR IN RCRD: CPT | Mod: CPTII,,, | Performed by: INTERNAL MEDICINE

## 2022-07-29 PROCEDURE — 96413 CHEMO IV INFUSION 1 HR: CPT

## 2022-07-29 PROCEDURE — 3074F PR MOST RECENT SYSTOLIC BLOOD PRESSURE < 130 MM HG: ICD-10-PCS | Mod: CPTII,,, | Performed by: INTERNAL MEDICINE

## 2022-07-29 PROCEDURE — 3078F PR MOST RECENT DIASTOLIC BLOOD PRESSURE < 80 MM HG: ICD-10-PCS | Mod: CPTII,,, | Performed by: INTERNAL MEDICINE

## 2022-07-29 PROCEDURE — 1159F MED LIST DOCD IN RCRD: CPT | Mod: CPTII,,, | Performed by: INTERNAL MEDICINE

## 2022-07-29 PROCEDURE — 63600175 PHARM REV CODE 636 W HCPCS: Mod: JG | Performed by: INTERNAL MEDICINE

## 2022-07-29 PROCEDURE — 99999 PR PBB SHADOW E&M-EST. PATIENT-LVL V: ICD-10-PCS | Mod: PBBFAC,,, | Performed by: INTERNAL MEDICINE

## 2022-07-29 PROCEDURE — 99999 PR PBB SHADOW E&M-EST. PATIENT-LVL V: CPT | Mod: PBBFAC,,, | Performed by: INTERNAL MEDICINE

## 2022-07-29 PROCEDURE — 3074F SYST BP LT 130 MM HG: CPT | Mod: CPTII,,, | Performed by: INTERNAL MEDICINE

## 2022-07-29 PROCEDURE — 25000003 PHARM REV CODE 250: Performed by: INTERNAL MEDICINE

## 2022-07-29 PROCEDURE — 1159F PR MEDICATION LIST DOCUMENTED IN MEDICAL RECORD: ICD-10-PCS | Mod: CPTII,,, | Performed by: INTERNAL MEDICINE

## 2022-07-29 PROCEDURE — 3008F BODY MASS INDEX DOCD: CPT | Mod: CPTII,,, | Performed by: INTERNAL MEDICINE

## 2022-07-29 PROCEDURE — 1160F PR REVIEW ALL MEDS BY PRESCRIBER/CLIN PHARMACIST DOCUMENTED: ICD-10-PCS | Mod: CPTII,,, | Performed by: INTERNAL MEDICINE

## 2022-07-29 PROCEDURE — 99215 PR OFFICE/OUTPT VISIT, EST, LEVL V, 40-54 MIN: ICD-10-PCS | Mod: S$PBB,,, | Performed by: INTERNAL MEDICINE

## 2022-07-29 PROCEDURE — 99215 OFFICE O/P EST HI 40 MIN: CPT | Mod: S$PBB,,, | Performed by: INTERNAL MEDICINE

## 2022-07-29 PROCEDURE — 3008F PR BODY MASS INDEX (BMI) DOCUMENTED: ICD-10-PCS | Mod: CPTII,,, | Performed by: INTERNAL MEDICINE

## 2022-07-29 PROCEDURE — 3078F DIAST BP <80 MM HG: CPT | Mod: CPTII,,, | Performed by: INTERNAL MEDICINE

## 2022-07-29 RX ORDER — SODIUM CHLORIDE 0.9 % (FLUSH) 0.9 %
10 SYRINGE (ML) INJECTION
Status: DISCONTINUED | OUTPATIENT
Start: 2022-07-29 | End: 2022-07-29 | Stop reason: HOSPADM

## 2022-07-29 RX ORDER — METHYLPREDNISOLONE SOD SUCC 125 MG
125 VIAL (EA) INJECTION ONCE AS NEEDED
Status: DISCONTINUED | OUTPATIENT
Start: 2022-07-29 | End: 2022-07-29 | Stop reason: HOSPADM

## 2022-07-29 RX ORDER — EPINEPHRINE 0.3 MG/.3ML
0.3 INJECTION SUBCUTANEOUS ONCE AS NEEDED
Status: DISCONTINUED | OUTPATIENT
Start: 2022-07-29 | End: 2022-07-29 | Stop reason: HOSPADM

## 2022-07-29 RX ORDER — SODIUM CHLORIDE 0.9 % (FLUSH) 0.9 %
10 SYRINGE (ML) INJECTION
Status: CANCELLED | OUTPATIENT
Start: 2022-07-29

## 2022-07-29 RX ORDER — HEPARIN 100 UNIT/ML
500 SYRINGE INTRAVENOUS
Status: CANCELLED | OUTPATIENT
Start: 2022-07-29

## 2022-07-29 RX ORDER — HEPARIN 100 UNIT/ML
500 SYRINGE INTRAVENOUS
Status: DISCONTINUED | OUTPATIENT
Start: 2022-07-29 | End: 2022-07-29 | Stop reason: HOSPADM

## 2022-07-29 RX ORDER — METHYLPREDNISOLONE SOD SUCC 125 MG
125 VIAL (EA) INJECTION ONCE AS NEEDED
Status: CANCELLED | OUTPATIENT
Start: 2022-07-29

## 2022-07-29 RX ORDER — DIPHENHYDRAMINE HYDROCHLORIDE 50 MG/ML
25 INJECTION INTRAMUSCULAR; INTRAVENOUS EVERY 10 MIN PRN
Status: DISCONTINUED | OUTPATIENT
Start: 2022-07-29 | End: 2022-07-29 | Stop reason: HOSPADM

## 2022-07-29 RX ORDER — EPINEPHRINE 0.3 MG/.3ML
0.3 INJECTION SUBCUTANEOUS ONCE AS NEEDED
Status: CANCELLED | OUTPATIENT
Start: 2022-07-29

## 2022-07-29 RX ORDER — DIPHENHYDRAMINE HYDROCHLORIDE 50 MG/ML
25 INJECTION INTRAMUSCULAR; INTRAVENOUS EVERY 10 MIN PRN
Status: CANCELLED | OUTPATIENT
Start: 2022-07-29 | End: 2022-07-30

## 2022-07-29 RX ADMIN — SODIUM CHLORIDE 200 MG: 9 INJECTION, SOLUTION INTRAVENOUS at 01:07

## 2022-07-29 RX ADMIN — SODIUM CHLORIDE: 0.9 INJECTION, SOLUTION INTRAVENOUS at 11:07

## 2022-07-29 RX ADMIN — SODIUM CHLORIDE 70 MG: 9 INJECTION, SOLUTION INTRAVENOUS at 12:07

## 2022-07-29 NOTE — PROGRESS NOTES
ESTABLISHED MEDICAL ONCOLOGY VISIT    Reason for visit:  Metastatic melanoma to the lungs, axillary lymph nodes, liver, peritoneal carcinomatosis, and brain.     Cancer/Stage/TNM:   Cancer Staging  Metastatic melanoma  Staging form: Melanoma of the Skin, AJCC 8th Edition  - Clinical stage from 1/28/2022: Stage IV (cTX, cNX, cM1) - Signed by Adria Rivera MD on 4/12/2022       Oncology History   Metastatic melanoma   1/28/2022 Cancer Staged    Staging form: Melanoma of the Skin, AJCC 8th Edition  - Clinical stage from 1/28/2022: Stage IV (cTX, cNX, cM1)     4/4/2022 Initial Diagnosis    Metastatic melanoma     6/14/2022 -  Chemotherapy    Treatment Summary   Plan Name: OP NIVOLUMAB 1 MG/KG IPILIMUMAB 3MG/KG FOLLOWED BY NIVOLUMAB 480MG Q4W  Treatment Goal: Control  Status: Active  Start Date: 6/14/2022  End Date: 5/26/2023 (Planned)  Provider: Clayton Wise MD  Chemotherapy: ipilimumab (YERVOY) 200 mg in sodium chloride 0.9% 140 mL chemo infusion, 211 mg, Intravenous, Clinic/HOD 1 time, 3 of 3 cycles  Administration: 200 mg (6/14/2022), 200 mg (7/8/2022)  nivolumab 70 mg in sodium chloride 0.9% 57 mL infusion, 1 mg/kg = 70 mg, Intravenous, Clinic/HOD 1 time, 3 of 14 cycles  Dose modification: 480 mg (original dose 1 mg/kg, Cycle 4, Reason: MD Discretion)  Administration: 70 mg (6/14/2022), 70 mg (7/8/2022)          HPI:   51 y.o. male with history of CAD s/p PCI, seizures, substance abuse, stage IV malignant melanoma who presents today to establish care at [facility].   He presented initially with enlarging right axillary lymph nodes that have grown progressively in size since April 2021. He presented to urgent care and was referred for US which showed multiple enlarged masses in the right axilla most likely reflecting abnormal lymph nodes concerning for malignancy.   He was subsequently referred to dermatology who performed a complete exam of the skin that was negative for suspicious cutaneous lesions  as well as punch biopsy of one of the lymph node on 01/04/22. Pathology came back as malignant melanoma.   BRAF mutation: positive for a V600E mutation.   He subsequently underwent a PET/CT on 01/28/22 which showed multiple hypermetabolic soft tissue masses within the right axillary region, metastatic lesions to the lungs and abdomen (VI segment of the liver measuring a max SUV of 6.99, 1.6 x 1.9 cm nodular soft tissue density with a max SUV  7.64 was seen adjacent to the colon within the right lower quadrant. Brain MRI was negative for metastatic disease.  LDH was 307 U/L  He saw Oncology at [facility] on 01/31/22 with plan to start First line Nivolumab / ipilimumab. He received C1 on 03/03/22.     He was admitted on 03/18 at [facility] for abdominal pain, and distension. He underwent therapeutic paracentesis with removal of 3L, and was discharged on 03/22. CT abdomen during that admission showed soft tissue attenuation throughout the peritoneum consistent with innumerable peritoneal implants.     He was readmitted the following day 03/23 for re accumulation and had another paracentesis with removal of 4.7L.  Unfortunately, he had rapid re accumulation of his ascites and was readmitted again between 03/30 and 04/02 and underwent paracentesis. He was planned to receive pleur-X catheter as outpatient.     He presented again to [facility] on 04/04 with worsening abdominal distension and pain. He underwent therapeutic paracentesis with removal of 5L. Cytology was positive for malignant cells. He was discharged on 04/05 on Cipro for SBP coverage.       Interval history:   Today, patient reports itching to his arms, back, and the back of his legs. He is on targeted therapy still without any issues. Still has mild nausea after meals - zofran and smaller meals has helped with nausea. Still has stiffness and pain in his knees and elbows. Improves with movement. MS Contin and oxycodone controlling pain. Needing BTP 3x a day. He denies other  symptoms including vision changes, weakness, diarrhea, abdominal swelling.      Review of Systems   Constitutional: Positive for fatigue. Negative for activity change, appetite change, chills, fever and unexpected weight change.   HENT: Negative for ear pain, facial swelling, hearing loss, mouth sores, nosebleeds, sore throat and trouble swallowing.    Eyes: Negative for pain, discharge, redness and visual disturbance.   Respiratory: Negative for cough, choking, chest tightness and shortness of breath.    Cardiovascular: Negative for chest pain, palpitations and leg swelling.   Gastrointestinal: Positive for nausea (mild). Negative for abdominal distention, abdominal pain, blood in stool, constipation, diarrhea and vomiting.   Endocrine: Negative for cold intolerance and heat intolerance.   Genitourinary: Negative for difficulty urinating, dysuria, frequency and urgency.   Musculoskeletal: Positive for arthralgias. Negative for gait problem, leg pain and myalgias.   Integumentary:  Negative for pallor, rash and wound.        Itching   Allergic/Immunologic: Negative for frequent infections.   Neurological: Negative for dizziness, tremors, weakness, light-headedness, numbness and headaches.   Hematological: Negative for adenopathy. Does not bruise/bleed easily.   Psychiatric/Behavioral: Negative for agitation, confusion, dysphoric mood and sleep disturbance. The patient is not nervous/anxious.          Past Medical History:   Past Medical History:   Diagnosis Date    Seizures         Past Surgical History:   History reviewed. No pertinent surgical history.     Family History:   History reviewed. No pertinent family history.     Social History:   Social History     Tobacco Use    Smoking status: Current Every Day Smoker    Smokeless tobacco: Never Used   Substance Use Topics    Alcohol use: Yes        I have reviewed and updated the patient's past medical, surgical, family and social histories.    Allergies:    Review of patient's allergies indicates:  No Known Allergies     Medications:   Current Outpatient Medications   Medication Sig Dispense Refill    ALPRAZolam (XANAX) 0.5 MG tablet Take 1 tablet (0.5 mg total) by mouth On call Procedure (Claustrophobia in MRI). 2 tablet 0    buPROPion (WELLBUTRIN SR) 100 MG TBSR 12 hr tablet Take 1 tablet (100 mg total) by mouth 2 (two) times daily. 60 tablet 2    clobetasoL (TEMOVATE) 0.05 % cream Apply topically 2 (two) times daily. 60 g 3    famotidine (PEPCID) 20 MG tablet Take 1 tablet (20 mg total) by mouth 2 (two) times daily. 60 tablet 11    levETIRAcetam (KEPPRA) 1000 MG tablet Take 1,000 mg by mouth 2 (two) times a day.      morphine (MS CONTIN) 30 MG 12 hr tablet Take 1 tablet (30 mg total) by mouth 2 (two) times daily. 60 tablet 0    naloxone (NARCAN) 4 mg/actuation Spry 4mg by nasal route as needed for opioid overdose; may repeat every 2-3 minutes in alternating nostrils until medical help arrives. Call 911 2 each 2    OLANZapine (ZYPREXA) 2.5 MG tablet Take 1 tablet (2.5 mg total) by mouth every 6 (six) hours as needed (anxiety). 60 tablet 0    ondansetron (ZOFRAN-ODT) 8 MG TbDL Dissolve 1 tablet (8 mg total) by mouth every 12 (twelve) hours as needed. 30 tablet 1    oxyCODONE (ROXICODONE) 30 MG Tab Take 1 tablet (30 mg total) by mouth every 4 (four) hours as needed (cancer related pain). 180 tablet 0    polyethylene glycol (GLYCOLAX) 17 gram/dose powder Dissolve one capful (17 g) in liquid and take by mouth once daily. 510 g 3    prochlorperazine (COMPAZINE) 5 MG tablet Take 1 tablet (5 mg total) by mouth every 6 (six) hours as needed for Nausea. 30 tablet 1    senna (SENNA) 8.6 mg tablet Take 1 tablet by mouth twice daily 60 tablet 10    senna-docusate 8.6-50 mg (PERICOLACE) 8.6-50 mg per tablet Take 1 tablet by mouth daily as needed for Constipation. 30 tablet 3     No current facility-administered medications for this visit.     Facility-Administered  Medications Ordered in Other Visits   Medication Dose Route Frequency Provider Last Rate Last Admin    alteplase injection 2 mg  2 mg Intra-Catheter PRN Clayton Wise MD        diphenhydrAMINE injection 25 mg  25 mg Intravenous Q10 Min PRN Clayton Wise MD        EPINEPHrine (EPIPEN) 0.3 mg/0.3 mL pen injection 0.3 mg  0.3 mg Intramuscular Once PRN Clayton Wise MD        heparin, porcine (PF) 100 unit/mL injection flush 500 Units  500 Units Intravenous PRN Clayton Wise MD        ipilimumab (YERVOY) 200 mg in sodium chloride 0.9% 140 mL chemo infusion  200 mg Intravenous 1 time in Clinic/HOD Clayton Wise MD        methylPREDNISolone sodium succinate injection 125 mg  125 mg Intravenous Once PRN Clayton Wise MD        nivolumab 70 mg in sodium chloride 0.9% 57 mL infusion  1 mg/kg (Treatment Plan Recorded) Intravenous 1 time in Clinic/HOD Clayton Wise MD        sodium chloride 0.9% 250 mL flush bag   Intravenous 1 time in Clinic/HOD Clayton Wise MD        sodium chloride 0.9% flush 10 mL  10 mL Intravenous PRN Clayton Wise MD            Physical Exam:   /68 (BP Location: Left arm, Patient Position: Sitting, BP Method: Medium (Automatic))   Pulse 71   Temp 98.9 °F (37.2 °C) (Oral)   Resp 18   Ht 6' (1.829 m)   Wt 65.3 kg (143 lb 15.4 oz)   SpO2 100%   BMI 19.52 kg/m²      ECOG Performance status: 1            Physical Exam  Vitals and nursing note reviewed.   Constitutional:       General: He is not in acute distress.     Appearance: Normal appearance. He is well-developed.   HENT:      Head: Normocephalic and atraumatic.   Eyes:      Conjunctiva/sclera: Conjunctivae normal.      Pupils: Pupils are equal, round, and reactive to light.   Pulmonary:      Effort: Pulmonary effort is normal. No respiratory distress.   Abdominal:      General: There is no distension.      Palpations: Abdomen is soft.   Musculoskeletal:         General: No swelling.  Normal range of motion.      Cervical back: Normal range of motion and neck supple.      Right lower leg: No edema.      Left lower leg: No edema.   Skin:     General: Skin is warm and dry.   Neurological:      General: No focal deficit present.      Mental Status: He is alert and oriented to person, place, and time.   Psychiatric:         Mood and Affect: Mood normal.         Behavior: Behavior normal.         Thought Content: Thought content normal.         Judgment: Judgment normal.           Labs:   Recent Results (from the past 48 hour(s))   CBC Auto Differential    Collection Time: 07/29/22 10:40 AM   Result Value Ref Range    WBC 11.25 3.90 - 12.70 K/uL    RBC 4.36 (L) 4.60 - 6.20 M/uL    Hemoglobin 12.3 (L) 14.0 - 18.0 g/dL    Hematocrit 37.7 (L) 40.0 - 54.0 %    MCV 87 82 - 98 fL    MCH 28.2 27.0 - 31.0 pg    MCHC 32.6 32.0 - 36.0 g/dL    RDW 14.1 11.5 - 14.5 %    Platelets 407 150 - 450 K/uL    MPV 9.5 9.2 - 12.9 fL    Immature Granulocytes 0.4 0.0 - 0.5 %    Gran # (ANC) 7.4 1.8 - 7.7 K/uL    Immature Grans (Abs) 0.04 0.00 - 0.04 K/uL    Lymph # 2.2 1.0 - 4.8 K/uL    Mono # 0.9 0.3 - 1.0 K/uL    Eos # 0.5 0.0 - 0.5 K/uL    Baso # 0.13 0.00 - 0.20 K/uL    nRBC 0 0 /100 WBC    Gran % 65.8 38.0 - 73.0 %    Lymph % 19.8 18.0 - 48.0 %    Mono % 8.4 4.0 - 15.0 %    Eosinophil % 4.4 0.0 - 8.0 %    Basophil % 1.2 0.0 - 1.9 %    Differential Method Automated    Comprehensive Metabolic Panel    Collection Time: 07/29/22 10:40 AM   Result Value Ref Range    Sodium 136 136 - 145 mmol/L    Potassium 5.2 (H) 3.5 - 5.1 mmol/L    Chloride 99 95 - 110 mmol/L    CO2 28 23 - 29 mmol/L    Glucose 91 70 - 110 mg/dL    BUN 13 6 - 20 mg/dL    Creatinine 0.8 0.5 - 1.4 mg/dL    Calcium 10.1 8.7 - 10.5 mg/dL    Total Protein 7.5 6.0 - 8.4 g/dL    Albumin 3.6 3.5 - 5.2 g/dL    Total Bilirubin 0.2 0.1 - 1.0 mg/dL    Alkaline Phosphatase 69 55 - 135 U/L    AST 21 10 - 40 U/L    ALT 15 10 - 44 U/L    Anion Gap 9 8 - 16 mmol/L    eGFR  if African American >60.0 >60 mL/min/1.73 m^2    eGFR if non African American >60.0 >60 mL/min/1.73 m^2   T4, Free    Collection Time: 07/29/22 10:40 AM   Result Value Ref Range    Free T4 0.85 0.71 - 1.51 ng/dL   TSH    Collection Time: 07/29/22 10:40 AM   Result Value Ref Range    TSH 0.943 0.400 - 4.000 uIU/mL   Lactate Dehydrogenase    Collection Time: 07/29/22 10:40 AM   Result Value Ref Range     110 - 260 U/L        Imaging:  Reviewed imaging personally and with Dr. Adam Levine, radiation oncology, discussed with patient MRI results showing mixed response compared to 4/20 exam with possible early leptomeningeal disease.     Results for orders placed or performed during the hospital encounter of 06/06/22 (from the past 2160 hour(s))   MRI Brain W WO Contrast    Impression    Scattered subcentimeter regions of nodular enhancement again identified.  Left frontal focus overall less conspicuous from prior.  However there are new or more conspicuous regions of enhancement involving the left posterior frontal cortex and left caudate nucleus overall remains concerning for metastatic disease in light of history.    There is no restricted diffusion to suggest acute infarction.  There is no significant parenchymal edema signal abnormality associated with the enhancing lesions.    Clinical correlation and close follow-up recommended.      Electronically signed by: Felix Gaitan DO  Date:    06/14/2022  Time:    06:39                 Path:   Skin, right axilla, biopsy:  -  Malignant melanoma.     Note:  The tumor measures approximately 3 x 4 mm and involves the peripheral margins of the specimen. Given no visualized connection to the overlying epidermis, a metastasis or satellitosis of a larger nearby melanoma is a consideration. Clear cell sarcoma is a differential diagnosis.     Microscopic Description:  Skin with a poorly circumscribed dermal nodule of atypical melanocytes. The tumor cells are hyperchromatic  with a high nuclear to cytoplasm ratio. The tumor cells are strongly positive for Sox10 stain. CD43 demonstrates scattered tumor infiltrating lymphocytes, but does not stain tumor cells. No lymphovascular invasion is seen with CD31 and D2-40 stains. All controls are adequate.     A PHH3 stain is pending with addendum to follow.     Mitotic counts:  3/mm2.  Ulceration:   Not identified.  Regression:  Not identified.  Tumor infiltrating lymphocytes:  Mild.  Angiolymphatic invasion:  Not identified.  Associated nevus:  Not identified.  Predominant cytology:  Epithelioid cell.  Margin involvement:  Involved.    Assessment:       1. Metastatic melanoma    2. Malignant melanoma metastatic to brain    3. Malignant ascites    4. Cancer associated pain    5. Nausea and vomiting, intractability of vomiting not specified, unspecified vomiting type    6. Headache due to intracranial disease    7. Insomnia secondary to anxiety    8. Itching          Plan:     1,2,3 Metastatic melanoma to the lungs, liver, and peritoneum   51 year-old-male patient with metastatic melanoma to the axillary LNs, lungs, liver, and peritoneum, BRAF V600E mutant, who is status post C1 of first line immunotherapy with Nivolumab+Ipilimumab on 03/03. Unfortunately, since then he has developed peritoneal carcinomatosis leading to recurrent accumulation of malignant ascites. He required admission 4 times to the hospital for therapeutic paracentesis. He was very symptomatic on presentation with worsening abdominal distension and notable worsening R axillary swelling and not tolerating po.  We discussed stage and prognosis of this aggressive melanoma.    With his rapidly progressive disease, we recommend switching him to targeted therapy with Encorafinib and Binimitinib. Samples were given and patient had a dramatic clinical response. He is now approved for patient assistance through [employer]. Patient had a mixed response on MRI brain.  Patient had symptoms of  increased intra-cranial pressure with only 1 week off of targeted therapy.  For this reason, we will continue targeted therapy during the early period of re-initiation of his best systemic option, being combination immunotherapy.  - Labs acceptable for C4 Ipilimumab and nivolumab today (received C1 in March). Re-staging scans in September. Re-check brain MRI before next visit. Patient will stop targeted therapy at this time and re-evaluate if needed again after next brain MRI.     4 Cancer associated pain   - Pain better controlled  - Oxy IR 30 mg Q8H PRN - patient in agreement to try to use less. Continue MS Contin 30 mg BID.  - Following with palliative care    5,6,7 Improved with re-initiation of targeted therapy and initiation of low dose zyprexa at night.  Also still taking wellbutrin.    8. Prescribed clobetasol for affected areas.     Patient was also seen and examined by Dr. Wise. Patient is in agreement with the proposed treatment plan. All questions were answered to the patient's satisfaction. Pt knows to call clinic if anything is needed before the next clinic visit.    Kitty Segura, MSN, APRN, FNP-C  Hematology and Medical Oncology  Nurse Practitioner to Dr. Clayton Wise  Nurse Practitioner, [facility]      I have reviewed the notes, assessments, and/or procedures performed by Kitty ATKINSON, as above.  I have personally interviewed and examined the patient at the beside, and rounded with Kitty. I concur with her assessment and plan and the documentation of Joseluis Garner.    Clayton Wise M.D.  Hematology/Oncology Attending  [facility]  [facility]        Route Chart for Scheduling    Med Onc Chart Routing      Follow up with physician 3 weeks. Prior to Opdivo   Follow up with RAYMUNDO    Infusion scheduling note    Injection scheduling note    Labs CBC, CMP, LDH, TSH and free T4   Lab interval: every 3  weeks     Imaging MRI   In 3 weeks   Pharmacy appointment    Other referrals          Treatment Plan Information   OP NIVOLUMAB 1 MG/KG IPILIMUMAB 3MG/KG FOLLOWED BY NIVOLUMAB 480MG Q4W   Clayton Wise MD   Upcoming Treatment Dates - OP NIVOLUMAB 1 MG/KG IPILIMUMAB 3MG/KG FOLLOWED BY NIVOLUMAB 480MG Q4W    8/19/2022       Chemotherapy       nivolumab 480 mg in sodium chloride 0.9% 148 mL infusion  9/16/2022       Chemotherapy       nivolumab 480 mg in sodium chloride 0.9% 148 mL infusion  10/14/2022       Chemotherapy       nivolumab 480 mg in sodium chloride 0.9% 148 mL infusion  11/11/2022       Chemotherapy       nivolumab 480 mg in sodium chloride 0.9% 148 mL infusion

## 2022-07-29 NOTE — PLAN OF CARE
1130  Patient seated in chair, VSS, Assessment done.  PIV inserted without issue, flushed, blood return noted.  Started NS @ 25 cc/hr KVO while waiting for opdivo and yervoy from pharmacy.    1459 Patient completed opdivo/ yervoy infusions, tolerated well.  PIV discontinued without issue.  RTC not scheduled at this time.  Patient will monitor My Chart for future appts.  Patient ambulated off floor independently, NAD

## 2022-08-09 ENCOUNTER — TELEPHONE (OUTPATIENT)
Dept: PALLIATIVE MEDICINE | Facility: CLINIC | Age: 52
End: 2022-08-09
Payer: MEDICAID

## 2022-08-09 DIAGNOSIS — C43.9 METASTATIC MELANOMA: ICD-10-CM

## 2022-08-09 DIAGNOSIS — R11.0 NAUSEA: ICD-10-CM

## 2022-08-09 DIAGNOSIS — F41.9 ANXIETY: ICD-10-CM

## 2022-08-09 RX ORDER — OLANZAPINE 2.5 MG/1
2.5 TABLET ORAL EVERY 6 HOURS PRN
Qty: 60 TABLET | Refills: 0 | Status: SHIPPED | OUTPATIENT
Start: 2022-08-09

## 2022-08-10 ENCOUNTER — PATIENT MESSAGE (OUTPATIENT)
Dept: HEMATOLOGY/ONCOLOGY | Facility: CLINIC | Age: 52
End: 2022-08-10
Payer: MEDICAID

## 2022-08-10 DIAGNOSIS — G89.3 CANCER ASSOCIATED PAIN: ICD-10-CM

## 2022-08-10 DIAGNOSIS — R11.2 NAUSEA AND VOMITING, INTRACTABILITY OF VOMITING NOT SPECIFIED, UNSPECIFIED VOMITING TYPE: ICD-10-CM

## 2022-08-10 RX ORDER — ONDANSETRON 8 MG/1
8 TABLET, ORALLY DISINTEGRATING ORAL EVERY 12 HOURS PRN
Qty: 30 TABLET | Refills: 1 | Status: SHIPPED | OUTPATIENT
Start: 2022-08-10 | End: 2022-09-28 | Stop reason: SDUPTHER

## 2022-08-11 ENCOUNTER — PATIENT MESSAGE (OUTPATIENT)
Dept: HEMATOLOGY/ONCOLOGY | Facility: CLINIC | Age: 52
End: 2022-08-11
Payer: MEDICAID

## 2022-08-11 ENCOUNTER — TELEPHONE (OUTPATIENT)
Dept: HEMATOLOGY/ONCOLOGY | Facility: CLINIC | Age: 52
End: 2022-08-11
Payer: MEDICAID

## 2022-08-12 DIAGNOSIS — G89.3 CANCER ASSOCIATED PAIN: ICD-10-CM

## 2022-08-12 RX ORDER — OXYCODONE HYDROCHLORIDE 30 MG/1
30 TABLET ORAL EVERY 4 HOURS PRN
Qty: 180 TABLET | Refills: 0 | Status: SHIPPED | OUTPATIENT
Start: 2022-08-12 | End: 2022-08-15 | Stop reason: SDUPTHER

## 2022-08-12 RX ORDER — MORPHINE SULFATE 30 MG/1
30 TABLET, FILM COATED, EXTENDED RELEASE ORAL 2 TIMES DAILY
Qty: 60 TABLET | Refills: 0 | Status: SHIPPED | OUTPATIENT
Start: 2022-08-12 | End: 2022-09-16

## 2022-08-14 ENCOUNTER — HOSPITAL ENCOUNTER (EMERGENCY)
Facility: HOSPITAL | Age: 52
Discharge: HOME | End: 2022-08-14
Attending: EMERGENCY MEDICINE
Payer: MEDICAID

## 2022-08-14 VITALS
RESPIRATION RATE: 18 BRPM | OXYGEN SATURATION: 98 % | WEIGHT: 150 LBS | TEMPERATURE: 98 F | DIASTOLIC BLOOD PRESSURE: 99 MMHG | BODY MASS INDEX: 20.34 KG/M2 | HEART RATE: 70 BPM | SYSTOLIC BLOOD PRESSURE: 177 MMHG

## 2022-08-14 DIAGNOSIS — E87.1 HYPONATREMIA: ICD-10-CM

## 2022-08-14 DIAGNOSIS — C43.9 METASTATIC MELANOMA: ICD-10-CM

## 2022-08-14 DIAGNOSIS — R11.2 NON-INTRACTABLE VOMITING WITH NAUSEA, UNSPECIFIED VOMITING TYPE: ICD-10-CM

## 2022-08-14 DIAGNOSIS — R10.9 ABDOMINAL PAIN, UNSPECIFIED ABDOMINAL LOCATION: Primary | ICD-10-CM

## 2022-08-14 LAB
ALBUMIN SERPL BCP-MCNC: 3.3 G/DL (ref 3.5–5.2)
ALP SERPL-CCNC: 50 U/L (ref 55–135)
ALT SERPL W/O P-5'-P-CCNC: 6 U/L (ref 10–44)
ANION GAP SERPL CALC-SCNC: 11 MMOL/L (ref 8–16)
AST SERPL-CCNC: 11 U/L (ref 10–40)
BASOPHILS # BLD AUTO: 0.11 K/UL (ref 0–0.2)
BASOPHILS NFR BLD: 0.8 % (ref 0–1.9)
BILIRUB SERPL-MCNC: 0.4 MG/DL (ref 0.1–1)
BUN SERPL-MCNC: 7 MG/DL (ref 6–20)
CALCIUM SERPL-MCNC: 9.2 MG/DL (ref 8.7–10.5)
CHLORIDE SERPL-SCNC: 90 MMOL/L (ref 95–110)
CO2 SERPL-SCNC: 24 MMOL/L (ref 23–29)
CREAT SERPL-MCNC: 0.7 MG/DL (ref 0.5–1.4)
CTP QC/QA: YES
DIFFERENTIAL METHOD: ABNORMAL
EOSINOPHIL # BLD AUTO: 0.2 K/UL (ref 0–0.5)
EOSINOPHIL NFR BLD: 1.2 % (ref 0–8)
ERYTHROCYTE [DISTWIDTH] IN BLOOD BY AUTOMATED COUNT: 14.5 % (ref 11.5–14.5)
EST. GFR  (NO RACE VARIABLE): >60 ML/MIN/1.73 M^2
GLUCOSE SERPL-MCNC: 115 MG/DL (ref 70–110)
HCT VFR BLD AUTO: 44.1 % (ref 40–54)
HGB BLD-MCNC: 15.7 G/DL (ref 14–18)
IMM GRANULOCYTES # BLD AUTO: 0.06 K/UL (ref 0–0.04)
IMM GRANULOCYTES NFR BLD AUTO: 0.4 % (ref 0–0.5)
LYMPHOCYTES # BLD AUTO: 1.8 K/UL (ref 1–4.8)
LYMPHOCYTES NFR BLD: 12.3 % (ref 18–48)
MAGNESIUM SERPL-MCNC: 1.8 MG/DL (ref 1.6–2.6)
MCH RBC QN AUTO: 29.6 PG (ref 27–31)
MCHC RBC AUTO-ENTMCNC: 35.6 G/DL (ref 32–36)
MCV RBC AUTO: 83 FL (ref 82–98)
MONOCYTES # BLD AUTO: 1.1 K/UL (ref 0.3–1)
MONOCYTES NFR BLD: 7.4 % (ref 4–15)
NEUTROPHILS # BLD AUTO: 11.2 K/UL (ref 1.8–7.7)
NEUTROPHILS NFR BLD: 77.9 % (ref 38–73)
NRBC BLD-RTO: 0 /100 WBC
PLATELET # BLD AUTO: 545 K/UL (ref 150–450)
PMV BLD AUTO: 9.4 FL (ref 9.2–12.9)
POTASSIUM SERPL-SCNC: 4 MMOL/L (ref 3.5–5.1)
PROT SERPL-MCNC: 6.1 G/DL (ref 6–8.4)
RBC # BLD AUTO: 5.31 M/UL (ref 4.6–6.2)
SARS-COV-2 RDRP RESP QL NAA+PROBE: NEGATIVE
SODIUM SERPL-SCNC: 125 MMOL/L (ref 136–145)
WBC # BLD AUTO: 14.4 K/UL (ref 3.9–12.7)

## 2022-08-14 PROCEDURE — 80053 COMPREHEN METABOLIC PANEL: CPT | Performed by: PHYSICIAN ASSISTANT

## 2022-08-14 PROCEDURE — 83735 ASSAY OF MAGNESIUM: CPT | Performed by: PHYSICIAN ASSISTANT

## 2022-08-14 PROCEDURE — 96374 THER/PROPH/DIAG INJ IV PUSH: CPT

## 2022-08-14 PROCEDURE — 99284 EMERGENCY DEPT VISIT MOD MDM: CPT | Mod: 25

## 2022-08-14 PROCEDURE — 63600175 PHARM REV CODE 636 W HCPCS: Performed by: PHYSICIAN ASSISTANT

## 2022-08-14 PROCEDURE — 96375 TX/PRO/DX INJ NEW DRUG ADDON: CPT

## 2022-08-14 PROCEDURE — 96376 TX/PRO/DX INJ SAME DRUG ADON: CPT

## 2022-08-14 PROCEDURE — 86803 HEPATITIS C AB TEST: CPT | Performed by: PHYSICIAN ASSISTANT

## 2022-08-14 PROCEDURE — 85025 COMPLETE CBC W/AUTO DIFF WBC: CPT | Performed by: PHYSICIAN ASSISTANT

## 2022-08-14 PROCEDURE — 99285 PR EMERGENCY DEPT VISIT,LEVEL V: ICD-10-PCS | Mod: CS,,, | Performed by: PHYSICIAN ASSISTANT

## 2022-08-14 PROCEDURE — U0002 COVID-19 LAB TEST NON-CDC: HCPCS | Performed by: PHYSICIAN ASSISTANT

## 2022-08-14 PROCEDURE — 99285 EMERGENCY DEPT VISIT HI MDM: CPT | Mod: CS,,, | Performed by: PHYSICIAN ASSISTANT

## 2022-08-14 PROCEDURE — 87389 HIV-1 AG W/HIV-1&-2 AB AG IA: CPT | Performed by: PHYSICIAN ASSISTANT

## 2022-08-14 RX ORDER — ONDANSETRON 2 MG/ML
4 INJECTION INTRAMUSCULAR; INTRAVENOUS
Status: COMPLETED | OUTPATIENT
Start: 2022-08-14 | End: 2022-08-14

## 2022-08-14 RX ORDER — HYDROMORPHONE HYDROCHLORIDE 1 MG/ML
1 INJECTION, SOLUTION INTRAMUSCULAR; INTRAVENOUS; SUBCUTANEOUS
Status: COMPLETED | OUTPATIENT
Start: 2022-08-14 | End: 2022-08-14

## 2022-08-14 RX ORDER — HYDROMORPHONE HYDROCHLORIDE 1 MG/ML
0.2 INJECTION, SOLUTION INTRAMUSCULAR; INTRAVENOUS; SUBCUTANEOUS
Status: COMPLETED | OUTPATIENT
Start: 2022-08-14 | End: 2022-08-14

## 2022-08-14 RX ORDER — PROMETHAZINE HYDROCHLORIDE 25 MG/1
25 SUPPOSITORY RECTAL EVERY 6 HOURS PRN
Qty: 10 SUPPOSITORY | Refills: 0 | Status: ON HOLD | OUTPATIENT
Start: 2022-08-14 | End: 2022-08-19

## 2022-08-14 RX ADMIN — SODIUM CHLORIDE, SODIUM LACTATE, POTASSIUM CHLORIDE, AND CALCIUM CHLORIDE 500 ML: .6; .31; .03; .02 INJECTION, SOLUTION INTRAVENOUS at 02:08

## 2022-08-14 RX ADMIN — HYDROMORPHONE HYDROCHLORIDE 1 MG: 1 INJECTION, SOLUTION INTRAMUSCULAR; INTRAVENOUS; SUBCUTANEOUS at 03:08

## 2022-08-14 RX ADMIN — HYDROMORPHONE HYDROCHLORIDE 1 MG: 1 INJECTION, SOLUTION INTRAMUSCULAR; INTRAVENOUS; SUBCUTANEOUS at 02:08

## 2022-08-14 RX ADMIN — ONDANSETRON 4 MG: 2 INJECTION INTRAMUSCULAR; INTRAVENOUS at 02:08

## 2022-08-14 RX ADMIN — HYDROMORPHONE HYDROCHLORIDE 0.2 MG: 1 INJECTION, SOLUTION INTRAMUSCULAR; INTRAVENOUS; SUBCUTANEOUS at 06:08

## 2022-08-14 NOTE — ED NOTES
LOC: The patient is awake, alert, and oriented to self, place, time, and situation. Pt is calm and cooperative. Affect is appropriate.  Speech is appropriate and clear.     APPEARANCE: Patient resting uncomfortably, nausea and vomiting, out of pain medications.  Patient is clean and well groomed.    SKIN: The skin is warm and dry; color consistent with ethnicity.  Patient has normal skin turgor and dry mucus membranes.  Skin intact; no breakdown or bruising noted.     MUSCULOSKELETAL: Patient moving upper and lower extremities without difficulty; denies pain in the extremities or back.  reporting weakness.     RESPIRATORY: Airway is open and patent. Respirations  non-labored.  Patient has a normal effort and rate.  No accessory muscle use noted. Denies cough.     CARDIAC:   No peripheral edema noted. No complaints of chest pain.      ABDOMEN:  tender to palpation.   Distention noted. Pt  Reporting  abdominal pain; nausea, vomiting, diarrhea.    NEUROLOGIC: Eyes open spontaneously.  Behavior appropriate to situation.  Follows commands; facial expression symmetrical.  Purposeful motor response noted; normal sensation in all extremities. Pt denies headache; denies lightheadedness or dizziness; denies visual disturbances; denies loss of balance; denies unilateral weakness.

## 2022-08-14 NOTE — ED PROVIDER NOTES
Encounter Date: 8/14/2022       History     Chief Complaint   Patient presents with    Abdominal Pain     Pt states he has stage 4 myeloma cancer and ran out of pain meds. Don't get next refill until Friday.      The history is provided by the patient and medical records. No  was used.      Joseluis Garner is a 51 y.o. male with medical history of Metastatic melanoma to the lungs, axillary lymph nodes, liver, peritoneal carcinomatosis, brain; CAD with stent, seizures, substance abuse, history of seizures presenting to the ED with the chief complaint of abdominal pain.     Patient reports worsening abdominal pain and N/V for the past week. Describes abdominal pain as diffuse and states it feels like his chronic pain. Reports having up to 3 episodes of emesis per day. He has history of malignant ascites requiring paracentesis. Denies associated abdominal distention and does not feel he needs fluid removed. Currently prescribed Oxy IR 30mg TID PRN and MS Contin 30mg BID. Pain meds prescribed by Dr. Crouch in palliative care. Reports taking more than what he is supposed to as he has been vomiting up the pills. Additionally taking Zofran without relief. He took his last pain medication 2 days ago and is not due for another refill in 6 days. No fever, chest pain, SOB, cough, urinary or bowel movement changes. Last BM today and normal.     Review of patient's allergies indicates:  No Known Allergies  Past Medical History:   Diagnosis Date    Seizures      History reviewed. No pertinent surgical history.  History reviewed. No pertinent family history.  Social History     Tobacco Use    Smoking status: Current Every Day Smoker    Smokeless tobacco: Never Used   Substance Use Topics    Alcohol use: Yes    Drug use: Yes     Types: Methamphetamines, Marijuana     Review of Systems   Constitutional: Negative for fever.   HENT: Negative for sore throat.    Eyes: Negative for pain.   Respiratory:  Negative for shortness of breath.    Cardiovascular: Negative for chest pain.   Gastrointestinal: Positive for abdominal pain, nausea and vomiting. Negative for constipation and diarrhea.   Genitourinary: Negative for dysuria.   Musculoskeletal: Negative for back pain.   Skin: Negative for rash.   Allergic/Immunologic: Positive for immunocompromised state.   Neurological: Negative for weakness.   Hematological: Does not bruise/bleed easily.       Physical Exam     Initial Vitals [08/14/22 1324]   BP Pulse Resp Temp SpO2   (!) 191/111 103 19 98.1 °F (36.7 °C) 97 %      MAP       --         Physical Exam    Constitutional: He appears well-developed and well-nourished. He is not diaphoretic. He is easily aroused.   HENT:   Head: Normocephalic and atraumatic.   Mouth/Throat: Oropharynx is clear and moist. No oropharyngeal exudate.   Eyes: EOM and lids are normal. Pupils are equal, round, and reactive to light. No scleral icterus.   Neck: Phonation normal. Neck supple. No stridor present.   Normal range of motion.  Cardiovascular: Regular rhythm. Tachycardia present.    Pulmonary/Chest: Breath sounds normal. No stridor. No respiratory distress. He has no wheezes. He has no rales.   Abdominal: Abdomen is soft. He exhibits no distension. There is no abdominal tenderness. There is no rebound.   Musculoskeletal:         General: No tenderness or edema. Normal range of motion.      Cervical back: Normal range of motion and neck supple.     Neurological: He is alert, oriented to person, place, and time and easily aroused. He has normal strength. No sensory deficit.   Skin: Skin is warm and dry. No rash noted. No erythema.   Psychiatric: He has a normal mood and affect. His speech is normal.       ED Course   Procedures  Labs Reviewed   CBC W/ AUTO DIFFERENTIAL - Abnormal; Notable for the following components:       Result Value    WBC 14.40 (*)     Platelets 545 (*)     Gran # (ANC) 11.2 (*)     Immature Grans (Abs) 0.06 (*)      Mono # 1.1 (*)     Gran % 77.9 (*)     Lymph % 12.3 (*)     All other components within normal limits   COMPREHENSIVE METABOLIC PANEL - Abnormal; Notable for the following components:    Sodium 125 (*)     Chloride 90 (*)     Glucose 115 (*)     Albumin 3.3 (*)     Alkaline Phosphatase 50 (*)     ALT 6 (*)     All other components within normal limits   MAGNESIUM   HIV 1 / 2 ANTIBODY   HEPATITIS C ANTIBODY   SARS-COV-2 RDRP GENE          Imaging Results    None          Medications   lactated ringers bolus 500 mL (0 mLs Intravenous Stopped 8/14/22 1456)   HYDROmorphone injection 1 mg (1 mg Intravenous Given 8/14/22 1428)   ondansetron injection 4 mg (4 mg Intravenous Given 8/14/22 1428)   HYDROmorphone injection 1 mg (1 mg Intravenous Given 8/14/22 1528)   HYDROmorphone injection 0.2 mg (0.2 mg Intravenous Given 8/14/22 1829)     Medical Decision Making:   History:   Old Medical Records: I decided to obtain old medical records.  Old Records Summarized: records from clinic visits and records from previous admission(s).  Clinical Tests:   Lab Tests: Ordered and Reviewed  Other:   I have discussed this case with another health care provider.       <> Summary of the Discussion: Hem-Onc       APC / Resident Notes:   51 y.o. male with medical history of Metastatic melanoma to the lungs, axillary lymph nodes, liver, peritoneal carcinomatosis, brain; CAD with stent, seizures, substance abuse, history of seizures presenting to the ED c/o chronic abdominal pain and N/V. He is out of his home pain RX and not due for a refill for 6 days.     DDx includes but not limited to cancer related pain, electrolyte disturbance, dehydration. Abdomen is soft and non-tender and lower suspicion for SBP.     Work-up reviewed. Hyponatremia 125 and Leukocytosis 14.4 which I suspect to be 2/2 vomiting. Lower suspicion for infection. Discussed with hem-onc who planned to admit, but after interview decided to manage as outpatient. Patient's  pain improved after 2 rounds of Dilaudid in the ED and would like to be managed at home. Will plan for repeat labs for sodium check next week. He has an appointment with oncology 8/19. Advised patient to discuss an additional pain RX with his palliative care doctor tomorrow. Patient expresses understanding and agreeable to the plan. Return to ED precautions given for new, worsening, or concerning symptoms. I have discussed the care of this patient with my supervising physician.                  Clinical Impression:   Final diagnoses:  [C79.9] Metastatic melanoma  [E87.1] Hyponatremia  [R11.2] Non-intractable vomiting with nausea, unspecified vomiting type  [R10.9] Abdominal pain, unspecified abdominal location (Primary)          ED Disposition Condition    Discharge Stable        ED Prescriptions     Medication Sig Dispense Start Date End Date Auth. Provider    promethazine (PHENERGAN) 25 MG suppository Place 1 suppository (25 mg total) rectally every 6 (six) hours as needed for Nausea. 10 suppository 8/14/2022  Marco Antonio Flower PA-C        Follow-up Information     Follow up With Specialties Details Why Contact Info    Clayton Wise MD Hematology and Oncology   [address] [facility] [address]  [phone]      Guerline Crouch MD Palliative Medicine   [address] [facility] [address]  [phone]             Marco Antonio Flower PA-C  08/14/22 8621

## 2022-08-14 NOTE — ED TRIAGE NOTES
To the ED with c/o uncontrollable pain in his stomach,  Out of pain medication, currently on immune therapy.  Seeing Dr Wise.    Also reporting nausea, unable to keep fluids down  Currently in palliative care.

## 2022-08-14 NOTE — DISCHARGE INSTRUCTIONS
Follow-up with your palliative care doctor tomorrow regarding a prescription for pain medication.    Follow-up with your oncology team for further management. You should have repeat blood work within the next week to ensure your sodium levels are improving.     Use the prescribed phenergan suppositories if your current nausea medications are not working at home.     Return to the emergency room for new, worsening, or concerning symptoms.     Future Appointments   Date Time Provider Department Center   [facility] LAB HO [facility]o [facility]iel[facility]   8/19/2022 12:30 PM Clayton Wise MD [facility] [id number] [place]   8/19/2022  1:00 PM NURSE 9, [facility] CHEMO [facility] CHEMO [place]   9/4/2022  9:00 AM [facility] OIC-MRI2 [facility]   9/16/2022 11:00 AM Guerline Crouch MD [facility] ALEC Ramírez

## 2022-08-15 DIAGNOSIS — G89.3 CANCER ASSOCIATED PAIN: ICD-10-CM

## 2022-08-15 RX ORDER — OXYCODONE HYDROCHLORIDE 30 MG/1
30 TABLET ORAL EVERY 4 HOURS PRN
Qty: 24 TABLET | Refills: 0 | Status: SHIPPED | OUTPATIENT
Start: 2022-08-15 | End: 2022-08-23 | Stop reason: SDUPTHER

## 2022-08-16 LAB — HIV 1+2 AB+HIV1 P24 AG SERPL QL IA: NEGATIVE

## 2022-08-17 LAB — HCV AB SERPL QL IA: NEGATIVE

## 2022-08-18 ENCOUNTER — HOSPITAL ENCOUNTER (INPATIENT)
Facility: HOSPITAL | Age: 52
LOS: 4 days | Discharge: HOME | DRG: 682 | End: 2022-08-22
Attending: EMERGENCY MEDICINE | Admitting: EMERGENCY MEDICINE
Payer: MEDICAID

## 2022-08-18 DIAGNOSIS — R10.9 ABDOMINAL PAIN: ICD-10-CM

## 2022-08-18 DIAGNOSIS — R11.2 NAUSEA AND VOMITING, INTRACTABILITY OF VOMITING NOT SPECIFIED, UNSPECIFIED VOMITING TYPE: ICD-10-CM

## 2022-08-18 DIAGNOSIS — Z91.89 AT RISK FOR LONG QT SYNDROME: ICD-10-CM

## 2022-08-18 DIAGNOSIS — N17.9 AKI (ACUTE KIDNEY INJURY): Primary | ICD-10-CM

## 2022-08-18 LAB
ALBUMIN SERPL BCP-MCNC: 3.6 G/DL (ref 3.5–5.2)
ALP SERPL-CCNC: 55 U/L (ref 55–135)
ALT SERPL W/O P-5'-P-CCNC: <5 U/L (ref 10–44)
ANION GAP SERPL CALC-SCNC: 16 MMOL/L (ref 8–16)
AST SERPL-CCNC: 12 U/L (ref 10–40)
BASOPHILS # BLD AUTO: 0.06 K/UL (ref 0–0.2)
BASOPHILS NFR BLD: 0.3 % (ref 0–1.9)
BILIRUB SERPL-MCNC: 0.7 MG/DL (ref 0.1–1)
BUN SERPL-MCNC: 21 MG/DL (ref 6–20)
CALCIUM SERPL-MCNC: 9.6 MG/DL (ref 8.7–10.5)
CHLORIDE SERPL-SCNC: 87 MMOL/L (ref 95–110)
CO2 SERPL-SCNC: 22 MMOL/L (ref 23–29)
CREAT SERPL-MCNC: 2 MG/DL (ref 0.5–1.4)
DIFFERENTIAL METHOD: ABNORMAL
EOSINOPHIL # BLD AUTO: 0 K/UL (ref 0–0.5)
EOSINOPHIL NFR BLD: 0.1 % (ref 0–8)
ERYTHROCYTE [DISTWIDTH] IN BLOOD BY AUTOMATED COUNT: 14.2 % (ref 11.5–14.5)
EST. GFR  (NO RACE VARIABLE): 39.4 ML/MIN/1.73 M^2
GLUCOSE SERPL-MCNC: 121 MG/DL (ref 70–110)
HCT VFR BLD AUTO: 50.7 % (ref 40–54)
HGB BLD-MCNC: 17.4 G/DL (ref 14–18)
IMM GRANULOCYTES # BLD AUTO: 0.08 K/UL (ref 0–0.04)
IMM GRANULOCYTES NFR BLD AUTO: 0.4 % (ref 0–0.5)
LACTATE SERPL-SCNC: 1.7 MMOL/L (ref 0.5–2.2)
LIPASE SERPL-CCNC: 13 U/L (ref 4–60)
LYMPHOCYTES # BLD AUTO: 1.9 K/UL (ref 1–4.8)
LYMPHOCYTES NFR BLD: 10.4 % (ref 18–48)
MCH RBC QN AUTO: 28.2 PG (ref 27–31)
MCHC RBC AUTO-ENTMCNC: 34.3 G/DL (ref 32–36)
MCV RBC AUTO: 82 FL (ref 82–98)
MONOCYTES # BLD AUTO: 0.8 K/UL (ref 0.3–1)
MONOCYTES NFR BLD: 4.5 % (ref 4–15)
NEUTROPHILS # BLD AUTO: 15.4 K/UL (ref 1.8–7.7)
NEUTROPHILS NFR BLD: 84.3 % (ref 38–73)
NRBC BLD-RTO: 0 /100 WBC
PLATELET # BLD AUTO: 666 K/UL (ref 150–450)
PMV BLD AUTO: 9.2 FL (ref 9.2–12.9)
POTASSIUM SERPL-SCNC: 4.8 MMOL/L (ref 3.5–5.1)
PROT SERPL-MCNC: 6.7 G/DL (ref 6–8.4)
RBC # BLD AUTO: 6.18 M/UL (ref 4.6–6.2)
SODIUM SERPL-SCNC: 125 MMOL/L (ref 136–145)
WBC # BLD AUTO: 18.29 K/UL (ref 3.9–12.7)

## 2022-08-18 PROCEDURE — 83690 ASSAY OF LIPASE: CPT | Performed by: EMERGENCY MEDICINE

## 2022-08-18 PROCEDURE — 93010 EKG 12-LEAD: ICD-10-PCS | Mod: ,,, | Performed by: INTERNAL MEDICINE

## 2022-08-18 PROCEDURE — 96374 THER/PROPH/DIAG INJ IV PUSH: CPT

## 2022-08-18 PROCEDURE — 63600175 PHARM REV CODE 636 W HCPCS

## 2022-08-18 PROCEDURE — 99285 EMERGENCY DEPT VISIT HI MDM: CPT | Mod: CS,,, | Performed by: EMERGENCY MEDICINE

## 2022-08-18 PROCEDURE — 25000003 PHARM REV CODE 250

## 2022-08-18 PROCEDURE — 96375 TX/PRO/DX INJ NEW DRUG ADDON: CPT

## 2022-08-18 PROCEDURE — 63600175 PHARM REV CODE 636 W HCPCS: Performed by: EMERGENCY MEDICINE

## 2022-08-18 PROCEDURE — 12000002 HC ACUTE/MED SURGE SEMI-PRIVATE ROOM

## 2022-08-18 PROCEDURE — 80053 COMPREHEN METABOLIC PANEL: CPT | Performed by: EMERGENCY MEDICINE

## 2022-08-18 PROCEDURE — 96361 HYDRATE IV INFUSION ADD-ON: CPT

## 2022-08-18 PROCEDURE — 85025 COMPLETE CBC W/AUTO DIFF WBC: CPT | Performed by: EMERGENCY MEDICINE

## 2022-08-18 PROCEDURE — 93010 ELECTROCARDIOGRAM REPORT: CPT | Mod: ,,, | Performed by: INTERNAL MEDICINE

## 2022-08-18 PROCEDURE — 83605 ASSAY OF LACTIC ACID: CPT | Performed by: EMERGENCY MEDICINE

## 2022-08-18 PROCEDURE — U0002 COVID-19 LAB TEST NON-CDC: HCPCS

## 2022-08-18 PROCEDURE — 99285 PR EMERGENCY DEPT VISIT,LEVEL V: ICD-10-PCS | Mod: CS,,, | Performed by: EMERGENCY MEDICINE

## 2022-08-18 PROCEDURE — 99285 EMERGENCY DEPT VISIT HI MDM: CPT | Mod: 25

## 2022-08-18 PROCEDURE — 93005 ELECTROCARDIOGRAM TRACING: CPT

## 2022-08-18 RX ORDER — HYDROMORPHONE HYDROCHLORIDE 1 MG/ML
1 INJECTION, SOLUTION INTRAMUSCULAR; INTRAVENOUS; SUBCUTANEOUS
Status: COMPLETED | OUTPATIENT
Start: 2022-08-18 | End: 2022-08-18

## 2022-08-18 RX ORDER — PROMETHAZINE HYDROCHLORIDE 25 MG/1
25 TABLET ORAL
Status: COMPLETED | OUTPATIENT
Start: 2022-08-18 | End: 2022-08-18

## 2022-08-18 RX ORDER — MORPHINE SULFATE 2 MG/ML
6 INJECTION, SOLUTION INTRAMUSCULAR; INTRAVENOUS
Status: COMPLETED | OUTPATIENT
Start: 2022-08-18 | End: 2022-08-18

## 2022-08-18 RX ORDER — ONDANSETRON 2 MG/ML
4 INJECTION INTRAMUSCULAR; INTRAVENOUS ONCE
Status: COMPLETED | OUTPATIENT
Start: 2022-08-18 | End: 2022-08-18

## 2022-08-18 RX ADMIN — SODIUM CHLORIDE 1000 ML: 0.9 INJECTION, SOLUTION INTRAVENOUS at 09:08

## 2022-08-18 RX ADMIN — MORPHINE SULFATE 6 MG: 2 INJECTION, SOLUTION INTRAMUSCULAR; INTRAVENOUS at 08:08

## 2022-08-18 RX ADMIN — ONDANSETRON 4 MG: 2 INJECTION INTRAMUSCULAR; INTRAVENOUS at 08:08

## 2022-08-18 RX ADMIN — PROMETHAZINE HYDROCHLORIDE 25 MG: 25 TABLET ORAL at 09:08

## 2022-08-18 RX ADMIN — SODIUM CHLORIDE, SODIUM LACTATE, POTASSIUM CHLORIDE, AND CALCIUM CHLORIDE 1000 ML: .6; .31; .03; .02 INJECTION, SOLUTION INTRAVENOUS at 08:08

## 2022-08-18 RX ADMIN — HYDROMORPHONE HYDROCHLORIDE 1 MG: 1 INJECTION, SOLUTION INTRAMUSCULAR; INTRAVENOUS; SUBCUTANEOUS at 09:08

## 2022-08-18 NOTE — FIRST PROVIDER EVALUATION
"Medical screening exam completed.  I have conducted a focused provider triage encounter, findings are as follows:    Brief history of present illness: 51 M hx of metastatic melanoma presenting today with nausea, vomitingx 3 days..  Feels dehydrated.  Denies hematemesis    Vitals:    08/18/22 1900 08/18/22 1901   BP: (!) 132/108    BP Location: Right arm    Patient Position: Sitting    Pulse: (!) 114    Resp: 18    Temp:  97.5 °F (36.4 °C)   TempSrc: Oral Oral   SpO2: 98%    Weight: 68 kg (150 lb)    Height: 5' 11" (1.803 m)        Pertinent physical exam:  Appears unwell, distended abdomen    Brief workup plan:  Labs, CT     Preliminary workup initiated; this workup will be continued and followed by the physician or advanced practice provider that is assigned to the patient when roomed.  "

## 2022-08-19 ENCOUNTER — PATIENT MESSAGE (OUTPATIENT)
Dept: HEMATOLOGY/ONCOLOGY | Facility: CLINIC | Age: 52
End: 2022-08-19
Payer: MEDICAID

## 2022-08-19 PROBLEM — Z71.89 GOALS OF CARE, COUNSELING/DISCUSSION: Status: ACTIVE | Noted: 2022-08-19

## 2022-08-19 PROBLEM — E43 SEVERE MALNUTRITION: Status: ACTIVE | Noted: 2022-08-19

## 2022-08-19 LAB
ALBUMIN FLD-MCNC: 2.4 G/DL
ALBUMIN SERPL BCP-MCNC: 3 G/DL (ref 3.5–5.2)
ALP SERPL-CCNC: 42 U/L (ref 55–135)
ALT SERPL W/O P-5'-P-CCNC: <5 U/L (ref 10–44)
ANION GAP SERPL CALC-SCNC: 9 MMOL/L (ref 8–16)
APPEARANCE FLD: CLEAR
AST SERPL-CCNC: 17 U/L (ref 10–40)
BASOPHILS # BLD AUTO: 0.08 K/UL (ref 0–0.2)
BASOPHILS NFR BLD: 0.5 % (ref 0–1.9)
BILIRUB SERPL-MCNC: 0.4 MG/DL (ref 0.1–1)
BODY FLD TYPE: ABNORMAL
BODY FLUID SOURCE, LDH: NORMAL
BUN SERPL-MCNC: 21 MG/DL (ref 6–20)
CALCIUM SERPL-MCNC: 8.7 MG/DL (ref 8.7–10.5)
CHLORIDE SERPL-SCNC: 91 MMOL/L (ref 95–110)
CO2 SERPL-SCNC: 26 MMOL/L (ref 23–29)
COLOR FLD: YELLOW
CREAT SERPL-MCNC: 1.5 MG/DL (ref 0.5–1.4)
DIFFERENTIAL METHOD: ABNORMAL
EOSINOPHIL # BLD AUTO: 0.1 K/UL (ref 0–0.5)
EOSINOPHIL NFR BLD: 0.5 % (ref 0–8)
ERYTHROCYTE [DISTWIDTH] IN BLOOD BY AUTOMATED COUNT: 14.7 % (ref 11.5–14.5)
EST. GFR  (NO RACE VARIABLE): 55.7 ML/MIN/1.73 M^2
GLUCOSE SERPL-MCNC: 109 MG/DL (ref 70–110)
GRAM STN SPEC: NORMAL
GRAM STN SPEC: NORMAL
HCT VFR BLD AUTO: 44.6 % (ref 40–54)
HGB BLD-MCNC: 15.8 G/DL (ref 14–18)
IMM GRANULOCYTES # BLD AUTO: 0.1 K/UL (ref 0–0.04)
IMM GRANULOCYTES NFR BLD AUTO: 0.6 % (ref 0–0.5)
LDH FLD L TO P-CCNC: 302 U/L
LYMPHOCYTES # BLD AUTO: 1.8 K/UL (ref 1–4.8)
LYMPHOCYTES NFR BLD: 11.4 % (ref 18–48)
LYMPHOCYTES NFR FLD MANUAL: 43 %
MAGNESIUM SERPL-MCNC: 1.9 MG/DL (ref 1.6–2.6)
MCH RBC QN AUTO: 29.2 PG (ref 27–31)
MCHC RBC AUTO-ENTMCNC: 35.4 G/DL (ref 32–36)
MCV RBC AUTO: 82 FL (ref 82–98)
MONOCYTES # BLD AUTO: 1.3 K/UL (ref 0.3–1)
MONOCYTES NFR BLD: 8.4 % (ref 4–15)
MONOS+MACROS NFR FLD MANUAL: 19 %
NEUTROPHILS # BLD AUTO: 12.2 K/UL (ref 1.8–7.7)
NEUTROPHILS NFR BLD: 78.6 % (ref 38–73)
NEUTROPHILS NFR FLD MANUAL: 13 %
NRBC BLD-RTO: 0 /100 WBC
OTHER CELLS FLD MANUAL: 25 %
PHOSPHATE SERPL-MCNC: 3.7 MG/DL (ref 2.7–4.5)
PLATELET # BLD AUTO: 486 K/UL (ref 150–450)
PMV BLD AUTO: 9.1 FL (ref 9.2–12.9)
POTASSIUM SERPL-SCNC: 5.1 MMOL/L (ref 3.5–5.1)
PROT FLD-MCNC: 3.7 G/DL
PROT SERPL-MCNC: 5.7 G/DL (ref 6–8.4)
RBC # BLD AUTO: 5.41 M/UL (ref 4.6–6.2)
SARS-COV-2 RDRP RESP QL NAA+PROBE: NEGATIVE
SODIUM SERPL-SCNC: 126 MMOL/L (ref 136–145)
SPECIMEN SOURCE: NORMAL
SPECIMEN SOURCE: NORMAL
WBC # BLD AUTO: 15.49 K/UL (ref 3.9–12.7)
WBC # FLD: 143 /CU MM

## 2022-08-19 PROCEDURE — 89051 BODY FLUID CELL COUNT: CPT | Performed by: STUDENT IN AN ORGANIZED HEALTH CARE EDUCATION/TRAINING PROGRAM

## 2022-08-19 PROCEDURE — 87205 SMEAR GRAM STAIN: CPT | Performed by: STUDENT IN AN ORGANIZED HEALTH CARE EDUCATION/TRAINING PROGRAM

## 2022-08-19 PROCEDURE — 99223 1ST HOSP IP/OBS HIGH 75: CPT | Mod: ,,, | Performed by: INTERNAL MEDICINE

## 2022-08-19 PROCEDURE — P9047 ALBUMIN (HUMAN), 25%, 50ML: HCPCS | Mod: JG

## 2022-08-19 PROCEDURE — 99223 PR INITIAL HOSPITAL CARE,LEVL III: ICD-10-PCS | Mod: ,,, | Performed by: INTERNAL MEDICINE

## 2022-08-19 PROCEDURE — 84100 ASSAY OF PHOSPHORUS: CPT | Performed by: STUDENT IN AN ORGANIZED HEALTH CARE EDUCATION/TRAINING PROGRAM

## 2022-08-19 PROCEDURE — 87075 CULTR BACTERIA EXCEPT BLOOD: CPT | Performed by: STUDENT IN AN ORGANIZED HEALTH CARE EDUCATION/TRAINING PROGRAM

## 2022-08-19 PROCEDURE — 63600175 PHARM REV CODE 636 W HCPCS: Performed by: STUDENT IN AN ORGANIZED HEALTH CARE EDUCATION/TRAINING PROGRAM

## 2022-08-19 PROCEDURE — 25000003 PHARM REV CODE 250

## 2022-08-19 PROCEDURE — 85025 COMPLETE CBC W/AUTO DIFF WBC: CPT | Performed by: STUDENT IN AN ORGANIZED HEALTH CARE EDUCATION/TRAINING PROGRAM

## 2022-08-19 PROCEDURE — 25000003 PHARM REV CODE 250: Performed by: STUDENT IN AN ORGANIZED HEALTH CARE EDUCATION/TRAINING PROGRAM

## 2022-08-19 PROCEDURE — 25000003 PHARM REV CODE 250: Performed by: INTERNAL MEDICINE

## 2022-08-19 PROCEDURE — 63600175 PHARM REV CODE 636 W HCPCS: Performed by: INTERNAL MEDICINE

## 2022-08-19 PROCEDURE — 82042 OTHER SOURCE ALBUMIN QUAN EA: CPT | Performed by: STUDENT IN AN ORGANIZED HEALTH CARE EDUCATION/TRAINING PROGRAM

## 2022-08-19 PROCEDURE — 36415 COLL VENOUS BLD VENIPUNCTURE: CPT | Performed by: STUDENT IN AN ORGANIZED HEALTH CARE EDUCATION/TRAINING PROGRAM

## 2022-08-19 PROCEDURE — 20600001 HC STEP DOWN PRIVATE ROOM

## 2022-08-19 PROCEDURE — 80053 COMPREHEN METABOLIC PANEL: CPT | Performed by: STUDENT IN AN ORGANIZED HEALTH CARE EDUCATION/TRAINING PROGRAM

## 2022-08-19 PROCEDURE — 83615 LACTATE (LD) (LDH) ENZYME: CPT | Performed by: STUDENT IN AN ORGANIZED HEALTH CARE EDUCATION/TRAINING PROGRAM

## 2022-08-19 PROCEDURE — 83735 ASSAY OF MAGNESIUM: CPT | Performed by: STUDENT IN AN ORGANIZED HEALTH CARE EDUCATION/TRAINING PROGRAM

## 2022-08-19 PROCEDURE — 63600175 PHARM REV CODE 636 W HCPCS: Mod: JG

## 2022-08-19 PROCEDURE — 87070 CULTURE OTHR SPECIMN AEROBIC: CPT | Performed by: STUDENT IN AN ORGANIZED HEALTH CARE EDUCATION/TRAINING PROGRAM

## 2022-08-19 PROCEDURE — 84157 ASSAY OF PROTEIN OTHER: CPT | Performed by: STUDENT IN AN ORGANIZED HEALTH CARE EDUCATION/TRAINING PROGRAM

## 2022-08-19 RX ORDER — IBUPROFEN 200 MG
24 TABLET ORAL
Status: DISCONTINUED | OUTPATIENT
Start: 2022-08-19 | End: 2022-08-22 | Stop reason: HOSPADM

## 2022-08-19 RX ORDER — GLUCAGON 1 MG
1 KIT INJECTION
Status: DISCONTINUED | OUTPATIENT
Start: 2022-08-19 | End: 2022-08-22 | Stop reason: HOSPADM

## 2022-08-19 RX ORDER — ONDANSETRON 2 MG/ML
4 INJECTION INTRAMUSCULAR; INTRAVENOUS 3 TIMES DAILY
Status: DISCONTINUED | OUTPATIENT
Start: 2022-08-19 | End: 2022-08-19

## 2022-08-19 RX ORDER — IBUPROFEN 200 MG
16 TABLET ORAL
Status: DISCONTINUED | OUTPATIENT
Start: 2022-08-19 | End: 2022-08-22 | Stop reason: HOSPADM

## 2022-08-19 RX ORDER — ONDANSETRON 2 MG/ML
8 INJECTION INTRAMUSCULAR; INTRAVENOUS 3 TIMES DAILY
Status: DISCONTINUED | OUTPATIENT
Start: 2022-08-19 | End: 2022-08-19

## 2022-08-19 RX ORDER — MORPHINE SULFATE 30 MG/1
30 TABLET, FILM COATED, EXTENDED RELEASE ORAL 2 TIMES DAILY
Status: DISCONTINUED | OUTPATIENT
Start: 2022-08-19 | End: 2022-08-22 | Stop reason: HOSPADM

## 2022-08-19 RX ORDER — AMOXICILLIN 250 MG
1 CAPSULE ORAL 2 TIMES DAILY PRN
Status: DISCONTINUED | OUTPATIENT
Start: 2022-08-19 | End: 2022-08-22 | Stop reason: HOSPADM

## 2022-08-19 RX ORDER — ACETAMINOPHEN 325 MG/1
650 TABLET ORAL EVERY 4 HOURS PRN
Status: DISCONTINUED | OUTPATIENT
Start: 2022-08-19 | End: 2022-08-22

## 2022-08-19 RX ORDER — HYDROMORPHONE HYDROCHLORIDE 1 MG/ML
1 INJECTION, SOLUTION INTRAMUSCULAR; INTRAVENOUS; SUBCUTANEOUS EVERY 4 HOURS PRN
Status: DISCONTINUED | OUTPATIENT
Start: 2022-08-19 | End: 2022-08-22

## 2022-08-19 RX ORDER — TALC
6 POWDER (GRAM) TOPICAL NIGHTLY
Status: DISCONTINUED | OUTPATIENT
Start: 2022-08-19 | End: 2022-08-22 | Stop reason: HOSPADM

## 2022-08-19 RX ORDER — PROCHLORPERAZINE MALEATE 5 MG
10 TABLET ORAL EVERY 6 HOURS PRN
Status: DISCONTINUED | OUTPATIENT
Start: 2022-08-19 | End: 2022-08-21

## 2022-08-19 RX ORDER — ALBUMIN HUMAN 250 G/1000ML
SOLUTION INTRAVENOUS
Status: COMPLETED
Start: 2022-08-19 | End: 2022-08-19

## 2022-08-19 RX ORDER — OXYCODONE HYDROCHLORIDE 10 MG/1
30 TABLET ORAL EVERY 4 HOURS PRN
Status: DISCONTINUED | OUTPATIENT
Start: 2022-08-19 | End: 2022-08-19

## 2022-08-19 RX ORDER — FAMOTIDINE 20 MG/1
20 TABLET, FILM COATED ORAL DAILY
Status: DISCONTINUED | OUTPATIENT
Start: 2022-08-19 | End: 2022-08-19

## 2022-08-19 RX ORDER — POLYETHYLENE GLYCOL 3350 17 G/17G
17 POWDER, FOR SOLUTION ORAL DAILY
Refills: 3 | Status: DISCONTINUED | OUTPATIENT
Start: 2022-08-19 | End: 2022-08-22 | Stop reason: HOSPADM

## 2022-08-19 RX ORDER — BUPROPION HYDROCHLORIDE 75 MG/1
75 TABLET ORAL 2 TIMES DAILY
Refills: 2 | Status: DISCONTINUED | OUTPATIENT
Start: 2022-08-19 | End: 2022-08-22 | Stop reason: HOSPADM

## 2022-08-19 RX ORDER — OLANZAPINE 2.5 MG/1
2.5 TABLET ORAL EVERY 6 HOURS PRN
Status: DISCONTINUED | OUTPATIENT
Start: 2022-08-19 | End: 2022-08-19

## 2022-08-19 RX ORDER — NALOXONE HCL 0.4 MG/ML
0.02 VIAL (ML) INJECTION
Status: DISCONTINUED | OUTPATIENT
Start: 2022-08-19 | End: 2022-08-22 | Stop reason: HOSPADM

## 2022-08-19 RX ORDER — ONDANSETRON 2 MG/ML
8 INJECTION INTRAMUSCULAR; INTRAVENOUS EVERY 8 HOURS
Status: DISCONTINUED | OUTPATIENT
Start: 2022-08-19 | End: 2022-08-22 | Stop reason: HOSPADM

## 2022-08-19 RX ORDER — AMOXICILLIN 250 MG
1 CAPSULE ORAL DAILY
Status: DISCONTINUED | OUTPATIENT
Start: 2022-08-19 | End: 2022-08-22 | Stop reason: HOSPADM

## 2022-08-19 RX ORDER — PROMETHAZINE HYDROCHLORIDE 25 MG/1
25 TABLET ORAL EVERY 6 HOURS PRN
Status: DISCONTINUED | OUTPATIENT
Start: 2022-08-19 | End: 2022-08-22 | Stop reason: HOSPADM

## 2022-08-19 RX ORDER — MAG HYDROX/ALUMINUM HYD/SIMETH 200-200-20
30 SUSPENSION, ORAL (FINAL DOSE FORM) ORAL 4 TIMES DAILY PRN
Status: DISCONTINUED | OUTPATIENT
Start: 2022-08-19 | End: 2022-08-22 | Stop reason: HOSPADM

## 2022-08-19 RX ORDER — LIDOCAINE HYDROCHLORIDE 10 MG/ML
INJECTION INFILTRATION; PERINEURAL CODE/TRAUMA/SEDATION MEDICATION
Status: COMPLETED | OUTPATIENT
Start: 2022-08-19 | End: 2022-08-19

## 2022-08-19 RX ORDER — DEXTROSE MONOHYDRATE AND SODIUM CHLORIDE 5; .9 G/100ML; G/100ML
INJECTION, SOLUTION INTRAVENOUS CONTINUOUS
Status: ACTIVE | OUTPATIENT
Start: 2022-08-19 | End: 2022-08-20

## 2022-08-19 RX ORDER — FAMOTIDINE 20 MG/1
20 TABLET, FILM COATED ORAL 2 TIMES DAILY
Status: DISCONTINUED | OUTPATIENT
Start: 2022-08-19 | End: 2022-08-20

## 2022-08-19 RX ORDER — HYDRALAZINE HYDROCHLORIDE 25 MG/1
25 TABLET, FILM COATED ORAL EVERY 8 HOURS PRN
Status: DISCONTINUED | OUTPATIENT
Start: 2022-08-19 | End: 2022-08-19

## 2022-08-19 RX ORDER — HYDRALAZINE HYDROCHLORIDE 20 MG/ML
10 INJECTION INTRAMUSCULAR; INTRAVENOUS EVERY 6 HOURS PRN
Status: DISCONTINUED | OUTPATIENT
Start: 2022-08-19 | End: 2022-08-22 | Stop reason: HOSPADM

## 2022-08-19 RX ORDER — LEVETIRACETAM 500 MG/1
1000 TABLET ORAL 2 TIMES DAILY
Status: DISCONTINUED | OUTPATIENT
Start: 2022-08-19 | End: 2022-08-20

## 2022-08-19 RX ORDER — ENOXAPARIN SODIUM 100 MG/ML
40 INJECTION SUBCUTANEOUS EVERY 24 HOURS
Status: DISCONTINUED | OUTPATIENT
Start: 2022-08-19 | End: 2022-08-22 | Stop reason: HOSPADM

## 2022-08-19 RX ORDER — OXYCODONE HYDROCHLORIDE 10 MG/1
30 TABLET ORAL EVERY 4 HOURS PRN
Status: DISCONTINUED | OUTPATIENT
Start: 2022-08-19 | End: 2022-08-22 | Stop reason: HOSPADM

## 2022-08-19 RX ORDER — SODIUM CHLORIDE 0.9 % (FLUSH) 0.9 %
10 SYRINGE (ML) INJECTION EVERY 12 HOURS PRN
Status: DISCONTINUED | OUTPATIENT
Start: 2022-08-19 | End: 2022-08-22 | Stop reason: HOSPADM

## 2022-08-19 RX ADMIN — SENNOSIDES AND DOCUSATE SODIUM 1 TABLET: 50; 8.6 TABLET ORAL at 08:08

## 2022-08-19 RX ADMIN — DEXTROSE AND SODIUM CHLORIDE: 5; .9 INJECTION, SOLUTION INTRAVENOUS at 01:08

## 2022-08-19 RX ADMIN — PROMETHAZINE HYDROCHLORIDE 25 MG: 25 TABLET ORAL at 03:08

## 2022-08-19 RX ADMIN — BUPROPION HYDROCHLORIDE 75 MG: 75 TABLET, FILM COATED ORAL at 09:08

## 2022-08-19 RX ADMIN — HYDRALAZINE HYDROCHLORIDE 25 MG: 25 TABLET, FILM COATED ORAL at 05:08

## 2022-08-19 RX ADMIN — Medication 6 MG: at 09:08

## 2022-08-19 RX ADMIN — HYDROMORPHONE HYDROCHLORIDE 1 MG: 1 INJECTION, SOLUTION INTRAMUSCULAR; INTRAVENOUS; SUBCUTANEOUS at 02:08

## 2022-08-19 RX ADMIN — ALBUMIN (HUMAN): 12.5 SOLUTION INTRAVENOUS at 12:08

## 2022-08-19 RX ADMIN — FAMOTIDINE 20 MG: 20 TABLET ORAL at 08:08

## 2022-08-19 RX ADMIN — ONDANSETRON 8 MG: 2 INJECTION INTRAMUSCULAR; INTRAVENOUS at 10:08

## 2022-08-19 RX ADMIN — MORPHINE SULFATE 30 MG: 30 TABLET, FILM COATED, EXTENDED RELEASE ORAL at 09:08

## 2022-08-19 RX ADMIN — HYDROMORPHONE HYDROCHLORIDE 1 MG: 1 INJECTION, SOLUTION INTRAMUSCULAR; INTRAVENOUS; SUBCUTANEOUS at 03:08

## 2022-08-19 RX ADMIN — ENOXAPARIN SODIUM 40 MG: 100 INJECTION SUBCUTANEOUS at 05:08

## 2022-08-19 RX ADMIN — OXYCODONE HYDROCHLORIDE 30 MG: 10 TABLET ORAL at 11:08

## 2022-08-19 RX ADMIN — HYDROMORPHONE HYDROCHLORIDE 1 MG: 1 INJECTION, SOLUTION INTRAMUSCULAR; INTRAVENOUS; SUBCUTANEOUS at 10:08

## 2022-08-19 RX ADMIN — HYDROMORPHONE HYDROCHLORIDE 1 MG: 1 INJECTION, SOLUTION INTRAMUSCULAR; INTRAVENOUS; SUBCUTANEOUS at 07:08

## 2022-08-19 RX ADMIN — POLYETHYLENE GLYCOL 3350 17 G: 17 POWDER, FOR SOLUTION ORAL at 08:08

## 2022-08-19 RX ADMIN — LEVETIRACETAM 1000 MG: 500 TABLET, FILM COATED ORAL at 08:08

## 2022-08-19 RX ADMIN — LEVETIRACETAM 1000 MG: 500 TABLET, FILM COATED ORAL at 09:08

## 2022-08-19 RX ADMIN — ONDANSETRON 8 MG: 2 INJECTION INTRAMUSCULAR; INTRAVENOUS at 08:08

## 2022-08-19 RX ADMIN — HYDROMORPHONE HYDROCHLORIDE 1 MG: 1 INJECTION, SOLUTION INTRAMUSCULAR; INTRAVENOUS; SUBCUTANEOUS at 06:08

## 2022-08-19 RX ADMIN — DEXTROSE AND SODIUM CHLORIDE: 5; .9 INJECTION, SOLUTION INTRAVENOUS at 11:08

## 2022-08-19 RX ADMIN — FAMOTIDINE 20 MG: 20 TABLET ORAL at 09:08

## 2022-08-19 RX ADMIN — MORPHINE SULFATE 30 MG: 30 TABLET, FILM COATED, EXTENDED RELEASE ORAL at 08:08

## 2022-08-19 RX ADMIN — OXYCODONE HYDROCHLORIDE 30 MG: 10 TABLET ORAL at 09:08

## 2022-08-19 RX ADMIN — MORPHINE SULFATE 30 MG: 30 TABLET, FILM COATED, EXTENDED RELEASE ORAL at 03:08

## 2022-08-19 RX ADMIN — ONDANSETRON 8 MG: 2 INJECTION INTRAMUSCULAR; INTRAVENOUS at 02:08

## 2022-08-19 RX ADMIN — ALBUMIN HUMAN 25 G: 0.25 SOLUTION INTRAVENOUS at 01:08

## 2022-08-19 RX ADMIN — LEVETIRACETAM 1000 MG: 500 TABLET, FILM COATED ORAL at 03:08

## 2022-08-19 RX ADMIN — LIDOCAINE HYDROCHLORIDE 5 ML: 10 INJECTION, SOLUTION INFILTRATION; PERINEURAL at 11:08

## 2022-08-19 RX ADMIN — BUPROPION HYDROCHLORIDE 75 MG: 75 TABLET, FILM COATED ORAL at 08:08

## 2022-08-19 RX ADMIN — ONDANSETRON 4 MG: 2 INJECTION INTRAMUSCULAR; INTRAVENOUS at 01:08

## 2022-08-19 NOTE — ED NOTES
Covid Negative. Pt laying flat in stretcher at this time. Pt denies discomfort. Family at hallway bedside.

## 2022-08-19 NOTE — ASSESSMENT & PLAN NOTE
Severe Malnutrition  Nutrition Problem  Severe Protein-Calorie Malnutrition  Malnutrition in the context of Chronic Illness/Injury/ Acute illness/injury/ Social/Environmental circumstances    Related to (etiology):   Inadequate energy intake    Signs and Symptoms (as evidenced by):   Body Fat Depletion: moderate depletion of orbital, triceps   Muscle Mass Depletion: severe depletion of temples, clavicle region, interosseous muscle, lower extremities  Weight Loss: 20% x 6 months    Interventions/Recommendations (treatment strategy):  Collaboration of nutrition care with other providers  [facility]    Nutrition Diagnosis Status:   New

## 2022-08-19 NOTE — PROGRESS NOTES
Pharmacist Renal Dose Adjustment Note    Joseluis Garner is a 51 y.o. male being treated with the medication famotidine    Patient Data:    Vital Signs (Most Recent):  Temp: 98.8 °F (37.1 °C) (08/18/22 2338)  Pulse: 93 (08/18/22 2338)  Resp: 18 (08/18/22 2338)  BP: (!) 140/73 (08/18/22 2338)  SpO2: 98 % (08/18/22 2338)   Vital Signs (72h Range):  Temp:  [97.5 °F (36.4 °C)-98.8 °F (37.1 °C)]   Pulse:  []   Resp:  [16-18]   BP: (132-140)/()   SpO2:  [98 %]      Recent Labs   Lab 08/14/22  1411 08/18/22 1912   CREATININE 0.7 2.0*     Serum creatinine: 2 mg/dL (H) 08/18/22 1912  Estimated creatinine clearance: 41.6 mL/min (A)    Medication:famotidine dose: 20mg frequency twice a day will be changed to medication:famotidine dose:20mg frequency:daily    Pharmacist's Name: Gaurav Pepper  Pharmacist's Extension: [id number]

## 2022-08-19 NOTE — ACP (ADVANCE CARE PLANNING)
Advance Care Planning     Code Status  In light of the patients advanced and life limiting illness, I engaged the the patient in a conversation about the patient's preferences for care at the very end of life. The patient wishes to have CPR or other invasive treatments performed when his heart and/or breathing stops. I communicated to the patient that a full code order would be placed in his medical record to reflect this preference. I spent a total of 5 minutes engaging the patient in this advance care planning discussion.     Tara Pascal, DO  Internal Medicine PGY-3  Medical Oncology

## 2022-08-19 NOTE — ED NOTES
Pt lying on stretcher. IVFs infusing. Mother at the bedside. Pt request more pain medication. MD notified. Pt and his mother also informed  that CT will be coming to get him shortly and when able to obtain the urine sample.

## 2022-08-19 NOTE — ED PROVIDER NOTES
Encounter Date: 8/18/2022       History     Chief Complaint   Patient presents with    Nausea     Pt believes he is dehydrated. Emesis everyday x3 days. Only able to keep water down. Denies blood. Hx of stage 4 CA.      51-year-old male with a history of metastatic melanoma presenting with a 3 day history of nausea and vomiting.  Patient says that he came into the ED on the 14th with similar complaints said he was told that the his sodium was low he but he preferred to go home than to be admitted.  The patient says that over the last couple of days he is had an inability to tolerate anything by mouth so he decided to come back in.  The patient says that he has had significant abdominal pain and distension.  He describes the pain as diffuse and says his usual pain regimen is not sufficient to control it. He is currently on immunotherapy.  He denies any chest pain or recent fevers.    The history is provided by the patient and the spouse.     Review of patient's allergies indicates:  No Known Allergies  Past Medical History:   Diagnosis Date    Seizures      No past surgical history on file.  No family history on file.  Social History     Tobacco Use    Smoking status: Current Every Day Smoker    Smokeless tobacco: Never Used   Substance Use Topics    Alcohol use: Yes    Drug use: Yes     Types: Methamphetamines, Marijuana     Review of Systems   Constitutional: Negative for chills and fever.   HENT: Negative for congestion and sore throat.    Eyes: Negative for redness and visual disturbance.   Respiratory: Negative for cough and shortness of breath.    Cardiovascular: Negative for chest pain and leg swelling.   Gastrointestinal: Positive for abdominal distention, abdominal pain, nausea and vomiting. Negative for diarrhea.   Genitourinary: Negative for difficulty urinating and dysuria.   Musculoskeletal: Negative for back pain and neck pain.   Skin: Negative for pallor and rash.   Neurological: Negative for  dizziness and syncope.   Psychiatric/Behavioral: Negative for confusion and dysphoric mood.       Physical Exam     Initial Vitals   BP Pulse Resp Temp SpO2   08/18/22 1900 08/18/22 1900 08/18/22 1900 08/18/22 1901 08/18/22 1900   (!) 132/108 (!) 114 18 97.5 °F (36.4 °C) 98 %      MAP       --                Physical Exam    Nursing note and vitals reviewed.  Constitutional: He appears well-developed and well-nourished. He is not diaphoretic.   HENT:   Head: Normocephalic and atraumatic.   Eyes: Pupils are equal, round, and reactive to light.   Neck: Neck supple.   Normal range of motion.  Pulmonary/Chest: Breath sounds normal. He has no wheezes. He has no rhonchi.   Abdominal: He exhibits distension. There is abdominal tenderness. There is no rebound and no guarding.   Musculoskeletal:         General: No tenderness or edema. Normal range of motion.      Cervical back: Normal range of motion and neck supple.     Neurological: He is alert and oriented to person, place, and time. He has normal strength. GCS score is 15. GCS eye subscore is 4. GCS verbal subscore is 5. GCS motor subscore is 6.   Skin: Skin is warm and dry. Capillary refill takes less than 2 seconds. No rash noted. No erythema. No pallor.   Psychiatric: He has a normal mood and affect. His behavior is normal. Judgment and thought content normal.         ED Course   Procedures  Labs Reviewed   CBC W/ AUTO DIFFERENTIAL - Abnormal; Notable for the following components:       Result Value    WBC 18.29 (*)     Platelets 666 (*)     Gran # (ANC) 15.4 (*)     Immature Grans (Abs) 0.08 (*)     Gran % 84.3 (*)     Lymph % 10.4 (*)     All other components within normal limits   COMPREHENSIVE METABOLIC PANEL - Abnormal; Notable for the following components:    Sodium 125 (*)     Chloride 87 (*)     CO2 22 (*)     Glucose 121 (*)     BUN 21 (*)     Creatinine 2.0 (*)     ALT <5 (*)     eGFR 39.4 (*)     All other components within normal limits   LACTIC ACID,  PLASMA   LIPASE   LIPASE    Narrative:     add on lipase per dr cely maria/order#[id number] @21:59   08/18/2022   SARS-COV-2 RNA AMPLIFICATION, QUAL   URINALYSIS, REFLEX TO URINE CULTURE   COMPREHENSIVE METABOLIC PANEL   MAGNESIUM   PHOSPHORUS   CBC W/ AUTO DIFFERENTIAL     EKG Readings: (Independently Interpreted)   Initial Reading: No STEMI. Rhythm: Normal Sinus Rhythm. Heart Rate: 85. Ectopy: No Ectopy.   NSR with no evidence of STEMI       Imaging Results          CT Abdomen Pelvis  Without Contrast (Final result)  Result time 08/19/22 00:53:31    Final result by Lavelle Leach MD (08/19/22 00:53:31)                 Impression:      No evidence of bowel obstruction.    Worsening, now large volume abdominopelvic ascites with peritoneal and omental nodularity which may represent peritoneal carcinomatosis.    Grossly stable bilateral pulmonary nodules which may represent metastatic disease.    Grossly stable soft tissue nodules in the right chest wall and left lower quadrant anterior abdominal wall which may represent metastatic nodules, though nonspecific.    Additional findings discussed in the body of the report.    Overall limited evaluation in the absence of IV contrast for additional metastatic disease.      Electronically signed by: Lavelle Leach MD  Date:    08/19/2022  Time:    00:53             Narrative:    EXAMINATION:  CT ABDOMEN PELVIS WITHOUT CONTRAST    CLINICAL HISTORY:  Bowel obstruction suspected;    TECHNIQUE:  Low dose axial images, sagittal and coronal reformations were obtained from the lung bases to the pubic symphysis.  Oral contrast was not administered.    COMPARISON:  CT chest abdomen pelvis, 06/15/2022.  CT chest abdomen pelvis, 04/16/2022.    FINDINGS:  Lower chest: Heart size is normal.  Multiple bilateral pulmonary nodules again noted.  1.5 cm nodule in the left upper lobe (series 2, image 15; previously 1.5 cm).  Left lower lobe pulmonary nodule measures 1.1 cm in size  (axial image 46; previously 1.2 cm).  Right middle lobe pulmonary nodule measures 0.8 cm in size, grossly stable.  5 mm nodule in the left upper lobe not definitively seen on the prior study (axial image 31).  Similar appearance of subcentimeter nodule in the right lower lobe.  There is dependent atelectasis in the right lower lobe and linear subsegmental atelectasis in the bilateral lower lobes.  No pleural fluid.  No pericardial effusion.    Abdomen:    Evaluation of the solid abdominal organs and bowel is limited in the absence of IV contrast.    Relatively small size of the liver with subtle nodular contour.  No contour deforming hepatic mass identified.  Gallbladder is mildly distended with probable biliary sludge.  No calcified gallstones identified.  No intra-or extrahepatic biliary ductal dilatation.    Spleen is not enlarged.  Adrenal glands and pancreas are unremarkable.    Kidneys are symmetric.  No renal or ureteral stones. No hydronephrosis.    No small bowel obstruction.  There are scattered colonic diverticula without obvious findings of diverticulitis.  There is diffuse mesenteric edema.  Worsening, now large volume abdominopelvic ascites.  There is peritoneal and omental nodularity, most pronounced in the left hemiabdomen.  Additional areas of peritoneal nodularity in the right hemiabdomen as well in the right pericolic gutter.    Abdominal aorta is normal in caliber with mild calcific atherosclerosis.    No bulky retroperitoneal lymphadenopathy.    Pelvis: Urinary bladder and rectum are unremarkable.  Moderate volume pelvic free fluid.    Bones and soft tissues: No aggressive osseous lesions. Minimal superior endplate height loss at T12 and L2 as seen previously.  Mild degenerative changes in the spine.  Mild body wall edema.  Soft tissue nodule in the right lateral chest wall measures approximately 2.6 cm in size (axial image 34; previously 2.7 cm).  Questionable soft tissue nodule in the left  lower quadrant anterior abdominal wall (axial image 149).                                 Medications   sodium chloride 0.9% flush 10 mL (has no administration in time range)   naloxone 0.4 mg/mL injection 0.02 mg (has no administration in time range)   glucose chewable tablet 16 g (has no administration in time range)   glucose chewable tablet 24 g (has no administration in time range)   dextrose 10% bolus 125 mL (has no administration in time range)   dextrose 10% bolus 250 mL (has no administration in time range)   glucagon (human recombinant) injection 1 mg (has no administration in time range)   enoxaparin injection 40 mg (has no administration in time range)   acetaminophen tablet 650 mg (has no administration in time range)   senna-docusate 8.6-50 mg per tablet 1 tablet (has no administration in time range)   promethazine tablet 25 mg (has no administration in time range)   prochlorperazine tablet 10 mg (has no administration in time range)   ondansetron injection 4 mg (4 mg Intravenous Given 8/19/22 0112)   melatonin tablet 6 mg (has no administration in time range)   aluminum-magnesium hydroxide-simethicone 200-200-20 mg/5 mL suspension 30 mL (has no administration in time range)   dextrose 5 % and 0.9 % NaCl infusion ( Intravenous New Bag 8/19/22 0116)   ondansetron injection 4 mg (4 mg Intravenous Given 8/18/22 2006)   lactated ringers bolus 1,000 mL (0 mLs Intravenous Stopped 8/18/22 2149)   morphine injection 6 mg (6 mg Intravenous Given 8/18/22 2036)   HYDROmorphone injection 1 mg (1 mg Intravenous Given 8/18/22 2151)   promethazine tablet 25 mg (25 mg Oral Given 8/18/22 2150)   sodium chloride 0.9% bolus 1,000 mL (0 mLs Intravenous Stopped 8/18/22 2318)     Medical Decision Making:   History:   Old Medical Records: I decided to obtain old medical records.  Old Records Summarized: records from clinic visits and records from previous admission(s).  Initial Assessment:   51yom with a hx of metastatic  melanoma, complaining of diffuse abdominal pain and nausea. Pts abdomen is distendedwithout rebaound tenderness.  Lactate ordered by physician in triage 1.7, Lipase WNL.    DDx: metastaticdisease, gastroenteritis, dehydration    Unlikely to be SBO or pancreatitis d/t laboratory findings, physical exam less concerning for gallbladder pathology, no pain focality  Clinical Tests:   Lab Tests: Reviewed  Radiological Study: Ordered  Medical Tests: Reviewed  ED Management:  Pt's pain and vomiting were treated and pt was resuscitated with 2L IVF.    Pt's labs significant for PAULA and hyponatremia, pending CT scan. Admitted to heme/onc.            Attending Attestation:   Physician Attestation Statement for Resident:  As the supervising MD   Physician Attestation Statement: I have personally seen and examined this patient.   I agree with the above history. -:   As the supervising MD I agree with the above PE.    As the supervising MD I agree with the above treatment, course, plan, and disposition.            Attending ED Notes:   51-year-old gentleman with known metastatic melanoma presenting with vomiting and poor oral intake.  ED workup concerning for acute kidney injury.  IV fluid resuscitation initiated.  Has abdominal distention on exam, likely worsened metastatic disease.  Doubt bowel obstruction or acute surgical pathology.  Discussed with oncology service, patient will be admitted to them for further management resuscitation.      ED Course as of 08/19/22 0137   u Aug 18, 2022   2120 Sodium(!): 125 [BP]   2121 Chloride(!): 87 [BP]   2121 Anion Gap: 16 [BP]   2121 WBC(!): 18.29 [BP]   2121 Platelets(!): 666 [BP]   2121 Lactate, Ammon: 1.7 [BP]   2245 Creatinine(!): 2.0 [BP]   Fri Aug 19, 2022   0048 SARS-CoV-2 RNA, Amplification, Qual: Negative [BP]      ED Course User Index  [BP] Jose Landeros MD             Clinical Impression:   Final diagnoses:  [R10.9] Abdominal pain  [N17.9] PAULA (acute kidney injury)  (Primary)  [R11.2] Nausea and vomiting, intractability of vomiting not specified, unspecified vomiting type          ED Disposition Condition    Admit               Jose Landeros MD  Resident  08/19/22 0054       Jose Landeros MD  Resident  08/19/22 0103       Echo Mooney MD  08/19/22 0137

## 2022-08-19 NOTE — ED NOTES
Bed: [facility] 02  Expected date:   Expected time:   Means of arrival:   Comments:  Patsy

## 2022-08-19 NOTE — ED NOTES
Attempt to call report made. Receiving nurse unable to take report at this time. Message left for receiving nurse to return call.

## 2022-08-19 NOTE — ED TRIAGE NOTES
Joseluis Debbi Garner, a 51 y.o. male presents to the ED     Triage note:  Chief Complaint   Patient presents with    Nausea     Pt believes he is dehydrated. Emesis everyday x3 days. Only able to keep water down. Denies blood. Hx of stage 4 CA.      Pt also c/o generalized abd pain and some diarrhea. Not currently on chemo or radiation.       Review of patient's allergies indicates:  No Known Allergies  Past Medical History:   Diagnosis Date    Seizures

## 2022-08-19 NOTE — ASSESSMENT & PLAN NOTE
-- Intractable N/V  -- Schedule Zofran 4 mg TID  -- Phenergan PRN  -- Compazine PRN  -- Regular diet as tolerated and Boost supplementation  -- IVF resuscitation

## 2022-08-19 NOTE — ASSESSMENT & PLAN NOTE
-- 125 on presentation  -- Suspect 2/2 hypovolemia in setting of N/V and poor PO intake. Similar picture during hospitalization 4/2022 improved with fluids  -- Urine studies likely not helpful s/p receiving NS x2 L  -- Monitor Na for normalization with fluid resuscitation

## 2022-08-19 NOTE — PLAN OF CARE
Pt arrived to unit from ED all safety  precautions maintained. Pt complained of pain and nausea. PRN medications given and pt states relief obtained. Pt's BP was elevated. Notified MD and she ordered PRN hydralazine. Dose given. Pt in room stable without any signs or symptoms of distress. Will continue to monitor.

## 2022-08-19 NOTE — ASSESSMENT & PLAN NOTE
-- History of stage IV melanoma with metastasis to lungs, axillary lymph nodes, liver, peritoneal carcinomatosis, and brain (on ipilimumab /nivolumab with Dr. Wise)

## 2022-08-19 NOTE — ED NOTES
Pt declines to remove clothing and place gown on. Pt currently in hallway and reports discomfort with changing at this time. No alternate changing area / curtains available.

## 2022-08-19 NOTE — HPI
Joseluis Garner is a 51 y.o. M with history of stage IV melanoma with metastasis to lungs, axillary lymph nodes, liver, peritoneal carcinomatosis, and brain (on ipilimumab /nivolumab with Dr. Wise), CAD s/p PCI, seizures, who presents with N/V. Reports intractable N/V for approximately 3 days unrelieved by anti-emetics at home. Associated diffuse abdominal pain unrelieved by analgesic regimen at home. Abdominal distension. Presented to ED on 8/14 with similar complaint. Also with hyponatremia at that time. Discharged home per his preference but unfortunately has continued to have N/V and inability to tolerate anything by mouth. Denies fever, chills, CP, SOB, diarrhea, constipation, dysuria, or edema. Was hospitalized 3/2022 at [facility] and 4/2022 at [facility] for similar N/V with associated electrolyte derangements and PAULA. Noted ascites during that time requiring multiple paracenteses. Treated with cipro for SPB prophylaxis. Fluid cytology positive for malignancy.

## 2022-08-19 NOTE — ASSESSMENT & PLAN NOTE
-- CT abdomen pelvis reveals worsening, now large volume abdominopelvic ascites with peritoneal and omental nodularity which may represent peritoneal carcinomatosis.  -- IR paracentesis, diagnostic and therapeutic. Low suspicion for sbp at this time. F/u fluid studies  -- Recurrent paracenteses. Consider pleurx

## 2022-08-19 NOTE — SUBJECTIVE & OBJECTIVE
Patient information was obtained from patient, past medical records, and ER records.     Oncology History:   Metastatic melanoma   1/28/2022 Cancer Staged     Staging form: Melanoma of the Skin, AJCC 8th Edition  - Clinical stage from 1/28/2022: Stage IV (cTX, cNX, cM1)      4/4/2022 Initial Diagnosis     Metastatic melanoma      6/14/2022 -  Chemotherapy     Treatment Summary   Plan Name: OP NIVOLUMAB 1 MG/KG IPILIMUMAB 3MG/KG FOLLOWED BY NIVOLUMAB 480MG Q4W  Treatment Goal: Control  Status: Active  Start Date: 6/14/2022  End Date: 5/26/2023 (Planned)  Provider: Clayton Wise MD  Chemotherapy: ipilimumab (YERVOY) 200 mg in sodium chloride 0.9% 140 mL chemo infusion, 211 mg, Intravenous, Clinic/HOD 1 time, 3 of 3 cycles  Administration: 200 mg (6/14/2022), 200 mg (7/8/2022)  nivolumab 70 mg in sodium chloride 0.9% 57 mL infusion, 1 mg/kg = 70 mg, Intravenous, Clinic/HOD 1 time, 3 of 14 cycles  Dose modification: 480 mg (original dose 1 mg/kg, Cycle 4, Reason: MD Discretion)  Administration: 70 mg (6/14/2022), 70 mg (7/8/2022)           (Not in a hospital admission)      Patient has no known allergies.     Past Medical History:   Diagnosis Date    Seizures      No past surgical history on file.  Family History    None       Tobacco Use    Smoking status: Current Every Day Smoker    Smokeless tobacco: Never Used   Substance and Sexual Activity    Alcohol use: Yes    Drug use: Yes     Types: Methamphetamines, Marijuana    Sexual activity: Not on file       Review of Systems   Constitutional:  Positive for activity change, appetite change and fatigue. Negative for chills and fever.   HENT:  Negative for sore throat.    Respiratory:  Negative for cough and shortness of breath.    Cardiovascular:  Negative for chest pain and leg swelling.   Gastrointestinal:  Positive for abdominal pain, nausea and vomiting. Negative for constipation and diarrhea.   Genitourinary:  Negative for dysuria.   Musculoskeletal:  Negative  for myalgias.   Skin:  Negative for rash.   Neurological:  Positive for weakness. Negative for dizziness and headaches.   Psychiatric/Behavioral:  Negative for confusion.    Objective:     Vital Signs (Most Recent):  Temp: 97.5 °F (36.4 °C) (08/18/22 1901)  Pulse: (!) 114 (08/18/22 1900)  Resp: 18 (08/18/22 2151)  BP: (!) 132/108 (08/18/22 1900)  SpO2: 98 % (08/18/22 1900)   Vital Signs (24h Range):  Temp:  [97.5 °F (36.4 °C)] 97.5 °F (36.4 °C)  Pulse:  [114] 114  Resp:  [16-18] 18  SpO2:  [98 %] 98 %  BP: (132)/(108) 132/108     Weight: 68 kg (150 lb)  Body mass index is 20.92 kg/m².  Body surface area is 1.85 meters squared.    ECOG SCORE           [unfilled]    Lines/Drains/Airways       Peripheral Intravenous Line  Duration                  Peripheral IV - Single Lumen 08/18/22 1914 20 G Left Forearm <1 day                    Physical Exam  Constitutional:       Appearance: He is ill-appearing.   HENT:      Head: Normocephalic and atraumatic.      Mouth/Throat:      Mouth: Mucous membranes are dry.      Pharynx: Oropharynx is clear.   Eyes:      Extraocular Movements: Extraocular movements intact.      Pupils: Pupils are equal, round, and reactive to light.   Cardiovascular:      Rate and Rhythm: Regular rhythm. Tachycardia present.      Pulses: Normal pulses.      Heart sounds: Normal heart sounds.   Pulmonary:      Effort: Pulmonary effort is normal. No respiratory distress.      Breath sounds: Normal breath sounds.   Abdominal:      General: Bowel sounds are normal. There is distension.      Palpations: Abdomen is soft.      Tenderness: There is abdominal tenderness.   Musculoskeletal:         General: No swelling.      Cervical back: Neck supple.   Skin:     General: Skin is warm and dry.      Capillary Refill: Capillary refill takes less than 2 seconds.   Neurological:      General: No focal deficit present.      Mental Status: He is alert and oriented to person, place, and time. Mental status is at baseline.    Psychiatric:         Mood and Affect: Mood normal.         Behavior: Behavior normal.         Thought Content: Thought content normal.         Judgment: Judgment normal.       Significant Labs:   BMP:   Recent Labs   Lab 08/18/22 1912   *   *   K 4.8   CL 87*   CO2 22*   BUN 21*   CREATININE 2.0*   CALCIUM 9.6   , CBC:   Recent Labs   Lab 08/18/22 1912   WBC 18.29*   HGB 17.4   HCT 50.7   *   , and CMP:   Recent Labs   Lab 08/18/22 1912   *   K 4.8   CL 87*   CO2 22*   *   BUN 21*   CREATININE 2.0*   CALCIUM 9.6   PROT 6.7   ALBUMIN 3.6   BILITOT 0.7   ALKPHOS 55   AST 12   ALT <5*   ANIONGAP 16       Diagnostic Results:  I have reviewed all pertinent imaging results/findings within the past 24 hours.    EXAMINATION:  CT ABDOMEN PELVIS WITHOUT CONTRAST     CLINICAL HISTORY:  Bowel obstruction suspected;     TECHNIQUE:  Low dose axial images, sagittal and coronal reformations were obtained from the lung bases to the pubic symphysis.  Oral contrast was not administered.     COMPARISON:  CT chest abdomen pelvis, 06/15/2022.  CT chest abdomen pelvis, 04/16/2022.     FINDINGS:  Lower chest: Heart size is normal.  Multiple bilateral pulmonary nodules again noted.  1.5 cm nodule in the left upper lobe (series 2, image 15; previously 1.5 cm).  Left lower lobe pulmonary nodule measures 1.1 cm in size (axial image 46; previously 1.2 cm).  Right middle lobe pulmonary nodule measures 0.8 cm in size, grossly stable.  5 mm nodule in the left upper lobe not definitively seen on the prior study (axial image 31).  Similar appearance of subcentimeter nodule in the right lower lobe.  There is dependent atelectasis in the right lower lobe and linear subsegmental atelectasis in the bilateral lower lobes.  No pleural fluid.  No pericardial effusion.     Abdomen:     Evaluation of the solid abdominal organs and bowel is limited in the absence of IV contrast.     Relatively small size of the liver with  subtle nodular contour.  No contour deforming hepatic mass identified.  Gallbladder is mildly distended with probable biliary sludge.  No calcified gallstones identified.  No intra-or extrahepatic biliary ductal dilatation.     Spleen is not enlarged.  Adrenal glands and pancreas are unremarkable.     Kidneys are symmetric.  No renal or ureteral stones. No hydronephrosis.     No small bowel obstruction.  There are scattered colonic diverticula without obvious findings of diverticulitis.  There is diffuse mesenteric edema.  Worsening, now large volume abdominopelvic ascites.  There is peritoneal and omental nodularity, most pronounced in the left hemiabdomen.  Additional areas of peritoneal nodularity in the right hemiabdomen as well in the right pericolic gutter.     Abdominal aorta is normal in caliber with mild calcific atherosclerosis.     No bulky retroperitoneal lymphadenopathy.     Pelvis: Urinary bladder and rectum are unremarkable.  Moderate volume pelvic free fluid.     Bones and soft tissues: No aggressive osseous lesions. Minimal superior endplate height loss at T12 and L2 as seen previously.  Mild degenerative changes in the spine.  Mild body wall edema.  Soft tissue nodule in the right lateral chest wall measures approximately 2.6 cm in size (axial image 34; previously 2.7 cm).  Questionable soft tissue nodule in the left lower quadrant anterior abdominal wall (axial image 149).     Impression:     No evidence of bowel obstruction.     Worsening, now large volume abdominopelvic ascites with peritoneal and omental nodularity which may represent peritoneal carcinomatosis.     Grossly stable bilateral pulmonary nodules which may represent metastatic disease.     Grossly stable soft tissue nodules in the right chest wall and left lower quadrant anterior abdominal wall which may represent metastatic nodules, though nonspecific.     Additional findings discussed in the body of the report.     Overall limited  evaluation in the absence of IV contrast for additional metastatic disease.        Electronically signed by: Lavelle Leach MD  Date:                                            08/19/2022  Time:                                           00:53

## 2022-08-19 NOTE — PROCEDURES
Radiology Post-Procedure Note    Pre Op Diagnosis: Ascites  Post Op Diagnosis: Same    Procedure: Paracentesis    Procedure performed by: Dion Goncalves MD, Karl Dorado MD    Written Informed Consent Obtained: Yes  Specimen Removed: YES - Serous  Estimated Blood Loss: Minimal    Findings:   Successful paracentesis.  Albumin administered PRN per protocol.    Patient tolerated procedure well.    Karl Dorado MD  Interventional Radiology

## 2022-08-19 NOTE — PLAN OF CARE
Paracentesis done. Removed 6000 ml. 37.5g Albumin 25% administered per protocol. Patient AAOx3, no distress noted, respirations even and unlabored. Right abdominal site clean, dry, and intact; no bleeding or hematoma noted. Patient to be transferred back to room number 860 via patient transporter. Patient stable for transport. Phone report given to EZE Kemp.

## 2022-08-19 NOTE — H&P
Radiology History & Physical      SUBJECTIVE:     Chief Complaint: abdominal distention    History of Present Illness:  Joseluis Garner is a 51 y.o. male who presents for ultrasound guided paracentesis    Past Medical History:   Diagnosis Date    Seizures      No past surgical history on file.    Home Meds:   Prior to Admission medications    Medication Sig Start Date End Date Taking? Authorizing Provider   ALPRAZolam (XANAX) 0.5 MG tablet Take 1 tablet (0.5 mg total) by mouth On call Procedure (Claustrophobia in MRI). 6/13/22 6/13/23 Yes Clayton Wise MD   buPROPion (WELLBUTRIN SR) 100 MG TBSR 12 hr tablet Take 1 tablet (100 mg total) by mouth 2 (two) times daily. 5/10/22 5/10/23 Yes Kitty Segura NP   famotidine (PEPCID) 20 MG tablet Take 1 tablet (20 mg total) by mouth 2 (two) times daily. 4/24/22 4/24/23 Yes Roel Campos MD   levETIRAcetam (KEPPRA) 1000 MG tablet Take 1,000 mg by mouth 2 (two) times a day. 6/28/21  Yes Historical Provider   morphine (MS CONTIN) 30 MG 12 hr tablet Take 1 tablet (30 mg total) by mouth 2 (two) times daily. 8/12/22  Yes Guerline Crouch MD   OLANZapine (ZYPREXA) 2.5 MG tablet Take 1 tablet (2.5 mg total) by mouth every 6 (six) hours as needed (anxiety). 8/9/22  Yes Guerline Crouhc MD   ondansetron (ZOFRAN-ODT) 8 MG TbDL Dissolve 1 tablet (8 mg total) by mouth every 12 (twelve) hours as needed. 8/10/22 8/10/23 Yes Guerline Crouch MD   oxyCODONE (ROXICODONE) 30 MG Tab Take 1 tablet (30 mg total) by mouth every 4 (four) hours as needed (cancer related pain). 8/15/22  Yes Guerline Crouch MD   polyethylene glycol (GLYCOLAX) 17 gram/dose powder Dissolve one capful (17 g) in liquid and take by mouth once daily. 4/22/22  Yes Roel Campos MD   prochlorperazine (COMPAZINE) 5 MG tablet Take 1 tablet (5 mg total) by mouth every 6 (six) hours as needed for Nausea. 5/31/22 5/31/23 Yes Clayton Wise MD   senna (SENNA) 8.6 mg tablet Take 1 tablet by mouth  twice daily 3/22/22  Yes    clobetasoL (TEMOVATE) 0.05 % cream Apply topically 2 (two) times daily.  Patient taking differently: Apply topically 2 (two) times daily as needed. 7/8/22   Clayton Wise MD   naloxone (NARCAN) 4 mg/actuation Spry 4mg by nasal route as needed for opioid overdose; may repeat every 2-3 minutes in alternating nostrils until medical help arrives. Call 911 6/22/22   Guerline Crouch MD   senna-docusate 8.6-50 mg (PERICOLACE) 8.6-50 mg per tablet Take 1 tablet by mouth daily as needed for Constipation. 5/25/22   Clayton Wise MD   promethazine (PHENERGAN) 25 MG suppository Place 1 suppository (25 mg total) rectally every 6 (six) hours as needed for Nausea. 8/14/22 8/19/22  Marco Antonio Flower PA-C     Anticoagulants/Antiplatelets: no anticoagulation    Allergies: Review of patient's allergies indicates:  No Known Allergies  Sedation History:  no adverse reactions    Review of Systems:   Hematological: no known coagulopathies  Respiratory: no shortness of breath  Cardiovascular: no chest pain  Gastrointestinal: no abdominal pain  Genito-Urinary: no dysuria  Musculoskeletal: negative  Neurological: no TIA or stroke symptoms         OBJECTIVE:     Vital Signs (Most Recent)  Temp: 98.7 °F (37.1 °C) (08/19/22 1405)  Pulse: 87 (08/19/22 1405)  Resp: 18 (08/19/22 1415)  BP: (!) 170/90 (08/19/22 1405)  SpO2: 95 % (08/19/22 1405)    Physical Exam:  ASA: 2  Mallampati: n/a    General: no acute distress  Mental Status: alert and oriented to person, place and time  HEENT: normocephalic, atraumatic  Chest: unlabored breathing  Heart: regular heart rate  Abdomen: distended  Extremity: moves all extremities    ASSESSMENT/PLAN:     Sedation Plan: local  Patient will undergo ultrasound guided paracentesis.    Karl Dorado MD  Interventional Radiology

## 2022-08-19 NOTE — H&P
[facility] Dept  Hematology  Medical Oncology  H&P    Subjective:     Principal Problem: PAULA (acute kidney injury)    HPI: Joseluis Garner is a 51 y.o. M with history of stage IV melanoma with metastasis to lungs, axillary lymph nodes, liver, peritoneal carcinomatosis, and brain (on ipilimumab /nivolumab with Dr. Wise), CAD s/p PCI, seizures, who presents with N/V. Reports intractable N/V for approximately 3 days unrelieved by anti-emetics at home. Associated diffuse abdominal pain unrelieved by analgesic regimen at home. Abdominal distension. Presented to ED on 8/14 with similar complaint. Also with hyponatremia at that time. Discharged home per his preference but unfortunately has continued to have N/V and inability to tolerate anything by mouth. Denies fever, chills, CP, SOB, diarrhea, constipation, dysuria, or edema. Was hospitalized 3/20[facility] at [facility] and [place] [facility] [place] with associated electrolyte derangements and PAULA. Noted ascites during that time requiring multiple paracenteses. Treated with cipro for SPB prophylaxis. Fluid cytology positive for malignancy.         Patient information was obtained from patient, past medical records, and ER records.     Oncology History:   Metastatic melanoma   1/28/2022 Cancer Staged     Staging form: Melanoma of the Skin, AJCC 8th Edition  - Clinical stage from 1/28/2022: Stage IV (cTX, cNX, cM1)      4/4/2022 Initial Diagnosis     Metastatic melanoma      6/14/2022 -  Chemotherapy     Treatment Summary   Plan Name: OP NIVOLUMAB 1 MG/KG IPILIMUMAB 3MG/KG FOLLOWED BY NIVOLUMAB 480MG Q4W  Treatment Goal: Control  Status: Active  Start Date: 6/14/2022  End Date: 5/26/2023 (Planned)  Provider: Clayton Wise MD  Chemotherapy: ipilimumab (YERVOY) 200 mg in sodium chloride 0.9% 140 mL chemo infusion, 211 mg, Intravenous, Clinic/HOD 1 time, 3 of 3 cycles  Administration: 200 mg (6/14/2022), 200 mg (7/8/2022)  nivolumab 70 mg in sodium chloride 0.9% 57 mL  infusion, 1 mg/kg = 70 mg, Intravenous, Clinic/HOD 1 time, 3 of 14 cycles  Dose modification: 480 mg (original dose 1 mg/kg, Cycle 4, Reason: MD Discretion)  Administration: 70 mg (6/14/2022), 70 mg (7/8/2022)           (Not in a hospital admission)      Patient has no known allergies.     Past Medical History:   Diagnosis Date    Seizures      No past surgical history on file.  Family History    None       Tobacco Use    Smoking status: Current Every Day Smoker    Smokeless tobacco: Never Used   Substance and Sexual Activity    Alcohol use: Yes    Drug use: Yes     Types: Methamphetamines, Marijuana    Sexual activity: Not on file       Review of Systems   Constitutional:  Positive for activity change, appetite change and fatigue. Negative for chills and fever.   HENT:  Negative for sore throat.    Respiratory:  Negative for cough and shortness of breath.    Cardiovascular:  Negative for chest pain and leg swelling.   Gastrointestinal:  Positive for abdominal pain, nausea and vomiting. Negative for constipation and diarrhea.   Genitourinary:  Negative for dysuria.   Musculoskeletal:  Negative for myalgias.   Skin:  Negative for rash.   Neurological:  Positive for weakness. Negative for dizziness and headaches.   Psychiatric/Behavioral:  Negative for confusion.    Objective:     Vital Signs (Most Recent):  Temp: 97.5 °F (36.4 °C) (08/18/22 1901)  Pulse: (!) 114 (08/18/22 1900)  Resp: 18 (08/18/22 2151)  BP: (!) 132/108 (08/18/22 1900)  SpO2: 98 % (08/18/22 1900)   Vital Signs (24h Range):  Temp:  [97.5 °F (36.4 °C)] 97.5 °F (36.4 °C)  Pulse:  [114] 114  Resp:  [16-18] 18  SpO2:  [98 %] 98 %  BP: (132)/(108) 132/108     Weight: 68 kg (150 lb)  Body mass index is 20.92 kg/m².  Body surface area is 1.85 meters squared.    ECOG SCORE           [unfilled]    Lines/Drains/Airways       Peripheral Intravenous Line  Duration                  Peripheral IV - Single Lumen 08/18/22 1914 20 G Left Forearm <1 day                     Physical Exam  Constitutional:       Appearance: He is ill-appearing.   HENT:      Head: Normocephalic and atraumatic.      Mouth/Throat:      Mouth: Mucous membranes are dry.      Pharynx: Oropharynx is clear.   Eyes:      Extraocular Movements: Extraocular movements intact.      Pupils: Pupils are equal, round, and reactive to light.   Cardiovascular:      Rate and Rhythm: Regular rhythm. Tachycardia present.      Pulses: Normal pulses.      Heart sounds: Normal heart sounds.   Pulmonary:      Effort: Pulmonary effort is normal. No respiratory distress.      Breath sounds: Normal breath sounds.   Abdominal:      General: Bowel sounds are normal. There is distension.      Palpations: Abdomen is soft.      Tenderness: There is abdominal tenderness.   Musculoskeletal:         General: No swelling.      Cervical back: Neck supple.   Skin:     General: Skin is warm and dry.      Capillary Refill: Capillary refill takes less than 2 seconds.   Neurological:      General: No focal deficit present.      Mental Status: He is alert and oriented to person, place, and time. Mental status is at baseline.   Psychiatric:         Mood and Affect: Mood normal.         Behavior: Behavior normal.         Thought Content: Thought content normal.         Judgment: Judgment normal.       Significant Labs:   BMP:   Recent Labs   Lab 08/18/22 1912   *   *   K 4.8   CL 87*   CO2 22*   BUN 21*   CREATININE 2.0*   CALCIUM 9.6   , CBC:   Recent Labs   Lab 08/18/22 1912   WBC 18.29*   HGB 17.4   HCT 50.7   *   , and CMP:   Recent Labs   Lab 08/18/22 1912   *   K 4.8   CL 87*   CO2 22*   *   BUN 21*   CREATININE 2.0*   CALCIUM 9.6   PROT 6.7   ALBUMIN 3.6   BILITOT 0.7   ALKPHOS 55   AST 12   ALT <5*   ANIONGAP 16       Diagnostic Results:  I have reviewed all pertinent imaging results/findings within the past 24 hours.    EXAMINATION:  CT ABDOMEN PELVIS WITHOUT CONTRAST     CLINICAL  HISTORY:  Bowel obstruction suspected;     TECHNIQUE:  Low dose axial images, sagittal and coronal reformations were obtained from the lung bases to the pubic symphysis.  Oral contrast was not administered.     COMPARISON:  CT chest abdomen pelvis, 06/15/2022.  CT chest abdomen pelvis, 04/16/2022.     FINDINGS:  Lower chest: Heart size is normal.  Multiple bilateral pulmonary nodules again noted.  1.5 cm nodule in the left upper lobe (series 2, image 15; previously 1.5 cm).  Left lower lobe pulmonary nodule measures 1.1 cm in size (axial image 46; previously 1.2 cm).  Right middle lobe pulmonary nodule measures 0.8 cm in size, grossly stable.  5 mm nodule in the left upper lobe not definitively seen on the prior study (axial image 31).  Similar appearance of subcentimeter nodule in the right lower lobe.  There is dependent atelectasis in the right lower lobe and linear subsegmental atelectasis in the bilateral lower lobes.  No pleural fluid.  No pericardial effusion.     Abdomen:     Evaluation of the solid abdominal organs and bowel is limited in the absence of IV contrast.     Relatively small size of the liver with subtle nodular contour.  No contour deforming hepatic mass identified.  Gallbladder is mildly distended with probable biliary sludge.  No calcified gallstones identified.  No intra-or extrahepatic biliary ductal dilatation.     Spleen is not enlarged.  Adrenal glands and pancreas are unremarkable.     Kidneys are symmetric.  No renal or ureteral stones. No hydronephrosis.     No small bowel obstruction.  There are scattered colonic diverticula without obvious findings of diverticulitis.  There is diffuse mesenteric edema.  Worsening, now large volume abdominopelvic ascites.  There is peritoneal and omental nodularity, most pronounced in the left hemiabdomen.  Additional areas of peritoneal nodularity in the right hemiabdomen as well in the right pericolic gutter.     Abdominal aorta is normal in  caliber with mild calcific atherosclerosis.     No bulky retroperitoneal lymphadenopathy.     Pelvis: Urinary bladder and rectum are unremarkable.  Moderate volume pelvic free fluid.     Bones and soft tissues: No aggressive osseous lesions. Minimal superior endplate height loss at T12 and L2 as seen previously.  Mild degenerative changes in the spine.  Mild body wall edema.  Soft tissue nodule in the right lateral chest wall measures approximately 2.6 cm in size (axial image 34; previously 2.7 cm).  Questionable soft tissue nodule in the left lower quadrant anterior abdominal wall (axial image 149).     Impression:     No evidence of bowel obstruction.     Worsening, now large volume abdominopelvic ascites with peritoneal and omental nodularity which may represent peritoneal carcinomatosis.     Grossly stable bilateral pulmonary nodules which may represent metastatic disease.     Grossly stable soft tissue nodules in the right chest wall and left lower quadrant anterior abdominal wall which may represent metastatic nodules, though nonspecific.     Additional findings discussed in the body of the report.     Overall limited evaluation in the absence of IV contrast for additional metastatic disease.        Electronically signed by: Lavelle Leach MD  Date:                                            08/19/2022  Time:                                           00:53    Assessment/Plan:     * PAULA (acute kidney injury)  -- Likely pre-renal in setting of N/V and poor PO intake  -- IVF resuscitation  -- Monitor Cr for return to baseline (~ 0.7-0.8)    Intractable nausea and vomiting  -- Intractable N/V  -- Schedule Zofran 4 mg TID  -- Phenergan PRN  -- Compazine PRN  -- Regular diet as tolerated and Boost supplementation  -- IVF resuscitation    Hyponatremia  -- 125 on presentation  -- Suspect 2/2 hypovolemia in setting of N/V and poor PO intake. Similar picture during hospitalization 4/2022 improved with fluids  -- Urine  studies likely not helpful s/p receiving NS x2 L  -- Monitor Na for normalization with fluid resuscitation    Metastatic melanoma  -- History of stage IV melanoma with metastasis to lungs, axillary lymph nodes, liver, peritoneal carcinomatosis, and brain (on ipilimumab /nivolumab with Dr. Wise)      Ascites  -- CT abdomen pelvis reveals worsening, now large volume abdominopelvic ascites with peritoneal and omental nodularity which may represent peritoneal carcinomatosis.  -- IR paracentesis, diagnostic and therapeutic. Low suspicion for sbp at this time. F/u fluid studies  -- Recurrent paracenteses. Consider pleurx        VTE Risk Mitigation (From admission, onward)         Ordered     enoxaparin injection 40 mg  Daily         08/19/22 0104     IP VTE HIGH RISK PATIENT  Once         08/19/22 0104     Place sequential compression device  Until discontinued         08/19/22 0104                Disposition: tbd    Tara Pascal, DO  Bone Marrow Transplant  Hematology  [facility] - Emergency Dept

## 2022-08-19 NOTE — PLAN OF CARE
Patient AAOx3, no distress noted, respirations even and unlabored, will continue to monitor. Blood pressure hypertensive (MD Aware). Vital signs otherwise stable. Acceptance of education, consents signed, H/P done. Labs reviewed. Patient in MPU [place] 3 for Paracentesis procedure. Warm blankets applied to patient. Patient prepped and draped in sterile fashion.

## 2022-08-19 NOTE — ED NOTES
Pt reports small amount of urine given prior to returning from CT. Pt instructed of need for urine again and provided with urinal / specimen cup. Pt reports being uncomfortable urinating in hallway bed at this time.

## 2022-08-19 NOTE — CONSULTS
[facility] Oncology ([facility])  Adult Nutrition  Consult Note    SUMMARY     Recommendations    1. Continue Regular diet      2. Continue Boost Plus TID      3. RD to monitor and follow    Goals: Meet % EEN, EPN by RD f/u  Nutrition Goal Status: new    Assessment and Plan    Severe Malnutrition  Nutrition Problem  Severe Protein-Calorie Malnutrition  Malnutrition in the context of Chronic Illness/Injury/ Acute illness/injury/ Social/Environmental circumstances    Related to (etiology):   Inadequate energy intake    Signs and Symptoms (as evidenced by):   Body Fat Depletion: moderate depletion of orbital, triceps   Muscle Mass Depletion: severe depletion of temples, clavicle region, interosseous muscle, lower extremities  Weight Loss: 20% x 6 months    Interventions/Recommendations (treatment strategy):  Collaboration of nutrition care with other providers  ONS    Nutrition Diagnosis Status:   New     Malnutrition Assessment  Weight Loss (Malnutrition):  (20% x 6 months)  Orbital Region (Subcutaneous Fat Loss): moderate depletion  Upper Arm Region (Subcutaneous Fat Loss): moderate depletion   [place] Region (Muscle Loss): severe depletion  Clavicle Bone Region (Muscle Loss): severe depletion  Clavicle and Acromion Bone Region (Muscle Loss): severe depletion  Dorsal Hand (Muscle Loss): severe depletion  Anterior Thigh Region (Muscle Loss): severe depletion  Posterior Calf Region (Muscle Loss): severe depletion     Reason for Assessment    Reason For Assessment: consult  Diagnosis:  (PAULA)  Relevant Medical History: Metastatic melanoma, intractable N/V  Interdisciplinary Rounds: did not attend    General Information Comments:   Pt with history of stage IV melanoma with metastasis to lungs, axillary lymph nodes, liver, peritoneal carcinomatosis, and brain present with N/V.   RD consulted for decreased PO intake. Spoken to pt and family at bedside. Pt reports decreased appetite in the past wk. Still having  nauseous, denies vomiting. Appetite improving since paracenteses. Pt usually do not have a fixed time for meal, would just eat whenever he feels better. Taking protein shots at home, unsure brand name. Stated UBW was 185lb before he got sick in February. CBW 148lb indicated significant wt loss of 37 lb (20%) over 6 months. NFPE completed today, noted moderate-severe fat and muscle depletion. Pt meet the criteria for severe malnutrition in the context of chronic illness. Discussed ways to increase calorie and protein, good snacks idea. Rec'd taking ONS Boost Plus to optimize calorie intake. Pt and family verbalized understanding.     Nutrition Discharge Planning: Pending medical course    Nutrition Risk Screen    Nutrition Risk Screen: other (see comments) (persistent nausea and vomiting)    Nutrition/Diet History    Spiritual, Cultural Beliefs, Mormon Practices, Values that Affect Care: no    Anthropometrics    Temp: 97.4 °F (36.3 °C)  Height Method: Stated  Height: 6' (182.9 cm)  Height (inches): 72 in  Weight Method: Standard Scale  Weight: 67.4 kg (148 lb 9.4 oz)  Weight (lb): 148.59 lb  Ideal Body Weight (IBW), Male: 178 lb  % Ideal Body Weight, Male (lb): 83.48 %  BMI (Calculated): 20.1     Lab/Procedures/Meds    Pertinent Labs Reviewed: reviewed  Pertinent Labs Comments: Na 126, BUN 21, Cr 1.5, ALP 42, ALT <5, eGFR 55.7  Pertinent Medications Reviewed: reviewed  Pertinent Medications Comments: famotidine, polyethylene glycol, senna-docusate, D5    Estimated/Assessed Needs    Weight Used For Calorie Calculations: 67 kg (147 lb 11.3 oz)  Energy Calorie Requirements (kcal): 1869 kcal  Energy Need Method: [place]-St Jeor (PAL 1.2)  Protein Requirements: 80-100g (1.2-1.5g/kg)  Weight Used For Protein Calculations: 67 kg (147 lb 11.3 oz)  Fluid Requirements (mL): 1ml/kcal or per MD  Estimated Fluid Requirement Method: RDA Method  RDA Method (mL): 1869     Nutrition Prescription Ordered    Current Diet Order:  Regular  Oral Nutrition Supplement: Boost Plus    Evaluation of Received Nutrient/Fluid Intake    I/O: + 1.7 L since admit  Energy Calories Required: not meeting needs  Protein Required: not meeting needs  Comments: LBM 8/17  Tolerance: tolerating    Nutrition Risk    Level of Risk/Frequency of Follow-up: low     Monitor and Evaluation    Food and Nutrient Intake: energy intake, food and beverage intake  Food and Nutrient Adminstration: diet order  Physical Activity and Function: nutrition-related ADLs and IADLs  Anthropometric Measurements: height/length, weight, weight change, body mass index  Biochemical Data, Medical Tests and Procedures: electrolyte and renal panel, gastrointestinal profile, glucose/endocrine profile, inflammatory profile, lipid profile  Nutrition-Focused Physical Findings: overall appearance     Nutrition Follow-Up    RD Follow-up?: Yes     Edel CHAND

## 2022-08-19 NOTE — ASSESSMENT & PLAN NOTE
-- Likely pre-renal in setting of N/V and poor PO intake  -- IVF resuscitation  -- Monitor Cr for return to baseline (~ 0.7-0.8)

## 2022-08-19 NOTE — PROGRESS NOTES
Pharmacist Renal Dose Adjustment Note    Joseluis Garner is a 51 y.o. male being treated with famotidine    Patient Data:    Vital Signs (Most Recent):  Temp: 97.9 °F (36.6 °C) (08/19/22 0810)  Pulse: 83 (08/19/22 0810)  Resp: 20 (08/19/22 1102)  BP: (!) 162/102 (08/19/22 0810)  SpO2: 96 % (08/19/22 0810)   Vital Signs (72h Range):  Temp:  [97.4 °F (36.3 °C)-98.8 °F (37.1 °C)]   Pulse:  []   Resp:  [16-20]   BP: (132-162)/()   SpO2:  [94 %-98 %]      Recent Labs   Lab 08/14/22  1411 08/18/22  1912 08/19/22  0626   CREATININE 0.7 2.0* 1.5*     Serum creatinine: 1.5 mg/dL (H) 08/19/22 0626  Estimated creatinine clearance: 54.9 mL/min (A)    Medication:famotidine 20mg PO daily will be changed to famotidine 20mg PO BID per renal dose adjustment protocol    Pharmacist's Name: Lynn Wolfe  Pharmacist's Extension: [phone]

## 2022-08-19 NOTE — ASSESSMENT & PLAN NOTE
Advance Care Planning     Code Status  In light of the patients advanced and life limiting illness, I engaged the the patient in a conversation about the patient's preferences for care at the very end of life. The patient wishes to have CPR or other invasive treatments performed when his heart and/or breathing stops. I communicated to the patient that a full code order would be placed in his medical record to reflect this preference.  I spent a total of 5 minutes engaging the patient in this advance care planning discussion.

## 2022-08-20 LAB
ALBUMIN SERPL BCP-MCNC: 3.3 G/DL (ref 3.5–5.2)
ALP SERPL-CCNC: 38 U/L (ref 55–135)
ALT SERPL W/O P-5'-P-CCNC: <5 U/L (ref 10–44)
ANION GAP SERPL CALC-SCNC: 11 MMOL/L (ref 8–16)
AST SERPL-CCNC: 13 U/L (ref 10–40)
BASOPHILS # BLD AUTO: 0.17 K/UL (ref 0–0.2)
BASOPHILS NFR BLD: 1.1 % (ref 0–1.9)
BILIRUB SERPL-MCNC: 0.4 MG/DL (ref 0.1–1)
BUN SERPL-MCNC: 29 MG/DL (ref 6–20)
CALCIUM SERPL-MCNC: 8.4 MG/DL (ref 8.7–10.5)
CHLORIDE SERPL-SCNC: 92 MMOL/L (ref 95–110)
CO2 SERPL-SCNC: 26 MMOL/L (ref 23–29)
CREAT SERPL-MCNC: 2 MG/DL (ref 0.5–1.4)
DIFFERENTIAL METHOD: ABNORMAL
EOSINOPHIL # BLD AUTO: 0.4 K/UL (ref 0–0.5)
EOSINOPHIL NFR BLD: 2.8 % (ref 0–8)
ERYTHROCYTE [DISTWIDTH] IN BLOOD BY AUTOMATED COUNT: 14.6 % (ref 11.5–14.5)
EST. GFR  (NO RACE VARIABLE): 39.4 ML/MIN/1.73 M^2
GLUCOSE SERPL-MCNC: 69 MG/DL (ref 70–110)
HCT VFR BLD AUTO: 43.3 % (ref 40–54)
HGB BLD-MCNC: 14.6 G/DL (ref 14–18)
IMM GRANULOCYTES # BLD AUTO: 0.07 K/UL (ref 0–0.04)
IMM GRANULOCYTES NFR BLD AUTO: 0.5 % (ref 0–0.5)
LYMPHOCYTES # BLD AUTO: 3.3 K/UL (ref 1–4.8)
LYMPHOCYTES NFR BLD: 21.8 % (ref 18–48)
MAGNESIUM SERPL-MCNC: 2 MG/DL (ref 1.6–2.6)
MCH RBC QN AUTO: 29.2 PG (ref 27–31)
MCHC RBC AUTO-ENTMCNC: 33.7 G/DL (ref 32–36)
MCV RBC AUTO: 87 FL (ref 82–98)
MONOCYTES # BLD AUTO: 1.4 K/UL (ref 0.3–1)
MONOCYTES NFR BLD: 9.1 % (ref 4–15)
NEUTROPHILS # BLD AUTO: 9.6 K/UL (ref 1.8–7.7)
NEUTROPHILS NFR BLD: 64.7 % (ref 38–73)
NRBC BLD-RTO: 0 /100 WBC
PHOSPHATE SERPL-MCNC: 4.3 MG/DL (ref 2.7–4.5)
PLATELET # BLD AUTO: 458 K/UL (ref 150–450)
PMV BLD AUTO: 9.3 FL (ref 9.2–12.9)
POTASSIUM SERPL-SCNC: 4.3 MMOL/L (ref 3.5–5.1)
PROT SERPL-MCNC: 5.4 G/DL (ref 6–8.4)
RBC # BLD AUTO: 5 M/UL (ref 4.6–6.2)
SODIUM SERPL-SCNC: 129 MMOL/L (ref 136–145)
WBC # BLD AUTO: 14.89 K/UL (ref 3.9–12.7)

## 2022-08-20 PROCEDURE — 80053 COMPREHEN METABOLIC PANEL: CPT | Performed by: STUDENT IN AN ORGANIZED HEALTH CARE EDUCATION/TRAINING PROGRAM

## 2022-08-20 PROCEDURE — 99233 PR SUBSEQUENT HOSPITAL CARE,LEVL III: ICD-10-PCS | Mod: ,,, | Performed by: INTERNAL MEDICINE

## 2022-08-20 PROCEDURE — 36415 COLL VENOUS BLD VENIPUNCTURE: CPT | Performed by: STUDENT IN AN ORGANIZED HEALTH CARE EDUCATION/TRAINING PROGRAM

## 2022-08-20 PROCEDURE — 83735 ASSAY OF MAGNESIUM: CPT | Performed by: STUDENT IN AN ORGANIZED HEALTH CARE EDUCATION/TRAINING PROGRAM

## 2022-08-20 PROCEDURE — 25000003 PHARM REV CODE 250: Performed by: INTERNAL MEDICINE

## 2022-08-20 PROCEDURE — 84100 ASSAY OF PHOSPHORUS: CPT | Performed by: STUDENT IN AN ORGANIZED HEALTH CARE EDUCATION/TRAINING PROGRAM

## 2022-08-20 PROCEDURE — 25000003 PHARM REV CODE 250: Performed by: STUDENT IN AN ORGANIZED HEALTH CARE EDUCATION/TRAINING PROGRAM

## 2022-08-20 PROCEDURE — 63600175 PHARM REV CODE 636 W HCPCS: Performed by: INTERNAL MEDICINE

## 2022-08-20 PROCEDURE — 25000003 PHARM REV CODE 250

## 2022-08-20 PROCEDURE — 99233 SBSQ HOSP IP/OBS HIGH 50: CPT | Mod: ,,, | Performed by: INTERNAL MEDICINE

## 2022-08-20 PROCEDURE — 85025 COMPLETE CBC W/AUTO DIFF WBC: CPT | Performed by: STUDENT IN AN ORGANIZED HEALTH CARE EDUCATION/TRAINING PROGRAM

## 2022-08-20 PROCEDURE — 20600001 HC STEP DOWN PRIVATE ROOM

## 2022-08-20 PROCEDURE — 63600175 PHARM REV CODE 636 W HCPCS: Performed by: STUDENT IN AN ORGANIZED HEALTH CARE EDUCATION/TRAINING PROGRAM

## 2022-08-20 RX ORDER — LEVETIRACETAM 750 MG/1
750 TABLET ORAL 2 TIMES DAILY
Status: DISCONTINUED | OUTPATIENT
Start: 2022-08-20 | End: 2022-08-21

## 2022-08-20 RX ORDER — SODIUM CHLORIDE 9 MG/ML
INJECTION, SOLUTION INTRAVENOUS CONTINUOUS
Status: DISCONTINUED | OUTPATIENT
Start: 2022-08-20 | End: 2022-08-22

## 2022-08-20 RX ORDER — FAMOTIDINE 20 MG/1
20 TABLET, FILM COATED ORAL DAILY
Status: DISCONTINUED | OUTPATIENT
Start: 2022-08-21 | End: 2022-08-22 | Stop reason: HOSPADM

## 2022-08-20 RX ADMIN — ONDANSETRON 8 MG: 2 INJECTION INTRAMUSCULAR; INTRAVENOUS at 01:08

## 2022-08-20 RX ADMIN — OXYCODONE HYDROCHLORIDE 30 MG: 10 TABLET ORAL at 10:08

## 2022-08-20 RX ADMIN — ONDANSETRON 8 MG: 2 INJECTION INTRAMUSCULAR; INTRAVENOUS at 10:08

## 2022-08-20 RX ADMIN — Medication 6 MG: at 10:08

## 2022-08-20 RX ADMIN — OXYCODONE HYDROCHLORIDE 30 MG: 10 TABLET ORAL at 08:08

## 2022-08-20 RX ADMIN — MORPHINE SULFATE 30 MG: 30 TABLET, FILM COATED, EXTENDED RELEASE ORAL at 10:08

## 2022-08-20 RX ADMIN — ONDANSETRON 8 MG: 2 INJECTION INTRAMUSCULAR; INTRAVENOUS at 06:08

## 2022-08-20 RX ADMIN — FAMOTIDINE 20 MG: 20 TABLET ORAL at 09:08

## 2022-08-20 RX ADMIN — LEVETIRACETAM 1000 MG: 500 TABLET, FILM COATED ORAL at 09:08

## 2022-08-20 RX ADMIN — SENNOSIDES AND DOCUSATE SODIUM 1 TABLET: 50; 8.6 TABLET ORAL at 09:08

## 2022-08-20 RX ADMIN — BUPROPION HYDROCHLORIDE 75 MG: 75 TABLET, FILM COATED ORAL at 09:08

## 2022-08-20 RX ADMIN — SODIUM CHLORIDE: 0.9 INJECTION, SOLUTION INTRAVENOUS at 10:08

## 2022-08-20 RX ADMIN — ENOXAPARIN SODIUM 40 MG: 100 INJECTION SUBCUTANEOUS at 04:08

## 2022-08-20 RX ADMIN — OXYCODONE HYDROCHLORIDE 30 MG: 10 TABLET ORAL at 02:08

## 2022-08-20 RX ADMIN — MORPHINE SULFATE 30 MG: 30 TABLET, FILM COATED, EXTENDED RELEASE ORAL at 09:08

## 2022-08-20 RX ADMIN — OXYCODONE HYDROCHLORIDE 30 MG: 10 TABLET ORAL at 03:08

## 2022-08-20 RX ADMIN — BUPROPION HYDROCHLORIDE 75 MG: 75 TABLET, FILM COATED ORAL at 10:08

## 2022-08-20 RX ADMIN — LEVETIRACETAM 750 MG: 750 TABLET, FILM COATED ORAL at 10:08

## 2022-08-20 NOTE — PLAN OF CARE
Plan of care reviewed with the patient at the beginning of shift. The patient is alert and oriented. GCS 15. Some complaints of pain overnight. PRNs administered with relief. Pt educated on pain control and instructed to inform staff if he is having pain. Pt would not request pain medication unless his pain was above an 8/10. Educated on early treatment of pain and the effects of pain on ADLs. IVF infusing. VSS. Bed in low locked position. Call bell and personal items within reach. Will continue to monitor.

## 2022-08-20 NOTE — HOSPITAL COURSE
Thoracentesis performed on 8/19 with 6L removed with concurrent IV albumin given. He reports improvement of his dyspnea and distension following the procedure. AM labs positive for PAULA with increase in Cr to 2. IV rehydration started. PAULA showed improvement with fluids, planned  for  discharge today but experienced N/V and an episode of diarrhea. N/V improved and patient had another paracentesis performed on 8/22 and was discharged  home with oncology follow up

## 2022-08-20 NOTE — ASSESSMENT & PLAN NOTE
Baseline Cr 1-1.5, Cr 2  Hx of CKD: No  DDx: Prerenal, ATN 2/2 large volume paracentesis  No indications for HD at this time    Plan:  - Start IV rehydration with NS 75cc/hr   - Trend Cr  - Strict I&Os  - Avoid nephrotoxic agents  - Renally adjust medications

## 2022-08-20 NOTE — PROGRESS NOTES
Pharmacist Renal Dose Adjustment Note    Joseluis Garner is a 51 y.o. male being treated with the medication famotidine     Patient Data:    Vital Signs (Most Recent):  Temp: 97.7 °F (36.5 °C) (08/20/22 1053)  Pulse: 72 (08/20/22 1053)  Resp: 18 (08/20/22 1053)  BP: 131/77 (08/20/22 1053)  SpO2: (!) 94 % (08/20/22 1053)   Vital Signs (72h Range):  Temp:  [97 °F (36.1 °C)-98.8 °F (37.1 °C)]   Pulse:  []   Resp:  [15-20]   BP: (128-170)/()   SpO2:  [93 %-100 %]      Recent Labs   Lab 08/18/22  1912 08/19/22  0626 08/20/22  0335   CREATININE 2.0* 1.5* 2.0*     Serum creatinine: 2 mg/dL (H) 08/20/22 0335  Estimated creatinine clearance: 41.2 mL/min (A)    Medication:famotidine dose: 20mg frequency BID will be changed to medication:famotidine dose:20mg  frequency:QD    Pharmacist's Name: Xiomara Tolbert  Pharmacist's Extension: [id number]

## 2022-08-20 NOTE — SUBJECTIVE & OBJECTIVE
Interval History: NAEON with patient reporting significant improvement of his dyspnea and distension. He denies any bleeding or drainage from the site and denies abd pain, fever, chills, cough    Oncology Treatment Plan:   OP NIVOLUMAB 1 MG/KG IPILIMUMAB 3MG/KG FOLLOWED BY NIVOLUMAB 480MG Q4W    Medications:  Continuous Infusions:   sodium chloride 0.9% 75 mL/hr at 08/20/22 1052     Scheduled Meds:   binimetinib  45 mg Oral Q12H    buPROPion  75 mg Oral BID    encorafenib  450 mg Oral Daily    enoxaparin  40 mg Subcutaneous Daily    [START ON 8/21/2022] famotidine  20 mg Oral Daily    levETIRAcetam  1,000 mg Oral BID    melatonin  6 mg Oral Nightly    morphine  30 mg Oral BID    ondansetron  8 mg Intravenous Q8H    polyethylene glycol  17 g Oral Daily    senna-docusate 8.6-50 mg  1 tablet Oral Daily     PRN Meds:acetaminophen, aluminum-magnesium hydroxide-simethicone, dextrose 10%, dextrose 10%, glucagon (human recombinant), glucose, glucose, hydrALAZINE, HYDROmorphone, naloxone, oxyCODONE, prochlorperazine, promethazine, senna-docusate 8.6-50 mg, sodium chloride 0.9%     Review of Systems   Constitutional:  Positive for activity change, appetite change and fatigue. Negative for chills and fever.   HENT:  Negative for sore throat.    Respiratory:  Negative for cough and shortness of breath.    Cardiovascular:  Negative for chest pain and leg swelling.   Gastrointestinal:  Positive for nausea and vomiting. Negative for abdominal distention, abdominal pain, constipation and diarrhea.   Genitourinary:  Negative for dysuria.   Musculoskeletal:  Negative for myalgias.   Skin:  Negative for rash.   Neurological:  Positive for weakness. Negative for dizziness and headaches.   Psychiatric/Behavioral:  Negative for confusion.    Objective:     Vital Signs (Most Recent):  Temp: 97.7 °F (36.5 °C) (08/20/22 1053)  Pulse: 72 (08/20/22 1053)  Resp: 18 (08/20/22 1053)  BP: 131/77 (08/20/22 1053)  SpO2: (!) 94 % (08/20/22 1053)    Vital Signs (24h Range):  Temp:  [97 °F (36.1 °C)-98.7 °F (37.1 °C)] 97.7 °F (36.5 °C)  Pulse:  [65-87] 72  Resp:  [15-20] 18  SpO2:  [93 %-100 %] 94 %  BP: (128-170)/() 131/77     Weight: 67.4 kg (148 lb 9.4 oz)  Body mass index is 20.15 kg/m².  Body surface area is 1.85 meters squared.      Intake/Output Summary (Last 24 hours) at 8/20/2022 1358  Last data filed at 8/19/2022 1734  Gross per 24 hour   Intake 1628.86 ml   Output --   Net 1628.86 ml       Physical Exam  Constitutional:       Appearance: Normal appearance. He is not ill-appearing.   HENT:      Head: Normocephalic and atraumatic.      Mouth/Throat:      Mouth: Mucous membranes are dry.      Pharynx: Oropharynx is clear.   Eyes:      Extraocular Movements: Extraocular movements intact.      Pupils: Pupils are equal, round, and reactive to light.   Cardiovascular:      Rate and Rhythm: Normal rate and regular rhythm.      Pulses: Normal pulses.      Heart sounds: Normal heart sounds.   Pulmonary:      Effort: Pulmonary effort is normal. No respiratory distress.      Breath sounds: Normal breath sounds.   Abdominal:      General: Bowel sounds are normal. There is no distension.      Palpations: Abdomen is soft.      Tenderness: There is abdominal tenderness.      Comments: RLQ paracentesis site CDI with no sign of active bleeding or drainage   Musculoskeletal:         General: No swelling.      Cervical back: Neck supple.   Skin:     General: Skin is warm and dry.      Capillary Refill: Capillary refill takes less than 2 seconds.   Neurological:      General: No focal deficit present.      Mental Status: He is alert and oriented to person, place, and time. Mental status is at baseline.   Psychiatric:         Mood and Affect: Mood normal.         Behavior: Behavior normal.         Thought Content: Thought content normal.         Judgment: Judgment normal.       Significant Labs:   Recent Lab Results         08/20/22  0335        Albumin 3.3        Alkaline Phosphatase 38       ALT <5       Anion Gap 11       AST 13       Baso # 0.17       Basophil % 1.1       BILIRUBIN TOTAL 0.4  Comment: For infants and newborns, interpretation of results should be based  on gestational age, weight and in agreement with clinical  observations.    Premature Infant recommended reference ranges:  Up to 24 hours.............<8.0 mg/dL  Up to 48 hours............<12.0 mg/dL  3-5 days..................<15.0 mg/dL  6-29 days.................<15.0 mg/dL         BUN 29       Calcium 8.4       Chloride 92       CO2 26       Creatinine 2.0       Differential Method Automated       eGFR 39.4       Eos # 0.4       Eosinophil % 2.8       Glucose 69       Gran # (ANC) 9.6       Gran % 64.7       Hematocrit 43.3       Hemoglobin 14.6       Immature Grans (Abs) 0.07  Comment: Mild elevation in immature granulocytes is non specific and   can be seen in a variety of conditions including stress response,   acute inflammation, trauma and pregnancy. Correlation with other   laboratory and clinical findings is essential.         Immature Granulocytes 0.5       Lymph # 3.3       Lymph % 21.8       Magnesium 2.0       MCH 29.2       MCHC 33.7       MCV 87       Mono # 1.4       Mono % 9.1       MPV 9.3       nRBC 0       Phosphorus 4.3       Platelets 458       Potassium 4.3       PROTEIN TOTAL 5.4       RBC 5.00       RDW 14.6       Sodium 129       WBC 14.89               Diagnostic Results:  I have reviewed all pertinent imaging results/findings within the past 24 hours.

## 2022-08-20 NOTE — PROGRESS NOTES
[facility] - Oncology ([facility])  Hematology/Oncology  Progress Note    Patient Name: Joseluis Garner  Admission Date: 8/18/2022  Hospital Length of Stay: 1 days  Code Status: Full Code     Subjective:     HPI:  Joseluis Garner is a 51 y.o. M with history of stage IV melanoma with metastasis to lungs, axillary lymph nodes, liver, peritoneal carcinomatosis, and brain (on ipilimumab /nivolumab with Dr. Wise), CAD s/p PCI, seizures, who presents with N/V. Reports intractable N/V for approximately 3 days unrelieved by anti-emetics at home. Associated diffuse abdominal pain unrelieved by analgesic regimen at home. Abdominal distension. Presented to ED on 8/14 with similar complaint. Also with hyponatremia at that time. Discharged home per his preference but unfortunately has continued to have N/V and inability to tolerate anything by mouth. Denies fever, chills, CP, SOB, diarrhea, constipation, dysuria, or edema. Was [place] 3/2022 at [facility] [place] [facility] [place] with associated electrolyte derangements and PAULA. Noted ascites during that time requiring multiple paracenteses. Treated with cipro for SPB prophylaxis. Fluid cytology positive for malignancy      Interval History: NAEON with patient reporting significant improvement of his dyspnea and distension. He denies any bleeding or drainage from the site and denies abd pain, fever, chills, cough    Oncology Treatment Plan:   OP NIVOLUMAB 1 MG/KG IPILIMUMAB 3MG/KG FOLLOWED BY NIVOLUMAB 480MG Q4W    Medications:  Continuous Infusions:   sodium chloride 0.9% 75 mL/hr at 08/20/22 1052     Scheduled Meds:   binimetinib  45 mg Oral Q12H    buPROPion  75 mg Oral BID    encorafenib  450 mg Oral Daily    enoxaparin  40 mg Subcutaneous Daily    [START ON 8/21/2022] famotidine  20 mg Oral Daily    levETIRAcetam  1,000 mg Oral BID    melatonin  6 mg Oral Nightly    morphine  30 mg Oral BID    ondansetron  8 mg Intravenous Q8H    polyethylene glycol  17  g Oral Daily    senna-docusate 8.6-50 mg  1 tablet Oral Daily     PRN Meds:acetaminophen, aluminum-magnesium hydroxide-simethicone, dextrose 10%, dextrose 10%, glucagon (human recombinant), glucose, glucose, hydrALAZINE, HYDROmorphone, naloxone, oxyCODONE, prochlorperazine, promethazine, senna-docusate 8.6-50 mg, sodium chloride 0.9%     Review of Systems   Constitutional:  Positive for activity change, appetite change and fatigue. Negative for chills and fever.   HENT:  Negative for sore throat.    Respiratory:  Negative for cough and shortness of breath.    Cardiovascular:  Negative for chest pain and leg swelling.   Gastrointestinal:  Positive for nausea and vomiting. Negative for abdominal distention, abdominal pain, constipation and diarrhea.   Genitourinary:  Negative for dysuria.   Musculoskeletal:  Negative for myalgias.   Skin:  Negative for rash.   Neurological:  Positive for weakness. Negative for dizziness and headaches.   Psychiatric/Behavioral:  Negative for confusion.    Objective:     Vital Signs (Most Recent):  Temp: 97.7 °F (36.5 °C) (08/20/22 1053)  Pulse: 72 (08/20/22 1053)  Resp: 18 (08/20/22 1053)  BP: 131/77 (08/20/22 1053)  SpO2: (!) 94 % (08/20/22 1053)   Vital Signs (24h Range):  Temp:  [97 °F (36.1 °C)-98.7 °F (37.1 °C)] 97.7 °F (36.5 °C)  Pulse:  [65-87] 72  Resp:  [15-20] 18  SpO2:  [93 %-100 %] 94 %  BP: (128-170)/() 131/77     Weight: 67.4 kg (148 lb 9.4 oz)  Body mass index is 20.15 kg/m².  Body surface area is 1.85 meters squared.      Intake/Output Summary (Last 24 hours) at 8/20/2022 1358  Last data filed at 8/19/2022 1734  Gross per 24 hour   Intake 1628.86 ml   Output --   Net 1628.86 ml       Physical Exam  Constitutional:       Appearance: Normal appearance. He is not ill-appearing.   HENT:      Head: Normocephalic and atraumatic.      Mouth/Throat:      Mouth: Mucous membranes are dry.      Pharynx: Oropharynx is clear.   Eyes:      Extraocular Movements: Extraocular  movements intact.      Pupils: Pupils are equal, round, and reactive to light.   Cardiovascular:      Rate and Rhythm: Normal rate and regular rhythm.      Pulses: Normal pulses.      Heart sounds: Normal heart sounds.   Pulmonary:      Effort: Pulmonary effort is normal. No respiratory distress.      Breath sounds: Normal breath sounds.   Abdominal:      General: Bowel sounds are normal. There is no distension.      Palpations: Abdomen is soft.      Tenderness: There is abdominal tenderness.      Comments: RLQ paracentesis site CDI with no sign of active bleeding or drainage   Musculoskeletal:         General: No swelling.      Cervical back: Neck supple.   Skin:     General: Skin is warm and dry.      Capillary Refill: Capillary refill takes less than 2 seconds.   Neurological:      General: No focal deficit present.      Mental Status: He is alert and oriented to person, place, and time. Mental status is at baseline.   Psychiatric:         Mood and Affect: Mood normal.         Behavior: Behavior normal.         Thought Content: Thought content normal.         Judgment: Judgment normal.       Significant Labs:   Recent Lab Results         08/20/22  0335        Albumin 3.3       Alkaline Phosphatase 38       ALT <5       Anion Gap 11       AST 13       Baso # 0.17       Basophil % 1.1       BILIRUBIN TOTAL 0.4  Comment: For infants and newborns, interpretation of results should be based  on gestational age, weight and in agreement with clinical  observations.    Premature Infant recommended reference ranges:  Up to 24 hours.............<8.0 mg/dL  Up to 48 hours............<12.0 mg/dL  3-5 days..................<15.0 mg/dL  6-29 days.................<15.0 mg/dL         BUN 29       Calcium 8.4       Chloride 92       CO2 26       Creatinine 2.0       Differential Method Automated       eGFR 39.4       Eos # 0.4       Eosinophil % 2.8       Glucose 69       Gran # (ANC) 9.6       Gran % 64.7       Hematocrit 43.3        Hemoglobin 14.6       Immature Grans (Abs) 0.07  Comment: Mild elevation in immature granulocytes is non specific and   can be seen in a variety of conditions including stress response,   acute inflammation, trauma and pregnancy. Correlation with other   laboratory and clinical findings is essential.         Immature Granulocytes 0.5       Lymph # 3.3       Lymph % 21.8       Magnesium 2.0       MCH 29.2       MCHC 33.7       MCV 87       Mono # 1.4       Mono % 9.1       MPV 9.3       nRBC 0       Phosphorus 4.3       Platelets 458       Potassium 4.3       PROTEIN TOTAL 5.4       RBC 5.00       RDW 14.6       Sodium 129       WBC 14.89               Diagnostic Results:  I have reviewed all pertinent imaging results/findings within the past 24 hours.    Assessment/Plan:     * PAULA (acute kidney injury)  Baseline Cr 1-1.5, Cr 2  Hx of CKD: No  DDx: Prerenal, ATN 2/2 large volume paracentesis  No indications for HD at this time    Plan:  - Start IV rehydration with NS 75cc/hr   - Trend Cr  - Strict I&Os  - Avoid nephrotoxic agents  - Renally adjust medications      Intractable nausea and vomiting  - Improved with paracentesis  - Cont Zofran    Hyponatremia  Chronic hyponatremia. Likely hypovolemic following large volume paracentesis  Improved with IV fluids and albumin     - Continue IV rehydration    Metastatic melanoma  Metastatic melanoma c/b peritoneal carcinomatosis on on ipilimumab /nivolumab with Dr. Wise    - Restart PO chemotherapy today    Ascites  Malignant ascites 2/2 malignant melanoma    - S/p paracentesis yesterday with 6L removed             Baltazar Diaz,   Hematology/Oncology  [facility] - Oncology ([facility])

## 2022-08-20 NOTE — PLAN OF CARE
Patient continues on room air. IV fluids started. PRN and scheduled pain medications given. Scheduled zofran continued. Patient stable at this time, no acute changes.     Problem: Adult Inpatient Plan of Care  Goal: Plan of Care Review  Outcome: Ongoing, Progressing  Goal: Patient-Specific Goal (Individualized)  Outcome: Ongoing, Progressing  Goal: Absence of Hospital-Acquired Illness or Injury  Outcome: Ongoing, Progressing  Goal: Optimal Comfort and Wellbeing  Outcome: Ongoing, Progressing  Goal: Readiness for Transition of Care  Outcome: Ongoing, Progressing     Problem: Fluid and Electrolyte Imbalance (Acute Kidney Injury/Impairment)  Goal: Fluid and Electrolyte Balance  Outcome: Ongoing, Progressing     Problem: Oral Intake Inadequate (Acute Kidney Injury/Impairment)  Goal: Optimal Nutrition Intake  Outcome: Ongoing, Progressing     Problem: Renal Function Impairment (Acute Kidney Injury/Impairment)  Goal: Effective Renal Function  Outcome: Ongoing, Progressing     Problem: Fall Injury Risk  Goal: Absence of Fall and Fall-Related Injury  Outcome: Ongoing, Progressing

## 2022-08-20 NOTE — HPI
Joseluis Garner is a 51 y.o. M with history of stage IV melanoma with metastasis to lungs, axillary lymph nodes, liver, peritoneal carcinomatosis, and brain (on ipilimumab /nivolumab with Dr. Wise), CAD s/p PCI, seizures, who presents with N/V. Reports intractable N/V for approximately 3 days unrelieved by anti-emetics at home. Associated diffuse abdominal pain unrelieved by analgesic regimen at home. Abdominal distension. Presented to ED on 8/14 with similar complaint. Also with hyponatremia at that time. Discharged home per his preference but unfortunately has continued to have N/V and inability to tolerate anything by mouth. Denies fever, chills, CP, SOB, diarrhea, constipation, dysuria, or edema. Was hospitalized 3/2022 at [facility] and 4/2022 at [facility] [place] for similar N/V with associated electrolyte derangements and PAULA. Noted ascites during that time requiring multiple paracenteses. Treated with cipro for SPB prophylaxis. Fluid cytology positive for malignancy

## 2022-08-20 NOTE — ASSESSMENT & PLAN NOTE
Chronic hyponatremia. Likely hypovolemic following large volume paracentesis  Improved with IV fluids and albumin     - Continue IV rehydration

## 2022-08-20 NOTE — ASSESSMENT & PLAN NOTE
Metastatic melanoma c/b peritoneal carcinomatosis on on ipilimumab /nivolumab with Dr. Wise    - Restart PO chemotherapy today

## 2022-08-20 NOTE — PLAN OF CARE
Plan of care reviewed with patient and mother.  Fall precautions maintained, side rails up x2, call light in reach, bed in low position and locked, nonskid socks on.  Has iv fluids infusing without difficulty.  Patient requesting pain medication during the shift and getting relief.  Mother at the bedside.  Patient had paracentesis and pulled off 6000ml and given albumin .  Voiding without difficulty. Patient is very kind and pleasant.

## 2022-08-21 LAB
ALBUMIN SERPL BCP-MCNC: 2.9 G/DL (ref 3.5–5.2)
ALP SERPL-CCNC: 42 U/L (ref 55–135)
ALT SERPL W/O P-5'-P-CCNC: <5 U/L (ref 10–44)
ANION GAP SERPL CALC-SCNC: 6 MMOL/L (ref 8–16)
AST SERPL-CCNC: 15 U/L (ref 10–40)
BASOPHILS # BLD AUTO: 0.13 K/UL (ref 0–0.2)
BASOPHILS NFR BLD: 1.5 % (ref 0–1.9)
BILIRUB SERPL-MCNC: 0.2 MG/DL (ref 0.1–1)
BUN SERPL-MCNC: 34 MG/DL (ref 6–20)
CALCIUM SERPL-MCNC: 8.3 MG/DL (ref 8.7–10.5)
CHLORIDE SERPL-SCNC: 97 MMOL/L (ref 95–110)
CO2 SERPL-SCNC: 28 MMOL/L (ref 23–29)
CREAT SERPL-MCNC: 1.8 MG/DL (ref 0.5–1.4)
DIFFERENTIAL METHOD: ABNORMAL
EOSINOPHIL # BLD AUTO: 0.4 K/UL (ref 0–0.5)
EOSINOPHIL NFR BLD: 4.9 % (ref 0–8)
ERYTHROCYTE [DISTWIDTH] IN BLOOD BY AUTOMATED COUNT: 14.4 % (ref 11.5–14.5)
EST. GFR  (NO RACE VARIABLE): 44.7 ML/MIN/1.73 M^2
GLUCOSE SERPL-MCNC: 79 MG/DL (ref 70–110)
HCT VFR BLD AUTO: 37.9 % (ref 40–54)
HGB BLD-MCNC: 12.3 G/DL (ref 14–18)
IMM GRANULOCYTES # BLD AUTO: 0.04 K/UL (ref 0–0.04)
IMM GRANULOCYTES NFR BLD AUTO: 0.4 % (ref 0–0.5)
LYMPHOCYTES # BLD AUTO: 1.8 K/UL (ref 1–4.8)
LYMPHOCYTES NFR BLD: 20 % (ref 18–48)
MAGNESIUM SERPL-MCNC: 2.3 MG/DL (ref 1.6–2.6)
MCH RBC QN AUTO: 28.4 PG (ref 27–31)
MCHC RBC AUTO-ENTMCNC: 32.5 G/DL (ref 32–36)
MCV RBC AUTO: 88 FL (ref 82–98)
MONOCYTES # BLD AUTO: 0.6 K/UL (ref 0.3–1)
MONOCYTES NFR BLD: 6.6 % (ref 4–15)
NEUTROPHILS # BLD AUTO: 6 K/UL (ref 1.8–7.7)
NEUTROPHILS NFR BLD: 66.6 % (ref 38–73)
NRBC BLD-RTO: 0 /100 WBC
PHOSPHATE SERPL-MCNC: 4.7 MG/DL (ref 2.7–4.5)
PLATELET # BLD AUTO: 378 K/UL (ref 150–450)
PMV BLD AUTO: 9 FL (ref 9.2–12.9)
POTASSIUM SERPL-SCNC: 4.5 MMOL/L (ref 3.5–5.1)
PROT SERPL-MCNC: 5 G/DL (ref 6–8.4)
RBC # BLD AUTO: 4.33 M/UL (ref 4.6–6.2)
SODIUM SERPL-SCNC: 131 MMOL/L (ref 136–145)
WBC # BLD AUTO: 8.94 K/UL (ref 3.9–12.7)

## 2022-08-21 PROCEDURE — 36415 COLL VENOUS BLD VENIPUNCTURE: CPT | Performed by: STUDENT IN AN ORGANIZED HEALTH CARE EDUCATION/TRAINING PROGRAM

## 2022-08-21 PROCEDURE — 99233 PR SUBSEQUENT HOSPITAL CARE,LEVL III: ICD-10-PCS | Mod: ,,, | Performed by: INTERNAL MEDICINE

## 2022-08-21 PROCEDURE — 63600175 PHARM REV CODE 636 W HCPCS: Performed by: INTERNAL MEDICINE

## 2022-08-21 PROCEDURE — 20600001 HC STEP DOWN PRIVATE ROOM

## 2022-08-21 PROCEDURE — 93010 EKG 12-LEAD: ICD-10-PCS | Mod: ,,, | Performed by: INTERNAL MEDICINE

## 2022-08-21 PROCEDURE — 25000003 PHARM REV CODE 250

## 2022-08-21 PROCEDURE — 63600175 PHARM REV CODE 636 W HCPCS

## 2022-08-21 PROCEDURE — 93005 ELECTROCARDIOGRAM TRACING: CPT

## 2022-08-21 PROCEDURE — 25000003 PHARM REV CODE 250: Performed by: STUDENT IN AN ORGANIZED HEALTH CARE EDUCATION/TRAINING PROGRAM

## 2022-08-21 PROCEDURE — 84100 ASSAY OF PHOSPHORUS: CPT | Performed by: STUDENT IN AN ORGANIZED HEALTH CARE EDUCATION/TRAINING PROGRAM

## 2022-08-21 PROCEDURE — 25000003 PHARM REV CODE 250: Performed by: INTERNAL MEDICINE

## 2022-08-21 PROCEDURE — 99233 SBSQ HOSP IP/OBS HIGH 50: CPT | Mod: ,,, | Performed by: INTERNAL MEDICINE

## 2022-08-21 PROCEDURE — 83735 ASSAY OF MAGNESIUM: CPT | Performed by: STUDENT IN AN ORGANIZED HEALTH CARE EDUCATION/TRAINING PROGRAM

## 2022-08-21 PROCEDURE — 85025 COMPLETE CBC W/AUTO DIFF WBC: CPT | Performed by: STUDENT IN AN ORGANIZED HEALTH CARE EDUCATION/TRAINING PROGRAM

## 2022-08-21 PROCEDURE — 63600175 PHARM REV CODE 636 W HCPCS: Performed by: STUDENT IN AN ORGANIZED HEALTH CARE EDUCATION/TRAINING PROGRAM

## 2022-08-21 PROCEDURE — 93010 ELECTROCARDIOGRAM REPORT: CPT | Mod: ,,, | Performed by: INTERNAL MEDICINE

## 2022-08-21 PROCEDURE — 80053 COMPREHEN METABOLIC PANEL: CPT | Performed by: STUDENT IN AN ORGANIZED HEALTH CARE EDUCATION/TRAINING PROGRAM

## 2022-08-21 RX ORDER — LEVETIRACETAM 750 MG/1
750 TABLET ORAL 2 TIMES DAILY
Qty: 60 TABLET | Refills: 11 | Status: SHIPPED | OUTPATIENT
Start: 2022-08-21 | End: 2022-08-21 | Stop reason: HOSPADM

## 2022-08-21 RX ORDER — LEVETIRACETAM 1000 MG/1
1000 TABLET ORAL 2 TIMES DAILY
Qty: 60 TABLET | Refills: 11 | Status: ON HOLD | OUTPATIENT
Start: 2022-08-21 | End: 2022-09-30 | Stop reason: SDUPTHER

## 2022-08-21 RX ORDER — PROCHLORPERAZINE MALEATE 5 MG
10 TABLET ORAL EVERY 6 HOURS
Status: DISCONTINUED | OUTPATIENT
Start: 2022-08-21 | End: 2022-08-22 | Stop reason: HOSPADM

## 2022-08-21 RX ORDER — ALPRAZOLAM 0.5 MG/1
0.5 TABLET ORAL ONCE
Status: COMPLETED | OUTPATIENT
Start: 2022-08-21 | End: 2022-08-21

## 2022-08-21 RX ORDER — FAMOTIDINE 20 MG/1
20 TABLET, FILM COATED ORAL DAILY
Qty: 30 TABLET | Refills: 11 | Status: SHIPPED | OUTPATIENT
Start: 2022-08-22 | End: 2023-08-22

## 2022-08-21 RX ORDER — LEVETIRACETAM 500 MG/1
1000 TABLET ORAL 2 TIMES DAILY
Status: DISCONTINUED | OUTPATIENT
Start: 2022-08-21 | End: 2022-08-22 | Stop reason: HOSPADM

## 2022-08-21 RX ADMIN — ENOXAPARIN SODIUM 40 MG: 100 INJECTION SUBCUTANEOUS at 05:08

## 2022-08-21 RX ADMIN — POLYETHYLENE GLYCOL 3350 17 G: 17 POWDER, FOR SOLUTION ORAL at 09:08

## 2022-08-21 RX ADMIN — FAMOTIDINE 20 MG: 20 TABLET ORAL at 09:08

## 2022-08-21 RX ADMIN — PROCHLORPERAZINE MALEATE 10 MG: 5 TABLET ORAL at 11:08

## 2022-08-21 RX ADMIN — SENNOSIDES AND DOCUSATE SODIUM 1 TABLET: 50; 8.6 TABLET ORAL at 09:08

## 2022-08-21 RX ADMIN — OXYCODONE HYDROCHLORIDE 30 MG: 10 TABLET ORAL at 06:08

## 2022-08-21 RX ADMIN — MORPHINE SULFATE 30 MG: 30 TABLET, FILM COATED, EXTENDED RELEASE ORAL at 09:08

## 2022-08-21 RX ADMIN — BUPROPION HYDROCHLORIDE 75 MG: 75 TABLET, FILM COATED ORAL at 08:08

## 2022-08-21 RX ADMIN — MORPHINE SULFATE 30 MG: 30 TABLET, FILM COATED, EXTENDED RELEASE ORAL at 08:08

## 2022-08-21 RX ADMIN — LEVETIRACETAM 750 MG: 750 TABLET, FILM COATED ORAL at 09:08

## 2022-08-21 RX ADMIN — ONDANSETRON 8 MG: 2 INJECTION INTRAMUSCULAR; INTRAVENOUS at 10:08

## 2022-08-21 RX ADMIN — LEVETIRACETAM 1000 MG: 500 TABLET, FILM COATED ORAL at 08:08

## 2022-08-21 RX ADMIN — ALPRAZOLAM 0.5 MG: 0.5 TABLET ORAL at 11:08

## 2022-08-21 RX ADMIN — SODIUM CHLORIDE 500 ML: 9 INJECTION, SOLUTION INTRAVENOUS at 05:08

## 2022-08-21 RX ADMIN — ONDANSETRON 8 MG: 2 INJECTION INTRAMUSCULAR; INTRAVENOUS at 06:08

## 2022-08-21 RX ADMIN — BUPROPION HYDROCHLORIDE 75 MG: 75 TABLET, FILM COATED ORAL at 09:08

## 2022-08-21 RX ADMIN — PROMETHAZINE HYDROCHLORIDE 25 MG: 25 TABLET ORAL at 10:08

## 2022-08-21 RX ADMIN — PROCHLORPERAZINE MALEATE 10 MG: 5 TABLET ORAL at 05:08

## 2022-08-21 RX ADMIN — OXYCODONE HYDROCHLORIDE 30 MG: 10 TABLET ORAL at 12:08

## 2022-08-21 RX ADMIN — SODIUM CHLORIDE: 0.9 INJECTION, SOLUTION INTRAVENOUS at 12:08

## 2022-08-21 RX ADMIN — ONDANSETRON 8 MG: 2 INJECTION INTRAMUSCULAR; INTRAVENOUS at 02:08

## 2022-08-21 RX ADMIN — Medication 6 MG: at 08:08

## 2022-08-21 NOTE — SUBJECTIVE & OBJECTIVE
Interval History: NAEON. Patient comfortable with  no N/V overnight and in the AM. Planned to discharge but patient experienced N/V and an episode of diarrhea prior to d/c.     Of note patient was given 0.5mg Xanax 30 mins prior to N/V/D for anxiety.     Oncology Treatment Plan:   OP NIVOLUMAB 1 MG/KG IPILIMUMAB 3MG/KG FOLLOWED BY NIVOLUMAB 480MG Q4W    Medications:  Continuous Infusions:   sodium chloride 0.9% 75 mL/hr at 08/21/22 0018     Scheduled Meds:   binimetinib  45 mg Oral Q12H    buPROPion  75 mg Oral BID    encorafenib  450 mg Oral Daily    enoxaparin  40 mg Subcutaneous Daily    famotidine  20 mg Oral Daily    levETIRAcetam  1,000 mg Oral BID    melatonin  6 mg Oral Nightly    morphine  30 mg Oral BID    ondansetron  8 mg Intravenous Q8H    polyethylene glycol  17 g Oral Daily    prochlorperazine  10 mg Oral Q6H    senna-docusate 8.6-50 mg  1 tablet Oral Daily    sodium chloride 0.9%  500 mL Intravenous Once     PRN Meds:acetaminophen, aluminum-magnesium hydroxide-simethicone, dextrose 10%, dextrose 10%, glucagon (human recombinant), glucose, glucose, hydrALAZINE, HYDROmorphone, naloxone, oxyCODONE, promethazine, senna-docusate 8.6-50 mg, sodium chloride 0.9%     Review of Systems   Constitutional:  Negative for chills, diaphoresis, fatigue and fever.   HENT:  Negative for congestion, rhinorrhea, sore throat and trouble swallowing.    Respiratory:  Negative for cough, chest tightness, shortness of breath and wheezing.    Cardiovascular:  Negative for chest pain, palpitations and leg swelling.   Gastrointestinal:  Positive for diarrhea, nausea and vomiting. Negative for abdominal distention, abdominal pain and blood in stool.   Genitourinary:  Negative for difficulty urinating, dysuria, flank pain and hematuria.   Musculoskeletal:  Negative for arthralgias, back pain and neck pain.   Skin:  Negative for rash and wound.   Neurological:  Negative for dizziness, syncope, weakness, light-headedness, numbness  and headaches.   Objective:     Vital Signs (Most Recent):  Temp: 97.2 °F (36.2 °C) (08/21/22 1258)  Pulse: 69 (08/21/22 1258)  Resp: 18 (08/21/22 1258)  BP: (!) 144/83 (08/21/22 1258)  SpO2: 95 % (08/21/22 1258)   Vital Signs (24h Range):  Temp:  [97.2 °F (36.2 °C)-97.7 °F (36.5 °C)] 97.2 °F (36.2 °C)  Pulse:  [54-69] 69  Resp:  [16-20] 18  SpO2:  [95 %-97 %] 95 %  BP: (111-144)/(69-89) 144/83     Weight: 67.4 kg (148 lb 9.4 oz)  Body mass index is 20.15 kg/m².  Body surface area is 1.85 meters squared.      Intake/Output Summary (Last 24 hours) at 8/21/2022 1713  Last data filed at 8/21/2022 0745  Gross per 24 hour   Intake 1007.5 ml   Output --   Net 1007.5 ml       Physical Exam  Constitutional:       Appearance: Normal appearance. He is not ill-appearing.   HENT:      Head: Normocephalic and atraumatic.      Mouth/Throat:      Mouth: Mucous membranes are dry.      Pharynx: Oropharynx is clear.   Eyes:      Extraocular Movements: Extraocular movements intact.      Pupils: Pupils are equal, round, and reactive to light.   Cardiovascular:      Rate and Rhythm: Normal rate and regular rhythm.      Pulses: Normal pulses.      Heart sounds: Normal heart sounds.   Pulmonary:      Effort: Pulmonary effort is normal. No respiratory distress.      Breath sounds: Normal breath sounds.   Abdominal:      General: Bowel sounds are normal. There is no distension.      Palpations: Abdomen is soft.      Tenderness: There is abdominal tenderness.      Comments: RLQ paracentesis site CDI with no sign of active bleeding or drainage   Musculoskeletal:         General: No swelling.      Cervical back: Neck supple.   Skin:     General: Skin is warm and dry.      Capillary Refill: Capillary refill takes less than 2 seconds.   Neurological:      General: No focal deficit present.      Mental Status: He is alert and oriented to person, place, and time. Mental status is at baseline.   Psychiatric:         Mood and Affect: Mood normal.          Behavior: Behavior normal.         Thought Content: Thought content normal.         Judgment: Judgment normal.       Significant Labs:   Recent Lab Results         08/21/22  0220        Albumin 2.9       Alkaline Phosphatase 42       ALT <5       Anion Gap 6       AST 15       Baso # 0.13       Basophil % 1.5       BILIRUBIN TOTAL 0.2  Comment: For infants and newborns, interpretation of results should be based  on gestational age, weight and in agreement with clinical  observations.    Premature Infant recommended reference ranges:  Up to 24 hours.............<8.0 mg/dL  Up to 48 hours............<12.0 mg/dL  3-5 days..................<15.0 mg/dL  6-29 days.................<15.0 mg/dL         BUN 34       Calcium 8.3       Chloride 97       CO2 28       Creatinine 1.8       Differential Method Automated       eGFR 44.7       Eos # 0.4       Eosinophil % 4.9       Glucose 79       Gran # (ANC) 6.0       Gran % 66.6       Hematocrit 37.9       Hemoglobin 12.3       Immature Grans (Abs) 0.04  Comment: Mild elevation in immature granulocytes is non specific and   can be seen in a variety of conditions including stress response,   acute inflammation, trauma and pregnancy. Correlation with other   laboratory and clinical findings is essential.         Immature Granulocytes 0.4       Lymph # 1.8       Lymph % 20.0       Magnesium 2.3       MCH 28.4       MCHC 32.5       MCV 88       Mono # 0.6       Mono % 6.6       MPV 9.0       nRBC 0       Phosphorus 4.7       Platelets 378       Potassium 4.5       PROTEIN TOTAL 5.0       RBC 4.33       RDW 14.4       Sodium 131       WBC 8.94               Diagnostic Results:  I have reviewed all pertinent imaging results/findings within the past 24 hours.

## 2022-08-21 NOTE — ASSESSMENT & PLAN NOTE
Baseline Cr 1-1.5  Current Cr 1.8  Hx of CKD: No  DDx: Prerenal, ATN 2/2 large volume paracentesis  No indications for HD at this time    Plan:  - Ordered additional 1L NS bolus  - Cont 75cc/hr IV rehydration with NS  - Trend Cr  - Strict I&Os  - Avoid nephrotoxic agents  - Renally adjust medications     33 year old right-handed woman with past medical history post-partum hypertension (now off nifedipine) presents as CODE STROKE for tingling of her left arm and leg. Labs show thrombocytosis and leukocytosis. CT head negative for acute pathology.    Recommendations:  - workup of leukocytosis and thrombocytosis (?normal post-partum) per primary team  - follow up UA  - magnesium level 33 year old right-handed woman with past medical history post-partum hypertension (now off nifedipine), migraine with visual aura presents as CODE STROKE for tingling of her left arm and leg, no associated weakness. Labs reveal thrombocytosis and leukocytosis. CT head negative for acute pathology. No focal deficits on exam, patient also reports improvement in symptoms.    Impression: Unclear etiology of left-sided paresthesias, possibly related to thrombocytosis vs. migraine w/ aura vs. hereditary neuropathy with pressure palsies; rule out underlying infection; unlikely demyelinating disease    Recommendations:  - workup of leukocytosis and thrombocytosis (?normal post-partum) per primary team  - follow up UA/culture  - serum magnesium  - no further inpatient neurological workup required  - MRI brain w/o contrast - can be coordinated as outpatient (pt's sister is a neurologist in [place])    Patient was seen and discussed w/ neurology attending, Dr. Watt.    Lilian Montano, DO  PGY-3  Neurology Resident

## 2022-08-21 NOTE — PROGRESS NOTES
[facility] - Oncology ([facility])  Hematology/Oncology  Progress Note    Patient Name: Joseluis Garner  Admission Date: 8/18/2022  Hospital Length of Stay: 2 days  Code Status: Full Code     Subjective:     HPI:  Joseluis Garner is a 51 y.o. M with history of stage IV melanoma with metastasis to lungs, axillary lymph nodes, liver, peritoneal carcinomatosis, and brain (on ipilimumab /nivolumab with Dr. Wise), CAD s/p PCI, seizures, who presents with N/V. Reports intractable N/V for approximately 3 days unrelieved by anti-emetics at home. Associated diffuse abdominal pain unrelieved by analgesic regimen at home. Abdominal distension. Presented to ED on 8/14 with similar complaint. Also with hyponatremia at that time. Discharged home per his preference but unfortunately has continued to have N/V and inability to tolerate anything by mouth. Denies fever, chills, CP, SOB, diarrhea, constipation, dysuria, or edema. Was hospitalized 3/2022 at [facility] and 4/2022 at [facility] [place] for similar N/V with associated electrolyte derangements and PAULA. Noted ascites during that time requiring multiple paracenteses. Treated with cipro for SPB prophylaxis. Fluid cytology positive for malignancy      Interval History: NAEON. Patient comfortable with  no N/V overnight and in the AM. Planned to discharge but patient experienced N/V and an episode of diarrhea prior to d/c.     Of note patient was given 0.5mg Xanax 30 mins prior to N/V/D for anxiety.     Oncology Treatment Plan:   OP NIVOLUMAB 1 MG/KG IPILIMUMAB 3MG/KG FOLLOWED BY NIVOLUMAB 480MG Q4W    Medications:  Continuous Infusions:   sodium chloride 0.9% 75 mL/hr at 08/21/22 0018     Scheduled Meds:   binimetinib  45 mg Oral Q12H    buPROPion  75 mg Oral BID    encorafenib  450 mg Oral Daily    enoxaparin  40 mg Subcutaneous Daily    famotidine  20 mg Oral Daily    levETIRAcetam  1,000 mg Oral BID    melatonin  6 mg Oral Nightly    morphine  30 mg Oral BID    ondansetron  8 mg  Intravenous Q8H    polyethylene glycol  17 g Oral Daily    prochlorperazine  10 mg Oral Q6H    senna-docusate 8.6-50 mg  1 tablet Oral Daily    sodium chloride 0.9%  500 mL Intravenous Once     PRN Meds:acetaminophen, aluminum-magnesium hydroxide-simethicone, dextrose 10%, dextrose 10%, glucagon (human recombinant), glucose, glucose, hydrALAZINE, HYDROmorphone, naloxone, oxyCODONE, promethazine, senna-docusate 8.6-50 mg, sodium chloride 0.9%     Review of Systems   Constitutional:  Negative for chills, diaphoresis, fatigue and fever.   HENT:  Negative for congestion, rhinorrhea, sore throat and trouble swallowing.    Respiratory:  Negative for cough, chest tightness, shortness of breath and wheezing.    Cardiovascular:  Negative for chest pain, palpitations and leg swelling.   Gastrointestinal:  Positive for diarrhea, nausea and vomiting. Negative for abdominal distention, abdominal pain and blood in stool.   Genitourinary:  Negative for difficulty urinating, dysuria, flank pain and hematuria.   Musculoskeletal:  Negative for arthralgias, back pain and neck pain.   Skin:  Negative for rash and wound.   Neurological:  Negative for dizziness, syncope, weakness, light-headedness, numbness and headaches.   Objective:     Vital Signs (Most Recent):  Temp: 97.2 °F (36.2 °C) (08/21/22 1258)  Pulse: 69 (08/21/22 1258)  Resp: 18 (08/21/22 1258)  BP: (!) 144/83 (08/21/22 1258)  SpO2: 95 % (08/21/22 1258)   Vital Signs (24h Range):  Temp:  [97.2 °F (36.2 °C)-97.7 °F (36.5 °C)] 97.2 °F (36.2 °C)  Pulse:  [54-69] 69  Resp:  [16-20] 18  SpO2:  [95 %-97 %] 95 %  BP: (111-144)/(69-89) 144/83     Weight: 67.4 kg (148 lb 9.4 oz)  Body mass index is 20.15 kg/m².  Body surface area is 1.85 meters squared.      Intake/Output Summary (Last 24 hours) at 8/21/2022 1713  Last data filed at 8/21/2022 0745  Gross per 24 hour   Intake 1007.5 ml   Output --   Net 1007.5 ml       Physical Exam  Constitutional:       Appearance: Normal  appearance. He is not ill-appearing.   HENT:      Head: Normocephalic and atraumatic.      Mouth/Throat:      Mouth: Mucous membranes are dry.      Pharynx: Oropharynx is clear.   Eyes:      Extraocular Movements: Extraocular movements intact.      Pupils: Pupils are equal, round, and reactive to light.   Cardiovascular:      Rate and Rhythm: Normal rate and regular rhythm.      Pulses: Normal pulses.      Heart sounds: Normal heart sounds.   Pulmonary:      Effort: Pulmonary effort is normal. No respiratory distress.      Breath sounds: Normal breath sounds.   Abdominal:      General: Bowel sounds are normal. There is no distension.      Palpations: Abdomen is soft.      Tenderness: There is abdominal tenderness.      Comments: RLQ paracentesis site CDI with no sign of active bleeding or drainage   Musculoskeletal:         General: No swelling.      Cervical back: Neck supple.   Skin:     General: Skin is warm and dry.      Capillary Refill: Capillary refill takes less than 2 seconds.   Neurological:      General: No focal deficit present.      Mental Status: He is alert and oriented to person, place, and time. Mental status is at baseline.   Psychiatric:         Mood and Affect: Mood normal.         Behavior: Behavior normal.         Thought Content: Thought content normal.         Judgment: Judgment normal.       Significant Labs:   Recent Lab Results         08/21/22  0220        Albumin 2.9       Alkaline Phosphatase 42       ALT <5       Anion Gap 6       AST 15       Baso # 0.13       Basophil % 1.5       BILIRUBIN TOTAL 0.2  Comment: For infants and newborns, interpretation of results should be based  on gestational age, weight and in agreement with clinical  observations.    Premature Infant recommended reference ranges:  Up to 24 hours.............<8.0 mg/dL  Up to 48 hours............<12.0 mg/dL  3-5 days..................<15.0 mg/dL  6-29 days.................<15.0 mg/dL         BUN 34       Calcium  8.3       Chloride 97       CO2 28       Creatinine 1.8       Differential Method Automated       eGFR 44.7       Eos # 0.4       Eosinophil % 4.9       Glucose 79       Gran # (ANC) 6.0       Gran % 66.6       Hematocrit 37.9       Hemoglobin 12.3       Immature Grans (Abs) 0.04  Comment: Mild elevation in immature granulocytes is non specific and   can be seen in a variety of conditions including stress response,   acute inflammation, trauma and pregnancy. Correlation with other   laboratory and clinical findings is essential.         Immature Granulocytes 0.4       Lymph # 1.8       Lymph % 20.0       Magnesium 2.3       MCH 28.4       MCHC 32.5       MCV 88       Mono # 0.6       Mono % 6.6       MPV 9.0       nRBC 0       Phosphorus 4.7       Platelets 378       Potassium 4.5       PROTEIN TOTAL 5.0       RBC 4.33       RDW 14.4       Sodium 131       WBC 8.94               Diagnostic Results:  I have reviewed all pertinent imaging results/findings within the past 24 hours.    Assessment/Plan:     * PAULA (acute kidney injury)  Baseline Cr 1-1.5  Current Cr 1.8  Hx of CKD: No  DDx: Prerenal, ATN 2/2 large volume paracentesis  No indications for HD at this time    Plan:  - Ordered additional 1L NS bolus  - Cont 75cc/hr IV rehydration with NS  - Trend Cr  - Strict I&Os  - Avoid nephrotoxic agents  - Renally adjust medications      Intractable nausea and vomiting  Experienced N/V and diarrhea prior to discharge.      - Improved with paracentesis  - Scheduled Zofran   - Scheduled Compazine  - PRN Promethazine   - EKG to monitor QTc tomorrow     Hyponatremia  Chronic hyponatremia. Likely hypovolemic following large volume paracentesis  Improved with IV fluids and albumin     - Continue IV rehydration    Metastatic melanoma  Metastatic melanoma c/b peritoneal carcinomatosis on on ipilimumab /nivolumab with Dr. Wise    - Restart PO chemotherapy today    Ascites  Malignant ascites 2/2 malignant melanoma    - S/p  paracentesis yesterday with 6L removed             Baltazar Diaz, DO  Hematology/Oncology  [facility] - Oncology ([facility])

## 2022-08-21 NOTE — DISCHARGE SUMMARY
[facility] - Oncology ([facility])  Hematology/Oncology  Discharge Summary      Patient Name: Joseluis Garner  MRN: [id number]  Admission Date: 8/18/2022  Hospital Length of Stay: 2 days  Discharge Date and Time:  08/21/2022 1:13 PM  Attending Physician: Meghan Rios MD   Discharging Provider: Baltazar Diaz DO  Primary Care Provider: Clayton Wise MD    HPI: Joseluis Garner is a 51 y.o. M with history of stage IV melanoma with metastasis to lungs, axillary lymph nodes, liver, peritoneal carcinomatosis, and brain (on ipilimumab /nivolumab with Dr. iWse), CAD s/p PCI, seizures, who presents with N/V. Reports intractable N/V for approximately 3 days unrelieved by anti-emetics at home. Associated diffuse abdominal pain unrelieved by analgesic regimen at home. Abdominal distension. Presented to ED on 8/14 with similar complaint. Also with hyponatremia at that time. Discharged home per his preference but unfortunately has continued to have N/V and inability to tolerate anything by mouth. Denies fever, chills, CP, SOB, diarrhea, constipation, dysuria, or edema. Was hospitalized 3/2022 at [facility] and 4/2022 at [facility] [place] for similar N/V with associated electrolyte derangements and PAULA. Noted ascites during that time requiring multiple paracenteses. Treated with cipro for SPB prophylaxis. Fluid cytology positive for malignancy      * No surgery found *     Hospital Course: Thoracentesis performed on 8/19 with 6L removed with concurrent IV albumin given. He reports improvement of his dyspnea and distension following the procedure. AM labs positive for PAULA with increase in Cr to 2. IV rehydration started. PAULA showed improvement with fluids, plan for discharge today.       Goals of Care Treatment Preferences:  Code Status: Full Code    Health care agent: Margarita Grimes [facility] agent number: [phone]                 Physical Exam  Constitutional:       Appearance: Normal appearance. He is not ill-appearing.   HENT:       Head: Normocephalic and atraumatic.      Mouth/Throat:      Mouth: Moist Mucous Membranes     Pharynx: Oropharynx is clear.   Eyes:      Extraocular Movements: Extraocular movements intact.      Pupils: Pupils are equal, round, and reactive to light.   Cardiovascular:      Rate and Rhythm: Normal rate and regular rhythm.      Pulses: Normal pulses.      Heart sounds: Normal heart sounds.   Pulmonary:      Effort: Pulmonary effort is normal. No respiratory distress.      Breath sounds: Normal breath sounds.   Abdominal:      General: Bowel sounds are normal. There is no distension.      Palpations: Abdomen is soft.      Tenderness: There is minimal abdominal tenderness     Comments: RLQ paracentesis site CDI with no sign of active bleeding or drainage   Musculoskeletal:         General: No swelling.      Cervical back: Neck supple.   Skin:     General: Skin is warm and dry.      Capillary Refill: Capillary refill takes less than 2 seconds.   Neurological:      General: No focal deficit present.      Mental Status: He is alert and oriented to person, place, and time. Mental status is at baseline.   Psychiatric:         Mood and Affect: Mood normal.         Behavior: Behavior normal.         Thought Content: Thought content normal.         Judgment: Judgment normal.       Consults:   Consults (From admission, onward)        Status Ordering Provider     Inpatient consult to Registered Dietitian/Nutritionist  Once        Provider:  (Not yet assigned)    Completed MARCOS TORRES     Inpatient consult to Hematology/Oncology  Once        Provider:  (Not yet assigned)    Completed KAYLA PEREZ          Significant Diagnostic Studies: Labs: All labs within the past 24 hours have been reviewed    Pending Diagnostic Studies:     None        Final Active Diagnoses:    Diagnosis Date Noted POA    PRINCIPAL PROBLEM:  PAULA (acute kidney injury) [N17.9] 04/04/2022 Yes    Goals of care, counseling/discussion [Z71.89]  08/19/2022 Not Applicable    Severe malnutrition [E43] 08/19/2022 Yes    Intractable nausea and vomiting [R11.2] 04/14/2022 Yes    Hyponatremia [E87.1] 04/04/2022 Yes    Metastatic melanoma [C79.9] 04/04/2022 Yes    Ascites [R18.8] 04/04/2022 Yes      Problems Resolved During this Admission:      Discharged Condition: good    Disposition: Home or Self Care    Follow Up:    Patient Instructions:   No discharge procedures on file.  Medications:  Reconciled Home Medications:      Medication List      CHANGE how you take these medications    clobetasoL 0.05 % cream  Commonly known as: TEMOVATE  Apply topically 2 (two) times daily.  What changed:   · when to take this  · reasons to take this     famotidine 20 MG tablet  Commonly known as: PEPCID  Take 1 tablet (20 mg total) by mouth once daily.  Start taking on: August 22, 2022  What changed: when to take this        CONTINUE taking these medications    buPROPion 100 MG TBSR 12 hr tablet  Commonly known as: WELLBUTRIN SR  Take 1 tablet (100 mg total) by mouth 2 (two) times daily.     GAVILAX 17 gram/dose powder  Generic drug: polyethylene glycol  Dissolve one capful (17 g) in liquid and take by mouth once daily.     levETIRAcetam 1000 MG tablet  Commonly known as: KEPPRA  Take 1 tablet (1,000 mg total) by mouth 2 (two) times a day.     morphine 30 MG 12 hr tablet  Commonly known as: MS CONTIN  Take 1 tablet (30 mg total) by mouth 2 (two) times daily.     OLANZapine 2.5 MG tablet  Commonly known as: ZyPREXA  Take 1 tablet (2.5 mg total) by mouth every 6 (six) hours as needed (anxiety).     ondansetron 8 MG Tbdl  Commonly known as: ZOFRAN-ODT  Dissolve 1 tablet (8 mg total) by mouth every 12 (twelve) hours as needed.     oxyCODONE 30 MG Tab  Commonly known as: ROXICODONE  Take 1 tablet (30 mg total) by mouth every 4 (four) hours as needed (cancer related pain).     prochlorperazine 5 MG tablet  Commonly known as: COMPAZINE  Take 1 tablet (5 mg total) by mouth every 6  (six) hours as needed for Nausea.     SENNA 8.6 mg tablet  Generic drug: senna  Take 1 tablet by mouth twice daily     senna-docusate 8.6-50 mg 8.6-50 mg per tablet  Commonly known as: PERICOLACE  Take 1 tablet by mouth daily as needed for Constipation.        STOP taking these medications    ALPRAZolam 0.5 MG tablet  Commonly known as: XANAX     NARCAN 4 mg/actuation Spry  Generic drug: naloxone            Baltazar Diaz,   Hematology/Oncology  [facility] - Oncology ([facility])

## 2022-08-21 NOTE — PLAN OF CARE
No acute events this shift. Afebrile & VSS on room air. Oxycodone x 3  for generalized pain (abdomen, bone). Ambulates independently to bathroom; POC reviewed with patient and family at bedside.      Problem: Adult Inpatient Plan of Care  Goal: Plan of Care Review  Outcome: Ongoing, Progressing  Goal: Patient-Specific Goal (Individualized)  Outcome: Ongoing, Progressing  Goal: Absence of Hospital-Acquired Illness or Injury  Outcome: Ongoing, Progressing  Goal: Optimal Comfort and Wellbeing  Outcome: Ongoing, Progressing  Goal: Readiness for Transition of Care  Outcome: Ongoing, Progressing     Problem: Fluid and Electrolyte Imbalance (Acute Kidney Injury/Impairment)  Goal: Fluid and Electrolyte Balance  Outcome: Ongoing, Progressing     Problem: Oral Intake Inadequate (Acute Kidney Injury/Impairment)  Goal: Optimal Nutrition Intake  Outcome: Ongoing, Progressing     Problem: Renal Function Impairment (Acute Kidney Injury/Impairment)  Goal: Effective Renal Function  Outcome: Ongoing, Progressing     Problem: Fall Injury Risk  Goal: Absence of Fall and Fall-Related Injury  Outcome: Ongoing, Progressing

## 2022-08-21 NOTE — ASSESSMENT & PLAN NOTE
Experienced N/V and diarrhea prior to discharge.      - Improved with paracentesis  - Scheduled Zofran   - Scheduled Compazine  - PRN Promethazine   - EKG to monitor QTc tomorrow

## 2022-08-22 ENCOUNTER — PATIENT MESSAGE (OUTPATIENT)
Dept: PALLIATIVE MEDICINE | Facility: CLINIC | Age: 52
End: 2022-08-22
Payer: MEDICAID

## 2022-08-22 VITALS
DIASTOLIC BLOOD PRESSURE: 80 MMHG | TEMPERATURE: 98 F | WEIGHT: 148.56 LBS | BODY MASS INDEX: 20.12 KG/M2 | SYSTOLIC BLOOD PRESSURE: 131 MMHG | HEIGHT: 72 IN | RESPIRATION RATE: 20 BRPM | OXYGEN SATURATION: 95 % | HEART RATE: 58 BPM

## 2022-08-22 DIAGNOSIS — G89.3 CANCER ASSOCIATED PAIN: ICD-10-CM

## 2022-08-22 LAB
ALBUMIN SERPL BCP-MCNC: 2.5 G/DL (ref 3.5–5.2)
ALP SERPL-CCNC: 61 U/L (ref 55–135)
ALT SERPL W/O P-5'-P-CCNC: 74 U/L (ref 10–44)
ANION GAP SERPL CALC-SCNC: 5 MMOL/L (ref 8–16)
AST SERPL-CCNC: 99 U/L (ref 10–40)
BACTERIA SPEC AEROBE CULT: NO GROWTH
BASOPHILS # BLD AUTO: 0.09 K/UL (ref 0–0.2)
BASOPHILS NFR BLD: 0.7 % (ref 0–1.9)
BILIRUB SERPL-MCNC: 0.4 MG/DL (ref 0.1–1)
BUN SERPL-MCNC: 14 MG/DL (ref 6–20)
CALCIUM SERPL-MCNC: 8 MG/DL (ref 8.7–10.5)
CHLORIDE SERPL-SCNC: 96 MMOL/L (ref 95–110)
CO2 SERPL-SCNC: 25 MMOL/L (ref 23–29)
CREAT SERPL-MCNC: 0.8 MG/DL (ref 0.5–1.4)
DIFFERENTIAL METHOD: ABNORMAL
EOSINOPHIL # BLD AUTO: 0.4 K/UL (ref 0–0.5)
EOSINOPHIL NFR BLD: 3 % (ref 0–8)
ERYTHROCYTE [DISTWIDTH] IN BLOOD BY AUTOMATED COUNT: 14.6 % (ref 11.5–14.5)
EST. GFR  (NO RACE VARIABLE): >60 ML/MIN/1.73 M^2
GLUCOSE SERPL-MCNC: 124 MG/DL (ref 70–110)
HCT VFR BLD AUTO: 35.4 % (ref 40–54)
HGB BLD-MCNC: 11.9 G/DL (ref 14–18)
IMM GRANULOCYTES # BLD AUTO: 0.04 K/UL (ref 0–0.04)
IMM GRANULOCYTES NFR BLD AUTO: 0.3 % (ref 0–0.5)
LYMPHOCYTES # BLD AUTO: 0.8 K/UL (ref 1–4.8)
LYMPHOCYTES NFR BLD: 6.6 % (ref 18–48)
MCH RBC QN AUTO: 29 PG (ref 27–31)
MCHC RBC AUTO-ENTMCNC: 33.6 G/DL (ref 32–36)
MCV RBC AUTO: 86 FL (ref 82–98)
MONOCYTES # BLD AUTO: 0.7 K/UL (ref 0.3–1)
MONOCYTES NFR BLD: 5.5 % (ref 4–15)
NEUTROPHILS # BLD AUTO: 10.1 K/UL (ref 1.8–7.7)
NEUTROPHILS NFR BLD: 83.9 % (ref 38–73)
NRBC BLD-RTO: 0 /100 WBC
PATH INTERP FLD-IMP: NORMAL
PLATELET # BLD AUTO: 269 K/UL (ref 150–450)
PMV BLD AUTO: 9.4 FL (ref 9.2–12.9)
POTASSIUM SERPL-SCNC: 3.8 MMOL/L (ref 3.5–5.1)
PROT SERPL-MCNC: 4.8 G/DL (ref 6–8.4)
RBC # BLD AUTO: 4.11 M/UL (ref 4.6–6.2)
SODIUM SERPL-SCNC: 126 MMOL/L (ref 136–145)
WBC # BLD AUTO: 12.08 K/UL (ref 3.9–12.7)

## 2022-08-22 PROCEDURE — 99239 HOSP IP/OBS DSCHRG MGMT >30: CPT | Mod: ,,, | Performed by: INTERNAL MEDICINE

## 2022-08-22 PROCEDURE — 25000003 PHARM REV CODE 250: Performed by: INTERNAL MEDICINE

## 2022-08-22 PROCEDURE — 25000003 PHARM REV CODE 250: Performed by: STUDENT IN AN ORGANIZED HEALTH CARE EDUCATION/TRAINING PROGRAM

## 2022-08-22 PROCEDURE — 99239 PR HOSPITAL DISCHARGE DAY,>30 MIN: ICD-10-PCS | Mod: ,,, | Performed by: INTERNAL MEDICINE

## 2022-08-22 PROCEDURE — 80053 COMPREHEN METABOLIC PANEL: CPT | Performed by: INTERNAL MEDICINE

## 2022-08-22 PROCEDURE — 85025 COMPLETE CBC W/AUTO DIFF WBC: CPT | Performed by: INTERNAL MEDICINE

## 2022-08-22 PROCEDURE — 36415 COLL VENOUS BLD VENIPUNCTURE: CPT | Performed by: INTERNAL MEDICINE

## 2022-08-22 PROCEDURE — 63600175 PHARM REV CODE 636 W HCPCS

## 2022-08-22 PROCEDURE — 25000003 PHARM REV CODE 250

## 2022-08-22 PROCEDURE — 63600175 PHARM REV CODE 636 W HCPCS: Performed by: INTERNAL MEDICINE

## 2022-08-22 RX ORDER — OXYCODONE HYDROCHLORIDE 30 MG/1
30 TABLET ORAL EVERY 4 HOURS PRN
Qty: 180 TABLET | Refills: 0 | OUTPATIENT
Start: 2022-08-22 | End: 2022-08-22

## 2022-08-22 RX ADMIN — LEVETIRACETAM 1000 MG: 500 TABLET, FILM COATED ORAL at 09:08

## 2022-08-22 RX ADMIN — ONDANSETRON 8 MG: 2 INJECTION INTRAMUSCULAR; INTRAVENOUS at 06:08

## 2022-08-22 RX ADMIN — OXYCODONE HYDROCHLORIDE 30 MG: 10 TABLET ORAL at 11:08

## 2022-08-22 RX ADMIN — PROCHLORPERAZINE MALEATE 10 MG: 5 TABLET ORAL at 11:08

## 2022-08-22 RX ADMIN — FAMOTIDINE 20 MG: 20 TABLET ORAL at 09:08

## 2022-08-22 RX ADMIN — PROCHLORPERAZINE MALEATE 10 MG: 5 TABLET ORAL at 06:08

## 2022-08-22 RX ADMIN — OXYCODONE HYDROCHLORIDE 30 MG: 10 TABLET ORAL at 06:08

## 2022-08-22 RX ADMIN — MORPHINE SULFATE 30 MG: 30 TABLET, FILM COATED, EXTENDED RELEASE ORAL at 09:08

## 2022-08-22 RX ADMIN — BUPROPION HYDROCHLORIDE 75 MG: 75 TABLET, FILM COATED ORAL at 11:08

## 2022-08-22 RX ADMIN — ONDANSETRON 8 MG: 2 INJECTION INTRAMUSCULAR; INTRAVENOUS at 03:08

## 2022-08-22 NOTE — PROGRESS NOTES
During ultrasound evaluation, small amount of ascites identified on RLQ. Insufficient ascites on LLQ for safe paracentesis. Patient tells me he is not uncomfortable, and declines paracentesis for today. No paracentesis performed.

## 2022-08-22 NOTE — H&P
Inpatient Radiology Pre-procedure Note    History of Present Illness:  Joseluis Garner is a 51 y.o. male who presents for ultrasound guided paracentesis.  Admission H&P reviewed.  Past Medical History:   Diagnosis Date    Seizures      No past surgical history on file.    Review of Systems:   As documented in primary team H&P    Home Meds:   Prior to Admission medications    Medication Sig Start Date End Date Taking? Authorizing Provider   buPROPion (WELLBUTRIN SR) 100 MG TBSR 12 hr tablet Take 1 tablet (100 mg total) by mouth 2 (two) times daily. 5/10/22 5/10/23 Yes Kitty Segura NP   morphine (MS CONTIN) 30 MG 12 hr tablet Take 1 tablet (30 mg total) by mouth 2 (two) times daily. 8/12/22  Yes Guerline Crouch MD   OLANZapine (ZYPREXA) 2.5 MG tablet Take 1 tablet (2.5 mg total) by mouth every 6 (six) hours as needed (anxiety). 8/9/22  Yes Guerline Crouch MD   ondansetron (ZOFRAN-ODT) 8 MG TbDL Dissolve 1 tablet (8 mg total) by mouth every 12 (twelve) hours as needed. 8/10/22 8/10/23 Yes Guerline Crouch MD   oxyCODONE (ROXICODONE) 30 MG Tab Take 1 tablet (30 mg total) by mouth every 4 (four) hours as needed (cancer related pain). 8/15/22  Yes Guerline Crouch MD   polyethylene glycol (GLYCOLAX) 17 gram/dose powder Dissolve one capful (17 g) in liquid and take by mouth once daily. 4/22/22  Yes Roel Campos MD   prochlorperazine (COMPAZINE) 5 MG tablet Take 1 tablet (5 mg total) by mouth every 6 (six) hours as needed for Nausea. 5/31/22 5/31/23 Yes Clayton Wise MD   senna (SENNA) 8.6 mg tablet Take 1 tablet by mouth twice daily 3/22/22  Yes    ALPRAZolam (XANAX) 0.5 MG tablet Take 1 tablet (0.5 mg total) by mouth On call Procedure (Claustrophobia in MRI). 6/13/22 8/21/22 Yes Clayton Wise MD   clobetasoL (TEMOVATE) 0.05 % cream Apply topically 2 (two) times daily.  Patient taking differently: Apply topically 2 (two) times daily as needed. 7/8/22   Clayton Wise MD    famotidine (PEPCID) 20 MG tablet Take 1 tablet (20 mg total) by mouth once daily. 8/22/22 8/22/23  Baltazar Diaz,    levETIRAcetam (KEPPRA) 1000 MG tablet Take 1 tablet (1,000 mg total) by mouth 2 (two) times a day. 8/21/22 8/21/23  Baltazar Diaz DO   senna-docusate 8.6-50 mg (PERICOLACE) 8.6-50 mg per tablet Take 1 tablet by mouth daily as needed for Constipation. 5/25/22   Clayton Wise MD   oxyCODONE (ROXICODONE) 30 MG Tab Take 1 tablet (30 mg total) by mouth every 4 (four) hours as needed (cancer related pain). 8/22/22 8/22/22  Guerline Crouch MD     Scheduled Meds:    binimetinib  45 mg Oral Q12H    buPROPion  75 mg Oral BID    encorafenib  450 mg Oral Daily    enoxaparin  40 mg Subcutaneous Daily    famotidine  20 mg Oral Daily    levETIRAcetam  1,000 mg Oral BID    melatonin  6 mg Oral Nightly    morphine  30 mg Oral BID    ondansetron  8 mg Intravenous Q8H    polyethylene glycol  17 g Oral Daily    prochlorperazine  10 mg Oral Q6H    senna-docusate 8.6-50 mg  1 tablet Oral Daily     Continuous Infusions:   PRN Meds:aluminum-magnesium hydroxide-simethicone, dextrose 10%, dextrose 10%, glucagon (human recombinant), glucose, glucose, hydrALAZINE, naloxone, oxyCODONE, promethazine, senna-docusate 8.6-50 mg, sodium chloride 0.9%  Anticoagulants/Antiplatelets: Lovenox    Allergies: Review of patient's allergies indicates:  No Known Allergies  Sedation Hx: have not been any systemic reactions    Vitals:  Temp: 98 °F (36.7 °C) (08/22/22 1259)  Pulse: 66 (08/22/22 1350)  Resp: 16 (08/22/22 1350)  BP: 135/82 (08/22/22 1350)  SpO2: 100 % (08/22/22 1350)     Physical Exam:  ASA: 3  Mallampati: n/a    General: no acute distress  Mental Status: alert and oriented to person, place and time  HEENT: normocephalic, atraumatic  Chest: unlabored breathing  Heart: regular heart rate  Abdomen: distended  Extremity: moves all extremities    Plan: ultrasound guided paracentesis  Sedation Plan:  local    DEMETRIO Woodward, FNP  Interventional Radiology  [phone] clinic

## 2022-08-22 NOTE — PLAN OF CARE
No acute events this shift. Afebrile & VSS on room air. Oxycodone x 1 for pain. Ambulates independently to bathroom; Pt slept throughout the night soundly  Problem: Adult Inpatient Plan of Care  Goal: Plan of Care Review  Outcome: Ongoing, Progressing  Goal: Patient-Specific Goal (Individualized)  Outcome: Ongoing, Progressing  Goal: Absence of Hospital-Acquired Illness or Injury  Outcome: Ongoing, Progressing  Goal: Optimal Comfort and Wellbeing  Outcome: Ongoing, Progressing  Goal: Readiness for Transition of Care  Outcome: Ongoing, Progressing     Problem: Fluid and Electrolyte Imbalance (Acute Kidney Injury/Impairment)  Goal: Fluid and Electrolyte Balance  Outcome: Ongoing, Progressing     Problem: Oral Intake Inadequate (Acute Kidney Injury/Impairment)  Goal: Optimal Nutrition Intake  Outcome: Ongoing, Progressing     Problem: Renal Function Impairment (Acute Kidney Injury/Impairment)  Goal: Effective Renal Function  Outcome: Ongoing, Progressing     Problem: Fall Injury Risk  Goal: Absence of Fall and Fall-Related Injury  Outcome: Ongoing, Progressing   .

## 2022-08-22 NOTE — PLAN OF CARE
Pt arrived to [facility] for inpt para. Pt in no acute distress, placed on monitor, VSS. Awaiting consent.

## 2022-08-22 NOTE — NURSING
Pt discharged with mother to drive him home. Piv dced prior to discharge. All discharge instructions discussed with pt and mother at the bedside. All medications reviewed as well. Pt left discharge instructions and eyeglasses at bedside. I called patient and left a msg on voicemail.

## 2022-08-22 NOTE — DISCHARGE SUMMARY
Jesse Ramírez - Oncology ([facility])  Hematology/Oncology  Discharge Summary      Patient Name: Joseluis Garner  MRN: [id number]  Admission Date: 8/18/2022  Hospital Length of Stay: 3 days  Discharge Date and Time:  08/22/2022 2:16 PM  Attending Physician: Meghan Rios MD   Discharging Provider: Baltazar Diaz DO  Primary Care Provider: Clayton Wise MD    HPI: Joseluis Garner is a 51 y.o. M with history of stage IV melanoma with metastasis to lungs, axillary lymph nodes, liver, peritoneal carcinomatosis, and brain (on ipilimumab /nivolumab with Dr. Wise), CAD s/p PCI, seizures, who presents with N/V. Reports intractable N/V for approximately 3 days unrelieved by anti-emetics at home. Associated diffuse abdominal pain unrelieved by analgesic regimen at home. Abdominal distension. Presented to ED on 8/14 with similar complaint. Also with hyponatremia at that time. Discharged home per his preference but unfortunately has continued to have N/V and inability to tolerate anything by mouth. Denies fever, chills, CP, SOB, diarrhea, constipation, dysuria, or edema. Was hospitalized 3/2022 at [facility] and 4/2022 at [facility] [place] [facility] for similar N/V with associated electrolyte derangements and PAULA. Noted ascites during that time requiring multiple paracenteses. Treated with cipro for SPB prophylaxis. Fluid cytology positive for malignancy      * No surgery found *     Hospital Course: Thoracentesis performed on 8/19 with 6L removed with concurrent IV albumin given. He reports improvement of his dyspnea and distension following the procedure. AM labs positive for PAULA with increase in Cr to 2. IV rehydration started. PAULA showed improvement with fluids, planned  for  discharge today but experienced N/V and an episode of diarrhea. N/V improved and patient had another paracentesis performed on 8/22 and was discharged  home with oncology follow up       Goals of Care Treatment Preferences:  Code Status: Full Code    Health care agent: Margarita Grimes  Count includes the [facility] agent number: [phone]                   Consults:   Consults (From admission, onward)        Status Ordering Provider     Inpatient consult to Registered Dietitian/Nutritionist  Once        Provider:  (Not yet assigned)    Completed MARCOS TORRES     Inpatient consult to Hematology/Oncology  Once        Provider:  (Not yet assigned)    Completed KAYLA PEREZ          Significant Diagnostic Studies: Labs: All labs within the past 24 hours have been reviewed      Pending Diagnostic Studies:     Procedure Component Value Units Date/Time    IR Paracentesis with Imaging [[id number]] Resulted: 08/22/22 1341    Order Status: Sent Lab Status: In process Updated: 08/22/22 1341        Final Active Diagnoses:    Diagnosis Date Noted POA    PRINCIPAL PROBLEM:  PAULA (acute kidney injury) [N17.9] 04/04/2022 Yes    Goals of care, counseling/discussion [Z71.89] 08/19/2022 Not Applicable    Severe malnutrition [E43] 08/19/2022 Yes    Intractable nausea and vomiting [R11.2] 04/14/2022 Yes    Hyponatremia [E87.1] 04/04/2022 Yes    Metastatic melanoma [C79.9] 04/04/2022 Yes    Ascites [R18.8] 04/04/2022 Yes      Problems Resolved During this Admission:      Discharged Condition: good    Disposition: Home or Self Care    Follow Up:    Patient Instructions:   No discharge procedures on file.  Medications:  Reconciled Home Medications:      Medication List      CHANGE how you take these medications    clobetasoL 0.05 % cream  Commonly known as: TEMOVATE  Apply topically 2 (two) times daily.  What changed:   · when to take this  · reasons to take this     famotidine 20 MG tablet  Commonly known as: PEPCID  Take 1 tablet (20 mg total) by mouth once daily.  What changed: when to take this        CONTINUE taking these medications    buPROPion 100 MG TBSR 12 hr tablet  Commonly known as: WELLBUTRIN SR  Take 1 tablet (100 mg total) by mouth 2 (two) times daily.     GAVILAX 17 gram/dose powder  Generic drug:  polyethylene glycol  Dissolve one capful (17 g) in liquid and take by mouth once daily.     levETIRAcetam 1000 MG tablet  Commonly known as: KEPPRA  Take 1 tablet (1,000 mg total) by mouth 2 (two) times a day.     morphine 30 MG 12 hr tablet  Commonly known as: MS CONTIN  Take 1 tablet (30 mg total) by mouth 2 (two) times daily.     OLANZapine 2.5 MG tablet  Commonly known as: ZyPREXA  Take 1 tablet (2.5 mg total) by mouth every 6 (six) hours as needed (anxiety).     ondansetron 8 MG Tbdl  Commonly known as: ZOFRAN-ODT  Dissolve 1 tablet (8 mg total) by mouth every 12 (twelve) hours as needed.     oxyCODONE 30 MG Tab  Commonly known as: ROXICODONE  Take 1 tablet (30 mg total) by mouth every 4 (four) hours as needed (cancer related pain).     prochlorperazine 5 MG tablet  Commonly known as: COMPAZINE  Take 1 tablet (5 mg total) by mouth every 6 (six) hours as needed for Nausea.     SENNA 8.6 mg tablet  Generic drug: senna  Take 1 tablet by mouth twice daily     senna-docusate 8.6-50 mg 8.6-50 mg per tablet  Commonly known as: PERICOLACE  Take 1 tablet by mouth daily as needed for Constipation.        STOP taking these medications    ALPRAZolam 0.5 MG tablet  Commonly known as: XANAX     NARCAN 4 mg/actuation Spry  Generic drug: naloxone            Baltazar Diaz,   Hematology/Oncology  [facility] - Oncology ([facility])

## 2022-08-23 DIAGNOSIS — G89.3 CANCER ASSOCIATED PAIN: ICD-10-CM

## 2022-08-24 ENCOUNTER — OFFICE VISIT (OUTPATIENT)
Dept: HEMATOLOGY/ONCOLOGY | Facility: CLINIC | Age: 52
End: 2022-08-24
Payer: MEDICAID

## 2022-08-24 ENCOUNTER — LAB VISIT (OUTPATIENT)
Dept: LAB | Facility: HOSPITAL | Age: 52
End: 2022-08-24
Payer: MEDICAID

## 2022-08-24 VITALS
DIASTOLIC BLOOD PRESSURE: 89 MMHG | WEIGHT: 150.38 LBS | SYSTOLIC BLOOD PRESSURE: 128 MMHG | HEIGHT: 72 IN | BODY MASS INDEX: 20.37 KG/M2 | OXYGEN SATURATION: 96 % | HEART RATE: 79 BPM | TEMPERATURE: 98 F | RESPIRATION RATE: 18 BRPM

## 2022-08-24 DIAGNOSIS — R18.0 MALIGNANT ASCITES: ICD-10-CM

## 2022-08-24 DIAGNOSIS — E03.9 HYPOTHYROIDISM, UNSPECIFIED TYPE: ICD-10-CM

## 2022-08-24 DIAGNOSIS — G89.3 CANCER ASSOCIATED PAIN: ICD-10-CM

## 2022-08-24 DIAGNOSIS — C79.31 MALIGNANT MELANOMA METASTATIC TO BRAIN: ICD-10-CM

## 2022-08-24 DIAGNOSIS — F51.05 INSOMNIA SECONDARY TO ANXIETY: ICD-10-CM

## 2022-08-24 DIAGNOSIS — C43.9 METASTATIC MELANOMA: ICD-10-CM

## 2022-08-24 DIAGNOSIS — R11.2 NAUSEA AND VOMITING, INTRACTABILITY OF VOMITING NOT SPECIFIED, UNSPECIFIED VOMITING TYPE: ICD-10-CM

## 2022-08-24 DIAGNOSIS — F41.9 INSOMNIA SECONDARY TO ANXIETY: ICD-10-CM

## 2022-08-24 DIAGNOSIS — L29.9 ITCHING: ICD-10-CM

## 2022-08-24 DIAGNOSIS — R51.9 HEADACHE DUE TO INTRACRANIAL DISEASE: ICD-10-CM

## 2022-08-24 DIAGNOSIS — G93.9 HEADACHE DUE TO INTRACRANIAL DISEASE: ICD-10-CM

## 2022-08-24 DIAGNOSIS — C43.9 METASTATIC MELANOMA: Primary | ICD-10-CM

## 2022-08-24 LAB
ALBUMIN SERPL BCP-MCNC: 2.9 G/DL (ref 3.5–5.2)
ALP SERPL-CCNC: 58 U/L (ref 55–135)
ALT SERPL W/O P-5'-P-CCNC: 36 U/L (ref 10–44)
ANION GAP SERPL CALC-SCNC: 6 MMOL/L (ref 8–16)
AST SERPL-CCNC: 21 U/L (ref 10–40)
BASOPHILS # BLD AUTO: 0.09 K/UL (ref 0–0.2)
BASOPHILS NFR BLD: 0.9 % (ref 0–1.9)
BILIRUB SERPL-MCNC: 0.3 MG/DL (ref 0.1–1)
BUN SERPL-MCNC: 10 MG/DL (ref 6–20)
CALCIUM SERPL-MCNC: 8.6 MG/DL (ref 8.7–10.5)
CHLORIDE SERPL-SCNC: 101 MMOL/L (ref 95–110)
CO2 SERPL-SCNC: 27 MMOL/L (ref 23–29)
CREAT SERPL-MCNC: 0.8 MG/DL (ref 0.5–1.4)
DIFFERENTIAL METHOD: ABNORMAL
EOSINOPHIL # BLD AUTO: 0.6 K/UL (ref 0–0.5)
EOSINOPHIL NFR BLD: 5.9 % (ref 0–8)
ERYTHROCYTE [DISTWIDTH] IN BLOOD BY AUTOMATED COUNT: 14.6 % (ref 11.5–14.5)
EST. GFR  (NO RACE VARIABLE): >60 ML/MIN/1.73 M^2
GLUCOSE SERPL-MCNC: 95 MG/DL (ref 70–110)
HCT VFR BLD AUTO: 38.5 % (ref 40–54)
HGB BLD-MCNC: 13 G/DL (ref 14–18)
IMM GRANULOCYTES # BLD AUTO: 0.03 K/UL (ref 0–0.04)
IMM GRANULOCYTES NFR BLD AUTO: 0.3 % (ref 0–0.5)
LDH SERPL L TO P-CCNC: 262 U/L (ref 110–260)
LYMPHOCYTES # BLD AUTO: 2.2 K/UL (ref 1–4.8)
LYMPHOCYTES NFR BLD: 21.1 % (ref 18–48)
MCH RBC QN AUTO: 28.6 PG (ref 27–31)
MCHC RBC AUTO-ENTMCNC: 33.8 G/DL (ref 32–36)
MCV RBC AUTO: 85 FL (ref 82–98)
MONOCYTES # BLD AUTO: 0.8 K/UL (ref 0.3–1)
MONOCYTES NFR BLD: 8.1 % (ref 4–15)
NEUTROPHILS # BLD AUTO: 6.6 K/UL (ref 1.8–7.7)
NEUTROPHILS NFR BLD: 63.7 % (ref 38–73)
NRBC BLD-RTO: 0 /100 WBC
PLATELET # BLD AUTO: 382 K/UL (ref 150–450)
PMV BLD AUTO: 9 FL (ref 9.2–12.9)
POTASSIUM SERPL-SCNC: 4.7 MMOL/L (ref 3.5–5.1)
PROT SERPL-MCNC: 5.5 G/DL (ref 6–8.4)
RBC # BLD AUTO: 4.54 M/UL (ref 4.6–6.2)
SODIUM SERPL-SCNC: 134 MMOL/L (ref 136–145)
T4 FREE SERPL-MCNC: 0.98 NG/DL (ref 0.71–1.51)
TSH SERPL DL<=0.005 MIU/L-ACNC: 1.4 UIU/ML (ref 0.4–4)
WBC # BLD AUTO: 10.36 K/UL (ref 3.9–12.7)

## 2022-08-24 PROCEDURE — 3079F PR MOST RECENT DIASTOLIC BLOOD PRESSURE 80-89 MM HG: ICD-10-PCS | Mod: CPTII,,, | Performed by: NURSE PRACTITIONER

## 2022-08-24 PROCEDURE — 3079F DIAST BP 80-89 MM HG: CPT | Mod: CPTII,,, | Performed by: NURSE PRACTITIONER

## 2022-08-24 PROCEDURE — 36415 COLL VENOUS BLD VENIPUNCTURE: CPT | Performed by: NURSE PRACTITIONER

## 2022-08-24 PROCEDURE — 80053 COMPREHEN METABOLIC PANEL: CPT | Performed by: INTERNAL MEDICINE

## 2022-08-24 PROCEDURE — 84439 ASSAY OF FREE THYROXINE: CPT | Performed by: INTERNAL MEDICINE

## 2022-08-24 PROCEDURE — 1160F RVW MEDS BY RX/DR IN RCRD: CPT | Mod: CPTII,,, | Performed by: NURSE PRACTITIONER

## 2022-08-24 PROCEDURE — 1159F MED LIST DOCD IN RCRD: CPT | Mod: CPTII,,, | Performed by: NURSE PRACTITIONER

## 2022-08-24 PROCEDURE — 85025 COMPLETE CBC W/AUTO DIFF WBC: CPT | Performed by: INTERNAL MEDICINE

## 2022-08-24 PROCEDURE — 1111F DSCHRG MED/CURRENT MED MERGE: CPT | Mod: CPTII,,, | Performed by: NURSE PRACTITIONER

## 2022-08-24 PROCEDURE — 3074F SYST BP LT 130 MM HG: CPT | Mod: CPTII,,, | Performed by: NURSE PRACTITIONER

## 2022-08-24 PROCEDURE — 84443 ASSAY THYROID STIM HORMONE: CPT | Performed by: INTERNAL MEDICINE

## 2022-08-24 PROCEDURE — 3008F BODY MASS INDEX DOCD: CPT | Mod: CPTII,,, | Performed by: NURSE PRACTITIONER

## 2022-08-24 PROCEDURE — 99215 PR OFFICE/OUTPT VISIT, EST, LEVL V, 40-54 MIN: ICD-10-PCS | Mod: S$PBB,,, | Performed by: NURSE PRACTITIONER

## 2022-08-24 PROCEDURE — 99999 PR PBB SHADOW E&M-EST. PATIENT-LVL IV: CPT | Mod: PBBFAC,,, | Performed by: NURSE PRACTITIONER

## 2022-08-24 PROCEDURE — 83615 LACTATE (LD) (LDH) ENZYME: CPT | Performed by: NURSE PRACTITIONER

## 2022-08-24 PROCEDURE — 99215 OFFICE O/P EST HI 40 MIN: CPT | Mod: S$PBB,,, | Performed by: NURSE PRACTITIONER

## 2022-08-24 PROCEDURE — 1160F PR REVIEW ALL MEDS BY PRESCRIBER/CLIN PHARMACIST DOCUMENTED: ICD-10-PCS | Mod: CPTII,,, | Performed by: NURSE PRACTITIONER

## 2022-08-24 PROCEDURE — 3074F PR MOST RECENT SYSTOLIC BLOOD PRESSURE < 130 MM HG: ICD-10-PCS | Mod: CPTII,,, | Performed by: NURSE PRACTITIONER

## 2022-08-24 PROCEDURE — 3008F PR BODY MASS INDEX (BMI) DOCUMENTED: ICD-10-PCS | Mod: CPTII,,, | Performed by: NURSE PRACTITIONER

## 2022-08-24 PROCEDURE — 1159F PR MEDICATION LIST DOCUMENTED IN MEDICAL RECORD: ICD-10-PCS | Mod: CPTII,,, | Performed by: NURSE PRACTITIONER

## 2022-08-24 PROCEDURE — 99214 OFFICE O/P EST MOD 30 MIN: CPT | Mod: PBBFAC | Performed by: NURSE PRACTITIONER

## 2022-08-24 PROCEDURE — 99999 PR PBB SHADOW E&M-EST. PATIENT-LVL IV: ICD-10-PCS | Mod: PBBFAC,,, | Performed by: NURSE PRACTITIONER

## 2022-08-24 PROCEDURE — 1111F PR DISCHARGE MEDS RECONCILED W/ CURRENT OUTPATIENT MED LIST: ICD-10-PCS | Mod: CPTII,,, | Performed by: NURSE PRACTITIONER

## 2022-08-24 RX ORDER — HEPARIN 100 UNIT/ML
500 SYRINGE INTRAVENOUS
Status: CANCELLED | OUTPATIENT
Start: 2022-08-24

## 2022-08-24 RX ORDER — SODIUM CHLORIDE 0.9 % (FLUSH) 0.9 %
10 SYRINGE (ML) INJECTION
Status: CANCELLED | OUTPATIENT
Start: 2022-08-24

## 2022-08-24 RX ORDER — OXYCODONE HYDROCHLORIDE 30 MG/1
30 TABLET ORAL EVERY 4 HOURS PRN
Qty: 180 TABLET | Refills: 0 | Status: SHIPPED | OUTPATIENT
Start: 2022-08-24 | End: 2022-09-20 | Stop reason: SDUPTHER

## 2022-08-24 RX ORDER — METHYLPREDNISOLONE SOD SUCC 125 MG
125 VIAL (EA) INJECTION ONCE AS NEEDED
Status: CANCELLED | OUTPATIENT
Start: 2022-08-24

## 2022-08-24 RX ORDER — DIPHENHYDRAMINE HYDROCHLORIDE 50 MG/ML
25 INJECTION INTRAMUSCULAR; INTRAVENOUS EVERY 10 MIN PRN
Status: CANCELLED | OUTPATIENT
Start: 2022-08-24 | End: 2022-08-25

## 2022-08-24 RX ORDER — EPINEPHRINE 0.3 MG/.3ML
0.3 INJECTION SUBCUTANEOUS ONCE AS NEEDED
Status: CANCELLED | OUTPATIENT
Start: 2022-08-24

## 2022-08-24 NOTE — PROGRESS NOTES
ESTABLISHED MEDICAL ONCOLOGY VISIT    Reason for visit:  Metastatic melanoma to the lungs, axillary lymph nodes, liver, peritoneal carcinomatosis, and brain.     Cancer/Stage/TNM:   Cancer Staging  Metastatic melanoma  Staging form: Melanoma of the Skin, AJCC 8th Edition  - Clinical stage from 1/28/2022: Stage IV (cTX, cNX, cM1) - Signed by Adria Rivera MD on 4/12/2022       Oncology History   Metastatic melanoma   1/28/2022 Cancer Staged    Staging form: Melanoma of the Skin, AJCC 8th Edition  - Clinical stage from 1/28/2022: Stage IV (cTX, cNX, cM1)     4/4/2022 Initial Diagnosis    Metastatic melanoma     6/14/2022 -  Chemotherapy    Treatment Summary   Plan Name: OP NIVOLUMAB 1 MG/KG IPILIMUMAB 3MG/KG FOLLOWED BY NIVOLUMAB 480MG Q4W  Treatment Goal: Control  Status: Active  Start Date: 6/14/2022  End Date: 5/26/2023 (Planned)  Provider: Clayton Wise MD  Chemotherapy: ipilimumab (YERVOY) 200 mg in sodium chloride 0.9% 140 mL chemo infusion, 211 mg, Intravenous, Clinic/HOD 1 time, 3 of 3 cycles  Administration: 200 mg (6/14/2022), 200 mg (7/8/2022), 200 mg (7/29/2022)  nivolumab 70 mg in sodium chloride 0.9% 57 mL infusion, 1 mg/kg = 70 mg, Intravenous, Clinic/HOD 1 time, 3 of 14 cycles  Dose modification: 480 mg (original dose 1 mg/kg, Cycle 4, Reason: MD Discretion)  Administration: 70 mg (6/14/2022), 70 mg (7/8/2022), 70 mg (7/29/2022)          HPI:   51 y.o. male with history of CAD s/p PCI, seizures, substance abuse, stage IV malignant melanoma who presents today to establish care at [facility].   He presented initially with enlarging right axillary lymph nodes that have grown progressively in size since April 2021. He presented to urgent care and was referred for US which showed multiple enlarged masses in the right axilla most likely reflecting abnormal lymph nodes concerning for malignancy.   He was subsequently referred to dermatology who performed a complete exam of the skin that was  negative for suspicious cutaneous lesions as well as punch biopsy of one of the lymph node on 01/04/22. Pathology came back as malignant melanoma.   BRAF mutation: positive for a V600E mutation.   He subsequently underwent a PET/CT on 01/28/22 which showed multiple hypermetabolic soft tissue masses within the right axillary region, metastatic lesions to the lungs and abdomen (VI segment of the liver measuring a max SUV of 6.99, 1.6 x 1.9 cm nodular soft tissue density with a max SUV  7.64 was seen adjacent to the colon within the right lower quadrant. Brain MRI was negative for metastatic disease.  LDH was 307 U/L  He saw Oncology at [facility] on 01/31/22 with plan to start First line Nivolumab / ipilimumab. He received C1 on 03/03/22.     He was admitted on 03/18 at [facility] for abdominal pain, and distension. He underwent therapeutic paracentesis with removal of 3L, and was discharged on 03/22. CT abdomen during that admission showed soft tissue attenuation throughout the peritoneum consistent with innumerable peritoneal implants.     He was readmitted the following day 03/23 for re accumulation and had another paracentesis with removal of 4.7L.  Unfortunately, he had rapid re accumulation of his ascites and was readmitted again between 03/30 and 04/02 and underwent paracentesis. He was planned to receive pleur-X catheter as outpatient.     He presented [place] to [facility] [place]04 with worsening abdominal distension and pain. He underwent therapeutic paracentesis with removal of 5L. Cytology was positive for malignant cells. He was discharged on 04/05 on Cipro for SBP coverage.       Interval history:   Patient hospitalized since last visit for reports of intractable N/V for approximately 3 days unrelieved by anti-emetics at home. Associated diffuse abdominal pain unrelieved by analgesic regimen at home. Abdominal distension. Paracentesis performed on 8/19 with 6L removed. N/V improved and was discharged  home with oncology follow  up.     Today, patient reports feeling extremely well. He has no complaints since restarting targeted therapy drugs. No return of N/V or abdominal distension. Eating well and focusing on protein intake - plans to start drinking Boost regularly. Denies any pain.     Review of Systems   Constitutional: Negative for activity change, appetite change, chills, fatigue, fever and unexpected weight change.   HENT: Negative for ear pain, facial swelling, hearing loss, mouth sores, nosebleeds, sore throat and trouble swallowing.    Eyes: Negative for pain, discharge, redness and visual disturbance.   Respiratory: Negative for cough, choking, chest tightness and shortness of breath.    Cardiovascular: Negative for chest pain, palpitations and leg swelling.   Gastrointestinal: Negative for abdominal distention, abdominal pain, blood in stool, constipation, diarrhea, nausea and vomiting.   Endocrine: Negative for cold intolerance and heat intolerance.   Genitourinary: Negative for difficulty urinating, dysuria, frequency and urgency.   Musculoskeletal: Negative for arthralgias, gait problem, leg pain and myalgias.   Integumentary:  Negative for pallor, rash and wound.   Allergic/Immunologic: Negative for frequent infections.   Neurological: Negative for dizziness, tremors, weakness, light-headedness, numbness and headaches.   Hematological: Negative for adenopathy. Does not bruise/bleed easily.   Psychiatric/Behavioral: Negative for agitation, confusion, dysphoric mood and sleep disturbance. The patient is not nervous/anxious.          Past Medical History:   Past Medical History:   Diagnosis Date    Seizures         Past Surgical History:   History reviewed. No pertinent surgical history.     Family History:   History reviewed. No pertinent family history.     Social History:   Social History     Tobacco Use    Smoking status: Current Every Day Smoker    Smokeless tobacco: Never Used   Substance Use Topics    Alcohol use:  Yes        I have reviewed and updated the patient's past medical, surgical, family and social histories.    Allergies:   Review of patient's allergies indicates:  No Known Allergies     Medications:   Current Outpatient Medications   Medication Sig Dispense Refill    binimetinib 15 mg Tab Take 45 mg by mouth 2 (two) times daily. 180 tablet 5    buPROPion (WELLBUTRIN SR) 100 MG TBSR 12 hr tablet Take 1 tablet (100 mg total) by mouth 2 (two) times daily. 60 tablet 2    clobetasoL (TEMOVATE) 0.05 % cream Apply topically 2 (two) times daily. (Patient taking differently: Apply topically 2 (two) times daily as needed.) 60 g 3    encorafenib 75 mg Cap Take 450 mg by mouth once daily. 180 capsule 5    famotidine (PEPCID) 20 MG tablet Take 1 tablet (20 mg total) by mouth once daily. 30 tablet 11    levETIRAcetam (KEPPRA) 1000 MG tablet Take 1 tablet (1,000 mg total) by mouth 2 (two) times a day. 60 tablet 11    morphine (MS CONTIN) 30 MG 12 hr tablet Take 1 tablet (30 mg total) by mouth 2 (two) times daily. 60 tablet 0    OLANZapine (ZYPREXA) 2.5 MG tablet Take 1 tablet (2.5 mg total) by mouth every 6 (six) hours as needed (anxiety). 60 tablet 0    ondansetron (ZOFRAN-ODT) 8 MG TbDL Dissolve 1 tablet (8 mg total) by mouth every 12 (twelve) hours as needed. 30 tablet 1    oxyCODONE (ROXICODONE) 30 MG Tab Take 1 tablet (30 mg total) by mouth every 4 (four) hours as needed (cancer related pain). 180 tablet 0    polyethylene glycol (GLYCOLAX) 17 gram/dose powder Dissolve one capful (17 g) in liquid and take by mouth once daily. 510 g 3    prochlorperazine (COMPAZINE) 5 MG tablet Take 1 tablet (5 mg total) by mouth every 6 (six) hours as needed for Nausea. 30 tablet 1    senna (SENNA) 8.6 mg tablet Take 1 tablet by mouth twice daily 60 tablet 10    senna-docusate 8.6-50 mg (PERICOLACE) 8.6-50 mg per tablet Take 1 tablet by mouth daily as needed for Constipation. 30 tablet 3     No current facility-administered  medications for this visit.        Physical Exam:   /89 (BP Location: Left arm, Patient Position: Sitting, BP Method: Medium (Automatic))   Pulse 79   Temp 98.4 °F (36.9 °C) (Oral)   Resp 18   Ht 6' (1.829 m)   Wt 68.2 kg (150 lb 5.7 oz)   SpO2 96%   BMI 20.39 kg/m²      ECOG Performance status: 1          Physical Exam  Vitals and nursing note reviewed.   Constitutional:       General: He is not in acute distress.     Appearance: Normal appearance. He is well-developed.   HENT:      Head: Normocephalic and atraumatic.   Eyes:      Conjunctiva/sclera: Conjunctivae normal.      Pupils: Pupils are equal, round, and reactive to light.   Pulmonary:      Effort: Pulmonary effort is normal. No respiratory distress.   Abdominal:      General: There is no distension.      Palpations: Abdomen is soft.   Musculoskeletal:         General: No swelling. Normal range of motion.      Cervical back: Normal range of motion and neck supple.      Right lower leg: No edema.      Left lower leg: No edema.   Skin:     General: Skin is warm and dry.   Neurological:      General: No focal deficit present.      Mental Status: He is alert and oriented to person, place, and time.   Psychiatric:         Mood and Affect: Mood normal.         Behavior: Behavior normal.         Thought Content: Thought content normal.         Judgment: Judgment normal.           Labs:   Recent Results (from the past 48 hour(s))   CBC Auto Differential    Collection Time: 08/24/22 10:56 AM   Result Value Ref Range    WBC 10.36 3.90 - 12.70 K/uL    RBC 4.54 (L) 4.60 - 6.20 M/uL    Hemoglobin 13.0 (L) 14.0 - 18.0 g/dL    Hematocrit 38.5 (L) 40.0 - 54.0 %    MCV 85 82 - 98 fL    MCH 28.6 27.0 - 31.0 pg    MCHC 33.8 32.0 - 36.0 g/dL    RDW 14.6 (H) 11.5 - 14.5 %    Platelets 382 150 - 450 K/uL    MPV 9.0 (L) 9.2 - 12.9 fL    Immature Granulocytes 0.3 0.0 - 0.5 %    Gran # (ANC) 6.6 1.8 - 7.7 K/uL    Immature Grans (Abs) 0.03 0.00 - 0.04 K/uL    Lymph #  2.2 1.0 - 4.8 K/uL    Mono # 0.8 0.3 - 1.0 K/uL    Eos # 0.6 (H) 0.0 - 0.5 K/uL    Baso # 0.09 0.00 - 0.20 K/uL    nRBC 0 0 /100 WBC    Gran % 63.7 38.0 - 73.0 %    Lymph % 21.1 18.0 - 48.0 %    Mono % 8.1 4.0 - 15.0 %    Eosinophil % 5.9 0.0 - 8.0 %    Basophil % 0.9 0.0 - 1.9 %    Differential Method Automated    Comprehensive Metabolic Panel    Collection Time: 08/24/22 10:56 AM   Result Value Ref Range    Sodium 134 (L) 136 - 145 mmol/L    Potassium 4.7 3.5 - 5.1 mmol/L    Chloride 101 95 - 110 mmol/L    CO2 27 23 - 29 mmol/L    Glucose 95 70 - 110 mg/dL    BUN 10 6 - 20 mg/dL    Creatinine 0.8 0.5 - 1.4 mg/dL    Calcium 8.6 (L) 8.7 - 10.5 mg/dL    Total Protein 5.5 (L) 6.0 - 8.4 g/dL    Albumin 2.9 (L) 3.5 - 5.2 g/dL    Total Bilirubin 0.3 0.1 - 1.0 mg/dL    Alkaline Phosphatase 58 55 - 135 U/L    AST 21 10 - 40 U/L    ALT 36 10 - 44 U/L    Anion Gap 6 (L) 8 - 16 mmol/L    eGFR >60.0 >60 mL/min/1.73 m^2   T4, Free    Collection Time: 08/24/22 10:56 AM   Result Value Ref Range    Free T4 0.98 0.71 - 1.51 ng/dL   TSH    Collection Time: 08/24/22 10:56 AM   Result Value Ref Range    TSH 1.400 0.400 - 4.000 uIU/mL   Lactate Dehydrogenase    Collection Time: 08/24/22 10:56 AM   Result Value Ref Range     (H) 110 - 260 U/L        Imaging:  Reviewed imaging personally and with Dr. Adam Levine, radiation oncology, discussed with patient MRI results showing mixed response compared to 4/20 exam with possible early leptomeningeal disease.     Results for orders placed or performed during the hospital encounter of 06/06/22 (from the past 2160 hour(s))   MRI Brain W WO Contrast    Impression    Scattered subcentimeter regions of nodular enhancement again identified.  Left frontal focus overall less conspicuous from prior.  However there are new or more conspicuous regions of enhancement involving the left posterior frontal cortex and left caudate nucleus overall remains concerning for metastatic disease in light of  history.    There is no restricted diffusion to suggest acute infarction.  There is no significant parenchymal edema signal abnormality associated with the enhancing lesions.    Clinical correlation and close follow-up recommended.      Electronically signed by: Felix Gaitan DO  Date:    06/14/2022  Time:    06:39         Path:   Skin, right axilla, biopsy:  -  Malignant melanoma.     Note:  The tumor measures approximately 3 x 4 mm and involves the peripheral margins of the specimen. Given no visualized connection to the overlying epidermis, a metastasis or satellitosis of a larger nearby melanoma is a consideration. Clear cell sarcoma is a differential diagnosis.     Microscopic Description:  Skin with a poorly circumscribed dermal nodule of atypical melanocytes. The tumor cells are hyperchromatic with a high nuclear to cytoplasm ratio. The tumor cells are strongly positive for Sox10 stain. CD43 demonstrates scattered tumor infiltrating lymphocytes, but does not stain tumor cells. No lymphovascular invasion is seen with CD31 and D2-40 stains. All controls are adequate.     A PHH3 stain is pending with addendum to follow.     Mitotic counts:  3/mm2.  Ulceration:   Not identified.  Regression:  Not identified.  Tumor infiltrating lymphocytes:  Mild.  Angiolymphatic invasion:  Not identified.  Associated nevus:  Not identified.  Predominant cytology:  Epithelioid cell.  Margin involvement:  Involved.    Assessment:       1. Metastatic melanoma    2. Malignant melanoma metastatic to brain    3. Malignant ascites    4. Cancer associated pain    5. Nausea and vomiting, intractability of vomiting not specified, unspecified vomiting type    6. Headache due to intracranial disease    7. Insomnia secondary to anxiety    8. Itching          Plan:     1,2,3 Metastatic melanoma to the lungs, liver, and peritoneum   51 year-old-male patient with metastatic melanoma to the axillary LNs, lungs, liver, and peritoneum, BRAF V600E  mutant, who is status post C1 of first line immunotherapy with Nivolumab+Ipilimumab on 03/03. Unfortunately, since then he has developed peritoneal carcinomatosis leading to recurrent accumulation of malignant ascites. He required admission 4 times to the hospital for therapeutic paracentesis. He was very symptomatic on presentation with worsening abdominal distension and notable worsening R axillary swelling and not tolerating po.  We discussed stage and prognosis of this aggressive melanoma.    With his rapidly progressive disease, we recommend switching him to targeted therapy with Encorafinib and Binimitinib. Samples were given and patient had a dramatic clinical response. He is now approved for patient assistance through [employer]. Patient had a mixed response on MRI brain.  Patient had symptoms of increased intra-cranial pressure with only 1 week off of targeted therapy.  For this reason, we will continue targeted therapy during the early period of re-initiation of his best systemic option, being combination immunotherapy.  - Labs acceptable for C5 of Opdivo only (received C1 in March). CT abd/pelvis during hospitalization showed worsening, now large volume abdominopelvic ascites with peritoneal and omental nodularity which may represent peritoneal carcinomatosis. With rapid recurrence of disease in the abdomen with stopping BRAF targeted therapy, will plan to continue triple therapy (encorafenib+binimitnib & Opdivo). Re-staging scans in october. Brain MRI prior to next visit.     4 Cancer associated pain   - Pain controlled  - Oxy IR 30 mg Q8H PRN - patient in agreement to try to use less. Continue MS Contin 30 mg BID.  - Following with palliative care    5,6,7 Improved with re-initiation of targeted therapy and initiation of low dose zyprexa at night.  Also still taking wellbutrin.    8. Prescribed clobetasol for affected areas.     Patient is in agreement with the proposed treatment plan. All questions were  answered to the patient's satisfaction. Pt knows to call clinic if anything is needed before the next clinic visit.    Kitty Segura, MSN, APRN, FNP-C  Hematology and Medical Oncology  Nurse Practitioner to Dr. Clayton Wise  Nurse Practitioner, [facility]          Route Chart for Scheduling    Med Onc Chart Routing      Follow up with physician 4 weeks. Prior to Opdivo   Follow up with RAYMUNDO    Infusion scheduling note    Injection scheduling note    Labs CBC, CMP, LDH, TSH and free T4   Lab interval: every 4 weeks     Imaging MRI   Brain MRI in 4 weeks   Pharmacy appointment    Other referrals          Treatment Plan Information   OP NIVOLUMAB 1 MG/KG IPILIMUMAB 3MG/KG FOLLOWED BY NIVOLUMAB 480MG Q4W   Clayton Wise MD   Upcoming Treatment Dates - OP NIVOLUMAB 1 MG/KG IPILIMUMAB 3MG/KG FOLLOWED BY NIVOLUMAB 480MG Q4W    8/19/2022       Chemotherapy       nivolumab 480 mg in sodium chloride 0.9% 148 mL infusion  9/16/2022       Chemotherapy       nivolumab 480 mg in sodium chloride 0.9% 148 mL infusion  10/14/2022       Chemotherapy       nivolumab 480 mg in sodium chloride 0.9% 148 mL infusion  11/11/2022       Chemotherapy       nivolumab 480 mg in sodium chloride 0.9% 148 mL infusion

## 2022-08-26 ENCOUNTER — INFUSION (OUTPATIENT)
Dept: INFUSION THERAPY | Facility: HOSPITAL | Age: 52
End: 2022-08-26
Payer: MEDICAID

## 2022-08-26 VITALS
DIASTOLIC BLOOD PRESSURE: 82 MMHG | WEIGHT: 150.38 LBS | BODY MASS INDEX: 20.39 KG/M2 | SYSTOLIC BLOOD PRESSURE: 148 MMHG | TEMPERATURE: 99 F | HEART RATE: 6 BPM | RESPIRATION RATE: 18 BRPM

## 2022-08-26 DIAGNOSIS — R11.2 NAUSEA AND VOMITING, INTRACTABILITY OF VOMITING NOT SPECIFIED, UNSPECIFIED VOMITING TYPE: ICD-10-CM

## 2022-08-26 DIAGNOSIS — C43.9 METASTATIC MELANOMA: Primary | ICD-10-CM

## 2022-08-26 LAB — BACTERIA SPEC ANAEROBE CULT: NORMAL

## 2022-08-26 PROCEDURE — 96413 CHEMO IV INFUSION 1 HR: CPT

## 2022-08-26 PROCEDURE — 25000003 PHARM REV CODE 250: Performed by: NURSE PRACTITIONER

## 2022-08-26 PROCEDURE — 63600175 PHARM REV CODE 636 W HCPCS: Mod: JG | Performed by: NURSE PRACTITIONER

## 2022-08-26 RX ORDER — SODIUM CHLORIDE 0.9 % (FLUSH) 0.9 %
10 SYRINGE (ML) INJECTION
Status: DISCONTINUED | OUTPATIENT
Start: 2022-08-26 | End: 2022-08-26 | Stop reason: HOSPADM

## 2022-08-26 RX ORDER — DIPHENHYDRAMINE HYDROCHLORIDE 50 MG/ML
25 INJECTION INTRAMUSCULAR; INTRAVENOUS EVERY 10 MIN PRN
Status: DISCONTINUED | OUTPATIENT
Start: 2022-08-26 | End: 2022-08-26 | Stop reason: HOSPADM

## 2022-08-26 RX ORDER — METHYLPREDNISOLONE SOD SUCC 125 MG
125 VIAL (EA) INJECTION ONCE AS NEEDED
Status: DISCONTINUED | OUTPATIENT
Start: 2022-08-26 | End: 2022-08-26 | Stop reason: HOSPADM

## 2022-08-26 RX ORDER — EPINEPHRINE 0.3 MG/.3ML
0.3 INJECTION SUBCUTANEOUS ONCE AS NEEDED
Status: DISCONTINUED | OUTPATIENT
Start: 2022-08-26 | End: 2022-08-26 | Stop reason: HOSPADM

## 2022-08-26 RX ORDER — HEPARIN 100 UNIT/ML
500 SYRINGE INTRAVENOUS
Status: DISCONTINUED | OUTPATIENT
Start: 2022-08-26 | End: 2022-08-26 | Stop reason: HOSPADM

## 2022-08-26 RX ADMIN — SODIUM CHLORIDE 480 MG: 9 INJECTION, SOLUTION INTRAVENOUS at 01:08

## 2022-08-26 RX ADMIN — SODIUM CHLORIDE: 9 INJECTION, SOLUTION INTRAVENOUS at 01:08

## 2022-08-26 NOTE — PLAN OF CARE
1320 pt here for Opdivo infusion D1C4, labs, hx, meds, allergies reviewed, pt with no new complaints at this time, reclined in chair, continue to monitor

## 2022-08-28 RX ORDER — PROCHLORPERAZINE MALEATE 5 MG
5 TABLET ORAL EVERY 6 HOURS PRN
Qty: 30 TABLET | Refills: 1 | Status: SHIPPED | OUTPATIENT
Start: 2022-08-28 | End: 2023-08-28

## 2022-09-04 ENCOUNTER — PATIENT MESSAGE (OUTPATIENT)
Dept: HEMATOLOGY/ONCOLOGY | Facility: CLINIC | Age: 52
End: 2022-09-04
Payer: MEDICAID

## 2022-09-06 ENCOUNTER — PATIENT MESSAGE (OUTPATIENT)
Dept: HEMATOLOGY/ONCOLOGY | Facility: CLINIC | Age: 52
End: 2022-09-06
Payer: MEDICAID

## 2022-09-12 ENCOUNTER — PATIENT MESSAGE (OUTPATIENT)
Dept: HEMATOLOGY/ONCOLOGY | Facility: CLINIC | Age: 52
End: 2022-09-12
Payer: MEDICAID

## 2022-09-15 ENCOUNTER — TELEPHONE (OUTPATIENT)
Dept: PALLIATIVE MEDICINE | Facility: CLINIC | Age: 52
End: 2022-09-15
Payer: MEDICAID

## 2022-09-16 ENCOUNTER — OFFICE VISIT (OUTPATIENT)
Dept: PALLIATIVE MEDICINE | Facility: CLINIC | Age: 52
End: 2022-09-16
Payer: MEDICAID

## 2022-09-16 VITALS — SYSTOLIC BLOOD PRESSURE: 148 MMHG | HEART RATE: 57 BPM | DIASTOLIC BLOOD PRESSURE: 87 MMHG

## 2022-09-16 DIAGNOSIS — Z51.5 ENCOUNTER FOR PALLIATIVE CARE: ICD-10-CM

## 2022-09-16 DIAGNOSIS — R11.0 NAUSEA: ICD-10-CM

## 2022-09-16 DIAGNOSIS — R63.0 ANOREXIA: ICD-10-CM

## 2022-09-16 DIAGNOSIS — C43.9 METASTATIC MELANOMA: Primary | ICD-10-CM

## 2022-09-16 DIAGNOSIS — R53.0 NEOPLASTIC (MALIGNANT) RELATED FATIGUE: ICD-10-CM

## 2022-09-16 DIAGNOSIS — G89.3 CANCER ASSOCIATED PAIN: ICD-10-CM

## 2022-09-16 DIAGNOSIS — G47.00 INSOMNIA, UNSPECIFIED TYPE: ICD-10-CM

## 2022-09-16 DIAGNOSIS — F43.23 ADJUSTMENT DISORDER WITH MIXED ANXIETY AND DEPRESSED MOOD: ICD-10-CM

## 2022-09-16 DIAGNOSIS — F41.9 ANXIETY: ICD-10-CM

## 2022-09-16 DIAGNOSIS — R19.7 DIARRHEA, UNSPECIFIED TYPE: Primary | ICD-10-CM

## 2022-09-16 DIAGNOSIS — Z71.89 ADVANCED CARE PLANNING/COUNSELING DISCUSSION: ICD-10-CM

## 2022-09-16 DIAGNOSIS — R19.7 DIARRHEA, UNSPECIFIED TYPE: ICD-10-CM

## 2022-09-16 PROCEDURE — 1111F PR DISCHARGE MEDS RECONCILED W/ CURRENT OUTPATIENT MED LIST: ICD-10-PCS | Mod: CPTII,,, | Performed by: STUDENT IN AN ORGANIZED HEALTH CARE EDUCATION/TRAINING PROGRAM

## 2022-09-16 PROCEDURE — 1160F RVW MEDS BY RX/DR IN RCRD: CPT | Mod: CPTII,,, | Performed by: STUDENT IN AN ORGANIZED HEALTH CARE EDUCATION/TRAINING PROGRAM

## 2022-09-16 PROCEDURE — 3077F PR MOST RECENT SYSTOLIC BLOOD PRESSURE >= 140 MM HG: ICD-10-PCS | Mod: CPTII,,, | Performed by: STUDENT IN AN ORGANIZED HEALTH CARE EDUCATION/TRAINING PROGRAM

## 2022-09-16 PROCEDURE — 99215 PR OFFICE/OUTPT VISIT, EST, LEVL V, 40-54 MIN: ICD-10-PCS | Mod: S$PBB,,, | Performed by: STUDENT IN AN ORGANIZED HEALTH CARE EDUCATION/TRAINING PROGRAM

## 2022-09-16 PROCEDURE — 3079F DIAST BP 80-89 MM HG: CPT | Mod: CPTII,,, | Performed by: STUDENT IN AN ORGANIZED HEALTH CARE EDUCATION/TRAINING PROGRAM

## 2022-09-16 PROCEDURE — 3079F PR MOST RECENT DIASTOLIC BLOOD PRESSURE 80-89 MM HG: ICD-10-PCS | Mod: CPTII,,, | Performed by: STUDENT IN AN ORGANIZED HEALTH CARE EDUCATION/TRAINING PROGRAM

## 2022-09-16 PROCEDURE — 99213 OFFICE O/P EST LOW 20 MIN: CPT | Mod: PBBFAC | Performed by: STUDENT IN AN ORGANIZED HEALTH CARE EDUCATION/TRAINING PROGRAM

## 2022-09-16 PROCEDURE — 99215 OFFICE O/P EST HI 40 MIN: CPT | Mod: S$PBB,,, | Performed by: STUDENT IN AN ORGANIZED HEALTH CARE EDUCATION/TRAINING PROGRAM

## 2022-09-16 PROCEDURE — 1160F PR REVIEW ALL MEDS BY PRESCRIBER/CLIN PHARMACIST DOCUMENTED: ICD-10-PCS | Mod: CPTII,,, | Performed by: STUDENT IN AN ORGANIZED HEALTH CARE EDUCATION/TRAINING PROGRAM

## 2022-09-16 PROCEDURE — 1159F PR MEDICATION LIST DOCUMENTED IN MEDICAL RECORD: ICD-10-PCS | Mod: CPTII,,, | Performed by: STUDENT IN AN ORGANIZED HEALTH CARE EDUCATION/TRAINING PROGRAM

## 2022-09-16 PROCEDURE — 99999 PR PBB SHADOW E&M-EST. PATIENT-LVL III: CPT | Mod: PBBFAC,,, | Performed by: STUDENT IN AN ORGANIZED HEALTH CARE EDUCATION/TRAINING PROGRAM

## 2022-09-16 PROCEDURE — 3077F SYST BP >= 140 MM HG: CPT | Mod: CPTII,,, | Performed by: STUDENT IN AN ORGANIZED HEALTH CARE EDUCATION/TRAINING PROGRAM

## 2022-09-16 PROCEDURE — 99999 PR PBB SHADOW E&M-EST. PATIENT-LVL III: ICD-10-PCS | Mod: PBBFAC,,, | Performed by: STUDENT IN AN ORGANIZED HEALTH CARE EDUCATION/TRAINING PROGRAM

## 2022-09-16 PROCEDURE — 1159F MED LIST DOCD IN RCRD: CPT | Mod: CPTII,,, | Performed by: STUDENT IN AN ORGANIZED HEALTH CARE EDUCATION/TRAINING PROGRAM

## 2022-09-16 PROCEDURE — 1111F DSCHRG MED/CURRENT MED MERGE: CPT | Mod: CPTII,,, | Performed by: STUDENT IN AN ORGANIZED HEALTH CARE EDUCATION/TRAINING PROGRAM

## 2022-09-16 RX ORDER — MORPHINE SULFATE 60 MG/1
60 TABLET, FILM COATED, EXTENDED RELEASE ORAL 2 TIMES DAILY
Qty: 60 TABLET | Refills: 0 | Status: SHIPPED | OUTPATIENT
Start: 2022-09-16

## 2022-09-16 RX ORDER — NALOXONE HYDROCHLORIDE 4 MG/.1ML
SPRAY NASAL
Qty: 1 EACH | Refills: 11 | Status: SHIPPED | OUTPATIENT
Start: 2022-09-16 | End: 2022-09-16
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
## 2022-09-19 ENCOUNTER — PATIENT MESSAGE (OUTPATIENT)
Dept: PALLIATIVE MEDICINE | Facility: CLINIC | Age: 52
End: 2022-09-19
Payer: MEDICAID

## 2022-09-19 ENCOUNTER — LAB VISIT (OUTPATIENT)
Dept: LAB | Facility: HOSPITAL | Age: 52
End: 2022-09-19
Payer: MEDICAID

## 2022-09-19 ENCOUNTER — PATIENT MESSAGE (OUTPATIENT)
Dept: HEMATOLOGY/ONCOLOGY | Facility: CLINIC | Age: 52
End: 2022-09-19
Payer: MEDICAID

## 2022-09-19 DIAGNOSIS — R19.7 DIARRHEA, UNSPECIFIED TYPE: ICD-10-CM

## 2022-09-19 LAB
C DIFF GDH STL QL: NEGATIVE
C DIFF TOX A+B STL QL IA: NEGATIVE

## 2022-09-19 PROCEDURE — 87449 NOS EACH ORGANISM AG IA: CPT | Performed by: NURSE PRACTITIONER

## 2022-09-19 PROCEDURE — 87046 STOOL CULTR AEROBIC BACT EA: CPT | Performed by: NURSE PRACTITIONER

## 2022-09-19 PROCEDURE — 87045 FECES CULTURE AEROBIC BACT: CPT | Performed by: NURSE PRACTITIONER

## 2022-09-19 PROCEDURE — 87427 SHIGA-LIKE TOXIN AG IA: CPT | Performed by: NURSE PRACTITIONER

## 2022-09-19 PROCEDURE — 83630 LACTOFERRIN FECAL (QUAL): CPT | Performed by: NURSE PRACTITIONER

## 2022-09-19 PROCEDURE — 83993 ASSAY FOR CALPROTECTIN FECAL: CPT | Performed by: NURSE PRACTITIONER

## 2022-09-20 DIAGNOSIS — G89.3 CANCER ASSOCIATED PAIN: ICD-10-CM

## 2022-09-20 LAB
E COLI SXT1 STL QL IA: NEGATIVE
E COLI SXT2 STL QL IA: NEGATIVE

## 2022-09-21 ENCOUNTER — PATIENT MESSAGE (OUTPATIENT)
Dept: PALLIATIVE MEDICINE | Facility: CLINIC | Age: 52
End: 2022-09-21
Payer: MEDICAID

## 2022-09-21 ENCOUNTER — PATIENT MESSAGE (OUTPATIENT)
Dept: HEMATOLOGY/ONCOLOGY | Facility: CLINIC | Age: 52
End: 2022-09-21
Payer: MEDICAID

## 2022-09-21 RX ORDER — OXYCODONE HYDROCHLORIDE 30 MG/1
30 TABLET ORAL EVERY 4 HOURS PRN
Qty: 180 TABLET | Refills: 0 | Status: SHIPPED | OUTPATIENT
Start: 2022-09-23

## 2022-09-22 ENCOUNTER — PATIENT MESSAGE (OUTPATIENT)
Dept: PALLIATIVE MEDICINE | Facility: CLINIC | Age: 52
End: 2022-09-22
Payer: MEDICAID

## 2022-09-22 ENCOUNTER — HOSPITAL ENCOUNTER (INPATIENT)
Facility: HOSPITAL | Age: 52
LOS: 4 days | Discharge: HOME | DRG: 683 | End: 2022-09-26
Attending: EMERGENCY MEDICINE | Admitting: INTERNAL MEDICINE
Payer: MEDICAID

## 2022-09-22 DIAGNOSIS — C43.9 METASTATIC MELANOMA: ICD-10-CM

## 2022-09-22 DIAGNOSIS — R00.0 TACHYCARDIA: ICD-10-CM

## 2022-09-22 DIAGNOSIS — R07.9 CHEST PAIN: ICD-10-CM

## 2022-09-22 DIAGNOSIS — G89.3 CANCER ASSOCIATED PAIN: ICD-10-CM

## 2022-09-22 DIAGNOSIS — R11.2 NAUSEA AND VOMITING, INTRACTABILITY OF VOMITING NOT SPECIFIED, UNSPECIFIED VOMITING TYPE: ICD-10-CM

## 2022-09-22 DIAGNOSIS — R91.1 PULMONARY NODULE: ICD-10-CM

## 2022-09-22 DIAGNOSIS — R11.10 VOMITING, INTRACTABILITY OF VOMITING NOT SPECIFIED, PRESENCE OF NAUSEA NOT SPECIFIED, UNSPECIFIED VOMITING TYPE: Primary | ICD-10-CM

## 2022-09-22 LAB
BACTERIA STL CULT: NORMAL
LACTOFERRIN STL QL IA: POSITIVE

## 2022-09-22 PROCEDURE — 25000003 PHARM REV CODE 250: Performed by: STUDENT IN AN ORGANIZED HEALTH CARE EDUCATION/TRAINING PROGRAM

## 2022-09-22 PROCEDURE — 80053 COMPREHEN METABOLIC PANEL: CPT | Performed by: STUDENT IN AN ORGANIZED HEALTH CARE EDUCATION/TRAINING PROGRAM

## 2022-09-22 PROCEDURE — 63600175 PHARM REV CODE 636 W HCPCS: Performed by: STUDENT IN AN ORGANIZED HEALTH CARE EDUCATION/TRAINING PROGRAM

## 2022-09-22 PROCEDURE — 99285 EMERGENCY DEPT VISIT HI MDM: CPT | Mod: CS,,, | Performed by: EMERGENCY MEDICINE

## 2022-09-22 PROCEDURE — 83605 ASSAY OF LACTIC ACID: CPT | Performed by: STUDENT IN AN ORGANIZED HEALTH CARE EDUCATION/TRAINING PROGRAM

## 2022-09-22 PROCEDURE — 96361 HYDRATE IV INFUSION ADD-ON: CPT

## 2022-09-22 PROCEDURE — 99285 EMERGENCY DEPT VISIT HI MDM: CPT | Mod: 25

## 2022-09-22 PROCEDURE — U0002 COVID-19 LAB TEST NON-CDC: HCPCS | Performed by: STUDENT IN AN ORGANIZED HEALTH CARE EDUCATION/TRAINING PROGRAM

## 2022-09-22 PROCEDURE — 83690 ASSAY OF LIPASE: CPT | Performed by: STUDENT IN AN ORGANIZED HEALTH CARE EDUCATION/TRAINING PROGRAM

## 2022-09-22 PROCEDURE — 85025 COMPLETE CBC W/AUTO DIFF WBC: CPT | Performed by: STUDENT IN AN ORGANIZED HEALTH CARE EDUCATION/TRAINING PROGRAM

## 2022-09-22 PROCEDURE — 87040 BLOOD CULTURE FOR BACTERIA: CPT | Performed by: STUDENT IN AN ORGANIZED HEALTH CARE EDUCATION/TRAINING PROGRAM

## 2022-09-22 PROCEDURE — 12000002 HC ACUTE/MED SURGE SEMI-PRIVATE ROOM

## 2022-09-22 PROCEDURE — 99285 PR EMERGENCY DEPT VISIT,LEVEL V: ICD-10-PCS | Mod: CS,,, | Performed by: EMERGENCY MEDICINE

## 2022-09-22 PROCEDURE — 96375 TX/PRO/DX INJ NEW DRUG ADDON: CPT

## 2022-09-22 RX ORDER — ALPRAZOLAM 0.25 MG/1
0.5 TABLET ORAL
Status: COMPLETED | OUTPATIENT
Start: 2022-09-22 | End: 2022-09-22

## 2022-09-22 RX ORDER — ONDANSETRON 2 MG/ML
4 INJECTION INTRAMUSCULAR; INTRAVENOUS
Status: COMPLETED | OUTPATIENT
Start: 2022-09-22 | End: 2022-09-22

## 2022-09-22 RX ADMIN — ONDANSETRON 4 MG: 2 INJECTION INTRAMUSCULAR; INTRAVENOUS at 11:09

## 2022-09-22 RX ADMIN — SODIUM CHLORIDE, SODIUM LACTATE, POTASSIUM CHLORIDE, AND CALCIUM CHLORIDE 1000 ML: .6; .31; .03; .02 INJECTION, SOLUTION INTRAVENOUS at 11:09

## 2022-09-22 RX ADMIN — ALPRAZOLAM 0.5 MG: 0.25 TABLET ORAL at 11:09

## 2022-09-23 PROBLEM — I25.10 CAD S/P PERCUTANEOUS CORONARY ANGIOPLASTY: Status: ACTIVE | Noted: 2022-09-23

## 2022-09-23 PROBLEM — R56.9 SEIZURE: Status: ACTIVE | Noted: 2021-06-27

## 2022-09-23 PROBLEM — F41.9 ANXIETY: Status: ACTIVE | Noted: 2022-09-23

## 2022-09-23 PROBLEM — Z98.61 CAD S/P PERCUTANEOUS CORONARY ANGIOPLASTY: Status: ACTIVE | Noted: 2022-09-23

## 2022-09-23 PROBLEM — E87.20 LACTIC ACIDOSIS: Status: ACTIVE | Noted: 2022-09-23

## 2022-09-23 LAB
ALBUMIN SERPL BCP-MCNC: 3.5 G/DL (ref 3.5–5.2)
ALBUMIN SERPL BCP-MCNC: 4.5 G/DL (ref 3.5–5.2)
ALLENS TEST: ABNORMAL
ALP SERPL-CCNC: 67 U/L (ref 55–135)
ALP SERPL-CCNC: 88 U/L (ref 55–135)
ALT SERPL W/O P-5'-P-CCNC: 6 U/L (ref 10–44)
ALT SERPL W/O P-5'-P-CCNC: 9 U/L (ref 10–44)
ANION GAP SERPL CALC-SCNC: 11 MMOL/L (ref 8–16)
ANION GAP SERPL CALC-SCNC: 20 MMOL/L (ref 8–16)
ANISOCYTOSIS BLD QL SMEAR: SLIGHT
AST SERPL-CCNC: 18 U/L (ref 10–40)
AST SERPL-CCNC: 22 U/L (ref 10–40)
BASOPHILS # BLD AUTO: 0.07 K/UL (ref 0–0.2)
BASOPHILS # BLD AUTO: 0.1 K/UL (ref 0–0.2)
BASOPHILS NFR BLD: 0.6 % (ref 0–1.9)
BASOPHILS NFR BLD: 0.7 % (ref 0–1.9)
BILIRUB SERPL-MCNC: 0.5 MG/DL (ref 0.1–1)
BILIRUB SERPL-MCNC: 0.8 MG/DL (ref 0.1–1)
BILIRUB UR QL STRIP: NEGATIVE
BUN SERPL-MCNC: 18 MG/DL (ref 6–20)
BUN SERPL-MCNC: 20 MG/DL (ref 6–20)
CALCIUM SERPL-MCNC: 10.9 MG/DL (ref 8.7–10.5)
CALCIUM SERPL-MCNC: 9.2 MG/DL (ref 8.7–10.5)
CHLORIDE SERPL-SCNC: 89 MMOL/L (ref 95–110)
CHLORIDE SERPL-SCNC: 91 MMOL/L (ref 95–110)
CLARITY UR REFRACT.AUTO: CLEAR
CO2 SERPL-SCNC: 21 MMOL/L (ref 23–29)
CO2 SERPL-SCNC: 27 MMOL/L (ref 23–29)
COLOR UR AUTO: YELLOW
CREAT SERPL-MCNC: 1.3 MG/DL (ref 0.5–1.4)
CREAT SERPL-MCNC: 1.6 MG/DL (ref 0.5–1.4)
DELSYS: ABNORMAL
DIFFERENTIAL METHOD: ABNORMAL
DIFFERENTIAL METHOD: ABNORMAL
EOSINOPHIL # BLD AUTO: 0 K/UL (ref 0–0.5)
EOSINOPHIL # BLD AUTO: 0 K/UL (ref 0–0.5)
EOSINOPHIL NFR BLD: 0.2 % (ref 0–8)
EOSINOPHIL NFR BLD: 0.3 % (ref 0–8)
ERYTHROCYTE [DISTWIDTH] IN BLOOD BY AUTOMATED COUNT: 14.6 % (ref 11.5–14.5)
ERYTHROCYTE [DISTWIDTH] IN BLOOD BY AUTOMATED COUNT: 15.2 % (ref 11.5–14.5)
EST. GFR  (NO RACE VARIABLE): 51.5 ML/MIN/1.73 M^2
EST. GFR  (NO RACE VARIABLE): >60 ML/MIN/1.73 M^2
GLUCOSE SERPL-MCNC: 103 MG/DL (ref 70–110)
GLUCOSE SERPL-MCNC: 109 MG/DL (ref 70–110)
GLUCOSE SERPL-MCNC: 118 MG/DL (ref 70–110)
GLUCOSE SERPL-MCNC: 97 MG/DL (ref 70–110)
GLUCOSE UR QL STRIP: NEGATIVE
HCO3 UR-SCNC: 36 MMOL/L (ref 24–28)
HCT VFR BLD AUTO: 37.4 % (ref 40–54)
HCT VFR BLD AUTO: 46.6 % (ref 40–54)
HGB BLD-MCNC: 13.2 G/DL (ref 14–18)
HGB BLD-MCNC: 16.9 G/DL (ref 14–18)
HGB UR QL STRIP: NEGATIVE
IMM GRANULOCYTES # BLD AUTO: 0.05 K/UL (ref 0–0.04)
IMM GRANULOCYTES # BLD AUTO: 0.12 K/UL (ref 0–0.04)
IMM GRANULOCYTES NFR BLD AUTO: 0.5 % (ref 0–0.5)
IMM GRANULOCYTES NFR BLD AUTO: 0.8 % (ref 0–0.5)
KETONES UR QL STRIP: NEGATIVE
LACTATE SERPL-SCNC: 1.4 MMOL/L (ref 0.5–2.2)
LACTATE SERPL-SCNC: 2.7 MMOL/L (ref 0.5–2.2)
LEUKOCYTE ESTERASE UR QL STRIP: NEGATIVE
LIPASE SERPL-CCNC: 14 U/L (ref 4–60)
LYMPHOCYTES # BLD AUTO: 1.1 K/UL (ref 1–4.8)
LYMPHOCYTES # BLD AUTO: 1.7 K/UL (ref 1–4.8)
LYMPHOCYTES NFR BLD: 16 % (ref 18–48)
LYMPHOCYTES NFR BLD: 7.1 % (ref 18–48)
MAGNESIUM SERPL-MCNC: 1.7 MG/DL (ref 1.6–2.6)
MCH RBC QN AUTO: 28.5 PG (ref 27–31)
MCH RBC QN AUTO: 29.3 PG (ref 27–31)
MCHC RBC AUTO-ENTMCNC: 35.3 G/DL (ref 32–36)
MCHC RBC AUTO-ENTMCNC: 36.3 G/DL (ref 32–36)
MCV RBC AUTO: 81 FL (ref 82–98)
MCV RBC AUTO: 81 FL (ref 82–98)
MODE: ABNORMAL
MONOCYTES # BLD AUTO: 0.9 K/UL (ref 0.3–1)
MONOCYTES # BLD AUTO: 1 K/UL (ref 0.3–1)
MONOCYTES NFR BLD: 5.8 % (ref 4–15)
MONOCYTES NFR BLD: 9.4 % (ref 4–15)
NEUTROPHILS # BLD AUTO: 12.6 K/UL (ref 1.8–7.7)
NEUTROPHILS # BLD AUTO: 7.9 K/UL (ref 1.8–7.7)
NEUTROPHILS NFR BLD: 73.2 % (ref 38–73)
NEUTROPHILS NFR BLD: 85.4 % (ref 38–73)
NITRITE UR QL STRIP: NEGATIVE
NRBC BLD-RTO: 0 /100 WBC
NRBC BLD-RTO: 0 /100 WBC
PCO2 BLDA: 50.2 MMHG (ref 35–45)
PH SMN: 7.46 [PH] (ref 7.35–7.45)
PH UR STRIP: 6 [PH] (ref 5–8)
PHOSPHATE SERPL-MCNC: 4.1 MG/DL (ref 2.7–4.5)
PLATELET # BLD AUTO: 281 K/UL (ref 150–450)
PLATELET # BLD AUTO: 357 K/UL (ref 150–450)
PLATELET BLD QL SMEAR: ABNORMAL
PLATELET BLD QL SMEAR: ABNORMAL
PMV BLD AUTO: 10 FL (ref 9.2–12.9)
PMV BLD AUTO: 10.1 FL (ref 9.2–12.9)
PO2 BLDA: 48 MMHG (ref 40–60)
POC BE: 12 MMOL/L
POC SATURATED O2: 85 % (ref 95–100)
POC TCO2: 37 MMOL/L (ref 24–29)
POCT GLUCOSE: 109 MG/DL (ref 70–110)
POIKILOCYTOSIS BLD QL SMEAR: SLIGHT
POTASSIUM SERPL-SCNC: 3.1 MMOL/L (ref 3.5–5.1)
POTASSIUM SERPL-SCNC: 4.3 MMOL/L (ref 3.5–5.1)
PROT SERPL-MCNC: 6.7 G/DL (ref 6–8.4)
PROT SERPL-MCNC: 8.7 G/DL (ref 6–8.4)
PROT UR QL STRIP: ABNORMAL
RBC # BLD AUTO: 4.63 M/UL (ref 4.6–6.2)
RBC # BLD AUTO: 5.77 M/UL (ref 4.6–6.2)
SAMPLE: ABNORMAL
SARS-COV-2 RDRP RESP QL NAA+PROBE: NEGATIVE
SITE: ABNORMAL
SODIUM SERPL-SCNC: 129 MMOL/L (ref 136–145)
SODIUM SERPL-SCNC: 130 MMOL/L (ref 136–145)
SP GR UR STRIP: 1.01 (ref 1–1.03)
URN SPEC COLLECT METH UR: ABNORMAL
WBC # BLD AUTO: 10.78 K/UL (ref 3.9–12.7)
WBC # BLD AUTO: 14.74 K/UL (ref 3.9–12.7)

## 2022-09-23 PROCEDURE — 83605 ASSAY OF LACTIC ACID: CPT | Performed by: INTERNAL MEDICINE

## 2022-09-23 PROCEDURE — 12000002 HC ACUTE/MED SURGE SEMI-PRIVATE ROOM

## 2022-09-23 PROCEDURE — 81003 URINALYSIS AUTO W/O SCOPE: CPT | Performed by: STUDENT IN AN ORGANIZED HEALTH CARE EDUCATION/TRAINING PROGRAM

## 2022-09-23 PROCEDURE — 96361 HYDRATE IV INFUSION ADD-ON: CPT

## 2022-09-23 PROCEDURE — A9585 GADOBUTROL INJECTION: HCPCS | Performed by: INTERNAL MEDICINE

## 2022-09-23 PROCEDURE — 25000003 PHARM REV CODE 250: Performed by: STUDENT IN AN ORGANIZED HEALTH CARE EDUCATION/TRAINING PROGRAM

## 2022-09-23 PROCEDURE — 87040 BLOOD CULTURE FOR BACTERIA: CPT | Performed by: STUDENT IN AN ORGANIZED HEALTH CARE EDUCATION/TRAINING PROGRAM

## 2022-09-23 PROCEDURE — 93010 EKG 12-LEAD: ICD-10-PCS | Mod: ,,, | Performed by: STUDENT IN AN ORGANIZED HEALTH CARE EDUCATION/TRAINING PROGRAM

## 2022-09-23 PROCEDURE — 63600175 PHARM REV CODE 636 W HCPCS: Performed by: STUDENT IN AN ORGANIZED HEALTH CARE EDUCATION/TRAINING PROGRAM

## 2022-09-23 PROCEDURE — 80053 COMPREHEN METABOLIC PANEL: CPT | Performed by: STUDENT IN AN ORGANIZED HEALTH CARE EDUCATION/TRAINING PROGRAM

## 2022-09-23 PROCEDURE — 93005 ELECTROCARDIOGRAM TRACING: CPT

## 2022-09-23 PROCEDURE — 84100 ASSAY OF PHOSPHORUS: CPT | Performed by: STUDENT IN AN ORGANIZED HEALTH CARE EDUCATION/TRAINING PROGRAM

## 2022-09-23 PROCEDURE — 25000003 PHARM REV CODE 250: Performed by: INTERNAL MEDICINE

## 2022-09-23 PROCEDURE — 96365 THER/PROPH/DIAG IV INF INIT: CPT

## 2022-09-23 PROCEDURE — 85025 COMPLETE CBC W/AUTO DIFF WBC: CPT | Performed by: STUDENT IN AN ORGANIZED HEALTH CARE EDUCATION/TRAINING PROGRAM

## 2022-09-23 PROCEDURE — 83735 ASSAY OF MAGNESIUM: CPT | Performed by: STUDENT IN AN ORGANIZED HEALTH CARE EDUCATION/TRAINING PROGRAM

## 2022-09-23 PROCEDURE — 99900035 HC TECH TIME PER 15 MIN (STAT)

## 2022-09-23 PROCEDURE — 25500020 PHARM REV CODE 255: Performed by: EMERGENCY MEDICINE

## 2022-09-23 PROCEDURE — 82803 BLOOD GASES ANY COMBINATION: CPT

## 2022-09-23 PROCEDURE — 93010 ELECTROCARDIOGRAM REPORT: CPT | Mod: ,,, | Performed by: STUDENT IN AN ORGANIZED HEALTH CARE EDUCATION/TRAINING PROGRAM

## 2022-09-23 PROCEDURE — 25500020 PHARM REV CODE 255: Performed by: INTERNAL MEDICINE

## 2022-09-23 RX ORDER — OLANZAPINE 2.5 MG/1
5 TABLET ORAL NIGHTLY
Status: DISCONTINUED | OUTPATIENT
Start: 2022-09-23 | End: 2022-09-26 | Stop reason: HOSPADM

## 2022-09-23 RX ORDER — ONDANSETRON 2 MG/ML
8 INJECTION INTRAMUSCULAR; INTRAVENOUS EVERY 8 HOURS PRN
Status: DISCONTINUED | OUTPATIENT
Start: 2022-09-23 | End: 2022-09-26 | Stop reason: HOSPADM

## 2022-09-23 RX ORDER — FAMOTIDINE 20 MG/1
20 TABLET, FILM COATED ORAL DAILY
Status: DISCONTINUED | OUTPATIENT
Start: 2022-09-23 | End: 2022-09-26 | Stop reason: HOSPADM

## 2022-09-23 RX ORDER — SODIUM CHLORIDE 9 MG/ML
INJECTION, SOLUTION INTRAVENOUS CONTINUOUS
Status: DISCONTINUED | OUTPATIENT
Start: 2022-09-23 | End: 2022-09-23

## 2022-09-23 RX ORDER — IBUPROFEN 200 MG
24 TABLET ORAL
Status: DISCONTINUED | OUTPATIENT
Start: 2022-09-23 | End: 2022-09-26 | Stop reason: HOSPADM

## 2022-09-23 RX ORDER — GLUCAGON 1 MG
1 KIT INJECTION
Status: DISCONTINUED | OUTPATIENT
Start: 2022-09-23 | End: 2022-09-26 | Stop reason: HOSPADM

## 2022-09-23 RX ORDER — SODIUM,POTASSIUM PHOSPHATES 280-250MG
2 POWDER IN PACKET (EA) ORAL
Status: DISCONTINUED | OUTPATIENT
Start: 2022-09-23 | End: 2022-09-26 | Stop reason: HOSPADM

## 2022-09-23 RX ORDER — PROCHLORPERAZINE EDISYLATE 5 MG/ML
5 INJECTION INTRAMUSCULAR; INTRAVENOUS EVERY 6 HOURS PRN
Status: DISCONTINUED | OUTPATIENT
Start: 2022-09-23 | End: 2022-09-26 | Stop reason: HOSPADM

## 2022-09-23 RX ORDER — MORPHINE SULFATE 30 MG/1
60 TABLET, FILM COATED, EXTENDED RELEASE ORAL 2 TIMES DAILY
Status: DISCONTINUED | OUTPATIENT
Start: 2022-09-23 | End: 2022-09-26 | Stop reason: HOSPADM

## 2022-09-23 RX ORDER — SODIUM CHLORIDE 0.9 % (FLUSH) 0.9 %
10 SYRINGE (ML) INJECTION EVERY 12 HOURS PRN
Status: DISCONTINUED | OUTPATIENT
Start: 2022-09-23 | End: 2022-09-26 | Stop reason: HOSPADM

## 2022-09-23 RX ORDER — DEXAMETHASONE SODIUM PHOSPHATE 4 MG/ML
4 INJECTION, SOLUTION INTRA-ARTICULAR; INTRALESIONAL; INTRAMUSCULAR; INTRAVENOUS; SOFT TISSUE EVERY 6 HOURS
Status: DISCONTINUED | OUTPATIENT
Start: 2022-09-24 | End: 2022-09-23

## 2022-09-23 RX ORDER — HYDROXYZINE PAMOATE 25 MG/1
25 CAPSULE ORAL EVERY 6 HOURS PRN
Status: ON HOLD | COMMUNITY
Start: 2022-04-06 | End: 2022-09-30 | Stop reason: HOSPADM

## 2022-09-23 RX ORDER — POLYETHYLENE GLYCOL 3350 17 G/17G
17 POWDER, FOR SOLUTION ORAL DAILY
Refills: 3 | Status: DISCONTINUED | OUTPATIENT
Start: 2022-09-23 | End: 2022-09-23

## 2022-09-23 RX ORDER — LEVETIRACETAM 500 MG/1
1000 TABLET ORAL 2 TIMES DAILY
Status: DISCONTINUED | OUTPATIENT
Start: 2022-09-23 | End: 2022-09-26 | Stop reason: HOSPADM

## 2022-09-23 RX ORDER — ALPRAZOLAM 0.5 MG/1
0.5 TABLET ORAL 2 TIMES DAILY PRN
Status: DISCONTINUED | OUTPATIENT
Start: 2022-09-23 | End: 2022-09-23

## 2022-09-23 RX ORDER — INSULIN ASPART 100 [IU]/ML
0-5 INJECTION, SOLUTION INTRAVENOUS; SUBCUTANEOUS
Status: DISCONTINUED | OUTPATIENT
Start: 2022-09-23 | End: 2022-09-26 | Stop reason: HOSPADM

## 2022-09-23 RX ORDER — PROCHLORPERAZINE MALEATE 5 MG
5 TABLET ORAL EVERY 6 HOURS PRN
Status: DISCONTINUED | OUTPATIENT
Start: 2022-09-23 | End: 2022-09-23

## 2022-09-23 RX ORDER — SODIUM CHLORIDE 9 MG/ML
INJECTION, SOLUTION INTRAVENOUS CONTINUOUS
Status: ACTIVE | OUTPATIENT
Start: 2022-09-23 | End: 2022-09-23

## 2022-09-23 RX ORDER — CEFEPIME HYDROCHLORIDE 1 G/50ML
1 INJECTION, SOLUTION INTRAVENOUS
Status: DISCONTINUED | OUTPATIENT
Start: 2022-09-23 | End: 2022-09-23

## 2022-09-23 RX ORDER — DEXAMETHASONE SODIUM PHOSPHATE 4 MG/ML
4 INJECTION, SOLUTION INTRA-ARTICULAR; INTRALESIONAL; INTRAMUSCULAR; INTRAVENOUS; SOFT TISSUE EVERY 12 HOURS
Status: DISCONTINUED | OUTPATIENT
Start: 2022-09-23 | End: 2022-09-26 | Stop reason: HOSPADM

## 2022-09-23 RX ORDER — ENOXAPARIN SODIUM 100 MG/ML
40 INJECTION SUBCUTANEOUS EVERY 24 HOURS
Status: DISCONTINUED | OUTPATIENT
Start: 2022-09-23 | End: 2022-09-24

## 2022-09-23 RX ORDER — ALPRAZOLAM 0.25 MG/1
0.5 TABLET ORAL
Status: DISCONTINUED | OUTPATIENT
Start: 2022-09-23 | End: 2022-09-23

## 2022-09-23 RX ORDER — LANOLIN ALCOHOL/MO/W.PET/CERES
800 CREAM (GRAM) TOPICAL
Status: DISCONTINUED | OUTPATIENT
Start: 2022-09-23 | End: 2022-09-26 | Stop reason: HOSPADM

## 2022-09-23 RX ORDER — DEXAMETHASONE SODIUM PHOSPHATE 4 MG/ML
10 INJECTION, SOLUTION INTRA-ARTICULAR; INTRALESIONAL; INTRAMUSCULAR; INTRAVENOUS; SOFT TISSUE ONCE
Status: COMPLETED | OUTPATIENT
Start: 2022-09-23 | End: 2022-09-23

## 2022-09-23 RX ORDER — MUPIROCIN 20 MG/G
OINTMENT TOPICAL 2 TIMES DAILY
Status: DISCONTINUED | OUTPATIENT
Start: 2022-09-23 | End: 2022-09-26 | Stop reason: HOSPADM

## 2022-09-23 RX ORDER — IBUPROFEN 200 MG
16 TABLET ORAL
Status: DISCONTINUED | OUTPATIENT
Start: 2022-09-23 | End: 2022-09-26 | Stop reason: HOSPADM

## 2022-09-23 RX ORDER — NALOXONE HCL 0.4 MG/ML
0.02 VIAL (ML) INJECTION
Status: DISCONTINUED | OUTPATIENT
Start: 2022-09-23 | End: 2022-09-26 | Stop reason: HOSPADM

## 2022-09-23 RX ORDER — ONDANSETRON 2 MG/ML
8 INJECTION INTRAMUSCULAR; INTRAVENOUS EVERY 6 HOURS PRN
Status: DISCONTINUED | OUTPATIENT
Start: 2022-09-23 | End: 2022-09-23

## 2022-09-23 RX ORDER — HYDROMORPHONE HYDROCHLORIDE 2 MG/ML
2 INJECTION, SOLUTION INTRAMUSCULAR; INTRAVENOUS; SUBCUTANEOUS EVERY 4 HOURS PRN
Status: DISCONTINUED | OUTPATIENT
Start: 2022-09-23 | End: 2022-09-26 | Stop reason: HOSPADM

## 2022-09-23 RX ORDER — ALPRAZOLAM 1 MG/1
1 TABLET ORAL 2 TIMES DAILY
Status: DISCONTINUED | OUTPATIENT
Start: 2022-09-23 | End: 2022-09-26 | Stop reason: HOSPADM

## 2022-09-23 RX ORDER — OXYCODONE HYDROCHLORIDE 10 MG/1
30 TABLET ORAL EVERY 4 HOURS PRN
Status: DISCONTINUED | OUTPATIENT
Start: 2022-09-23 | End: 2022-09-23

## 2022-09-23 RX ORDER — OLANZAPINE 2.5 MG/1
2.5 TABLET ORAL EVERY 6 HOURS PRN
Status: DISCONTINUED | OUTPATIENT
Start: 2022-09-23 | End: 2022-09-23

## 2022-09-23 RX ORDER — ALPRAZOLAM 1 MG/1
1 TABLET ORAL 2 TIMES DAILY PRN
COMMUNITY
Start: 2022-08-23

## 2022-09-23 RX ORDER — GADOBUTROL 604.72 MG/ML
7 INJECTION INTRAVENOUS
Status: COMPLETED | OUTPATIENT
Start: 2022-09-23 | End: 2022-09-23

## 2022-09-23 RX ORDER — ONDANSETRON 8 MG/1
8 TABLET, ORALLY DISINTEGRATING ORAL EVERY 12 HOURS PRN
Status: DISCONTINUED | OUTPATIENT
Start: 2022-09-23 | End: 2022-09-23

## 2022-09-23 RX ORDER — LOPERAMIDE HYDROCHLORIDE 2 MG/1
2 CAPSULE ORAL 4 TIMES DAILY PRN
Status: DISCONTINUED | OUTPATIENT
Start: 2022-09-23 | End: 2022-09-23

## 2022-09-23 RX ORDER — AMOXICILLIN 250 MG
1 CAPSULE ORAL DAILY PRN
Status: DISCONTINUED | OUTPATIENT
Start: 2022-09-23 | End: 2022-09-23

## 2022-09-23 RX ORDER — BUPROPION HYDROCHLORIDE 100 MG/1
100 TABLET ORAL 2 TIMES DAILY
Refills: 2 | Status: DISCONTINUED | OUTPATIENT
Start: 2022-09-23 | End: 2022-09-26 | Stop reason: HOSPADM

## 2022-09-23 RX ADMIN — ALPRAZOLAM 0.5 MG: 0.25 TABLET ORAL at 05:09

## 2022-09-23 RX ADMIN — LEVETIRACETAM 1000 MG: 500 TABLET, FILM COATED ORAL at 09:09

## 2022-09-23 RX ADMIN — DEXAMETHASONE SODIUM PHOSPHATE 4 MG: 4 INJECTION INTRA-ARTICULAR; INTRALESIONAL; INTRAMUSCULAR; INTRAVENOUS; SOFT TISSUE at 09:09

## 2022-09-23 RX ADMIN — SODIUM CHLORIDE, SODIUM LACTATE, POTASSIUM CHLORIDE, AND CALCIUM CHLORIDE 1000 ML: .6; .31; .03; .02 INJECTION, SOLUTION INTRAVENOUS at 01:09

## 2022-09-23 RX ADMIN — GADOBUTROL 7 ML: 604.72 INJECTION INTRAVENOUS at 11:09

## 2022-09-23 RX ADMIN — MORPHINE SULFATE 60 MG: 30 TABLET, FILM COATED, EXTENDED RELEASE ORAL at 09:09

## 2022-09-23 RX ADMIN — ONDANSETRON 8 MG: 2 INJECTION INTRAMUSCULAR; INTRAVENOUS at 12:09

## 2022-09-23 RX ADMIN — MUPIROCIN: 20 OINTMENT TOPICAL at 09:09

## 2022-09-23 RX ADMIN — POTASSIUM BICARBONATE 50 MEQ: 977.5 TABLET, EFFERVESCENT ORAL at 02:09

## 2022-09-23 RX ADMIN — DEXAMETHASONE SODIUM PHOSPHATE 10 MG: 4 INJECTION INTRA-ARTICULAR; INTRALESIONAL; INTRAMUSCULAR; INTRAVENOUS; SOFT TISSUE at 04:09

## 2022-09-23 RX ADMIN — PROCHLORPERAZINE EDISYLATE 5 MG: 5 INJECTION INTRAMUSCULAR; INTRAVENOUS at 07:09

## 2022-09-23 RX ADMIN — VANCOMYCIN HYDROCHLORIDE 1500 MG: 1.5 INJECTION, POWDER, LYOPHILIZED, FOR SOLUTION INTRAVENOUS at 02:09

## 2022-09-23 RX ADMIN — ALPRAZOLAM 0.5 MG: 0.25 TABLET ORAL at 09:09

## 2022-09-23 RX ADMIN — MORPHINE SULFATE 60 MG: 30 TABLET, FILM COATED, EXTENDED RELEASE ORAL at 08:09

## 2022-09-23 RX ADMIN — HYDROMORPHONE HYDROCHLORIDE 2 MG: 2 INJECTION INTRAMUSCULAR; INTRAVENOUS; SUBCUTANEOUS at 07:09

## 2022-09-23 RX ADMIN — LEVETIRACETAM 1000 MG: 500 TABLET, FILM COATED ORAL at 08:09

## 2022-09-23 RX ADMIN — OXYCODONE HYDROCHLORIDE 30 MG: 10 TABLET ORAL at 04:09

## 2022-09-23 RX ADMIN — ALPRAZOLAM 1 MG: 1 TABLET ORAL at 09:09

## 2022-09-23 RX ADMIN — ONDANSETRON 8 MG: 2 INJECTION INTRAMUSCULAR; INTRAVENOUS at 09:09

## 2022-09-23 RX ADMIN — BUPROPION HYDROCHLORIDE 100 MG: 100 TABLET, FILM COATED ORAL at 09:09

## 2022-09-23 RX ADMIN — FAMOTIDINE 20 MG: 20 TABLET ORAL at 08:09

## 2022-09-23 RX ADMIN — ENOXAPARIN SODIUM 40 MG: 100 INJECTION SUBCUTANEOUS at 04:09

## 2022-09-23 RX ADMIN — OLANZAPINE 5 MG: 2.5 TABLET, FILM COATED ORAL at 09:09

## 2022-09-23 RX ADMIN — IOHEXOL 75 ML: 350 INJECTION, SOLUTION INTRAVENOUS at 12:09

## 2022-09-23 RX ADMIN — SODIUM CHLORIDE: 0.9 INJECTION, SOLUTION INTRAVENOUS at 04:09

## 2022-09-23 NOTE — SUBJECTIVE & OBJECTIVE
Oncology Treatment Plan:   OP NIVOLUMAB 1 MG/KG IPILIMUMAB 3MG/KG FOLLOWED BY NIVOLUMAB 480MG Q4W    Medications:  Continuous Infusions:   sodium chloride 0.9% 100 mL/hr at 09/23/22 0411     Scheduled Meds:   buPROPion  100 mg Oral BID    enoxaparin  40 mg Subcutaneous Daily    famotidine  20 mg Oral Daily    levETIRAcetam  1,000 mg Oral BID    morphine  60 mg Oral BID    polyethylene glycol  17 g Oral Daily     PRN Meds:dextrose 10%, dextrose 10%, glucagon (human recombinant), glucose, glucose, insulin aspart U-100, magnesium oxide, magnesium oxide, naloxone, OLANZapine, ondansetron, oxyCODONE, potassium bicarbonate, potassium bicarbonate, potassium bicarbonate, potassium, sodium phosphates, potassium, sodium phosphates, potassium, sodium phosphates, prochlorperazine, senna-docusate 8.6-50 mg, sodium chloride 0.9%     Review of patient's allergies indicates:  No Known Allergies     Past Medical History:   Diagnosis Date    Seizures      No past surgical history on file.  Family History    None       Tobacco Use    Smoking status: Every Day    Smokeless tobacco: Never   Substance and Sexual Activity    Alcohol use: Yes    Drug use: Yes     Types: Methamphetamines, Marijuana    Sexual activity: Not on file       Review of Systems   Constitutional:  Negative for chills, diaphoresis, fatigue and fever.   HENT:  Negative for congestion, drooling and sore throat.    Eyes:  Negative for redness and visual disturbance.   Respiratory:  Negative for cough, choking, chest tightness, shortness of breath, wheezing and stridor.    Cardiovascular:  Negative for chest pain, palpitations and leg swelling.   Gastrointestinal:  Positive for abdominal pain, diarrhea, nausea and vomiting. Negative for abdominal distention, anal bleeding, blood in stool, constipation and rectal pain.   Endocrine: Negative for cold intolerance and heat intolerance.   Genitourinary:  Negative for decreased urine volume, difficulty urinating, dysuria,  enuresis, flank pain, hematuria and urgency.   Musculoskeletal:  Negative for arthralgias, back pain, joint swelling, myalgias and neck pain.   Skin:  Negative for color change, pallor, rash and wound.   Allergic/Immunologic: Negative for immunocompromised state.   Neurological:  Negative for dizziness, tremors, seizures, syncope, facial asymmetry, speech difficulty, weakness, light-headedness, numbness and headaches.   Hematological:  Negative for adenopathy. Does not bruise/bleed easily.   Psychiatric/Behavioral:  Negative for behavioral problems, confusion, decreased concentration and dysphoric mood.    Objective:     Vital Signs (Most Recent):  Temp: 97.7 °F (36.5 °C) (09/23/22 0438)  Pulse: 61 (09/23/22 0437)  Resp: 12 (09/23/22 0239)  BP: 112/82 (09/23/22 0239)  SpO2: 99 % (09/23/22 0437) Vital Signs (24h Range):  Temp:  [97.7 °F (36.5 °C)-98.1 °F (36.7 °C)] 97.7 °F (36.5 °C)  Pulse:  [] 61  Resp:  [12-27] 12  SpO2:  [97 %-100 %] 99 %  BP: (112-135)/(72-85) 112/82     Weight: 63.5 kg (140 lb)  Body mass index is 19.53 kg/m².  Body surface area is 1.78 meters squared.    No intake or output data in the 24 hours ending 09/23/22 0526    Physical Exam  Constitutional:       Appearance: Normal appearance.   HENT:      Head: Normocephalic and atraumatic.      Nose: Nose normal.      Mouth/Throat:      Mouth: Mucous membranes are moist.   Cardiovascular:      Rate and Rhythm: Normal rate and regular rhythm.      Pulses: Normal pulses.      Heart sounds: Normal heart sounds.   Pulmonary:      Effort: Pulmonary effort is normal.      Breath sounds: Normal breath sounds.   Abdominal:      General: Abdomen is flat. There is no distension.      Palpations: Abdomen is soft.      Tenderness: There is abdominal tenderness.      Comments: Mild ttp in the abdomen; vague localization.    Musculoskeletal:         General: Normal range of motion.      Cervical back: Normal range of motion and neck supple.   Neurological:       General: No focal deficit present.      Mental Status: He is alert and oriented to person, place, and time.     Significant Labs:   All pertinent labs from the last 24 hours have been reviewed.    Diagnostic Results:  I have reviewed all pertinent imaging results/findings within the past 24 hours.  I have reviewed and interpreted all pertinent imaging results/findings within the past 24 hours.

## 2022-09-23 NOTE — ED NOTES
Patient identifiers verified and correct for Joseluis Garner.   LOC: The patient is awake, alert and aware of environment with an appropriate affect, the patient is oriented x 3 and speaking appropriately.   APPEARANCE: Patient appears comfortable and in no acute distress, patient is clean and well groomed.  SKIN: The skin is warm and dry, color consistent with ethnicity, patient has normal skin turgor and moist mucus membranes, skin intact, no breakdown or bruising noted.   MUSCULOSKELETAL: Patient moving all extremities spontaneously, no swelling noted.  RESPIRATORY: Airway is open and patent, respirations are spontaneous, patient has a normal effort and rate, no accessory muscle use noted,  O2 sats noted at 99% on room air.  CARDIAC: Patient has a normal rate and regular rhythm, no edema noted, capillary refill < 3 seconds.   GASTRO: Soft and non tender to palpation, no distention noted, normoactive bowel sounds present in all four quadrants. Pt states bowel movements have been regular. Patient is c/o abdominal pain rates it a 9/10. Patient also reports N/V. Patient did have an episode of vomiting.  : Pt denies any pain or frequency with urination.  NEURO: Pt opens eyes spontaneously, behavior appropriate to situation, follows commands, facial expression symmetrical, bilateral hand grasp equal and even, purposeful motor response noted, normal sensation in all extremities when touched with a finger.

## 2022-09-23 NOTE — H&P
[facility]  Hematology/Oncology  H&P    Patient Name: Joseluis Garner  MRN: [id number]  Admission Date: 9/22/2022  Code Status: Full Code   Attending Provider: Clayton Wise MD  Primary Care Physician: Clayton Wise MD  Principal Problem:Intractable nausea and vomiting    Subjective:     HPI:   Patient is a 51-year-old male with a history of stage IV melanoma with metastasis to lungs, axillary lymph nodes, liver, peritoneal carcinomatosis, and brain (on ipilimumab /nivolumab with Dr. Wise), CAD s/p PCI, seizures, who presents with NBNB nausea, vomiting and diarrhea x3 days. His symptoms of n/v/d has been going on for a while, and he has had multiple admissions for the same complaints in the recent past. However, for the past three days symptoms has worsened and he has been unable to keep food down. Associated had generalized weakness/fatigue, abdominal pain, and lightheadedness. Abdominal pain is at his baseline that he attributes to tumor burden. Also endorsing thoracic back pain also unchanged from baseline. Denies fever, chest pain, sob, blood in the stool or vomit, leg weakness or urinary incontinence. He has had multiple episodes of fluid accumulation in the abdomen requiring paracentesis.     On arrival at the ED, initially noted to be tachycardic, but improved with fluids. Otherwise, afebrile and HD stable. CBC showed leukocytosis. CMP showed hyponatremia, hypokalemia, PAULA. Lactate elevated to 2.7. Admitted to med/onc for further care.         Oncology History   Metastatic melanoma   1/28/2022 Cancer Staged     Staging form: Melanoma of the Skin, AJCC 8th Edition  - Clinical stage from 1/28/2022: Stage IV (cTX, cNX, cM1)      4/4/2022 Initial Diagnosis     Metastatic melanoma      6/14/2022 -  Chemotherapy     Treatment Summary   Plan Name: OP NIVOLUMAB 1 MG/KG IPILIMUMAB 3MG/KG FOLLOWED BY NIVOLUMAB 480MG Q4W  Treatment Goal: Control  Status: Active  Start Date: 6/14/2022  End  Date: 5/26/2023 (Planned)  Provider: Clayton Wise MD  Chemotherapy: ipilimumab (YERVOY) 200 mg in sodium chloride 0.9% 140 mL chemo infusion, 211 mg, Intravenous, Clinic/[facility] 1 time, 3 of 3 cycles  Administration: 200 mg (6/14/2022), 200 mg (7/8/2022), 200 mg (7/29/2022)  nivolumab 70 mg in sodium chloride 0.9% 57 mL infusion, 1 mg/kg = 70 mg, Intravenous, Clinic/[facility] 1 time, 3 of 14 cycles  Dose modification: 480 mg (original dose 1 mg/kg, Cycle 4, Reason: MD Discretion)  Administration: 70 mg (6/14/2022), 70 mg (7/8/2022), 70 mg (7/29/2022)            Oncology Treatment Plan:   OP NIVOLUMAB 1 MG/KG IPILIMUMAB 3MG/KG FOLLOWED BY NIVOLUMAB 480MG Q4W    Medications:  Continuous Infusions:   sodium chloride 0.9% 100 mL/hr at 09/23/22 0411     Scheduled Meds:   buPROPion  100 mg Oral BID    enoxaparin  40 mg Subcutaneous Daily    famotidine  20 mg Oral Daily    levETIRAcetam  1,000 mg Oral BID    morphine  60 mg Oral BID    polyethylene glycol  17 g Oral Daily     PRN Meds:dextrose 10%, dextrose 10%, glucagon (human recombinant), glucose, glucose, insulin aspart U-100, magnesium oxide, magnesium oxide, naloxone, OLANZapine, ondansetron, oxyCODONE, potassium bicarbonate, potassium bicarbonate, potassium bicarbonate, potassium, sodium phosphates, potassium, sodium phosphates, potassium, sodium phosphates, prochlorperazine, senna-docusate 8.6-50 mg, sodium chloride 0.9%     Review of patient's allergies indicates:  No Known Allergies     Past Medical History:   Diagnosis Date    Seizures      No past surgical history on file.  Family History    None       Tobacco Use    Smoking status: Every Day    Smokeless tobacco: Never   Substance and Sexual Activity    Alcohol use: Yes    Drug use: Yes     Types: Methamphetamines, Marijuana    Sexual activity: Not on file       Review of Systems   Constitutional:  Negative for chills, diaphoresis, fatigue and fever.   HENT:  Negative for congestion, drooling and sore throat.     Eyes:  Negative for redness and visual disturbance.   Respiratory:  Negative for cough, choking, chest tightness, shortness of breath, wheezing and stridor.    Cardiovascular:  Negative for chest pain, palpitations and leg swelling.   Gastrointestinal:  Positive for abdominal pain, diarrhea, nausea and vomiting. Negative for abdominal distention, anal bleeding, blood in stool, constipation and rectal pain.   Endocrine: Negative for cold intolerance and heat intolerance.   Genitourinary:  Negative for decreased urine volume, difficulty urinating, dysuria, enuresis, flank pain, hematuria and urgency.   Musculoskeletal:  Negative for arthralgias, back pain, joint swelling, myalgias and neck pain.   Skin:  Negative for color change, pallor, rash and wound.   Allergic/Immunologic: Negative for immunocompromised state.   Neurological:  Negative for dizziness, tremors, seizures, syncope, facial asymmetry, speech difficulty, weakness, light-headedness, numbness and headaches.   Hematological:  Negative for adenopathy. Does not bruise/bleed easily.   Psychiatric/Behavioral:  Negative for behavioral problems, confusion, decreased concentration and dysphoric mood.    Objective:     Vital Signs (Most Recent):  Temp: 97.7 °F (36.5 °C) (09/23/22 0438)  Pulse: 61 (09/23/22 0437)  Resp: 12 (09/23/22 0239)  BP: 112/82 (09/23/22 0239)  SpO2: 99 % (09/23/22 0437) Vital Signs (24h Range):  Temp:  [97.7 °F (36.5 °C)-98.1 °F (36.7 °C)] 97.7 °F (36.5 °C)  Pulse:  [] 61  Resp:  [12-27] 12  SpO2:  [97 %-100 %] 99 %  BP: (112-135)/(72-85) 112/82     Weight: 63.5 kg (140 lb)  Body mass index is 19.53 kg/m².  Body surface area is 1.78 meters squared.    No intake or output data in the 24 hours ending 09/23/22 0526    Physical Exam  Constitutional:       Appearance: Normal appearance.   HENT:      Head: Normocephalic and atraumatic.      Nose: Nose normal.      Mouth/Throat:      Mouth: Mucous membranes are moist.   Cardiovascular:       Rate and Rhythm: Normal rate and regular rhythm.      Pulses: Normal pulses.      Heart sounds: Normal heart sounds.   Pulmonary:      Effort: Pulmonary effort is normal.      Breath sounds: Normal breath sounds.   Abdominal:      General: Abdomen is flat. There is no distension.      Palpations: Abdomen is soft.      Tenderness: There is abdominal tenderness.      Comments: Mild ttp in the abdomen; vague localization.    Musculoskeletal:         General: Normal range of motion.      Cervical back: Normal range of motion and neck supple.   Neurological:      General: No focal deficit present.      Mental Status: He is alert and oriented to person, place, and time.     Significant Labs:   All pertinent labs from the last 24 hours have been reviewed.    Diagnostic Results:  I have reviewed all pertinent imaging results/findings within the past 24 hours.  I have reviewed and interpreted all pertinent imaging results/findings within the past 24 hours.    Assessment/Plan:     * Intractable nausea and vomiting  51-year-old male with a history of stage IV melanoma with metastasis to lungs, axillary lymph nodes, liver, peritoneal carcinomatosis, and brain (on ipilimumab /nivolumab with Dr. Wise), CAD s/p PCI, seizures admitted for intractable n/v/d. He has had multiple admissions for same problem. On exam, mild ttp to palpation of abdomen. No distension noted.     - Supportive care  - Fluids.   - Advance diet as tolerated.   - Replace electrolytes.     Anxiety  Pt with hx of anxiety  - Started on home bupropion.   - Zyprexa prn for agitation.   - Xanax .5 mg bid prn    Lactic acidosis  Lactate elevated to 2.7. Likely from hypovolemia.     - Fluids.   - F/u repeat lactate    Goals of care, counseling/discussion  Pt had appt with palliative care on 9/16. States to move forward with life prolonging measure. Currently on MS Contin 60 mg BID and oxycodone 30 mg q4h prn    - Continue on current pain regimen.   - Consider  having palliative on board.       PAULA (acute kidney injury)  Baseline Cr .8, Cr 1.6 on admission. Likely from dehydration .     - Fluids challenge.   - Monitor Cr.   - Monitor UOP.         Hyponatremia  Likely from n/v/d.   - Nacl infusion    Metastatic melanoma  Hx of metastatic melanoma. See onc history in the HPI    History of seizure  Started on home nieves Boyle MD  Hematology/Oncology  [facility]

## 2022-09-23 NOTE — ASSESSMENT & PLAN NOTE
Pt had appt with palliative care on 9/16. States to move forward with life prolonging measure. Currently on MS Contin 60 mg BID and oxycodone 30 mg q4h prn    - Continue on current pain regimen.   - Consider having palliative on board.

## 2022-09-23 NOTE — ED TRIAGE NOTES
Joseluis Garner, a 51 y.o. male with stage IV melanoma presents to the ED w/ complaint of  severe nausea and vomiting for 3 days. Patient reports that he's been experiencing nausea, vomiting, and diarrhea for 3 days as well as diffuse abdominal pain. Reports that he can't take water or his meds because he will vomit. Denies fever, chest pain, shortness of breath. He is on immunotherapy for his cancer.    Triage note:  Chief Complaint   Patient presents with    Emesis     Pt states unable to eat or hold down food for 3 days. N/V/D. Stage 4 melanoma on immunotherapy and targeted therapy. Pt reports abdominal pain with lesions in his stomach.     Review of patient's allergies indicates:  No Known Allergies  Past Medical History:   Diagnosis Date    Seizures

## 2022-09-23 NOTE — HPI
Patient is a 51-year-old male with a history of stage IV melanoma with metastasis to lungs, axillary lymph nodes, liver, peritoneal carcinomatosis, and brain (on ipilimumab /nivolumab with Dr. Wise), CAD s/p PCI, seizures, who presents with NBNB nausea, vomiting and diarrhea x3 days. His symptoms of n/v/d has been going on for a while, and he has had multiple admissions for the same complaints in the recent past. However, for the past three days symptoms has worsened and he has been unable to keep food down. Associated had generalized weakness/fatigue, abdominal pain, and lightheadedness. Abdominal pain is at his baseline that he attributes to tumor burden. Also endorsing thoracic back pain also unchanged from baseline. Denies fever, chest pain, sob, blood in the stool or vomit, leg weakness or urinary incontinence. He has had multiple episodes of fluid accumulation in the abdomen requiring paracentesis.     On arrival at the ED, initially noted to be tachycardic, but improved with fluids. Otherwise, afebrile and HD stable. CBC showed leukocytosis. CMP showed hyponatremia, hypokalemia, PAULA. Lactate elevated to 2.7. Admitted to med/onc for further care.         Oncology History   Metastatic melanoma   1/28/2022 Cancer Staged     Staging form: Melanoma of the Skin, AJCC 8th Edition  - Clinical stage from 1/28/2022: Stage IV (cTX, cNX, cM1)      4/4/2022 Initial Diagnosis     Metastatic melanoma      6/14/2022 -  Chemotherapy     Treatment Summary   Plan Name: OP NIVOLUMAB 1 MG/KG IPILIMUMAB 3MG/KG FOLLOWED BY NIVOLUMAB 480MG Q4W  Treatment Goal: Control  Status: Active  Start Date: 6/14/2022  End Date: 5/26/2023 (Planned)  Provider: Clayton Wise MD  Chemotherapy: ipilimumab (YERVOY) 200 mg in sodium chloride 0.9% 140 mL chemo infusion, 211 mg, Intravenous, Clinic/[facility] 1 time, 3 of 3 cycles  Administration: 200 mg (6/14/2022), 200 mg (7/8/2022), 200 mg (7/29/2022)  nivolumab 70 mg in sodium chloride 0.9% 57 mL  infusion, 1 mg/kg = 70 mg, Intravenous, Clinic/[facility] 1 time, 3 of 14 cycles  Dose modification: 480 mg (original dose 1 mg/kg, Cycle 4, Reason: MD Discretion)  Administration: 70 mg (6/14/2022), 70 mg (7/8/2022), 70 mg (7/29/2022)

## 2022-09-23 NOTE — ASSESSMENT & PLAN NOTE
Baseline Cr .8, Cr 1.6 on admission. Likely from dehydration .     - Fluids challenge.   - Monitor Cr.   - Monitor UOP.

## 2022-09-23 NOTE — SUBJECTIVE & OBJECTIVE
Patient information was obtained from patient, past medical records, and ER records.     Oncology History: See above     (Not in a hospital admission)      Patient has no known allergies.     Past Medical History:   Diagnosis Date    Seizures      No past surgical history on file.  Family History    None       Tobacco Use    Smoking status: Every Day    Smokeless tobacco: Never   Substance and Sexual Activity    Alcohol use: Yes    Drug use: Yes     Types: Methamphetamines, Marijuana    Sexual activity: Not on file       Review of Systems   Constitutional:  Negative for chills, diaphoresis, fatigue and fever.   HENT:  Negative for congestion, drooling and sore throat.    Eyes:  Negative for redness and visual disturbance.   Respiratory:  Negative for cough, choking, chest tightness, shortness of breath, wheezing and stridor.    Cardiovascular:  Negative for chest pain, palpitations and leg swelling.   Gastrointestinal:  Positive for abdominal pain, diarrhea, nausea and vomiting. Negative for abdominal distention, anal bleeding, blood in stool, constipation and rectal pain.   Endocrine: Negative for cold intolerance and heat intolerance.   Genitourinary:  Negative for decreased urine volume, difficulty urinating, dysuria, enuresis, flank pain, hematuria and urgency.   Musculoskeletal:  Negative for arthralgias, back pain, joint swelling, myalgias and neck pain.   Skin:  Negative for color change, pallor, rash and wound.   Allergic/Immunologic: Negative for immunocompromised state.   Neurological:  Negative for dizziness, tremors, seizures, syncope, facial asymmetry, speech difficulty, weakness, light-headedness, numbness and headaches.   Hematological:  Negative for adenopathy. Does not bruise/bleed easily.   Psychiatric/Behavioral:  Negative for behavioral problems, confusion, decreased concentration and dysphoric mood.    Objective:     Vital Signs (Most Recent):  Temp: 97.7 °F (36.5 °C) (09/23/22 0438)  Pulse: 61  (09/23/22 0437)  Resp: 12 (09/23/22 0239)  BP: 112/82 (09/23/22 0239)  SpO2: 99 % (09/23/22 0437) Vital Signs (24h Range):  Temp:  [97.7 °F (36.5 °C)-98.1 °F (36.7 °C)] 97.7 °F (36.5 °C)  Pulse:  [] 61  Resp:  [12-27] 12  SpO2:  [97 %-100 %] 99 %  BP: (112-135)/(72-85) 112/82     Weight: 63.5 kg (140 lb)  Body mass index is 19.53 kg/m².  Body surface area is 1.78 meters squared.    ECOG SCORE           [unfilled]    Lines/Drains/Airways       Peripheral Intravenous Line  Duration                  Peripheral IV - Single Lumen 09/22/22 2328 20 G Right Forearm <1 day         Peripheral IV - Single Lumen 09/23/22 0157 Distal;Posterior;Right Forearm <1 day                    Physical Exam  Constitutional:       Appearance: Normal appearance.   HENT:      Head: Normocephalic and atraumatic.      Nose: Nose normal.      Mouth/Throat:      Mouth: Mucous membranes are moist.   Cardiovascular:      Rate and Rhythm: Normal rate and regular rhythm.      Pulses: Normal pulses.      Heart sounds: Normal heart sounds.   Pulmonary:      Effort: Pulmonary effort is normal.      Breath sounds: Normal breath sounds.   Abdominal:      General: Abdomen is flat. There is no distension.      Palpations: Abdomen is soft.      Tenderness: There is abdominal tenderness.      Comments: Mild ttp in the abdomen; vague localization.    Musculoskeletal:         General: Normal range of motion.      Cervical back: Normal range of motion and neck supple.   Neurological:      General: No focal deficit present.      Mental Status: He is alert and oriented to person, place, and time.     Significant Labs:   All pertinent labs from the last 24 hours have been reviewed.    Diagnostic Results:  I have reviewed all pertinent imaging results/findings within the past 24 hours.  I have reviewed and interpreted all pertinent imaging results/findings within the past 24 hours.

## 2022-09-23 NOTE — ASSESSMENT & PLAN NOTE
51-year-old male with a history of stage IV melanoma with metastasis to lungs, axillary lymph nodes, liver, peritoneal carcinomatosis, and brain (on ipilimumab /nivolumab with Dr. Wise), CAD s/p PCI, seizures admitted for intractable n/v/d. He has had multiple admissions for same problem. On exam, mild ttp to palpation of abdomen. No distension noted.     - Supportive care  - Fluids.   - Advance diet as tolerated.   - Replace electrolytes.

## 2022-09-23 NOTE — ASSESSMENT & PLAN NOTE
Pt with hx of anxiety  - Started on home bupropion.   - Zyprexa prn for agitation.   - Xanax .5 mg bid prn

## 2022-09-23 NOTE — NURSING
REPORT REC., ARRIVED TO UNIT VIA STRETCHER, VSS, NAD, DENIES PAIN, N/V AT THIS TIME, ABD SOFT, NON TENDER, MOM @ BS, FULL ASSESSMENT COMPLETED, WILL MONITOR.

## 2022-09-23 NOTE — ED PROVIDER NOTES
Encounter Date: 9/22/2022       History     Chief Complaint   Patient presents with    Emesis     Pt states unable to eat or hold down food for 3 days. N/V/D. Stage 4 melanoma on immunotherapy and targeted therapy. Pt reports abdominal pain with lesions in his stomach.     Patient is a 51-year-old male with a history of stage IV melanoma with metastasis to lungs, axillary lymph nodes, liver, peritoneal carcinomatosis, and brain (on ipilimumab /nivolumab with Dr. Wise), CAD s/p PCI, seizures, who presents with NBNB nausea, vomiting and diarrhea x3 days. He has had generalized weakness/fatigue and lightheadedness. No associated fevers. He also has abdominal pain, however pain is at his baseline that he attributes to tumor burden. Also endorsing thoracic back pain also unchanged from baseline. No leg weakness or urinary incontinence.    Review of patient's allergies indicates:  No Known Allergies  Past Medical History:   Diagnosis Date    Seizures      No past surgical history on file.  No family history on file.  Social History     Tobacco Use    Smoking status: Every Day    Smokeless tobacco: Never   Substance Use Topics    Alcohol use: Yes    Drug use: Yes     Types: Methamphetamines, Marijuana     Review of Systems   Constitutional:  Negative for activity change, chills and fever.   HENT:  Negative for congestion, ear pain and sore throat.    Eyes:  Negative for visual disturbance.   Respiratory:  Negative for shortness of breath and stridor.    Cardiovascular:  Negative for chest pain and palpitations.   Gastrointestinal:  Positive for abdominal pain, nausea and vomiting.   Genitourinary:  Negative for dysuria and urgency.   Musculoskeletal:  Negative for back pain.   Skin:  Negative for rash.   Neurological:  Negative for dizziness, syncope, weakness and headaches.   Hematological:  Does not bruise/bleed easily.     Physical Exam     Initial Vitals [09/22/22 2123]   BP Pulse Resp Temp SpO2   135/72 97 20  98.1 °F (36.7 °C) 100 %      MAP       --         Physical Exam    Nursing note and vitals reviewed.  Constitutional: He appears well-developed and well-nourished. He is not diaphoretic. No distress.   Chronically ill-appearing.  Uncomfortable.  Actively vomiting.  Speaking full sentences.   HENT:   Head: Normocephalic and atraumatic.   Right Ear: External ear normal.   Left Ear: External ear normal.   Mouth/Throat: Mucous membranes are dry.   Neck: Neck supple.   Cardiovascular:  Regular rhythm, normal heart sounds and intact distal pulses.   Tachycardia present.         Pulmonary/Chest: Breath sounds normal. No respiratory distress. He has no wheezes. He has no rhonchi. He has no rales.   Abdominal: Abdomen is soft. He exhibits no distension. There is generalized abdominal tenderness. There is no rebound and no guarding.   Musculoskeletal:      Cervical back: Neck supple.     Neurological: He is alert and oriented to person, place, and time. GCS score is 15. GCS eye subscore is 4. GCS verbal subscore is 5. GCS motor subscore is 6.   Skin: Skin is warm. Capillary refill takes more than 3 seconds. No rash noted.   Capillary refill 3-4 seconds   Psychiatric: He has a normal mood and affect.       ED Course   Procedures  Labs Reviewed   CBC W/ AUTO DIFFERENTIAL - Abnormal; Notable for the following components:       Result Value    WBC 14.74 (*)     MCV 81 (*)     MCHC 36.3 (*)     RDW 15.2 (*)     Immature Granulocytes 0.8 (*)     Gran # (ANC) 12.6 (*)     Immature Grans (Abs) 0.12 (*)     Gran % 85.4 (*)     Lymph % 7.1 (*)     All other components within normal limits    Narrative:     Add on Lipase per Dr. Hughes @ 23:55 pm 09/23/22 to order # [id number]   COMPREHENSIVE METABOLIC PANEL - Abnormal; Notable for the following components:    Sodium 130 (*)     Potassium 3.1 (*)     Chloride 89 (*)     CO2 21 (*)     Glucose 118 (*)     Creatinine 1.6 (*)     Calcium 10.9 (*)     Total Protein 8.7 (*)     ALT 9 (*)      Anion Gap 20 (*)     eGFR 51.5 (*)     All other components within normal limits    Narrative:     Add on Lipase per Dr. Hughes @ 23:55 pm 09/23/22 to order # [id number]   LACTIC ACID, PLASMA - Abnormal; Notable for the following components:    Lactate (Lactic Acid) 2.7 (*)     All other components within normal limits   ISTAT PROCEDURE - Abnormal; Notable for the following components:    POC PH 7.463 (*)     POC PCO2 50.2 (*)     POC HCO3 36.0 (*)     POC SATURATED O2 85 (*)     POC TCO2 37 (*)     All other components within normal limits   CULTURE, BLOOD   CULTURE, BLOOD   SARS-COV-2 RNA AMPLIFICATION, QUAL   LIPASE   LIPASE    Narrative:     Add on Lipase per Dr. Hughes @ 23:55 pm 09/23/22 to order # [id number]   URINALYSIS, REFLEX TO URINE CULTURE   LACTIC ACID, PLASMA     EKG Readings: (Independently Interpreted)   Initial Reading: No STEMI. Previous EKG: Compared with most recent EKG Rhythm: Normal Sinus Rhythm. Heart Rate: 66. Ectopy: No Ectopy. Conduction: Normal.     Imaging Results              CT Abdomen Pelvis With Contrast (Final result)  Result time 09/23/22 01:53:41      Final result by Kwan Kee MD (09/23/22 01:53:41)                   Impression:      Marked distention of the rectum with fluid noting proximal loops of sigmoid colon are distended with fluid to a lesser degree.  No significant wall thickening or hyperenhancement.  Correlate clinically for diarrheal illness.    The stomach is not fully distended however there is some wall thickening at the greater curvature, nonspecific and could represent gastritis or sequela of possible residual peritoneal carcinomatosis noted on prior study.    Stable superior endplate deformities at T12 and L2 without evidence of acute fracture or osseous abnormality.  Visualized lower thoracic and lumbar spine appears similar to prior study.    Marked improvement in the intra-abdominal ascites.    Additional findings as above.    Electronically signed by  resident: Aliyah Villaseñor  Date:    09/23/2022  Time:    00:58    Electronically signed by: Kwan Kee MD  Date:    09/23/2022  Time:    01:53               Narrative:    EXAMINATION:  CT ABDOMEN PELVIS WITH CONTRAST    CLINICAL HISTORY:  Nausea/vomiting;n/v in cancer patient also with back pain; please also evaluate T/L spine;    TECHNIQUE:  Low dose axial images, sagittal and coronal reformations were obtained from the lung bases to the pubic symphysis following the IV administration of 75 mL of Omnipaque 350 .  Oral contrast was not administered.    COMPARISON:  CT abdomen pelvis 08/19/2022, CT chest and pelvis 06/15/2022    FINDINGS:  Heart: Normal in size. No pericardial effusion.    Lung Bases: 1 cm nodule in the posterior segment of the left lower lobe (series 3, image 10), similar to prior.  No new pulmonary nodules of the visualized lung bases.  No consolidation.  No pleural effusion.  Mild bibasilar dependent atelectasis.    Liver: Normal size normal attenuation.  No evidence of focal hepatic lesion.    Gallbladder: The gallbladder is distended without evidence of calcified stones.    Bile Ducts: No intra or extrahepatic biliary ductal dilatation.    Pancreas: No mass or peripancreatic fat stranding.    Spleen: Normal size and overall attenuation.    Adrenals: Nonspecific left adrenal thickening, similar prior.    Kidneys/ Ureters: Normal size and location.  Few bilateral punctate cortical hypodensities, too small to adequately characterize.  No hydroureteronephrosis.  No calcified stones.    Bladder: Nondistended without evidence of wall thickening.    Reproductive organs: Mild prostatomegaly.    GI Tract/Mesentery: Gastric wall at the greater curvature appears mildly thickened.  Marked distension of the rectum measuring up to 7.0 cm with proximal fluid filled loops of sigmoid colon.  There is no bowel wall thickening or hyperenhancement.  No evidence of bowel obstruction.    Peritoneal Space: No  ascites is seen on today's examination, significantly improved from prior.  The previously seen pleural and omental nodularity is are not distinguished on today's examination noting significantly decreased ascites on today's examination may obscure those findings.  No free air.    Retroperitoneum: No significant adenopathy.    Abdominal wall: Possible soft tissue nodularity in the left lower quadrant remains and appears similar to prior.    Vasculature: Minimal scattered atherosclerotic plaque of the abdominal aorta.  No aneurysm.  The celiac artery, SMA, and PEDRO are patent.  Portal vein, SMV, and IMV are patent.    Bones: Mild osseous degenerative changes.  Stable appearance of the minimal superior endplate height loss at T12 and L2.  No evidence of new endplate changes.  No osseous destructive lesions.  No acute fracture.                                        X-Ray Chest AP Portable (Final result)  Result time 09/22/22 23:22:44      Final result by Junior Mejía MD (09/22/22 23:22:44)                   Impression:      1.6 cm left upper lobe pulmonary nodule could represent metastatic disease.  See above comments.  CT follow-up may better characterize.    This report was flagged in Epic as abnormal.      Electronically signed by: Junior Mejía  Date:    09/22/2022  Time:    23:22               Narrative:    EXAMINATION:  XR CHEST AP PORTABLE    CLINICAL HISTORY:  Sepsis;    TECHNIQUE:  Single frontal view of the chest was performed.    COMPARISON:  02/18/2021    FINDINGS:  1.6 cm left upper lobe pulmonary nodule.  This could represent metastatic disease.  Recommend CT follow-up for better characterization.    There are 2 small subcentimeter nodules on the right also.    No effusion or pneumothorax.  No significant infiltrate or consolidation.    The cardiac silhouette is normal in size. The hilar and mediastinal contours are unremarkable.    Bones are intact.                                    X-Rays:  "  Independently Interpreted Readings:   Chest X-Ray: Normal heart size.  No infiltrates.  No acute abnormalities.   Medications   lactated ringers bolus 1,000 mL (1,000 mLs Intravenous New Bag 9/23/22 0157)   vancomycin 1.5 g in dextrose 5 % 250 mL IVPB (ready to mix) (1,500 mg Intravenous New Bag 9/23/22 0231)   cefepime in dextrose 5 % 1 gram/50 mL IVPB 1 g (has no administration in time range)   lactated ringers bolus 1,000 mL (0 mLs Intravenous Stopped 9/23/22 0028)   ondansetron injection 4 mg (4 mg Intravenous Given 9/22/22 2329)   ALPRAZolam tablet 0.5 mg (0.5 mg Oral Given 9/22/22 2349)   iohexoL (OMNIPAQUE 350) injection 75 mL (75 mLs Intravenous Given 9/23/22 0051)   potassium bicarbonate disintegrating tablet 50 mEq (50 mEq Oral Given 9/23/22 0238)     Medical Decision Making:   History:   I obtained history from: someone other than patient.  Old Medical Records: I decided to obtain old medical records.  Old Records Summarized: records from previous admission(s).       <> Summary of Records:   - Per chart review, it appears as though diarrhea has been present for about a week; got stool studies done with heme/onc 3 days ago that were negative.  - Prior admission late august for intractable nausea/vomiting    Initial Assessment:   Emergent evaluation of intractable nausea/vomiting and diarrhea immunocompromised patient, on chemotherapy.  He is afebrile and hemodynamically stable.  Differential Diagnosis:   Sepsis, worsening tumor burden/peritoneal carcinomatosis, bacteremia, gastroenteritis  Clinical Tests:   Lab Tests: Ordered and Reviewed  Radiological Study: Ordered and Reviewed  Medical Tests: Ordered and Reviewed  Sepsis Perfusion Assessment: "I attest a sepsis perfusion exam was performed within 6 hours of sepsis, severe sepsis, or septic shock presentation, following fluid resuscitation."  ED Management:  Physical exam is consistent with hypovolemia 2/2 GI losses. This is reflected in his CMP, with " electrolyte derangements including mild hyponatremia (130), hypokalemia (3.1) and a mild PAULA with a creatinine of 1.6. Covered with broad spectrum abx for mild leukocytosis (14) and left shift. Lactic acid also elevated at 2.7 with anion gap of 20. VBG shows metabolic alkalosis with resp compensation-- pH of 7.46, Pco2 of 50 and HCO3 of 36. CT A/P without surgical emergency and consistent with diarrheal illness. No abnormalities noted to T/L spine. Discussed case with hematology/oncology, who will admit patient to their service for intractable nausea vomiting, and further IV fluids.          Attending Attestation:   Physician Attestation Statement for Resident:  As the supervising MD   Physician Attestation Statement: I have personally seen and examined this patient.   I agree with the above history.  -:   As the supervising MD I agree with the above PE.     As the supervising MD I agree with the above treatment, course, plan, and disposition.    I have reviewed and agree with the residents interpretation of the following: lab data, x-rays and CT scans.  I have reviewed the following: old records at this facility.                           Clinical Impression:   Final diagnoses:  [R00.0] Tachycardia  [R91.1] Pulmonary nodule  [R11.10] Vomiting, intractability of vomiting not specified, presence of nausea not specified, unspecified vomiting type (Primary)      ED Disposition Condition    Admit Stable                Marvel Hughes MD  Resident  09/23/22 0258       Beba Sellers MD  09/23/22 9727

## 2022-09-23 NOTE — ED NOTES
Cardiac monitoring d/c-ed by attending. Pt taken off of continuous cardiac monitoring, pulse ox, bp to promote pt comfort. VS WDL at this time.

## 2022-09-23 NOTE — ED NOTES
Hourly rounding complete. Patient resting in stretcher and is in NAD at this time. Pt is currently sleeping, VSS. Pt updated on POC. Bed low and locked, SR up x2, call bell in patient reach. Pt voices no needs at this time. Mother is at the bedside.

## 2022-09-23 NOTE — ASSESSMENT & PLAN NOTE
Pt with hx of anxiety  - Started on home bupropion.   - Zyprexa prn for agitation.   - Xanax held given high dose of opiod. Consider restarting if clinically deemed safe.

## 2022-09-23 NOTE — NURSING
PT WITH C/O PAIN, OXY GIVEN AS ORDERED, PT WITH N/V IMMEDIATELY AFTER TAKING PILLS, WILL NOTIFY MD.

## 2022-09-23 NOTE — HPI
Patient is a 51-year-old male with a history of stage IV melanoma with metastasis to lungs, axillary lymph nodes, liver, peritoneal carcinomatosis, and brain (on ipilimumab /nivolumab with Dr. Wise), CAD s/p PCI, seizures, who presents with NBNB nausea, vomiting and diarrhea x3 days. His symptoms of n/v/d has been going on for a while, and he has had multiple admissions for the same complaints in the recent past. However, for the past three days symptoms has worsened and he has been unable to keep food down. Associated had generalized weakness/fatigue, abdominal pain, and lightheadedness. Abdominal pain is at his baseline that he attributes to tumor burden. Also endorsing thoracic back pain also unchanged from baseline. Denies fever, chest pain, sob, blood in the stool or vomit, leg weakness or urinary incontinence. He has had multiple episodes of fluid accumulation in the abdomen requiring paracentesis.     On arrival at the ED, initially noted to be tachycardic, but improved with fluids. Otherwise, afebrile and HD stable. CBC showed leukocytosis. CMP showed hyponatremia, hypokalemia, PAULA. Lactate elevated to 2.7. Admitted to med/onc for further care.         Oncology History   Metastatic melanoma   1/28/2022 Cancer Staged     Staging form: Melanoma of the Skin, AJCC 8th Edition  - Clinical stage from 1/28/2022: Stage IV (cTX, cNX, cM1)      4/4/2022 Initial Diagnosis     Metastatic melanoma      6/14/2022 -  Chemotherapy     Treatment Summary   Plan Name: OP NIVOLUMAB 1 MG/KG IPILIMUMAB 3MG/KG FOLLOWED BY NIVOLUMAB 480MG Q4W  Treatment Goal: Control  Status: Active  Start Date: 6/14/2022  End Date: 5/26/2023 (Planned)  Provider: Clayton Wise MD  Chemotherapy: ipilimumab (YERVOY) 200 mg in sodium chloride 0.9% 140 mL chemo infusion, 211 mg, Intravenous, Clinic/[facility] 1 time, 3 of 3 cycles  Administration: 200 mg (6/14/2022), 200 mg (7/8/2022), 200 mg (7/29/2022)  nivolumab 70 mg in sodium chloride 0.9% 57 mL  infusion, 1 mg/kg = 70 mg, Intravenous, Clinic[facility] 1 time, 3 of 14 cycles  Dose modification: 480 mg (original dose 1 mg/kg, Cycle 4, Reason: MD Discretion)  Administration: 70 mg (6/14/2022), 70 mg (7/8/2022), 70 mg (7/29/2022)

## 2022-09-23 NOTE — ED NOTES
I have assumed care of the pt from Rylee, RN and EZE Ayala. Two pt identifiers verbally confirmed w/ pt; pt is Joseluis Garner  1970.    Pt sleeping on stretcher, easily aroused.  Pt remains on continuous cardiac and pulse ox monitoring with non-invasive blood pressure to cycle every 30 minutes.  VS stable; NSR noted. Pt denies pain at this time; no acute distress or discomfort reported or observed.  Pt denies restroom needs at this time, urinal at bedside. Pt is able to reposition self on stretcher. Bed locked in lowest position; side rails up and locked x 2; call light, bedside table, and personal belongings within reach. Room assessed for safety measures and cleanliness; no action needed at this time. Plan of care discussed.  Pt instructed to alert nurse for assistance and before attempting to get out of bed; verbalizes understanding. Mother remains at bedside. Whiteboard updated.

## 2022-09-24 LAB
ALBUMIN SERPL BCP-MCNC: 3.3 G/DL (ref 3.5–5.2)
ALP SERPL-CCNC: 61 U/L (ref 55–135)
ALT SERPL W/O P-5'-P-CCNC: 6 U/L (ref 10–44)
ANION GAP SERPL CALC-SCNC: 11 MMOL/L (ref 8–16)
AST SERPL-CCNC: 13 U/L (ref 10–40)
BASOPHILS # BLD AUTO: 0.02 K/UL (ref 0–0.2)
BASOPHILS NFR BLD: 0.3 % (ref 0–1.9)
BILIRUB SERPL-MCNC: 0.4 MG/DL (ref 0.1–1)
BUN SERPL-MCNC: 10 MG/DL (ref 6–20)
CALCIUM SERPL-MCNC: 9.1 MG/DL (ref 8.7–10.5)
CHLORIDE SERPL-SCNC: 94 MMOL/L (ref 95–110)
CO2 SERPL-SCNC: 29 MMOL/L (ref 23–29)
CREAT SERPL-MCNC: 0.8 MG/DL (ref 0.5–1.4)
DIFFERENTIAL METHOD: ABNORMAL
EOSINOPHIL # BLD AUTO: 0 K/UL (ref 0–0.5)
EOSINOPHIL NFR BLD: 0 % (ref 0–8)
ERYTHROCYTE [DISTWIDTH] IN BLOOD BY AUTOMATED COUNT: 14.6 % (ref 11.5–14.5)
EST. GFR  (NO RACE VARIABLE): >60 ML/MIN/1.73 M^2
GLUCOSE SERPL-MCNC: 89 MG/DL (ref 70–110)
HCT VFR BLD AUTO: 38.6 % (ref 40–54)
HGB BLD-MCNC: 12.8 G/DL (ref 14–18)
IMM GRANULOCYTES # BLD AUTO: 0.02 K/UL (ref 0–0.04)
IMM GRANULOCYTES NFR BLD AUTO: 0.3 % (ref 0–0.5)
LYMPHOCYTES # BLD AUTO: 1.1 K/UL (ref 1–4.8)
LYMPHOCYTES NFR BLD: 13.8 % (ref 18–48)
MAGNESIUM SERPL-MCNC: 1.7 MG/DL (ref 1.6–2.6)
MCH RBC QN AUTO: 28.5 PG (ref 27–31)
MCHC RBC AUTO-ENTMCNC: 33.2 G/DL (ref 32–36)
MCV RBC AUTO: 86 FL (ref 82–98)
MONOCYTES # BLD AUTO: 0.8 K/UL (ref 0.3–1)
MONOCYTES NFR BLD: 10.3 % (ref 4–15)
NEUTROPHILS # BLD AUTO: 6 K/UL (ref 1.8–7.7)
NEUTROPHILS NFR BLD: 75.3 % (ref 38–73)
NRBC BLD-RTO: 0 /100 WBC
PHOSPHATE SERPL-MCNC: 3.9 MG/DL (ref 2.7–4.5)
PLATELET # BLD AUTO: 308 K/UL (ref 150–450)
PMV BLD AUTO: 10.2 FL (ref 9.2–12.9)
POCT GLUCOSE: 70 MG/DL (ref 70–110)
POCT GLUCOSE: 75 MG/DL (ref 70–110)
POCT GLUCOSE: 75 MG/DL (ref 70–110)
POCT GLUCOSE: 81 MG/DL (ref 70–110)
POTASSIUM SERPL-SCNC: 3.4 MMOL/L (ref 3.5–5.1)
PROT SERPL-MCNC: 6.1 G/DL (ref 6–8.4)
RBC # BLD AUTO: 4.49 M/UL (ref 4.6–6.2)
SODIUM SERPL-SCNC: 134 MMOL/L (ref 136–145)
WBC # BLD AUTO: 7.89 K/UL (ref 3.9–12.7)

## 2022-09-24 PROCEDURE — 97161 PT EVAL LOW COMPLEX 20 MIN: CPT

## 2022-09-24 PROCEDURE — 99233 SBSQ HOSP IP/OBS HIGH 50: CPT | Mod: ,,, | Performed by: INTERNAL MEDICINE

## 2022-09-24 PROCEDURE — 80053 COMPREHEN METABOLIC PANEL: CPT | Performed by: STUDENT IN AN ORGANIZED HEALTH CARE EDUCATION/TRAINING PROGRAM

## 2022-09-24 PROCEDURE — 85025 COMPLETE CBC W/AUTO DIFF WBC: CPT | Performed by: STUDENT IN AN ORGANIZED HEALTH CARE EDUCATION/TRAINING PROGRAM

## 2022-09-24 PROCEDURE — 36410 VNPNXR 3YR/> PHY/QHP DX/THER: CPT

## 2022-09-24 PROCEDURE — 97165 OT EVAL LOW COMPLEX 30 MIN: CPT

## 2022-09-24 PROCEDURE — 99233 PR SUBSEQUENT HOSPITAL CARE,LEVL III: ICD-10-PCS | Mod: ,,, | Performed by: INTERNAL MEDICINE

## 2022-09-24 PROCEDURE — 83735 ASSAY OF MAGNESIUM: CPT | Performed by: STUDENT IN AN ORGANIZED HEALTH CARE EDUCATION/TRAINING PROGRAM

## 2022-09-24 PROCEDURE — 25000003 PHARM REV CODE 250: Performed by: STUDENT IN AN ORGANIZED HEALTH CARE EDUCATION/TRAINING PROGRAM

## 2022-09-24 PROCEDURE — 84100 ASSAY OF PHOSPHORUS: CPT | Performed by: STUDENT IN AN ORGANIZED HEALTH CARE EDUCATION/TRAINING PROGRAM

## 2022-09-24 PROCEDURE — 63600175 PHARM REV CODE 636 W HCPCS: Performed by: STUDENT IN AN ORGANIZED HEALTH CARE EDUCATION/TRAINING PROGRAM

## 2022-09-24 PROCEDURE — 12000002 HC ACUTE/MED SURGE SEMI-PRIVATE ROOM

## 2022-09-24 PROCEDURE — C1751 CATH, INF, PER/CENT/MIDLINE: HCPCS

## 2022-09-24 PROCEDURE — 36415 COLL VENOUS BLD VENIPUNCTURE: CPT | Performed by: STUDENT IN AN ORGANIZED HEALTH CARE EDUCATION/TRAINING PROGRAM

## 2022-09-24 PROCEDURE — 25000003 PHARM REV CODE 250

## 2022-09-24 RX ORDER — OXYCODONE HYDROCHLORIDE 10 MG/1
30 TABLET ORAL ONCE
Status: COMPLETED | OUTPATIENT
Start: 2022-09-24 | End: 2022-09-24

## 2022-09-24 RX ORDER — ONDANSETRON 4 MG/1
4 TABLET, ORALLY DISINTEGRATING ORAL ONCE
Status: COMPLETED | OUTPATIENT
Start: 2022-09-24 | End: 2022-09-24

## 2022-09-24 RX ADMIN — HYDROMORPHONE HYDROCHLORIDE 2 MG: 2 INJECTION INTRAMUSCULAR; INTRAVENOUS; SUBCUTANEOUS at 11:09

## 2022-09-24 RX ADMIN — POTASSIUM BICARBONATE 50 MEQ: 978 TABLET, EFFERVESCENT ORAL at 09:09

## 2022-09-24 RX ADMIN — Medication 16 G: at 05:09

## 2022-09-24 RX ADMIN — LEVETIRACETAM 1000 MG: 500 TABLET, FILM COATED ORAL at 09:09

## 2022-09-24 RX ADMIN — MUPIROCIN: 20 OINTMENT TOPICAL at 08:09

## 2022-09-24 RX ADMIN — OXYCODONE HYDROCHLORIDE 30 MG: 10 TABLET ORAL at 08:09

## 2022-09-24 RX ADMIN — MORPHINE SULFATE 60 MG: 30 TABLET, FILM COATED, EXTENDED RELEASE ORAL at 09:09

## 2022-09-24 RX ADMIN — LEVETIRACETAM 1000 MG: 500 TABLET, FILM COATED ORAL at 08:09

## 2022-09-24 RX ADMIN — POTASSIUM BICARBONATE 50 MEQ: 978 TABLET, EFFERVESCENT ORAL at 11:09

## 2022-09-24 RX ADMIN — HYDROMORPHONE HYDROCHLORIDE 2 MG: 2 INJECTION INTRAMUSCULAR; INTRAVENOUS; SUBCUTANEOUS at 09:09

## 2022-09-24 RX ADMIN — DEXAMETHASONE SODIUM PHOSPHATE 4 MG: 4 INJECTION INTRA-ARTICULAR; INTRALESIONAL; INTRAMUSCULAR; INTRAVENOUS; SOFT TISSUE at 11:09

## 2022-09-24 RX ADMIN — ALPRAZOLAM 1 MG: 1 TABLET ORAL at 08:09

## 2022-09-24 RX ADMIN — BUPROPION HYDROCHLORIDE 100 MG: 100 TABLET, FILM COATED ORAL at 08:09

## 2022-09-24 RX ADMIN — DEXAMETHASONE SODIUM PHOSPHATE 4 MG: 4 INJECTION INTRA-ARTICULAR; INTRALESIONAL; INTRAMUSCULAR; INTRAVENOUS; SOFT TISSUE at 10:09

## 2022-09-24 RX ADMIN — ALPRAZOLAM 1 MG: 1 TABLET ORAL at 09:09

## 2022-09-24 RX ADMIN — BUPROPION HYDROCHLORIDE 100 MG: 100 TABLET, FILM COATED ORAL at 09:09

## 2022-09-24 RX ADMIN — OLANZAPINE 5 MG: 2.5 TABLET, FILM COATED ORAL at 08:09

## 2022-09-24 RX ADMIN — FAMOTIDINE 20 MG: 20 TABLET ORAL at 09:09

## 2022-09-24 RX ADMIN — HYDROMORPHONE HYDROCHLORIDE 2 MG: 2 INJECTION INTRAMUSCULAR; INTRAVENOUS; SUBCUTANEOUS at 04:09

## 2022-09-24 RX ADMIN — ONDANSETRON 4 MG: 4 TABLET, ORALLY DISINTEGRATING ORAL at 09:09

## 2022-09-24 RX ADMIN — MUPIROCIN: 20 OINTMENT TOPICAL at 09:09

## 2022-09-24 RX ADMIN — MORPHINE SULFATE 60 MG: 30 TABLET, FILM COATED, EXTENDED RELEASE ORAL at 08:09

## 2022-09-24 NOTE — PT/OT/SLP EVAL
Physical Therapy Evaluation and Discharge Note    Patient Name:  Joseluis Garner   MRN:  [id number]    Recommendations:     Discharge Recommendations:  home   Discharge Equipment Recommendations: none   Barriers to discharge: None    Assessment:     Joseluis Garner is a 51 y.o. male admitted with a medical diagnosis of Intractable nausea and vomiting. .  At this time, patient is functioning at their prior level of function and does not require further acute PT services.     Recent Surgery: * No surgery found *      Plan:     During this hospitalization, patient does not require further acute PT services.  Please re-consult if situation changes.      Subjective     Chief Complaint: none  Patient/Family Comments/goals: none  Pain/Comfort:  Pain Rating 1: 0/10    Patients cultural, spiritual, Rastafari conflicts given the current situation: no    Living Environment:  Patient lives with mother in a single story home with 13 steps to enter  Prior to admission, patients level of function was independent.  Equipment used at home: none.  DME owned (not currently used): none.  Upon discharge, patient will have assistance from mother.    Objective:     Communicated with RN prior to session.  Patient found supine with peripheral IV upon PT entry to room.    General Precautions: Standard, fall   Orthopedic Precautions:N/A   Braces: N/A   Respiratory Status: Room air    Exams:  Cognitive Exam:  Patient is oriented to Person, Place, Time, and Situation  RUE ROM: WFL  RUE Strength: WFL  LUE ROM: WFL  LUE Strength: WFL  RLE ROM: WFL  RLE Strength: WFL  LLE ROM: WFL  LLE Strength: WFL    Functional Mobility:  Bed Mobility:     Scooting: independence  Supine to Sit: independence  Transfers:     Sit to Stand:  independence with no AD  Gait: patient ambulated 15 feet in room   Balance: patient demonstrates sufficient balance in sitting and standing - patient is able to respond well to unexpected perturbation     AM-PAC 6 CLICK  MOBILITY  Total Score:24       Patient left supine with all lines intact, call button in reach, and RN notified.    GOALS:   Multidisciplinary Problems       Physical Therapy Goals       Not on file                    History:     Past Medical History:   Diagnosis Date    Seizures        No past surgical history on file.    Time Tracking:     PT Received On: 09/24/22  PT Start Time: 1020     PT Stop Time: 1038  PT Total Time (min): 18 min     Billable Minutes: Evaluation 18      09/24/2022

## 2022-09-24 NOTE — PROGRESS NOTES
[facility] ([place] [address])  Hematology/Oncology  Progress Note    Patient Name: Joseluis Garner  Admission Date: 9/22/2022  Hospital Length of Stay: 1 days  Code Status: Full Code     Subjective:     HPI:  Patient is a 51-year-old male with a history of stage IV melanoma with metastasis to lungs, axillary lymph nodes, liver, peritoneal carcinomatosis, and brain (on ipilimumab /nivolumab with Dr. Wise), CAD s/p PCI, seizures, who presents with NBNB nausea, vomiting and diarrhea x3 days. His symptoms of n/v/d has been going on for a while, and he has had multiple admissions for the same complaints in the recent past. However, for the past three days symptoms has worsened and he has been unable to keep food down. Associated had generalized weakness/fatigue, abdominal pain, and lightheadedness. Abdominal pain is at his baseline that he attributes to tumor burden. Also endorsing thoracic back pain also unchanged from baseline. Denies fever, chest pain, sob, blood in the stool or vomit, leg weakness or urinary incontinence. He has had multiple episodes of fluid accumulation in the abdomen requiring paracentesis.     On arrival at the ED, initially noted to be tachycardic, but improved with fluids. Otherwise, afebrile and HD stable. CBC showed leukocytosis. CMP showed hyponatremia, hypokalemia, PAULA. Lactate elevated to 2.7. Admitted to med/onc for further care.         Oncology History   Metastatic melanoma   1/28/2022 Cancer Staged     Staging form: Melanoma of the Skin, AJCC 8th Edition  - Clinical stage from 1/28/2022: Stage IV (cTX, cNX, cM1)      4/4/2022 Initial Diagnosis     Metastatic melanoma      6/14/2022 -  Chemotherapy     Treatment Summary   Plan Name: OP NIVOLUMAB 1 MG/KG IPILIMUMAB 3MG/KG FOLLOWED BY NIVOLUMAB 480MG Q4W  Treatment Goal: Control  Status: Active  Start Date: 6/14/2022  End Date: 5/26/2023 (Planned)  Provider: Clayton Wise MD  Chemotherapy: ipilimumab (YERVOY) 200  mg in sodium chloride 0.9% 140 mL chemo infusion, 211 mg, Intravenous, Clinic/HOD 1 time, 3 of 3 cycles  Administration: 200 mg (6/14/2022), 200 mg (7/8/2022), 200 mg (7/29/2022)  nivolumab 70 mg in sodium chloride 0.9% 57 mL infusion, 1 mg/kg = 70 mg, Intravenous, Clinic/HOD 1 time, 3 of 14 cycles  Dose modification: 480 mg (original dose 1 mg/kg, Cycle 4, Reason: MD Discretion)  Administration: 70 mg (6/14/2022), 70 mg (7/8/2022), 70 mg (7/29/2022)           Interval History: Patient reports feeling better this morning. He denies any pain or further vomiting.    Oncology Treatment Plan:   OP NIVOLUMAB 1 MG/KG IPILIMUMAB 3MG/KG FOLLOWED BY NIVOLUMAB 480MG Q4W    Medications:  Continuous Infusions:  Scheduled Meds:   ALPRAZolam  1 mg Oral BID    buPROPion  100 mg Oral BID    dexamethasone  4 mg Intravenous Q12H    famotidine  20 mg Oral Daily    levETIRAcetam  1,000 mg Oral BID    morphine  60 mg Oral BID    mupirocin   Nasal BID    OLANZapine  5 mg Oral QHS     PRN Meds:dextrose 10%, dextrose 10%, glucagon (human recombinant), glucose, glucose, HYDROmorphone, insulin aspart U-100, magnesium oxide, magnesium oxide, naloxone, ondansetron, potassium, sodium phosphates, potassium, sodium phosphates, potassium, sodium phosphates, prochlorperazine, sodium chloride 0.9%     Review of Systems   Constitutional:  Negative for chills and fever.   HENT:  Negative for congestion and facial swelling.    Eyes:  Negative for visual disturbance.   Respiratory:  Negative for cough and shortness of breath.    Cardiovascular:  Negative for chest pain and leg swelling.   Gastrointestinal:  Negative for nausea and vomiting.   Genitourinary:  Negative for dysuria.   Musculoskeletal:  Negative for back pain.   Skin:  Negative for rash.   Neurological:  Negative for headaches.   Psychiatric/Behavioral:  Negative for confusion.    Objective:     Vital Signs (Most Recent):  Temp: 98.3 °F (36.8 °C) (09/24/22 1600)  Pulse: 71 (09/24/22  1600)  Resp: 18 (09/24/22 1600)  BP: 127/77 (09/24/22 1600)  SpO2: 95 % (09/24/22 1600) Vital Signs (24h Range):  Temp:  [97.7 °F (36.5 °C)-99.4 °F (37.4 °C)] 98.3 °F (36.8 °C)  Pulse:  [52-71] 71  Resp:  [15-20] 18  SpO2:  [93 %-100 %] 95 %  BP: (110-127)/(70-79) 127/77     Weight: 63.5 kg (140 lb)  Body mass index is 19.53 kg/m².  Body surface area is 1.78 meters squared.      Intake/Output Summary (Last 24 hours) at 9/24/2022 1629  Last data filed at 9/24/2022 0600  Gross per 24 hour   Intake 420 ml   Output --   Net 420 ml       Physical Exam  Constitutional:       General: He is not in acute distress.     Appearance: Normal appearance.   HENT:      Head: Normocephalic and atraumatic.      Right Ear: External ear normal.      Left Ear: External ear normal.      Nose: Nose normal.      Mouth/Throat:      Mouth: Mucous membranes are moist.      Pharynx: Oropharynx is clear.   Cardiovascular:      Rate and Rhythm: Normal rate and regular rhythm.      Heart sounds: Normal heart sounds.   Pulmonary:      Effort: Pulmonary effort is normal.      Breath sounds: Normal breath sounds. No wheezing or rales.   Abdominal:      General: Abdomen is flat. Bowel sounds are normal. There is no distension.      Palpations: Abdomen is soft.      Tenderness: There is no abdominal tenderness.   Musculoskeletal:         General: No swelling. Normal range of motion.      Cervical back: Normal range of motion.   Skin:     General: Skin is warm and dry.   Neurological:      General: No focal deficit present.      Mental Status: He is alert.   Psychiatric:         Mood and Affect: Mood normal.         Behavior: Behavior normal.       Significant Labs:   All pertinent labs from the last 24 hours have been reviewed.    Diagnostic Results:  I have reviewed all pertinent imaging results/findings within the past 24 hours.    Assessment/Plan:     * Intractable nausea and vomiting  51-year-old male with a history of stage IV melanoma with  metastasis to lungs, axillary lymph nodes, liver, peritoneal carcinomatosis, and brain (on ipilimumab /nivolumab with Dr. Wise), CAD s/p PCI, seizures admitted for intractable n/v/d. He has had multiple admissions for same problem. On exam, mild ttp to palpation of abdomen. No distension noted.     - Supportive care  - Fluids.   - Advance diet as tolerated.   - Replace electrolytes.     Anxiety  Pt with hx of anxiety  - Started on home bupropion.   - Zyprexa prn for agitation.   - Xanax .5 mg bid prn    Lactic acidosis  Lactate elevated to 2.7. Likely from hypovolemia.     - Fluids.   - F/u repeat lactate    Goals of care, counseling/discussion  Pt had appt with palliative care on 9/16. States to move forward with life prolonging measure. Currently on MS Contin 60 mg BID and oxycodone 30 mg q4h prn    - Continue on current pain regimen.   - Consider having palliative on board.       PAULA (acute kidney injury)  Baseline Cr .8, Cr 1.6 on admission. Likely from dehydration .     - Fluids challenge.   - Monitor Cr.   - Monitor UOP.         Hyponatremia  Likely from n/v/d.   - Nacl infusion    Metastatic melanoma  Hx of metastatic melanoma. See onc history in the HPI    History of seizure  Started on home nieves SANDY MD  Hematology/Oncology  [facility] - [facility] ([place] [address])      STAFF NOTE:  I have personally taken the history and examined this patient and agree with residents Note as stated above   Please see my note for additional details   He seems to be doing better today after starting dexamethasone. Diarrhea still persistent. Tolerating diet.  Rad/onc consult on Monday and then most likely can go home

## 2022-09-24 NOTE — PT/OT/SLP EVAL
Occupational Therapy   Co-Evaluation and Discharge    Name: Joseluis Garner  MRN: [id number]  Admitting Diagnosis:  Intractable nausea and vomiting  Recent Surgery: * No surgery found *      Recommendations:     Discharge Recommendations: home  Discharge Equipment Recommendations:  none  Barriers to discharge:  None    Assessment:     Joseluis Garner is a 51 y.o. male with a medical diagnosis of Intractable nausea and vomiting.  At this time, pt is functioning at his PLOF in all ADLs, self-care, and functional mobility. He does not require further skilled OT services at this time.    Plan:   During this hospitalization, patient does not require further acute PT services.  Please re-consult if situation changes      Subjective     Chief Complaint: none  Patient/Family Comments/goals: none    Occupational Profile:  Living Environment: Pt lives with his mother in a [facility] with 13 KARL and B HR. He has a t/s combo with shower chair  Previous level of function: PTA, pt was (I) with ADLs and functional mobility using no AD. Pt (+) drives and does not work.  Roles and Routines: walking his dog  Equipment Used at Home:  none  Assistance upon Discharge: mother    Pain/Comfort:  Pain Rating 1: 0/10  Pain Rating Post-Intervention 1: 0/10    Patients cultural, spiritual, Yazidism conflicts given the current situation: no    Objective:   Co-treatment performed due to patient's multiple deficits requiring two skilled therapists to appropriately and safely assess patient's strength and endurance while facilitating functional tasks in addition to accommodating for patient's activity tolerance.      Additional staff present:  CHARLIE Chan and BETH Hess    Communicated with: RN prior to session.  Patient found HOB elevated with peripheral IV upon OT entry to room.    General Precautions: Standard, fall   Orthopedic Precautions:N/A   Braces: N/A  Respiratory Status: Room air    Occupational Performance:    Bed Mobility:     Patient completed Scooting/Bridging with independence  Patient completed Supine <> Sit with independence    Functional Mobility/Transfers:  Patient completed Sit <> Stand Transfer with independence  with  no assistive device   Patient completed Toilet Transfer Step Transfer technique with independence with  no AD  Functional Mobility: Pt performs functional ambulation to/from EOB with (I) and no AD  Pt demo'd good dynamic standing balance with ability to perform trunk flexion in order to retrieve pen placed on floor (I) with no LOB    Activities of Daily Living:  Lower Body Dressing: independence as pt simulated donning socks via figure 4    Cognitive/Visual Perceptual:  Cognitive/Psychosocial Skills:     -       Oriented to: Person, Place, Time, and Situation   -       Follows Commands/attention:Follows multistep  commands  -       Safety awareness/insight to disability: intact   -       Mood/Affect/Coping skills/emotional control: Cooperative and Pleasant    Physical Exam:  Balance:    -       normal sitting and standing balance  Postural examination/scapula alignment:    -       No postural abnormalities identified  Upper Extremity Range of Motion:     -       Right Upper Extremity: WFL  -       Left Upper Extremity: WFL  Upper Extremity Strength:    -       Right Upper Extremity: WFL  -       Left Upper Extremity: WFL    AMPAC 6 Click ADL:  AMPAC Total Score: 24    Treatment & Education:  Provided education regarding role of OT & discharge recommendations with pt & mother verbalizing understanding.  Pt had no further questions & when asked whether there were any concerns pt reported none.     Patient left HOB elevated with all lines intact, call button in reach, RN notified, and mother present    GOALS:   Multidisciplinary Problems       Occupational Therapy Goals       Not on file              Multidisciplinary Problems (Resolved)          Problem: Occupational Therapy    Goal Priority Disciplines Outcome  Interventions   Occupational Therapy Goal   (Resolved)     OT, PT/OT Met    Description: Pt is currently functioning at his [facility] and does not require further skilled OT services at this time.                       History:     Past Medical History:   Diagnosis Date    Seizures        No past surgical history on file.    Time Tracking:     OT Date of Treatment: 09/24/22  OT Start Time: 1020  OT Stop Time: 1028  OT Total Time (min): 8 min    Billable Minutes:Evaluation 8    9/24/2022

## 2022-09-24 NOTE — PLAN OF CARE
Problem: Adult Inpatient Plan of Care  Goal: Plan of Care Review  Outcome: Ongoing, Progressing  Goal: Patient-Specific Goal (Individualized)  Outcome: Ongoing, Progressing  Goal: Absence of Hospital-Acquired Illness or Injury  Outcome: Ongoing, Progressing  Goal: Optimal Comfort and Wellbeing  Outcome: Ongoing, Progressing  Goal: Readiness for Transition of Care  Outcome: Ongoing, Progressing     Problem: Oral Intake Inadequate (Acute Kidney Injury/Impairment)  Goal: Optimal Nutrition Intake  Outcome: Ongoing, Progressing     Problem: Renal Function Impairment (Acute Kidney Injury/Impairment)  Goal: Effective Renal Function  Outcome: Ongoing, Progressing    Pt aaox4. Family member at bedside. No reported n/v during shift. Medicated as ordered. Pt able to keep down clear liquids during shift. He is free of any falls/injuries. Bed locked and low with personal items/bedside table within reach. Safety measures maintained.

## 2022-09-24 NOTE — SUBJECTIVE & OBJECTIVE
Interval History: Patient reports feeling better this morning. He denies any pain or further vomiting.    Oncology Treatment Plan:   OP NIVOLUMAB 1 MG/KG IPILIMUMAB 3MG/KG FOLLOWED BY NIVOLUMAB 480MG Q4W    Medications:  Continuous Infusions:  Scheduled Meds:   ALPRAZolam  1 mg Oral BID    buPROPion  100 mg Oral BID    dexamethasone  4 mg Intravenous Q12H    famotidine  20 mg Oral Daily    levETIRAcetam  1,000 mg Oral BID    morphine  60 mg Oral BID    mupirocin   Nasal BID    OLANZapine  5 mg Oral QHS     PRN Meds:dextrose 10%, dextrose 10%, glucagon (human recombinant), glucose, glucose, HYDROmorphone, insulin aspart U-100, magnesium oxide, magnesium oxide, naloxone, ondansetron, potassium, sodium phosphates, potassium, sodium phosphates, potassium, sodium phosphates, prochlorperazine, sodium chloride 0.9%     Review of Systems   Constitutional:  Negative for chills and fever.   HENT:  Negative for congestion and facial swelling.    Eyes:  Negative for visual disturbance.   Respiratory:  Negative for cough and shortness of breath.    Cardiovascular:  Negative for chest pain and leg swelling.   Gastrointestinal:  Negative for nausea and vomiting.   Genitourinary:  Negative for dysuria.   Musculoskeletal:  Negative for back pain.   Skin:  Negative for rash.   Neurological:  Negative for headaches.   Psychiatric/Behavioral:  Negative for confusion.    Objective:     Vital Signs (Most Recent):  Temp: 98.3 °F (36.8 °C) (09/24/22 1600)  Pulse: 71 (09/24/22 1600)  Resp: 18 (09/24/22 1600)  BP: 127/77 (09/24/22 1600)  SpO2: 95 % (09/24/22 1600) Vital Signs (24h Range):  Temp:  [97.7 °F (36.5 °C)-99.4 °F (37.4 °C)] 98.3 °F (36.8 °C)  Pulse:  [52-71] 71  Resp:  [15-20] 18  SpO2:  [93 %-100 %] 95 %  BP: (110-127)/(70-79) 127/77     Weight: 63.5 kg (140 lb)  Body mass index is 19.53 kg/m².  Body surface area is 1.78 meters squared.      Intake/Output Summary (Last 24 hours) at 9/24/2022 1629  Last data filed at 9/24/2022  0600  Gross per 24 hour   Intake 420 ml   Output --   Net 420 ml       Physical Exam  Constitutional:       General: He is not in acute distress.     Appearance: Normal appearance.   HENT:      Head: Normocephalic and atraumatic.      Right Ear: External ear normal.      Left Ear: External ear normal.      Nose: Nose normal.      Mouth/Throat:      Mouth: Mucous membranes are moist.      Pharynx: Oropharynx is clear.   Cardiovascular:      Rate and Rhythm: Normal rate and regular rhythm.      Heart sounds: Normal heart sounds.   Pulmonary:      Effort: Pulmonary effort is normal.      Breath sounds: Normal breath sounds. No wheezing or rales.   Abdominal:      General: Abdomen is flat. Bowel sounds are normal. There is no distension.      Palpations: Abdomen is soft.      Tenderness: There is no abdominal tenderness.   Musculoskeletal:         General: No swelling. Normal range of motion.      Cervical back: Normal range of motion.   Skin:     General: Skin is warm and dry.   Neurological:      General: No focal deficit present.      Mental Status: He is alert.   Psychiatric:         Mood and Affect: Mood normal.         Behavior: Behavior normal.       Significant Labs:   All pertinent labs from the last 24 hours have been reviewed.    Diagnostic Results:  I have reviewed all pertinent imaging results/findings within the past 24 hours.

## 2022-09-24 NOTE — PROGRESS NOTES
Admit Assessment    Patient Identification  Joseluis Garner   :  1970  Admit Date:  2022  Attending Provider:  Clayton Wise MD              Referral:   Patient was admitted to Medical Oncology Service with a diagnosis of Stage IV Melanoma, and he was admitted this hospital stay due to Intractable Nausea and Vomiting, and Diarrhea.   Tachycardia [R00.0]  Pulmonary nodule [R91.1]  Chest pain [R07.9]  Vomiting, intractability of vomiting not specified, presence of nausea not specified, unspecified vomiting type [R11.10].   Additional oncologic and medical history is noted in H&P, in chart.    Oncology Social Worker is involved. Patient was referred to the Social Work Department via admission list/census - routine referral. Patient presents as a 51 y.o. year old, single,  male.    Patient is known to this Oncology Social Worker from previous hospital stays and clinic.     Persons interviewed with patient's permission: Met with patient and his mother in patient's room this afternoon. Patient was alert, oriented, and cooperative.       Living Situation:    Patient resides with his mother Margarita Hernandez at [address] [place] [address].  Patient's cell phone number is [phone].  Patient's mother Margarita Riggins's cell phone number is [phone].    Patient was previously ambulatory and independent with ADLs.  He wasn't driving; transported via family car.       (RETIRED) Functional Status Prior  Ambulation Prior: 0-->independent  Transferrin-->independent  Toiletin-->independent    Current or Past Agencies and Description of Services/Supplies    DME -  Agency Name:   Agency Phone Number:        Home Health - none  Agency Name:   Agency Phone Number:   Services:     IV Infusion - none  Agency Name:   Agency Phone Number:   Services/Supplies Provided:    Nutrition: Oral diet - unable to keep food down due to nausea/vomiting and diarrhea which has  worsened    Outpatient Pharmacy:     [facility]  [address]  [facility] [address]  Phone: [phone] Fax: [phone]    [id number] - [place] - [address] AT [facility] OF [address]  [address]  [address]  [place] [address]  Phone: [phone] Fax: [phone]    [facility] - [place] - [address]  [address]  [place] [address]  Phone: [phone] Fax: [phone]    [facility] - [place], [place] - [address]  [address]  [address]  [place] [address]  Phone: [phone] Fax: [phone]      Patient Preference of agencies include: none specified.     Patient/Caregiver informed of right to choose providers or agencies.  Patient provides permission to release any necessary information to [facility] and to Non[facility] agencies as needed to facilitate patient care, treatment planning, and patient discharge planning.  Written and verbal resources will be provided as needed/requested.      Coping  Patient and mother received news that patient's cancer has progressed (worsening brain mets per MRI results) and awaiting Radiation Oncology physician to meet with them. Patient waiting to find out if anything more can be done for his cancer or not.          Adjustment to Diagnosis and Treatment  See above.      Emotional/Behavioral/Cognitive Issues  None noted/observed            History/Current Symptoms of Anxiety/Depression: yes    History/Current Substance Use: As per chart:  Social History     Tobacco Use    Smoking status: Every Day    Smokeless tobacco: Never   Substance and Sexual Activity    Alcohol use: Yes    Drug use: Yes     Types: Methamphetamines, Marijuana    Sexual activity: Not on file       Indications of Abuse/Neglect: no  As per chart:  Abuse Screen (yes response referral indicated)  Feels Unsafe at Home or Work/School: no  Feels Threatened by Someone: no  Does Anyone Try to Keep You From Having Contact with Others or  Doing Things Outside Your Home?: no  Physical Signs of Abuse Present: no      Financial:  Payer/Plan Subscr  Sex Relation Sub. Ins. ID Effective Group Num   1. KARAN DIALLO* DANIEL ZAVALETA* 1970 Male Self [id number] 22                                    [address] [address]   2. MEDICAID - AE* DANIEL ZAVALETA* 1970 Male Self [id number]* 22 01                                   [address] [address], [place] [address]      Patient is unable to work.    Patient's medical insurance is [employer] [facility] Medicaid plan.                Other identified concerns/needs: Palliative care, possibly hospice services.    Plan: Patient to return home with his mother at discharge from the hospital.    Interventions/Referrals: To be determined pending medical status/physician recommendations.    Patient/caregiver engaged in treatment planning process.    Oncology Social Worker providing psychosocial and supportive counseling, resources, education, assistance and discharge planning as appropriate.  Patient/caregiver state understanding of  available resources,  following, remains available.    Will continue to follow.

## 2022-09-25 LAB
ALBUMIN SERPL BCP-MCNC: 3.6 G/DL (ref 3.5–5.2)
ALP SERPL-CCNC: 57 U/L (ref 55–135)
ALT SERPL W/O P-5'-P-CCNC: 8 U/L (ref 10–44)
ANION GAP SERPL CALC-SCNC: 14 MMOL/L (ref 8–16)
AST SERPL-CCNC: 28 U/L (ref 10–40)
BASOPHILS # BLD AUTO: 0.05 K/UL (ref 0–0.2)
BASOPHILS NFR BLD: 0.4 % (ref 0–1.9)
BILIRUB SERPL-MCNC: 0.4 MG/DL (ref 0.1–1)
BUN SERPL-MCNC: 10 MG/DL (ref 6–20)
CALCIUM SERPL-MCNC: 9.4 MG/DL (ref 8.7–10.5)
CHLORIDE SERPL-SCNC: 96 MMOL/L (ref 95–110)
CO2 SERPL-SCNC: 24 MMOL/L (ref 23–29)
CREAT SERPL-MCNC: 0.9 MG/DL (ref 0.5–1.4)
DIFFERENTIAL METHOD: ABNORMAL
EOSINOPHIL # BLD AUTO: 0 K/UL (ref 0–0.5)
EOSINOPHIL NFR BLD: 0 % (ref 0–8)
ERYTHROCYTE [DISTWIDTH] IN BLOOD BY AUTOMATED COUNT: 14.9 % (ref 11.5–14.5)
EST. GFR  (NO RACE VARIABLE): >60 ML/MIN/1.73 M^2
GLUCOSE SERPL-MCNC: 72 MG/DL (ref 70–110)
HCT VFR BLD AUTO: 45.6 % (ref 40–54)
HGB BLD-MCNC: 15.5 G/DL (ref 14–18)
IMM GRANULOCYTES # BLD AUTO: 0.05 K/UL (ref 0–0.04)
IMM GRANULOCYTES NFR BLD AUTO: 0.4 % (ref 0–0.5)
LYMPHOCYTES # BLD AUTO: 1.4 K/UL (ref 1–4.8)
LYMPHOCYTES NFR BLD: 11.1 % (ref 18–48)
MAGNESIUM SERPL-MCNC: 1.8 MG/DL (ref 1.6–2.6)
MCH RBC QN AUTO: 29.2 PG (ref 27–31)
MCHC RBC AUTO-ENTMCNC: 34 G/DL (ref 32–36)
MCV RBC AUTO: 86 FL (ref 82–98)
MONOCYTES # BLD AUTO: 1.4 K/UL (ref 0.3–1)
MONOCYTES NFR BLD: 10.6 % (ref 4–15)
NEUTROPHILS # BLD AUTO: 10 K/UL (ref 1.8–7.7)
NEUTROPHILS NFR BLD: 77.5 % (ref 38–73)
NRBC BLD-RTO: 0 /100 WBC
PHOSPHATE SERPL-MCNC: 3.1 MG/DL (ref 2.7–4.5)
PLATELET # BLD AUTO: 336 K/UL (ref 150–450)
PMV BLD AUTO: 10.8 FL (ref 9.2–12.9)
POCT GLUCOSE: 101 MG/DL (ref 70–110)
POCT GLUCOSE: 108 MG/DL (ref 70–110)
POCT GLUCOSE: 96 MG/DL (ref 70–110)
POTASSIUM SERPL-SCNC: 5.4 MMOL/L (ref 3.5–5.1)
PROT SERPL-MCNC: 6.9 G/DL (ref 6–8.4)
RBC # BLD AUTO: 5.31 M/UL (ref 4.6–6.2)
SODIUM SERPL-SCNC: 134 MMOL/L (ref 136–145)
WBC # BLD AUTO: 12.84 K/UL (ref 3.9–12.7)

## 2022-09-25 PROCEDURE — 84100 ASSAY OF PHOSPHORUS: CPT | Performed by: STUDENT IN AN ORGANIZED HEALTH CARE EDUCATION/TRAINING PROGRAM

## 2022-09-25 PROCEDURE — 80053 COMPREHEN METABOLIC PANEL: CPT | Performed by: STUDENT IN AN ORGANIZED HEALTH CARE EDUCATION/TRAINING PROGRAM

## 2022-09-25 PROCEDURE — 25000003 PHARM REV CODE 250: Performed by: STUDENT IN AN ORGANIZED HEALTH CARE EDUCATION/TRAINING PROGRAM

## 2022-09-25 PROCEDURE — 63600175 PHARM REV CODE 636 W HCPCS: Performed by: STUDENT IN AN ORGANIZED HEALTH CARE EDUCATION/TRAINING PROGRAM

## 2022-09-25 PROCEDURE — 99233 SBSQ HOSP IP/OBS HIGH 50: CPT | Mod: ,,, | Performed by: INTERNAL MEDICINE

## 2022-09-25 PROCEDURE — 83735 ASSAY OF MAGNESIUM: CPT | Performed by: STUDENT IN AN ORGANIZED HEALTH CARE EDUCATION/TRAINING PROGRAM

## 2022-09-25 PROCEDURE — 99233 PR SUBSEQUENT HOSPITAL CARE,LEVL III: ICD-10-PCS | Mod: ,,, | Performed by: INTERNAL MEDICINE

## 2022-09-25 PROCEDURE — 85025 COMPLETE CBC W/AUTO DIFF WBC: CPT | Performed by: STUDENT IN AN ORGANIZED HEALTH CARE EDUCATION/TRAINING PROGRAM

## 2022-09-25 PROCEDURE — 94761 N-INVAS EAR/PLS OXIMETRY MLT: CPT

## 2022-09-25 PROCEDURE — 36415 COLL VENOUS BLD VENIPUNCTURE: CPT | Performed by: STUDENT IN AN ORGANIZED HEALTH CARE EDUCATION/TRAINING PROGRAM

## 2022-09-25 PROCEDURE — 12000002 HC ACUTE/MED SURGE SEMI-PRIVATE ROOM

## 2022-09-25 RX ORDER — DEXAMETHASONE 4 MG/1
4 TABLET ORAL EVERY 12 HOURS
Qty: 20 TABLET | Refills: 0 | OUTPATIENT
Start: 2022-09-25 | End: 2022-10-05

## 2022-09-25 RX ADMIN — DEXAMETHASONE SODIUM PHOSPHATE 4 MG: 4 INJECTION INTRA-ARTICULAR; INTRALESIONAL; INTRAMUSCULAR; INTRAVENOUS; SOFT TISSUE at 08:09

## 2022-09-25 RX ADMIN — LEVETIRACETAM 1000 MG: 500 TABLET, FILM COATED ORAL at 08:09

## 2022-09-25 RX ADMIN — ALPRAZOLAM 1 MG: 1 TABLET ORAL at 08:09

## 2022-09-25 RX ADMIN — BUPROPION HYDROCHLORIDE 100 MG: 100 TABLET, FILM COATED ORAL at 08:09

## 2022-09-25 RX ADMIN — HYDROMORPHONE HYDROCHLORIDE 2 MG: 2 INJECTION INTRAMUSCULAR; INTRAVENOUS; SUBCUTANEOUS at 06:09

## 2022-09-25 RX ADMIN — HYDROMORPHONE HYDROCHLORIDE 2 MG: 2 INJECTION INTRAMUSCULAR; INTRAVENOUS; SUBCUTANEOUS at 05:09

## 2022-09-25 RX ADMIN — MORPHINE SULFATE 60 MG: 30 TABLET, FILM COATED, EXTENDED RELEASE ORAL at 08:09

## 2022-09-25 RX ADMIN — HYDROMORPHONE HYDROCHLORIDE 2 MG: 2 INJECTION INTRAMUSCULAR; INTRAVENOUS; SUBCUTANEOUS at 12:09

## 2022-09-25 RX ADMIN — HYDROMORPHONE HYDROCHLORIDE 2 MG: 2 INJECTION INTRAMUSCULAR; INTRAVENOUS; SUBCUTANEOUS at 01:09

## 2022-09-25 RX ADMIN — HYDROMORPHONE HYDROCHLORIDE 2 MG: 2 INJECTION INTRAMUSCULAR; INTRAVENOUS; SUBCUTANEOUS at 11:09

## 2022-09-25 RX ADMIN — FAMOTIDINE 20 MG: 20 TABLET ORAL at 08:09

## 2022-09-25 RX ADMIN — OLANZAPINE 5 MG: 2.5 TABLET, FILM COATED ORAL at 08:09

## 2022-09-25 RX ADMIN — ONDANSETRON 8 MG: 2 INJECTION INTRAMUSCULAR; INTRAVENOUS at 08:09

## 2022-09-25 RX ADMIN — MUPIROCIN: 20 OINTMENT TOPICAL at 08:09

## 2022-09-25 RX ADMIN — HYDROMORPHONE HYDROCHLORIDE 2 MG: 2 INJECTION INTRAMUSCULAR; INTRAVENOUS; SUBCUTANEOUS at 09:09

## 2022-09-25 NOTE — PROGRESS NOTES
[facility] ([place] [address])  Hematology/Oncology  Progress Note    Patient Name: Joseluis Garner  Admission Date: 9/22/2022  Hospital Length of Stay: 2 days  Code Status: Full Code     Subjective:     HPI:  Patient is a 51-year-old male with a history of stage IV melanoma with metastasis to lungs, axillary lymph nodes, liver, peritoneal carcinomatosis, and brain (on ipilimumab /nivolumab with Dr. Wise), CAD s/p PCI, seizures, who presents with NBNB nausea, vomiting and diarrhea x3 days. His symptoms of n/v/d has been going on for a while, and he has had multiple admissions for the same complaints in the recent past. However, for the past three days symptoms has worsened and he has been unable to keep food down. Associated had generalized weakness/fatigue, abdominal pain, and lightheadedness. Abdominal pain is at his baseline that he attributes to tumor burden. Also endorsing thoracic back pain also unchanged from baseline. Denies fever, chest pain, sob, blood in the stool or vomit, leg weakness or urinary incontinence. He has had multiple episodes of fluid accumulation in the abdomen requiring paracentesis.     On arrival at the ED, initially noted to be tachycardic, but improved with fluids. Otherwise, afebrile and HD stable. CBC showed leukocytosis. CMP showed hyponatremia, hypokalemia, PAULA. Lactate elevated to 2.7. Admitted to med/onc for further care.         Oncology History   Metastatic melanoma   1/28/2022 Cancer Staged     Staging form: Melanoma of the Skin, AJCC 8th Edition  - Clinical stage from 1/28/2022: Stage IV (cTX, cNX, cM1)      4/4/2022 Initial Diagnosis     Metastatic melanoma      6/14/2022 -  Chemotherapy     Treatment Summary   Plan Name: OP NIVOLUMAB 1 MG/KG IPILIMUMAB 3MG/KG FOLLOWED BY NIVOLUMAB 480MG Q4W  Treatment Goal: Control  Status: Active  Start Date: 6/14/2022  End Date: 5/26/2023 (Planned)  Provider: Clayton Wise MD  Chemotherapy: ipilimumab (YERVOY) 200  mg in sodium chloride 0.9% 140 mL chemo infusion, 211 mg, Intravenous, Clinic/HOD 1 time, 3 of 3 cycles  Administration: 200 mg (6/14/2022), 200 mg (7/8/2022), 200 mg (7/29/2022)  nivolumab 70 mg in sodium chloride 0.9% 57 mL infusion, 1 mg/kg = 70 mg, Intravenous, Clinic/HOD 1 time, 3 of 14 cycles  Dose modification: 480 mg (original dose 1 mg/kg, Cycle 4, Reason: MD Discretion)  Administration: 70 mg (6/14/2022), 70 mg (7/8/2022), 70 mg (7/29/2022)           Interval History: Patient reports feeling better this morning. He denies any pain or further vomiting. Diarrhea is improving.    Oncology Treatment Plan:   OP NIVOLUMAB 1 MG/KG IPILIMUMAB 3MG/KG FOLLOWED BY NIVOLUMAB 480MG Q4W    Medications:  Continuous Infusions:  Scheduled Meds:   ALPRAZolam  1 mg Oral BID    buPROPion  100 mg Oral BID    dexamethasone  4 mg Intravenous Q12H    famotidine  20 mg Oral Daily    levETIRAcetam  1,000 mg Oral BID    morphine  60 mg Oral BID    mupirocin   Nasal BID    OLANZapine  5 mg Oral QHS     PRN Meds:dextrose 10%, dextrose 10%, glucagon (human recombinant), glucose, glucose, HYDROmorphone, insulin aspart U-100, magnesium oxide, magnesium oxide, naloxone, ondansetron, potassium, sodium phosphates, potassium, sodium phosphates, potassium, sodium phosphates, prochlorperazine, sodium chloride 0.9%     Review of Systems   Constitutional:  Negative for chills and fever.   HENT:  Negative for congestion and facial swelling.    Eyes:  Negative for visual disturbance.   Respiratory:  Negative for cough and shortness of breath.    Cardiovascular:  Negative for chest pain and leg swelling.   Gastrointestinal:  Positive for diarrhea. Negative for nausea and vomiting.   Genitourinary:  Negative for dysuria.   Musculoskeletal:  Negative for back pain.   Skin:  Negative for rash.   Neurological:  Negative for headaches.   Psychiatric/Behavioral:  Negative for confusion.    Objective:     Vital Signs (Most Recent):  Temp: 98 °F (36.7 °C)  (09/25/22 1150)  Pulse: 62 (09/25/22 1150)  Resp: 18 (09/25/22 1150)  BP: 119/80 (09/25/22 1150)  SpO2: 95 % (09/25/22 1150) Vital Signs (24h Range):  Temp:  [97.6 °F (36.4 °C)-98.9 °F (37.2 °C)] 98 °F (36.7 °C)  Pulse:  [62-71] 62  Resp:  [14-20] 18  SpO2:  [94 %-96 %] 95 %  BP: (104-128)/(72-89) 119/80     Weight: 63.5 kg (140 lb)  Body mass index is 19.53 kg/m².  Body surface area is 1.78 meters squared.    No intake or output data in the 24 hours ending 09/25/22 1228      Physical Exam  Constitutional:       General: He is not in acute distress.     Appearance: Normal appearance.   HENT:      Head: Normocephalic and atraumatic.      Right Ear: External ear normal.      Left Ear: External ear normal.      Nose: Nose normal.      Mouth/Throat:      Mouth: Mucous membranes are moist.      Pharynx: Oropharynx is clear.   Cardiovascular:      Rate and Rhythm: Normal rate and regular rhythm.      Heart sounds: Normal heart sounds.   Pulmonary:      Effort: Pulmonary effort is normal.      Breath sounds: Normal breath sounds. No wheezing or rales.   Abdominal:      General: Abdomen is flat. Bowel sounds are normal. There is no distension.      Palpations: Abdomen is soft.      Tenderness: There is no abdominal tenderness.   Musculoskeletal:         General: No swelling. Normal range of motion.      Cervical back: Normal range of motion.   Skin:     General: Skin is warm and dry.   Neurological:      General: No focal deficit present.      Mental Status: He is alert.   Psychiatric:         Mood and Affect: Mood normal.         Behavior: Behavior normal.       Significant Labs:   All pertinent labs from the last 24 hours have been reviewed.    Diagnostic Results:  I have reviewed all pertinent imaging results/findings within the past 24 hours.    Assessment/Plan:     * Intractable nausea and vomiting  51-year-old male with a history of stage IV melanoma with metastasis to lungs, axillary lymph nodes, liver, peritoneal  carcinomatosis, and brain (on ipilimumab /nivolumab with Dr. Wise), CAD s/p PCI, seizures admitted for intractable n/v/d. He has had multiple admissions for same problem. On exam, mild ttp to palpation of abdomen. No distension noted.     - Supportive care  - Fluids.   - Advance diet as tolerated.   - Replace electrolytes.     Anxiety  Pt with hx of anxiety  - Started on home bupropion.   - Zyprexa prn for agitation.   - Xanax .5 mg bid prn    Lactic acidosis  Lactate elevated to 2.7. Likely from hypovolemia.     - Fluids.   - F/u repeat lactate    Goals of care, counseling/discussion  Pt had appt with palliative care on 9/16. States to move forward with life prolonging measure. Currently on MS Contin 60 mg BID and oxycodone 30 mg q4h prn    - Continue on current pain regimen.   - Consider having palliative on board.       PAULA (acute kidney injury)  Baseline Cr .8, Cr 1.6 on admission. Likely from dehydration .     - Fluids challenge.   - Monitor Cr.   - Monitor UOP.         Hyponatremia  Likely from n/v/d.   - Nacl infusion    Metastatic melanoma  Hx of metastatic melanoma. See onc history in the HPI    History of seizure  Started on home nieves SANDY MD  Hematology/Oncology  [facility] ([place] [address])    STAFF NOTE:  I have personally taken the history and examined this patient and agree with residents Note as stated above   Doing better with dex. Will have him see Radiation Oncology and then DC home

## 2022-09-25 NOTE — HOSPITAL COURSE
Diarrhea and vomiting are improving. MRI brain has shown progression of disease with CSF involvement. Discussed with patient that disease is not curative. Patient continues to have diarrhea, but has not had any further vomiting. Stool studies negative. Radiation oncology consulted, will see patient 9/26. Will continue dexamethasone and work towards discharge.

## 2022-09-25 NOTE — SUBJECTIVE & OBJECTIVE
Interval History: Patient reports feeling better this morning. He denies any pain or further vomiting. Diarrhea is improving.    Oncology Treatment Plan:   OP NIVOLUMAB 1 MG/KG IPILIMUMAB 3MG/KG FOLLOWED BY NIVOLUMAB 480MG Q4W    Medications:  Continuous Infusions:  Scheduled Meds:   ALPRAZolam  1 mg Oral BID    buPROPion  100 mg Oral BID    dexamethasone  4 mg Intravenous Q12H    famotidine  20 mg Oral Daily    levETIRAcetam  1,000 mg Oral BID    morphine  60 mg Oral BID    mupirocin   Nasal BID    OLANZapine  5 mg Oral QHS     PRN Meds:dextrose 10%, dextrose 10%, glucagon (human recombinant), glucose, glucose, HYDROmorphone, insulin aspart U-100, magnesium oxide, magnesium oxide, naloxone, ondansetron, potassium, sodium phosphates, potassium, sodium phosphates, potassium, sodium phosphates, prochlorperazine, sodium chloride 0.9%     Review of Systems   Constitutional:  Negative for chills and fever.   HENT:  Negative for congestion and facial swelling.    Eyes:  Negative for visual disturbance.   Respiratory:  Negative for cough and shortness of breath.    Cardiovascular:  Negative for chest pain and leg swelling.   Gastrointestinal:  Positive for diarrhea. Negative for nausea and vomiting.   Genitourinary:  Negative for dysuria.   Musculoskeletal:  Negative for back pain.   Skin:  Negative for rash.   Neurological:  Negative for headaches.   Psychiatric/Behavioral:  Negative for confusion.    Objective:     Vital Signs (Most Recent):  Temp: 98 °F (36.7 °C) (09/25/22 1150)  Pulse: 62 (09/25/22 1150)  Resp: 18 (09/25/22 1150)  BP: 119/80 (09/25/22 1150)  SpO2: 95 % (09/25/22 1150) Vital Signs (24h Range):  Temp:  [97.6 °F (36.4 °C)-98.9 °F (37.2 °C)] 98 °F (36.7 °C)  Pulse:  [62-71] 62  Resp:  [14-20] 18  SpO2:  [94 %-96 %] 95 %  BP: (104-128)/(72-89) 119/80     Weight: 63.5 kg (140 lb)  Body mass index is 19.53 kg/m².  Body surface area is 1.78 meters squared.    No intake or output data in the 24 hours ending  09/25/22 1228      Physical Exam  Constitutional:       General: He is not in acute distress.     Appearance: Normal appearance.   HENT:      Head: Normocephalic and atraumatic.      Right Ear: External ear normal.      Left Ear: External ear normal.      Nose: Nose normal.      Mouth/Throat:      Mouth: Mucous membranes are moist.      Pharynx: Oropharynx is clear.   Cardiovascular:      Rate and Rhythm: Normal rate and regular rhythm.      Heart sounds: Normal heart sounds.   Pulmonary:      Effort: Pulmonary effort is normal.      Breath sounds: Normal breath sounds. No wheezing or rales.   Abdominal:      General: Abdomen is flat. Bowel sounds are normal. There is no distension.      Palpations: Abdomen is soft.      Tenderness: There is no abdominal tenderness.   Musculoskeletal:         General: No swelling. Normal range of motion.      Cervical back: Normal range of motion.   Skin:     General: Skin is warm and dry.   Neurological:      General: No focal deficit present.      Mental Status: He is alert.   Psychiatric:         Mood and Affect: Mood normal.         Behavior: Behavior normal.       Significant Labs:   All pertinent labs from the last 24 hours have been reviewed.    Diagnostic Results:  I have reviewed all pertinent imaging results/findings within the past 24 hours.

## 2022-09-26 VITALS
SYSTOLIC BLOOD PRESSURE: 109 MMHG | RESPIRATION RATE: 18 BRPM | WEIGHT: 140 LBS | DIASTOLIC BLOOD PRESSURE: 71 MMHG | HEART RATE: 58 BPM | OXYGEN SATURATION: 94 % | TEMPERATURE: 98 F | BODY MASS INDEX: 19.6 KG/M2 | HEIGHT: 71 IN

## 2022-09-26 LAB
CALPROTECTIN STL-MCNT: 260.1 MCG/G
POCT GLUCOSE: 103 MG/DL (ref 70–110)
POCT GLUCOSE: 121 MG/DL (ref 70–110)

## 2022-09-26 PROCEDURE — 99233 SBSQ HOSP IP/OBS HIGH 50: CPT | Mod: ,,, | Performed by: INTERNAL MEDICINE

## 2022-09-26 PROCEDURE — 25000003 PHARM REV CODE 250: Performed by: STUDENT IN AN ORGANIZED HEALTH CARE EDUCATION/TRAINING PROGRAM

## 2022-09-26 PROCEDURE — 99233 SBSQ HOSP IP/OBS HIGH 50: CPT | Mod: ,,, | Performed by: STUDENT IN AN ORGANIZED HEALTH CARE EDUCATION/TRAINING PROGRAM

## 2022-09-26 PROCEDURE — 63600175 PHARM REV CODE 636 W HCPCS: Performed by: STUDENT IN AN ORGANIZED HEALTH CARE EDUCATION/TRAINING PROGRAM

## 2022-09-26 PROCEDURE — 99233 PR SUBSEQUENT HOSPITAL CARE,LEVL III: ICD-10-PCS | Mod: ,,, | Performed by: INTERNAL MEDICINE

## 2022-09-26 PROCEDURE — 99233 PR SUBSEQUENT HOSPITAL CARE,LEVL III: ICD-10-PCS | Mod: ,,, | Performed by: STUDENT IN AN ORGANIZED HEALTH CARE EDUCATION/TRAINING PROGRAM

## 2022-09-26 RX ORDER — ONDANSETRON 8 MG/1
8 TABLET, ORALLY DISINTEGRATING ORAL EVERY 12 HOURS PRN
Qty: 60 TABLET | Refills: 5
Start: 2022-09-26

## 2022-09-26 RX ORDER — AMOXICILLIN 250 MG
1 CAPSULE ORAL 2 TIMES DAILY
Qty: 30 TABLET | Refills: 3 | OUTPATIENT
Start: 2022-09-26

## 2022-09-26 RX ORDER — DEXAMETHASONE 4 MG/1
4 TABLET ORAL EVERY 12 HOURS
Qty: 20 TABLET | Refills: 0 | Status: SHIPPED | OUTPATIENT
Start: 2022-09-26 | End: 2022-10-06

## 2022-09-26 RX ADMIN — BUPROPION HYDROCHLORIDE 100 MG: 100 TABLET, FILM COATED ORAL at 08:09

## 2022-09-26 RX ADMIN — LEVETIRACETAM 1000 MG: 500 TABLET, FILM COATED ORAL at 08:09

## 2022-09-26 RX ADMIN — DEXAMETHASONE SODIUM PHOSPHATE 4 MG: 4 INJECTION INTRA-ARTICULAR; INTRALESIONAL; INTRAMUSCULAR; INTRAVENOUS; SOFT TISSUE at 08:09

## 2022-09-26 RX ADMIN — FAMOTIDINE 20 MG: 20 TABLET ORAL at 08:09

## 2022-09-26 RX ADMIN — ONDANSETRON 8 MG: 2 INJECTION INTRAMUSCULAR; INTRAVENOUS at 08:09

## 2022-09-26 RX ADMIN — ALPRAZOLAM 1 MG: 1 TABLET ORAL at 08:09

## 2022-09-26 RX ADMIN — MUPIROCIN: 20 OINTMENT TOPICAL at 08:09

## 2022-09-26 RX ADMIN — MORPHINE SULFATE 60 MG: 30 TABLET, FILM COATED, EXTENDED RELEASE ORAL at 08:09

## 2022-09-26 RX ADMIN — HYDROMORPHONE HYDROCHLORIDE 2 MG: 2 INJECTION INTRAMUSCULAR; INTRAVENOUS; SUBCUTANEOUS at 10:09

## 2022-09-26 NOTE — NURSING
Pt midline removed without difficulty. Discharge paperwork and instructions given. Mother at bedside. Pt refused pt transport.

## 2022-09-26 NOTE — DISCHARGE SUMMARY
[facility] ([place] [address])  Hematology/Oncology  Discharge Summary      Patient Name: Joseluis Garner  MRN: [id number]  Admission Date: 9/22/2022  Hospital Length of Stay: 3 days  Discharge Date and Time:  09/26/2022 12:01 PM  Attending Physician: Kelsey Collins MD   Discharging Provider: Greg Jorge MD  Primary Care Provider: Clayton Wise MD    HPI:   Patient is a 51-year-old male with a history of stage IV melanoma with metastasis to lungs, axillary lymph nodes, liver, peritoneal carcinomatosis, and brain (on ipilimumab /nivolumab with Dr. Wise), CAD s/p PCI, seizures, who presents with NBNB nausea, vomiting and diarrhea x3 days. His symptoms of n/v/d has been going on for a while, and he has had multiple admissions for the same complaints in the recent past. However, for the past three days symptoms has worsened and he has been unable to keep food down. Associated had generalized weakness/fatigue, abdominal pain, and lightheadedness. Abdominal pain is at his baseline that he attributes to tumor burden. Also endorsing thoracic back pain also unchanged from baseline. Denies fever, chest pain, sob, blood in the stool or vomit, leg weakness or urinary incontinence. He has had multiple episodes of fluid accumulation in the abdomen requiring paracentesis.     On arrival at the ED, initially noted to be tachycardic, but improved with fluids. Otherwise, afebrile and HD stable. CBC showed leukocytosis. CMP showed hyponatremia, hypokalemia, PAULA. Lactate elevated to 2.7. Admitted to med/onc for further care.         Oncology History   Metastatic melanoma   1/28/2022 Cancer Staged     Staging form: Melanoma of the Skin, AJCC 8th Edition  - Clinical stage from 1/28/2022: Stage IV (cTX, cNX, cM1)      4/4/2022 Initial Diagnosis     Metastatic melanoma      6/14/2022 -  Chemotherapy     Treatment Summary   Plan Name: OP NIVOLUMAB 1 MG/KG IPILIMUMAB 3MG/KG FOLLOWED BY NIVOLUMAB 480MG Q4W  Treatment  Goal: Control  Status: Active  Start Date: 6/14/2022  End Date: 5/26/2023 (Planned)  Provider: Harrison Wise MD  Chemotherapy: ipilimumab (YERVOY) 200 mg in sodium chloride 0.9% 140 mL chemo infusion, 211 mg, Intravenous, Clinic/HOD 1 time, 3 of 3 cycles  Administration: 200 mg (6/14/2022), 200 mg (7/8/2022), 200 mg (7/29/2022)  nivolumab 70 mg in sodium chloride 0.9% 57 mL infusion, 1 mg/kg = 70 mg, Intravenous, Clinic/HOD 1 time, 3 of 14 cycles  Dose modification: 480 mg (original dose 1 mg/kg, Cycle 4, Reason: MD Discretion)  Administration: 70 mg (6/14/2022), 70 mg (7/8/2022), 70 mg (7/29/2022)           * No surgery found *     Hospital Course: Diarrhea and vomiting are improving. MRI brain has shown progression of disease with CSF involvement. Discussed with patient that disease is not curative. Patient continues to have diarrhea, but has not had any further vomiting. Stool studies negative. Radiation oncology consulted, will see patient 9/26. Will continue dexamethasone and work towards discharge.       Goals of Care Treatment Preferences:  Code Status: Full Code    Health care agent: [facility] agent number: [phone]                   Consults:   Consults (From admission, onward)          Status Ordering Provider     Inpatient consult to Midline team  Once        Provider:  (Not yet assigned)    Completed HARRISON WISE     Inpatient consult to PICC team ([facility])  Once        Provider:  (Not yet assigned)    Completed HARRISON WISE     Inpatient consult to Radiation Oncology  Once        Provider:  (Not yet assigned)    YANA Mas            Significant Diagnostic Studies: Labs:   CMP   Recent Labs   Lab 09/25/22  0530   *   K 5.4*   CL 96   CO2 24   GLU 72   BUN 10   CREATININE 0.9   CALCIUM 9.4   PROT 6.9   ALBUMIN 3.6   BILITOT 0.4   ALKPHOS 57   AST 28   ALT 8*   ANIONGAP 14    and CBC   Recent Labs   Lab 09/25/22  0530   WBC 12.84*   HGB 15.5    HCT 45.6          Pending Diagnostic Studies:       Procedure Component Value Units Date/Time    Lactic acid, plasma #2 [[id number]] Collected: 09/23/22 0341    Order Status: Sent Lab Status: In process Updated: 09/23/22 0341    Specimen: Blood           Final Active Diagnoses:    Diagnosis Date Noted POA    PRINCIPAL PROBLEM:  Intractable nausea and vomiting [R11.2] 04/14/2022 Yes    Lactic acidosis [E87.2] 09/23/2022 Unknown    Anxiety [F41.9] 09/23/2022 Yes    Goals of care, counseling/discussion [Z71.89] 08/19/2022 Not Applicable    Metastatic melanoma [C79.9] 04/04/2022 Yes    Hyponatremia [E87.1] 04/04/2022 Yes    PAULA (acute kidney injury) [N17.9] 04/04/2022 Yes    History of seizure [Z87.898] 02/18/2021 Not Applicable      Problems Resolved During this Admission:      Discharged Condition: stable    Disposition: Home or Self Care    Follow Up:    Patient Instructions:      Ambulatory referral/consult to Radiation Oncology   Standing Status: Future   Referral Priority: Routine Referral Type: Consultation   Referral Reason: Specialty Services Required   Requested Specialty: Radiation Oncology   Number of Visits Requested: 1     Diet Adult Regular     Notify your health care provider if you experience any of the following:  temperature >100.4     Notify your health care provider if you experience any of the following:  persistent nausea and vomiting or diarrhea     Notify your health care provider if you experience any of the following:  severe uncontrolled pain     Activity as tolerated     Medications:  Reconciled Home Medications:      Medication List        START taking these medications      dexAMETHasone 4 MG Tab  Commonly known as: DECADRON  Take 1 tablet (4 mg total) by mouth every 12 (twelve) hours. for 10 days            CHANGE how you take these medications      ALPRAZolam 1 MG tablet  Commonly known as: XANAX  Take 1 mg by mouth 2 (two) times daily as needed.  What changed: Another medication  with the same name was removed. Continue taking this medication, and follow the directions you see here.            CONTINUE taking these medications      buPROPion 100 MG TBSR 12 hr tablet  Commonly known as: WELLBUTRIN SR  Take 1 tablet (100 mg total) by mouth 2 (two) times daily.     dabrafenib 75 mg Cap  Commonly known as: TAFINLAR  Take 150 mg by mouth.     famotidine 20 MG tablet  Commonly known as: PEPCID  Take 1 tablet (20 mg total) by mouth once daily.     GAVILAX 17 gram/dose powder  Generic drug: polyethylene glycol  Dissolve one capful (17 g) in liquid and take by mouth once daily.     hydrOXYzine pamoate 25 MG Cap  Commonly known as: VISTARIL  Take 25 mg by mouth every 6 (six) hours as needed.     levETIRAcetam 1000 MG tablet  Commonly known as: KEPPRA  Take 1 tablet (1,000 mg total) by mouth 2 (two) times a day.     morphine 60 MG 12 hr tablet  Commonly known as: MS CONTIN  Take 1 tablet (60 mg total) by mouth 2 (two) times daily.     OLANZapine 2.5 MG tablet  Commonly known as: ZyPREXA  Take 1 tablet (2.5 mg total) by mouth every 6 (six) hours as needed (anxiety).     ondansetron 8 MG Tbdl  Commonly known as: ZOFRAN-ODT  Dissolve 1 tablet (8 mg total) by mouth every 12 (twelve) hours as needed.     oxyCODONE 30 MG Tab  Commonly known as: ROXICODONE  Take 1 tablet (30 mg total) by mouth every 4 (four) hours as needed (cancer related pain).     prochlorperazine 5 MG tablet  Commonly known as: COMPAZINE  Take 1 tablet (5 mg total) by mouth every 6 (six) hours as needed for Nausea.     SENNA 8.6 mg tablet  Generic drug: senna  Take 1 tablet by mouth twice daily     senna-docusate 8.6-50 mg 8.6-50 mg per tablet  Commonly known as: PERICOLACE  Take 1 tablet by mouth daily as needed for Constipation.     trametinib 2 mg Tab  Commonly known as: MEKINIST  Take 2 mg by mouth.  * Please refer to primary oncologist            STOP taking these medications      binimetinib 15 mg Tab  * please refer to primary  oncologist     encorafenib 75 mg Cap  * Please refer to primary oncologist          ASK your doctor about these medications      clobetasoL 0.05 % cream  Commonly known as: TEMOVATE  Apply topically 2 (two) times daily.              Greg Jorge MD  Hematology/Oncology  [facility] - Intensive Care ([place] [address])    STAFF NOTE: Agree with above

## 2022-09-26 NOTE — NURSING
Discharge Planning        Patient Name: Joseluis Garner  Admission Date: 9/22/2022  Hospital Length of Stay: 5 days  Code Status: Full Code         Primary Diagnosis: stage IV melanoma with metastasis to lungs, axillary lymph nodes, liver, peritoneal carcinomatosis, and brain     Primary Oncologist/RAYMUNDO: Dr. Wise/ Kitty Segura NP    Treatment Plan: OP ipi/nivo (last received C4 on 8/26)    Reason For Admission: NBNB nausea, vomiting and diarrhea x3 days,ongoing issue w/multiple admissions    Consults/Referrals:   PT/OT: recommend d/c home  RadOnc: will see OP    LDAs:   Midline: to be removed prior to d/c    ELIZ: 9/26    Disposition: d/c home     Future Appointments   Date Time Provider Department Center   [facility] LAB  Augusto Nielson   9/29/2022  1:00 PM Clayton Wise MD [facility] [id number] [place]   10/28/2022 11:00 AM Guerline Crouch MD [facility]  Oncology Discharge Navigator  Phone: [phone]

## 2022-09-26 NOTE — SUBJECTIVE & OBJECTIVE
Interval History: No acute events overnight. Patient tolerating solid food. Will either have radiation oncology speak to him inpatient or place referral outpatient.     Oncology Treatment Plan:   OP NIVOLUMAB 1 MG/KG IPILIMUMAB 3MG/KG FOLLOWED BY NIVOLUMAB 480MG Q4W    Medications:  Continuous Infusions:  Scheduled Meds:   ALPRAZolam  1 mg Oral BID    buPROPion  100 mg Oral BID    dexamethasone  4 mg Intravenous Q12H    famotidine  20 mg Oral Daily    levETIRAcetam  1,000 mg Oral BID    morphine  60 mg Oral BID    mupirocin   Nasal BID    OLANZapine  5 mg Oral QHS     PRN Meds:dextrose 10%, dextrose 10%, glucagon (human recombinant), glucose, glucose, HYDROmorphone, insulin aspart U-100, magnesium oxide, magnesium oxide, naloxone, ondansetron, potassium, sodium phosphates, potassium, sodium phosphates, potassium, sodium phosphates, prochlorperazine, sodium chloride 0.9%       Objective:     Vital Signs (Most Recent):  Temp: 98.6 °F (37 °C) (09/26/22 0515)  Pulse: 68 (09/26/22 0515)  Resp: 20 (09/26/22 0515)  BP: 121/78 (09/26/22 0515)  SpO2: 98 % (09/26/22 0515) Vital Signs (24h Range):  Temp:  [97.5 °F (36.4 °C)-98.6 °F (37 °C)] 98.6 °F (37 °C)  Pulse:  [57-68] 68  Resp:  [17-20] 20  SpO2:  [94 %-99 %] 98 %  BP: (118-127)/(77-89) 121/78     Weight: 63.5 kg (140 lb)  Body mass index is 19.53 kg/m².  Body surface area is 1.78 meters squared.      Intake/Output Summary (Last 24 hours) at 9/26/2022 0726  Last data filed at 9/26/2022 0500  Gross per 24 hour   Intake 720 ml   Output 5 ml   Net 715 ml       Physical Exam  Constitutional:       General: He is not in acute distress.     Appearance: Normal appearance.   HENT:      Head: Normocephalic and atraumatic.      Right Ear: External ear normal.      Left Ear: External ear normal.      Nose: Nose normal.      Mouth/Throat:      Mouth: Mucous membranes are moist.      Pharynx: Oropharynx is clear.   Cardiovascular:      Rate and Rhythm: Normal rate and regular rhythm.       Heart sounds: Normal heart sounds.   Pulmonary:      Effort: Pulmonary effort is normal.      Breath sounds: Normal breath sounds. No wheezing or rales.   Abdominal:      General: Abdomen is flat. Bowel sounds are normal. There is no distension.      Palpations: Abdomen is soft.      Tenderness: There is no abdominal tenderness.   Musculoskeletal:         General: No swelling. Normal range of motion.      Cervical back: Normal range of motion.   Skin:     General: Skin is warm and dry.   Neurological:      General: No focal deficit present.      Mental Status: He is alert.   Psychiatric:         Mood and Affect: Mood normal.         Behavior: Behavior normal.       Significant Labs:   All pertinent labs from the last 24 hours have been reviewed.    Diagnostic Results:  I have reviewed all pertinent imaging results/findings within the past 24 hours.

## 2022-09-26 NOTE — PLAN OF CARE
Problem: Adult Inpatient Plan of Care  Goal: Plan of Care Review  Outcome: Ongoing, Progressing     Problem: Fluid and Electrolyte Imbalance (Acute Kidney Injury/Impairment)  Goal: Fluid and Electrolyte Balance  Outcome: Ongoing, Progressing     Problem: Oral Intake Inadequate (Acute Kidney Injury/Impairment)  Goal: Optimal Nutrition Intake  Outcome: Ongoing, Progressing     Problem: Renal Function Impairment (Acute Kidney Injury/Impairment)  Goal: Effective Renal Function  Outcome: Ongoing, Progressing     Problem: Fall Injury Risk  Goal: Absence of Fall and Fall-Related Injury  Outcome: Ongoing, Progressing     Problem: Infection  Goal: Absence of Infection Signs and Symptoms  Outcome: Ongoing, Progressing

## 2022-09-26 NOTE — CONSULTS
[facility] Consult Note    Referring provider: Kelsey Collins MD    Diagnosis:  Joseluis Garner is a 51 y.o. male with metastatic melanoma, BRAF mutated, on BRAF/MEK inhibitor with progressive intracranial lesions.     Oncologic History:  He has stage IV melanoma with metastasis to lungs, axillary lymph nodes, liver, peritoneal carcinomatosis, and brain. CAD s/p PCI, seizures.   He was diagnosed with melanoma in January 2022 with biopsy of the right axilla. Staging PET/CT demonstrating lung and abdominal metastases. MRI brain was without mets. He was started on Ipi/Nivo on 3/3/22. He was diagnosed with malignant ascites in April 2022 and was started to BRAF/MEK inhibitor.   MRI brain on 4/20/22 demonstrated two subcm foci in the left frontal and right parietal white matter. He was seen by radiation oncology with no plans for SRS. He continued systemic therapy with continued regression of disease on CT CAP but a mixed response in the brain.   He presented on 9/22 with symptoms of n/v/d has been going on for a while, and he has had multiple admissions for the same complaints in the recent past. MRI brain on 9/23/22 demonstrated numerous foci throughout the cerebral hemispheres, many of which are superficial in location, raising question for CSF dissemination.          History of Present Illness:  Joseluis Garner is seen as an inpatient to discuss radiotherapy for his brain metastases.    His nausea and emesis have improved and he is planning for discharge today. He is tolerating liquids as well as solids. He has had insurance issues with BRAF and Mek inhibitors. When he is off of them, his abdominal distension worsens. He thinks his last time off of the BRAF and Mek was just over one month ago. He denies headaches, focal numbness or weakness. No significant osseous pains but has myalgias and joint stiffness in the AM. Weight is overall stable around 145lbs but he lost some due to nausea right  before admission.      Review of Systems:  Review of Systems   Constitutional:  Positive for weight loss. Negative for fever.   HENT:  Negative for sore throat.    Eyes:  Negative for double vision.   Respiratory:  Negative for cough and shortness of breath.    Cardiovascular:  Negative for chest pain.   Gastrointestinal:  Negative for abdominal pain and nausea.   Genitourinary:  Negative for dysuria.   Musculoskeletal:  Positive for joint pain (in AM associated with his immunotherapy).   Skin:  Negative for rash.   Neurological:  Negative for dizziness, sensory change, speech change, focal weakness and headaches.     Social History:  Social History     Tobacco Use    Smoking status: Every Day    Smokeless tobacco: Never   Substance Use Topics    Alcohol use: Yes    Drug use: Yes     Types: Methamphetamines, Marijuana       Past Medical History:  Past Medical History:   Diagnosis Date    Seizures        Past surgery  Multiple IR guided paracentesis    Family History    None    Medications:  No current facility-administered medications on file prior to encounter.     Current Outpatient Medications on File Prior to Encounter   Medication Sig Dispense Refill    dabrafenib (TAFINLAR) 75 mg Cap Take 150 mg by mouth.      trametinib (MEKINIST) 2 mg Tab Take 2 mg by mouth.      ALPRAZolam (XANAX) 1 MG tablet Take 1 mg by mouth 2 (two) times daily as needed.      buPROPion (WELLBUTRIN SR) 100 MG TBSR 12 hr tablet Take 1 tablet (100 mg total) by mouth 2 (two) times daily. 60 tablet 2    clobetasoL (TEMOVATE) 0.05 % cream Apply topically 2 (two) times daily. (Patient taking differently: Apply topically 2 (two) times daily as needed.) 60 g 3    famotidine (PEPCID) 20 MG tablet Take 1 tablet (20 mg total) by mouth once daily. 30 tablet 11    hydrOXYzine pamoate (VISTARIL) 25 MG Cap Take 25 mg by mouth every 6 (six) hours as needed.      levETIRAcetam (KEPPRA) 1000 MG tablet Take 1 tablet (1,000 mg total) by mouth 2 (two) times  a day. 60 tablet 11    morphine (MS CONTIN) 60 MG 12 hr tablet Take 1 tablet (60 mg total) by mouth 2 (two) times daily. 60 tablet 0    OLANZapine (ZYPREXA) 2.5 MG tablet Take 1 tablet (2.5 mg total) by mouth every 6 (six) hours as needed (anxiety). 60 tablet 0    ondansetron (ZOFRAN-ODT) 8 MG TbDL Dissolve 1 tablet (8 mg total) by mouth every 12 (twelve) hours as needed. 30 tablet 1    oxyCODONE (ROXICODONE) 30 MG Tab Take 1 tablet (30 mg total) by mouth every 4 (four) hours as needed (cancer related pain). 180 tablet 0    polyethylene glycol (GLYCOLAX) 17 gram/dose powder Dissolve one capful (17 g) in liquid and take by mouth once daily. 510 g 3    prochlorperazine (COMPAZINE) 5 MG tablet Take 1 tablet (5 mg total) by mouth every 6 (six) hours as needed for Nausea. 30 tablet 1    senna (SENNA) 8.6 mg tablet Take 1 tablet by mouth twice daily 60 tablet 10    senna-docusate 8.6-50 mg (PERICOLACE) 8.6-50 mg per tablet Take 1 tablet by mouth daily as needed for Constipation. 30 tablet 3    [DISCONTINUED] binimetinib 15 mg Tab Take 45 mg by mouth 2 (two) times daily. 180 tablet 5    [DISCONTINUED] encorafenib 75 mg Cap Take 450 mg by mouth once daily. 180 capsule 5       Allergies:  Review of patient's allergies indicates:  No Known Allergies    Exam:  Vitals:    09/26/22 0807 09/26/22 0820 09/26/22 1025 09/26/22 1102   BP:  134/79  109/71   BP Location:  Right arm  Right arm   Patient Position:  Lying  Lying   Pulse:  (!) 54  (!) 58   Resp: 18 16 16 18   Temp:  97.4 °F (36.3 °C)  98 °F (36.7 °C)   TempSrc:  Oral  Oral   SpO2:  (!) 94%  (!) 94%   Weight:       Height:         Constitutional: Pleasant 51 y.o. male in no acute distress.  Well nourished. Well groomed.   HEENT: Normocephalic and atraumatic   Cardiovascular: Upper extremities warm to touch  Lungs: No audible wheezing.  Normal effort.   Musculoskeletal: No gross MSK deformities. Line in medial LUE  Skin: No rashes appreciated.  Psych: Alert and oriented with  appropriate mood and affect.  Neuro: no appreciated facial asymmetry.  push pull 5/5 and symmetric. Hip flexion, plantarflex and dorsiflex of the ankles 5/5 and symmetric. Sensation intact to light touch in the distal LE    Data Review:      Independent Interpretation of Test(s): MRI brain from 9/23/22 was personally reviewed       Assessment:  metastatic melanoma, BRAF mutated, on BRAF/MEK inhibitor with progressive intracranial lesions.   ECOG: (1) Restricted in physically strenuous activity, ambulatory and able to do work of light nature    Possibility of pregnancy: N/A  History of prior irradiation: No  History of prior systemic anti-cancer therapy: Yes - as above  History of collagen vascular disease: No  Implanted electronic device (pacer/defib/nerve stimulator): No    Plan:  Superficial location of the lesions raises concern for leptomeningeal involvement. Given this and the number of lesions, I do not recommend SRS. As such, treatment options were discussed including whole brain RT and systemic therapy. Given the toxicity of whole brain RT and the radioresistant nature of melanoma, I favor systemic therapy, if this is an options. Per patient, he was intermittently off of his BRAF and MEK inhibitors up until a month ago.  He will meet with Dr. Wise on 9/29 and I will plan to follow with him in clinic next week. Recommend steroid taper and use of PPI while on decadron.     Barron Otero MD  Radiation Oncology

## 2022-09-26 NOTE — PROGRESS NOTES
[facility] ([place] [id number])  Hematology/Oncology  Progress Note    Patient Name: Joseluis Garner  Admission Date: 9/22/2022  Hospital Length of Stay: 3 days  Code Status: Full Code     Subjective:     HPI:  Patient is a 51-year-old male with a history of stage IV melanoma with metastasis to lungs, axillary lymph nodes, liver, peritoneal carcinomatosis, and brain (on ipilimumab /nivolumab with Dr. Wise), CAD s/p PCI, seizures, who presents with NBNB nausea, vomiting and diarrhea x3 days. His symptoms of n/v/d has been going on for a while, and he has had multiple admissions for the same complaints in the recent past. However, for the past three days symptoms has worsened and he has been unable to keep food down. Associated had generalized weakness/fatigue, abdominal pain, and lightheadedness. Abdominal pain is at his baseline that he attributes to tumor burden. Also endorsing thoracic back pain also unchanged from baseline. Denies fever, chest pain, sob, blood in the stool or vomit, leg weakness or urinary incontinence. He has had multiple episodes of fluid accumulation in the abdomen requiring paracentesis.     On arrival at the ED, initially noted to be tachycardic, but improved with fluids. Otherwise, afebrile and HD stable. CBC showed leukocytosis. CMP showed hyponatremia, hypokalemia, PAULA. Lactate elevated to 2.7. Admitted to med/onc for further care.         Oncology History   Metastatic melanoma   1/28/2022 Cancer Staged     Staging form: Melanoma of the Skin, AJCC 8th Edition  - Clinical stage from 1/28/2022: Stage IV (cTX, cNX, cM1)      4/4/2022 Initial Diagnosis     Metastatic melanoma      6/14/2022 -  Chemotherapy     Treatment Summary   Plan Name: OP NIVOLUMAB 1 MG/KG IPILIMUMAB 3MG/KG FOLLOWED BY NIVOLUMAB 480MG Q4W  Treatment Goal: Control  Status: Active  Start Date: 6/14/2022  End Date: 5/26/2023 (Planned)  Provider: Clayton Wise MD  Chemotherapy: ipilimumab (YERVOY) 200  mg in sodium chloride 0.9% 140 mL chemo infusion, 211 mg, Intravenous, Clinic/HOD 1 time, 3 of 3 cycles  Administration: 200 mg (6/14/2022), 200 mg (7/8/2022), 200 mg (7/29/2022)  nivolumab 70 mg in sodium chloride 0.9% 57 mL infusion, 1 mg/kg = 70 mg, Intravenous, Clinic/HOD 1 time, 3 of 14 cycles  Dose modification: 480 mg (original dose 1 mg/kg, Cycle 4, Reason: MD Discretion)  Administration: 70 mg (6/14/2022), 70 mg (7/8/2022), 70 mg (7/29/2022)           Interval History: No acute events overnight. Patient tolerating solid food. Will either have radiation oncology speak to him inpatient or place referral outpatient.     Oncology Treatment Plan:   OP NIVOLUMAB 1 MG/KG IPILIMUMAB 3MG/KG FOLLOWED BY NIVOLUMAB 480MG Q4W    Medications:  Continuous Infusions:  Scheduled Meds:   ALPRAZolam  1 mg Oral BID    buPROPion  100 mg Oral BID    dexamethasone  4 mg Intravenous Q12H    famotidine  20 mg Oral Daily    levETIRAcetam  1,000 mg Oral BID    morphine  60 mg Oral BID    mupirocin   Nasal BID    OLANZapine  5 mg Oral QHS     PRN Meds:dextrose 10%, dextrose 10%, glucagon (human recombinant), glucose, glucose, HYDROmorphone, insulin aspart U-100, magnesium oxide, magnesium oxide, naloxone, ondansetron, potassium, sodium phosphates, potassium, sodium phosphates, potassium, sodium phosphates, prochlorperazine, sodium chloride 0.9%       Objective:     Vital Signs (Most Recent):  Temp: 98.6 °F (37 °C) (09/26/22 0515)  Pulse: 68 (09/26/22 0515)  Resp: 20 (09/26/22 0515)  BP: 121/78 (09/26/22 0515)  SpO2: 98 % (09/26/22 0515) Vital Signs (24h Range):  Temp:  [97.5 °F (36.4 °C)-98.6 °F (37 °C)] 98.6 °F (37 °C)  Pulse:  [57-68] 68  Resp:  [17-20] 20  SpO2:  [94 %-99 %] 98 %  BP: (118-127)/(77-89) 121/78     Weight: 63.5 kg (140 lb)  Body mass index is 19.53 kg/m².  Body surface area is 1.78 meters squared.      Intake/Output Summary (Last 24 hours) at 9/26/2022 0772  Last data filed at 9/26/2022 0500  Gross per 24 hour    Intake 720 ml   Output 5 ml   Net 715 ml       Physical Exam  Constitutional:       General: He is not in acute distress.     Appearance: Normal appearance.   HENT:      Head: Normocephalic and atraumatic.      Right Ear: External ear normal.      Left Ear: External ear normal.      Nose: Nose normal.      Mouth/Throat:      Mouth: Mucous membranes are moist.      Pharynx: Oropharynx is clear.   Cardiovascular:      Rate and Rhythm: Normal rate and regular rhythm.      Heart sounds: Normal heart sounds.   Pulmonary:      Effort: Pulmonary effort is normal.      Breath sounds: Normal breath sounds. No wheezing or rales.   Abdominal:      General: Abdomen is flat. Bowel sounds are normal. There is no distension.      Palpations: Abdomen is soft.      Tenderness: There is no abdominal tenderness.   Musculoskeletal:         General: No swelling. Normal range of motion.      Cervical back: Normal range of motion.   Skin:     General: Skin is warm and dry.   Neurological:      General: No focal deficit present.      Mental Status: He is alert.   Psychiatric:         Mood and Affect: Mood normal.         Behavior: Behavior normal.       Significant Labs:   All pertinent labs from the last 24 hours have been reviewed.    Diagnostic Results:  I have reviewed all pertinent imaging results/findings within the past 24 hours.    Assessment/Plan:     * Intractable nausea and vomiting  51-year-old male with a history of stage IV melanoma with metastasis to lungs, axillary lymph nodes, liver, peritoneal carcinomatosis, and brain (on ipilimumab /nivolumab with Dr. Wise), CAD s/p PCI, seizures admitted for intractable n/v/d. He has had multiple admissions for same problem. On exam, mild ttp to palpation of abdomen. No distension noted.     - Supportive care  - Fluids.   - Advance diet as tolerated.   - Replace electrolytes.   - Recent MRI brain with evidence of disease progression. May be partially responsible for nausea  -  Dexamethasone started inpatient, nausea much improved and patient tolerating PO diet  - Will discharge with dexamethasone and place radiation oncology referral    Anxiety  Pt with hx of anxiety  - Started on home bupropion.   - Zyprexa prn for agitation.   - Xanax .5 mg bid prn    Lactic acidosis  Lactate elevated to 2.7. Likely from hypovolemia.     - Fluids.   - F/u repeat lactate    Goals of care, counseling/discussion  Pt had appt with palliative care on 9/16. States to move forward with life prolonging measure. Currently on MS Contin 60 mg BID and oxycodone 30 mg q4h prn    - Continue on current pain regimen.   - Consider having palliative on board.       PAULA (acute kidney injury)  Baseline Cr .8, Cr 1.6 on admission. Likely from dehydration .     - Fluids challenge.   - Monitor Cr.   - Monitor UOP.         Hyponatremia  Likely from n/v/d.   - Nacl infusion    Metastatic melanoma  Hx of metastatic melanoma. See onc history in the HPI  - Evidence of disease progression on MRI  - Follow up with Dr. Wise and radiation oncology referral    History of seizure  Started on home nieves Jorge MD  Hematology/Oncology  [facility] - Intensive Care ([place] [id number])    STAFF NOTE:  I have personally reviewed the past notes, images, labs and other provoider's notes and taken the history and examined this patient and agree with Dr. Jorge's Note as stated above.    Kelsey Collins MD

## 2022-09-27 ENCOUNTER — PATIENT OUTREACH (OUTPATIENT)
Dept: ADMINISTRATIVE | Facility: CLINIC | Age: 52
End: 2022-09-27
Payer: MEDICAID

## 2022-09-28 ENCOUNTER — HOSPITAL ENCOUNTER (INPATIENT)
Facility: HOSPITAL | Age: 52
LOS: 2 days | Discharge: HOSPICE | DRG: 100 | End: 2022-09-30
Attending: STUDENT IN AN ORGANIZED HEALTH CARE EDUCATION/TRAINING PROGRAM | Admitting: STUDENT IN AN ORGANIZED HEALTH CARE EDUCATION/TRAINING PROGRAM
Payer: MEDICAID

## 2022-09-28 DIAGNOSIS — R41.82 ALTERED MENTAL STATE: ICD-10-CM

## 2022-09-28 DIAGNOSIS — J96.01 ACUTE HYPOXEMIC RESPIRATORY FAILURE: ICD-10-CM

## 2022-09-28 DIAGNOSIS — Z78.9 INTUBATION OF AIRWAY PERFORMED WITHOUT DIFFICULTY: ICD-10-CM

## 2022-09-28 DIAGNOSIS — G40.901 STATUS EPILEPTICUS: ICD-10-CM

## 2022-09-28 DIAGNOSIS — C43.9 MALIGNANT MELANOMA, UNSPECIFIED SITE: ICD-10-CM

## 2022-09-28 DIAGNOSIS — R56.9 SEIZURE: Primary | ICD-10-CM

## 2022-09-28 DIAGNOSIS — R11.2 NAUSEA AND VOMITING, INTRACTABILITY OF VOMITING NOT SPECIFIED, UNSPECIFIED VOMITING TYPE: ICD-10-CM

## 2022-09-28 LAB
ALBUMIN SERPL BCP-MCNC: 3.7 G/DL (ref 3.5–5.2)
ALLENS TEST: ABNORMAL
ALP SERPL-CCNC: 59 U/L (ref 55–135)
ALT SERPL W/O P-5'-P-CCNC: 9 U/L (ref 10–44)
ANION GAP SERPL CALC-SCNC: 22 MMOL/L (ref 8–16)
AST SERPL-CCNC: 26 U/L (ref 10–40)
BACTERIA BLD CULT: NORMAL
BACTERIA BLD CULT: NORMAL
BASOPHILS # BLD AUTO: 0.04 K/UL (ref 0–0.2)
BASOPHILS NFR BLD: 0.2 % (ref 0–1.9)
BILIRUB SERPL-MCNC: 0.4 MG/DL (ref 0.1–1)
BUN SERPL-MCNC: 15 MG/DL (ref 6–20)
CALCIUM SERPL-MCNC: 9.1 MG/DL (ref 8.7–10.5)
CHLORIDE SERPL-SCNC: 90 MMOL/L (ref 95–110)
CO2 SERPL-SCNC: 19 MMOL/L (ref 23–29)
CREAT SERPL-MCNC: 1.3 MG/DL (ref 0.5–1.4)
CTP QC/QA: YES
DELSYS: ABNORMAL
DIFFERENTIAL METHOD: ABNORMAL
EOSINOPHIL # BLD AUTO: 0 K/UL (ref 0–0.5)
EOSINOPHIL NFR BLD: 0 % (ref 0–8)
ERYTHROCYTE [DISTWIDTH] IN BLOOD BY AUTOMATED COUNT: 14.5 % (ref 11.5–14.5)
ERYTHROCYTE [SEDIMENTATION RATE] IN BLOOD BY WESTERGREN METHOD: 20 MM/H
EST. GFR  (NO RACE VARIABLE): >60 ML/MIN/1.73 M^2
FIO2: 60
GLUCOSE SERPL-MCNC: 164 MG/DL (ref 70–110)
HCO3 UR-SCNC: 27 MMOL/L (ref 24–28)
HCT VFR BLD AUTO: 45 % (ref 40–54)
HCT VFR BLD CALC: 47 %PCV (ref 36–54)
HGB BLD-MCNC: 15.2 G/DL (ref 14–18)
HGB BLD-MCNC: 16 G/DL
IMM GRANULOCYTES # BLD AUTO: 0.17 K/UL (ref 0–0.04)
IMM GRANULOCYTES NFR BLD AUTO: 0.7 % (ref 0–0.5)
LACTATE SERPL-SCNC: 9.4 MMOL/L (ref 0.5–2.2)
LYMPHOCYTES # BLD AUTO: 1.3 K/UL (ref 1–4.8)
LYMPHOCYTES NFR BLD: 5.8 % (ref 18–48)
MCH RBC QN AUTO: 28.6 PG (ref 27–31)
MCHC RBC AUTO-ENTMCNC: 33.8 G/DL (ref 32–36)
MCV RBC AUTO: 85 FL (ref 82–98)
MODE: ABNORMAL
MONOCYTES # BLD AUTO: 0.9 K/UL (ref 0.3–1)
MONOCYTES NFR BLD: 3.8 % (ref 4–15)
NEUTROPHILS # BLD AUTO: 20.7 K/UL (ref 1.8–7.7)
NEUTROPHILS NFR BLD: 89.5 % (ref 38–73)
NRBC BLD-RTO: 0 /100 WBC
PCO2 BLDA: 44 MMHG (ref 35–45)
PEEP: 5
PH SMN: 7.39 [PH] (ref 7.35–7.45)
PLATELET # BLD AUTO: 467 K/UL (ref 150–450)
PLATELET BLD QL SMEAR: ABNORMAL
PMV BLD AUTO: 10.6 FL (ref 9.2–12.9)
PO2 BLDA: 114 MMHG (ref 40–60)
POC BE: 2 MMOL/L
POC SATURATED O2: 98 % (ref 95–100)
POC TCO2: 28 MMOL/L (ref 24–29)
POTASSIUM BLD-SCNC: 3.1 MMOL/L (ref 3.5–5.1)
POTASSIUM SERPL-SCNC: 4.1 MMOL/L (ref 3.5–5.1)
PROT SERPL-MCNC: 7.5 G/DL (ref 6–8.4)
RBC # BLD AUTO: 5.32 M/UL (ref 4.6–6.2)
SAMPLE: ABNORMAL
SARS-COV-2 RDRP RESP QL NAA+PROBE: NEGATIVE
SODIUM BLD-SCNC: 132 MMOL/L (ref 136–145)
SODIUM SERPL-SCNC: 131 MMOL/L (ref 136–145)
TSH SERPL DL<=0.005 MIU/L-ACNC: 0.45 UIU/ML (ref 0.4–4)
VT: 400
WBC # BLD AUTO: 23.07 K/UL (ref 3.9–12.7)

## 2022-09-28 PROCEDURE — 63600175 PHARM REV CODE 636 W HCPCS: Performed by: STUDENT IN AN ORGANIZED HEALTH CARE EDUCATION/TRAINING PROGRAM

## 2022-09-28 PROCEDURE — U0002 COVID-19 LAB TEST NON-CDC: HCPCS | Performed by: STUDENT IN AN ORGANIZED HEALTH CARE EDUCATION/TRAINING PROGRAM

## 2022-09-28 PROCEDURE — 25000003 PHARM REV CODE 250

## 2022-09-28 PROCEDURE — 80053 COMPREHEN METABOLIC PANEL: CPT | Performed by: STUDENT IN AN ORGANIZED HEALTH CARE EDUCATION/TRAINING PROGRAM

## 2022-09-28 PROCEDURE — 25000003 PHARM REV CODE 250: Performed by: STUDENT IN AN ORGANIZED HEALTH CARE EDUCATION/TRAINING PROGRAM

## 2022-09-28 PROCEDURE — 96365 THER/PROPH/DIAG IV INF INIT: CPT

## 2022-09-28 PROCEDURE — 93010 EKG 12-LEAD: ICD-10-PCS | Mod: ,,, | Performed by: INTERNAL MEDICINE

## 2022-09-28 PROCEDURE — 83605 ASSAY OF LACTIC ACID: CPT | Performed by: STUDENT IN AN ORGANIZED HEALTH CARE EDUCATION/TRAINING PROGRAM

## 2022-09-28 PROCEDURE — 81000 URINALYSIS NONAUTO W/SCOPE: CPT | Mod: 59 | Performed by: STUDENT IN AN ORGANIZED HEALTH CARE EDUCATION/TRAINING PROGRAM

## 2022-09-28 PROCEDURE — 87040 BLOOD CULTURE FOR BACTERIA: CPT | Performed by: STUDENT IN AN ORGANIZED HEALTH CARE EDUCATION/TRAINING PROGRAM

## 2022-09-28 PROCEDURE — 85025 COMPLETE CBC W/AUTO DIFF WBC: CPT | Performed by: STUDENT IN AN ORGANIZED HEALTH CARE EDUCATION/TRAINING PROGRAM

## 2022-09-28 PROCEDURE — 94002 VENT MGMT INPAT INIT DAY: CPT

## 2022-09-28 PROCEDURE — 31500 INSERT EMERGENCY AIRWAY: CPT | Mod: 59

## 2022-09-28 PROCEDURE — 12000002 HC ACUTE/MED SURGE SEMI-PRIVATE ROOM

## 2022-09-28 PROCEDURE — 84443 ASSAY THYROID STIM HORMONE: CPT | Performed by: STUDENT IN AN ORGANIZED HEALTH CARE EDUCATION/TRAINING PROGRAM

## 2022-09-28 PROCEDURE — 80307 DRUG TEST PRSMV CHEM ANLYZR: CPT | Performed by: STUDENT IN AN ORGANIZED HEALTH CARE EDUCATION/TRAINING PROGRAM

## 2022-09-28 PROCEDURE — 99291 CRITICAL CARE FIRST HOUR: CPT | Mod: 25

## 2022-09-28 PROCEDURE — 96361 HYDRATE IV INFUSION ADD-ON: CPT

## 2022-09-28 PROCEDURE — 99900035 HC TECH TIME PER 15 MIN (STAT)

## 2022-09-28 PROCEDURE — 93005 ELECTROCARDIOGRAM TRACING: CPT

## 2022-09-28 PROCEDURE — 93010 ELECTROCARDIOGRAM REPORT: CPT | Mod: ,,, | Performed by: INTERNAL MEDICINE

## 2022-09-28 PROCEDURE — 96372 THER/PROPH/DIAG INJ SC/IM: CPT | Performed by: STUDENT IN AN ORGANIZED HEALTH CARE EDUCATION/TRAINING PROGRAM

## 2022-09-28 RX ORDER — ONDANSETRON 8 MG/1
8 TABLET, ORALLY DISINTEGRATING ORAL EVERY 12 HOURS PRN
Qty: 30 TABLET | Refills: 1 | Status: SHIPPED | OUTPATIENT
Start: 2022-09-28 | End: 2023-09-28

## 2022-09-28 RX ORDER — LEVETIRACETAM 500 MG/5ML
1000 INJECTION, SOLUTION, CONCENTRATE INTRAVENOUS
Status: COMPLETED | OUTPATIENT
Start: 2022-09-28 | End: 2022-09-28

## 2022-09-28 RX ORDER — PROPOFOL 10 MG/ML
INJECTION, EMULSION INTRAVENOUS
Status: DISCONTINUED
Start: 2022-09-28 | End: 2022-09-28 | Stop reason: WASHOUT

## 2022-09-28 RX ORDER — PROPOFOL 10 MG/ML
0-50 INJECTION, EMULSION INTRAVENOUS CONTINUOUS
Status: DISCONTINUED | OUTPATIENT
Start: 2022-09-28 | End: 2022-09-29

## 2022-09-28 RX ORDER — ROCURONIUM BROMIDE 10 MG/ML
80 INJECTION, SOLUTION INTRAVENOUS ONCE
Status: COMPLETED | OUTPATIENT
Start: 2022-09-28 | End: 2022-09-28

## 2022-09-28 RX ORDER — ETOMIDATE 2 MG/ML
20 INJECTION INTRAVENOUS
Status: COMPLETED | OUTPATIENT
Start: 2022-09-28 | End: 2022-09-28

## 2022-09-28 RX ORDER — ETOMIDATE 2 MG/ML
INJECTION INTRAVENOUS
Status: COMPLETED
Start: 2022-09-28 | End: 2022-09-28

## 2022-09-28 RX ORDER — ROCURONIUM BROMIDE 10 MG/ML
80 INJECTION, SOLUTION INTRAVENOUS ONCE
Status: DISCONTINUED | OUTPATIENT
Start: 2022-09-28 | End: 2022-09-28

## 2022-09-28 RX ORDER — ROCURONIUM BROMIDE 10 MG/ML
INJECTION, SOLUTION INTRAVENOUS
Status: COMPLETED
Start: 2022-09-28 | End: 2022-09-28

## 2022-09-28 RX ORDER — DROPERIDOL 2.5 MG/ML
5 INJECTION, SOLUTION INTRAMUSCULAR; INTRAVENOUS ONCE
Status: COMPLETED | OUTPATIENT
Start: 2022-09-28 | End: 2022-09-28

## 2022-09-28 RX ORDER — LORAZEPAM 2 MG/ML
2 INJECTION INTRAMUSCULAR
Status: COMPLETED | OUTPATIENT
Start: 2022-09-28 | End: 2022-09-28

## 2022-09-28 RX ORDER — PROPOFOL 10 MG/ML
40 VIAL (ML) INTRAVENOUS
Status: DISCONTINUED | OUTPATIENT
Start: 2022-09-28 | End: 2022-09-28

## 2022-09-28 RX ORDER — SUCCINYLCHOLINE CHLORIDE 20 MG/ML
INJECTION INTRAMUSCULAR; INTRAVENOUS
Status: DISCONTINUED
Start: 2022-09-28 | End: 2022-09-28 | Stop reason: WASHOUT

## 2022-09-28 RX ADMIN — ROCURONIUM BROMIDE 80 MG: 10 INJECTION, SOLUTION INTRAVENOUS at 10:09

## 2022-09-28 RX ADMIN — SODIUM CHLORIDE 1000 ML: 0.9 INJECTION, SOLUTION INTRAVENOUS at 10:09

## 2022-09-28 RX ADMIN — ETOMIDATE 20 MG: 2 INJECTION INTRAVENOUS at 10:09

## 2022-09-28 RX ADMIN — ROCURONIUM BROMIDE 80 MG: 50 INJECTION INTRAVENOUS at 10:09

## 2022-09-28 RX ADMIN — PROPOFOL 10 MCG/KG/MIN: 10 INJECTION, EMULSION INTRAVENOUS at 10:09

## 2022-09-28 RX ADMIN — LEVETIRACETAM 1000 MG: 100 INJECTION, SOLUTION INTRAVENOUS at 10:09

## 2022-09-28 RX ADMIN — LORAZEPAM 2 MG: 2 INJECTION INTRAMUSCULAR; INTRAVENOUS at 09:09

## 2022-09-28 RX ADMIN — DROPERIDOL 5 MG: 2.5 INJECTION, SOLUTION INTRAMUSCULAR; INTRAVENOUS at 09:09

## 2022-09-28 NOTE — Clinical Note
Diagnosis: Seizure [[id number]]   Admitting Provider:: CHU CRUZ [[id number]]   Future Attending Provider: CHU CRUZ [[id number]]   Reason for IP Medical Treatment  (Clinical interventions that can only be accomplished in the IP setting? ) :: AMS, intubated   Estimated Length of Stay:: 5+ midnights   I certify that Inpatient services for greater than or equal to 2 midnights are medically necessary:: Yes   Plans for Post-Acute care--if anticipated (pick the single best option):: [facility]

## 2022-09-29 PROBLEM — J96.01 ACUTE HYPOXEMIC RESPIRATORY FAILURE: Status: ACTIVE | Noted: 2022-09-29

## 2022-09-29 PROBLEM — G40.901 STATUS EPILEPTICUS: Status: ACTIVE | Noted: 2022-09-29

## 2022-09-29 PROBLEM — C43.9 MALIGNANT MELANOMA: Status: ACTIVE | Noted: 2022-09-29

## 2022-09-29 LAB
AMPHET+METHAMPHET UR QL: NEGATIVE
ANION GAP SERPL CALC-SCNC: 12 MMOL/L (ref 8–16)
BACTERIA #/AREA URNS HPF: ABNORMAL /HPF
BARBITURATES UR QL SCN>200 NG/ML: NEGATIVE
BASOPHILS # BLD AUTO: 0.05 K/UL (ref 0–0.2)
BASOPHILS NFR BLD: 0.2 % (ref 0–1.9)
BENZODIAZ UR QL SCN>200 NG/ML: ABNORMAL
BILIRUB UR QL STRIP: NEGATIVE
BUN SERPL-MCNC: 13 MG/DL (ref 6–20)
BZE UR QL SCN: NEGATIVE
CALCIUM SERPL-MCNC: 8.8 MG/DL (ref 8.7–10.5)
CANNABINOIDS UR QL SCN: ABNORMAL
CHLORIDE SERPL-SCNC: 93 MMOL/L (ref 95–110)
CLARITY UR: CLEAR
CO2 SERPL-SCNC: 28 MMOL/L (ref 23–29)
COLOR UR: YELLOW
CREAT SERPL-MCNC: 1 MG/DL (ref 0.5–1.4)
CREAT UR-MCNC: 286.7 MG/DL (ref 23–375)
DIFFERENTIAL METHOD: ABNORMAL
EOSINOPHIL # BLD AUTO: 0 K/UL (ref 0–0.5)
EOSINOPHIL NFR BLD: 0 % (ref 0–8)
ERYTHROCYTE [DISTWIDTH] IN BLOOD BY AUTOMATED COUNT: 14.6 % (ref 11.5–14.5)
EST. GFR  (NO RACE VARIABLE): >60 ML/MIN/1.73 M^2
GLUCOSE SERPL-MCNC: 68 MG/DL (ref 70–110)
GLUCOSE UR QL STRIP: NEGATIVE
HCT VFR BLD AUTO: 40.3 % (ref 40–54)
HGB BLD-MCNC: 14 G/DL (ref 14–18)
HGB UR QL STRIP: NEGATIVE
HYALINE CASTS #/AREA URNS LPF: 29 /LPF
IMM GRANULOCYTES # BLD AUTO: 0.15 K/UL (ref 0–0.04)
IMM GRANULOCYTES NFR BLD AUTO: 0.6 % (ref 0–0.5)
KETONES UR QL STRIP: NEGATIVE
LACTATE SERPL-SCNC: 1.3 MMOL/L (ref 0.5–2.2)
LEUKOCYTE ESTERASE UR QL STRIP: NEGATIVE
LYMPHOCYTES # BLD AUTO: 1.9 K/UL (ref 1–4.8)
LYMPHOCYTES NFR BLD: 8.1 % (ref 18–48)
MAGNESIUM SERPL-MCNC: 1.8 MG/DL (ref 1.6–2.6)
MCH RBC QN AUTO: 28.7 PG (ref 27–31)
MCHC RBC AUTO-ENTMCNC: 34.7 G/DL (ref 32–36)
MCV RBC AUTO: 83 FL (ref 82–98)
METHADONE UR QL SCN>300 NG/ML: NEGATIVE
MICROSCOPIC COMMENT: ABNORMAL
MONOCYTES # BLD AUTO: 2.6 K/UL (ref 0.3–1)
MONOCYTES NFR BLD: 11 % (ref 4–15)
NEUTROPHILS # BLD AUTO: 19.1 K/UL (ref 1.8–7.7)
NEUTROPHILS NFR BLD: 80.1 % (ref 38–73)
NITRITE UR QL STRIP: NEGATIVE
NRBC BLD-RTO: 0 /100 WBC
OPIATES UR QL SCN: ABNORMAL
PCP UR QL SCN>25 NG/ML: NEGATIVE
PH UR STRIP: 6 [PH] (ref 5–8)
PLATELET # BLD AUTO: 406 K/UL (ref 150–450)
PMV BLD AUTO: 9.7 FL (ref 9.2–12.9)
POCT GLUCOSE: 100 MG/DL (ref 70–110)
POCT GLUCOSE: 116 MG/DL (ref 70–110)
POCT GLUCOSE: 166 MG/DL (ref 70–110)
POCT GLUCOSE: 49 MG/DL (ref 70–110)
POTASSIUM SERPL-SCNC: 3.3 MMOL/L (ref 3.5–5.1)
PROT UR QL STRIP: ABNORMAL
RBC # BLD AUTO: 4.87 M/UL (ref 4.6–6.2)
RBC #/AREA URNS HPF: 1 /HPF (ref 0–4)
SODIUM SERPL-SCNC: 133 MMOL/L (ref 136–145)
SP GR UR STRIP: 1.03 (ref 1–1.03)
SQUAMOUS #/AREA URNS HPF: 0 /HPF
TOXICOLOGY INFORMATION: ABNORMAL
URN SPEC COLLECT METH UR: ABNORMAL
UROBILINOGEN UR STRIP-ACNC: NEGATIVE EU/DL
WBC # BLD AUTO: 23.9 K/UL (ref 3.9–12.7)
WBC #/AREA URNS HPF: 3 /HPF (ref 0–5)

## 2022-09-29 PROCEDURE — 25000003 PHARM REV CODE 250: Performed by: STUDENT IN AN ORGANIZED HEALTH CARE EDUCATION/TRAINING PROGRAM

## 2022-09-29 PROCEDURE — 99900035 HC TECH TIME PER 15 MIN (STAT)

## 2022-09-29 PROCEDURE — 94003 VENT MGMT INPAT SUBQ DAY: CPT

## 2022-09-29 PROCEDURE — 95819 PR EEG,W/AWAKE & ASLEEP RECORD: ICD-10-PCS | Mod: 26,,, | Performed by: STUDENT IN AN ORGANIZED HEALTH CARE EDUCATION/TRAINING PROGRAM

## 2022-09-29 PROCEDURE — 83735 ASSAY OF MAGNESIUM: CPT | Performed by: NURSE PRACTITIONER

## 2022-09-29 PROCEDURE — 99223 1ST HOSP IP/OBS HIGH 75: CPT | Mod: ,,, | Performed by: INTERNAL MEDICINE

## 2022-09-29 PROCEDURE — 20000000 HC ICU ROOM

## 2022-09-29 PROCEDURE — 99223 PR INITIAL HOSPITAL CARE,LEVL III: ICD-10-PCS | Mod: ,,, | Performed by: STUDENT IN AN ORGANIZED HEALTH CARE EDUCATION/TRAINING PROGRAM

## 2022-09-29 PROCEDURE — 63600175 PHARM REV CODE 636 W HCPCS: Performed by: STUDENT IN AN ORGANIZED HEALTH CARE EDUCATION/TRAINING PROGRAM

## 2022-09-29 PROCEDURE — 94761 N-INVAS EAR/PLS OXIMETRY MLT: CPT

## 2022-09-29 PROCEDURE — 99223 PR INITIAL HOSPITAL CARE,LEVL III: ICD-10-PCS | Mod: ,,, | Performed by: INTERNAL MEDICINE

## 2022-09-29 PROCEDURE — 1152F DOC ADVNCD DIS COMFORT 1ST: CPT | Mod: CPTII,,, | Performed by: STUDENT IN AN ORGANIZED HEALTH CARE EDUCATION/TRAINING PROGRAM

## 2022-09-29 PROCEDURE — 25000003 PHARM REV CODE 250: Performed by: INTERNAL MEDICINE

## 2022-09-29 PROCEDURE — 63600175 PHARM REV CODE 636 W HCPCS: Performed by: NURSE PRACTITIONER

## 2022-09-29 PROCEDURE — 25000003 PHARM REV CODE 250: Performed by: NURSE PRACTITIONER

## 2022-09-29 PROCEDURE — 95819 EEG AWAKE AND ASLEEP: CPT | Mod: 26,,, | Performed by: STUDENT IN AN ORGANIZED HEALTH CARE EDUCATION/TRAINING PROGRAM

## 2022-09-29 PROCEDURE — 85025 COMPLETE CBC W/AUTO DIFF WBC: CPT | Performed by: NURSE PRACTITIONER

## 2022-09-29 PROCEDURE — 99233 SBSQ HOSP IP/OBS HIGH 50: CPT | Mod: ,,, | Performed by: PSYCHIATRY & NEUROLOGY

## 2022-09-29 PROCEDURE — 63600175 PHARM REV CODE 636 W HCPCS: Performed by: INTERNAL MEDICINE

## 2022-09-29 PROCEDURE — 99223 1ST HOSP IP/OBS HIGH 75: CPT | Mod: ,,, | Performed by: STUDENT IN AN ORGANIZED HEALTH CARE EDUCATION/TRAINING PROGRAM

## 2022-09-29 PROCEDURE — 99497 PR ADVNCD CARE PLAN 30 MIN: ICD-10-PCS | Mod: 25,,, | Performed by: STUDENT IN AN ORGANIZED HEALTH CARE EDUCATION/TRAINING PROGRAM

## 2022-09-29 PROCEDURE — 1158F ADVNC CARE PLAN TLK DOCD: CPT | Mod: CPTII,,, | Performed by: STUDENT IN AN ORGANIZED HEALTH CARE EDUCATION/TRAINING PROGRAM

## 2022-09-29 PROCEDURE — 99497 ADVNCD CARE PLAN 30 MIN: CPT | Mod: 25,,, | Performed by: STUDENT IN AN ORGANIZED HEALTH CARE EDUCATION/TRAINING PROGRAM

## 2022-09-29 PROCEDURE — 99233 PR SUBSEQUENT HOSPITAL CARE,LEVL III: ICD-10-PCS | Mod: ,,, | Performed by: PSYCHIATRY & NEUROLOGY

## 2022-09-29 PROCEDURE — 27000221 HC OXYGEN, UP TO 24 HOURS

## 2022-09-29 PROCEDURE — 83605 ASSAY OF LACTIC ACID: CPT | Performed by: NURSE PRACTITIONER

## 2022-09-29 PROCEDURE — 1152F PR DOC ADVANCED DISEASE DX, GOAL OF CARE PRIORITIZE COMFORT: ICD-10-PCS | Mod: CPTII,,, | Performed by: STUDENT IN AN ORGANIZED HEALTH CARE EDUCATION/TRAINING PROGRAM

## 2022-09-29 PROCEDURE — 25000003 PHARM REV CODE 250

## 2022-09-29 PROCEDURE — 95819 EEG AWAKE AND ASLEEP: CPT

## 2022-09-29 PROCEDURE — 1158F PR ADVANCE CARE PLANNING DISCUSS DOCUMENTED IN MEDICAL RECORD: ICD-10-PCS | Mod: CPTII,,, | Performed by: STUDENT IN AN ORGANIZED HEALTH CARE EDUCATION/TRAINING PROGRAM

## 2022-09-29 PROCEDURE — 63600175 PHARM REV CODE 636 W HCPCS

## 2022-09-29 PROCEDURE — 80048 BASIC METABOLIC PNL TOTAL CA: CPT | Performed by: NURSE PRACTITIONER

## 2022-09-29 RX ORDER — LORAZEPAM 2 MG/ML
INJECTION INTRAMUSCULAR
Status: COMPLETED
Start: 2022-09-29 | End: 2022-09-29

## 2022-09-29 RX ORDER — NOREPINEPHRINE BITARTRATE/D5W 4MG/250ML
0-3 PLASTIC BAG, INJECTION (ML) INTRAVENOUS CONTINUOUS
Status: DISCONTINUED | OUTPATIENT
Start: 2022-09-29 | End: 2022-09-30

## 2022-09-29 RX ORDER — DEXAMETHASONE 4 MG/1
4 TABLET ORAL EVERY 12 HOURS
Status: DISCONTINUED | OUTPATIENT
Start: 2022-09-29 | End: 2022-09-30 | Stop reason: HOSPADM

## 2022-09-29 RX ORDER — DEXMEDETOMIDINE HYDROCHLORIDE 4 UG/ML
INJECTION, SOLUTION INTRAVENOUS
Status: COMPLETED
Start: 2022-09-29 | End: 2022-09-29

## 2022-09-29 RX ORDER — LEVETIRACETAM 500 MG/5ML
1000 INJECTION, SOLUTION, CONCENTRATE INTRAVENOUS EVERY 12 HOURS
Status: DISCONTINUED | OUTPATIENT
Start: 2022-09-29 | End: 2022-09-29

## 2022-09-29 RX ORDER — LORAZEPAM 2 MG/ML
2 INJECTION INTRAMUSCULAR ONCE
Status: COMPLETED | OUTPATIENT
Start: 2022-09-29 | End: 2022-09-29

## 2022-09-29 RX ORDER — GLUCAGON 1 MG
1 KIT INJECTION
Status: DISCONTINUED | OUTPATIENT
Start: 2022-09-29 | End: 2022-09-30

## 2022-09-29 RX ORDER — FENTANYL CITRATE 50 UG/ML
25 INJECTION, SOLUTION INTRAMUSCULAR; INTRAVENOUS ONCE
Status: DISCONTINUED | OUTPATIENT
Start: 2022-09-29 | End: 2022-09-29

## 2022-09-29 RX ORDER — LEVETIRACETAM 500 MG/5ML
1500 INJECTION, SOLUTION, CONCENTRATE INTRAVENOUS EVERY 12 HOURS
Status: DISCONTINUED | OUTPATIENT
Start: 2022-09-29 | End: 2022-09-30 | Stop reason: HOSPADM

## 2022-09-29 RX ORDER — FENTANYL CITRATE 50 UG/ML
25 INJECTION, SOLUTION INTRAMUSCULAR; INTRAVENOUS ONCE
Status: COMPLETED | OUTPATIENT
Start: 2022-09-29 | End: 2022-09-29

## 2022-09-29 RX ORDER — ACETAMINOPHEN 325 MG/1
650 TABLET ORAL EVERY 4 HOURS PRN
Status: DISCONTINUED | OUTPATIENT
Start: 2022-09-29 | End: 2022-09-30 | Stop reason: HOSPADM

## 2022-09-29 RX ORDER — MUPIROCIN 20 MG/G
OINTMENT TOPICAL 2 TIMES DAILY
Status: DISCONTINUED | OUTPATIENT
Start: 2022-09-29 | End: 2022-09-30 | Stop reason: HOSPADM

## 2022-09-29 RX ORDER — ONDANSETRON 2 MG/ML
4 INJECTION INTRAMUSCULAR; INTRAVENOUS EVERY 8 HOURS PRN
Status: DISCONTINUED | OUTPATIENT
Start: 2022-09-29 | End: 2022-09-30 | Stop reason: HOSPADM

## 2022-09-29 RX ORDER — SODIUM CHLORIDE 0.9 % (FLUSH) 0.9 %
10 SYRINGE (ML) INJECTION
Status: DISCONTINUED | OUTPATIENT
Start: 2022-09-29 | End: 2022-09-30 | Stop reason: HOSPADM

## 2022-09-29 RX ORDER — DEXMEDETOMIDINE HYDROCHLORIDE 4 UG/ML
0-1.4 INJECTION, SOLUTION INTRAVENOUS CONTINUOUS
Status: DISCONTINUED | OUTPATIENT
Start: 2022-09-29 | End: 2022-09-29

## 2022-09-29 RX ORDER — NOREPINEPHRINE BITARTRATE/D5W 4MG/250ML
PLASTIC BAG, INJECTION (ML) INTRAVENOUS
Status: COMPLETED
Start: 2022-09-29 | End: 2022-09-29

## 2022-09-29 RX ORDER — FAMOTIDINE 10 MG/ML
20 INJECTION INTRAVENOUS EVERY 12 HOURS
Status: DISCONTINUED | OUTPATIENT
Start: 2022-09-29 | End: 2022-09-29

## 2022-09-29 RX ORDER — MAGNESIUM SULFATE HEPTAHYDRATE 40 MG/ML
2 INJECTION, SOLUTION INTRAVENOUS ONCE
Status: COMPLETED | OUTPATIENT
Start: 2022-09-29 | End: 2022-09-29

## 2022-09-29 RX ADMIN — VANCOMYCIN HYDROCHLORIDE 1500 MG: 1.5 INJECTION, POWDER, LYOPHILIZED, FOR SOLUTION INTRAVENOUS at 01:09

## 2022-09-29 RX ADMIN — LEVETIRACETAM 1500 MG: 500 INJECTION, SOLUTION INTRAVENOUS at 08:09

## 2022-09-29 RX ADMIN — LEVETIRACETAM 1000 MG: 100 INJECTION INTRAVENOUS at 08:09

## 2022-09-29 RX ADMIN — Medication 0.08 MCG/KG/MIN: at 03:09

## 2022-09-29 RX ADMIN — DEXAMETHASONE 4 MG: 4 TABLET ORAL at 08:09

## 2022-09-29 RX ADMIN — PROPOFOL 30 MCG/KG/MIN: 10 INJECTION, EMULSION INTRAVENOUS at 05:09

## 2022-09-29 RX ADMIN — Medication 0.02 MCG/KG/MIN: at 06:09

## 2022-09-29 RX ADMIN — POTASSIUM BICARBONATE 25 MEQ: 977.5 TABLET, EFFERVESCENT ORAL at 01:09

## 2022-09-29 RX ADMIN — DEXMEDETOMIDINE HYDROCHLORIDE 1.2 MCG/KG/HR: 4 INJECTION, SOLUTION INTRAVENOUS at 09:09

## 2022-09-29 RX ADMIN — LORAZEPAM 2 MG: 2 INJECTION INTRAMUSCULAR; INTRAVENOUS at 02:09

## 2022-09-29 RX ADMIN — LORAZEPAM 2 MG: 2 INJECTION INTRAMUSCULAR; INTRAVENOUS at 05:09

## 2022-09-29 RX ADMIN — POTASSIUM BICARBONATE 25 MEQ: 977.5 TABLET, EFFERVESCENT ORAL at 09:09

## 2022-09-29 RX ADMIN — FAMOTIDINE 20 MG: 10 INJECTION, SOLUTION INTRAVENOUS at 08:09

## 2022-09-29 RX ADMIN — DEXMEDETOMIDINE HYDROCHLORIDE 0.2 MCG/KG/HR: 4 INJECTION, SOLUTION INTRAVENOUS at 02:09

## 2022-09-29 RX ADMIN — MUPIROCIN: 20 OINTMENT TOPICAL at 08:09

## 2022-09-29 RX ADMIN — LORAZEPAM 2 MG: 2 INJECTION INTRAMUSCULAR at 05:09

## 2022-09-29 RX ADMIN — DEXTROSE 250 ML: 10 SOLUTION INTRAVENOUS at 05:09

## 2022-09-29 RX ADMIN — MAGNESIUM SULFATE 2 G: 2 INJECTION INTRAVENOUS at 10:09

## 2022-09-29 RX ADMIN — FENTANYL CITRATE 25 MCG: 50 INJECTION INTRAMUSCULAR; INTRAVENOUS at 08:09

## 2022-09-29 RX ADMIN — CEFTRIAXONE 2 G: 2 INJECTION, SOLUTION INTRAVENOUS at 12:09

## 2022-09-29 RX ADMIN — SODIUM CHLORIDE, SODIUM LACTATE, POTASSIUM CHLORIDE, AND CALCIUM CHLORIDE 500 ML: .6; .31; .03; .02 INJECTION, SOLUTION INTRAVENOUS at 06:09

## 2022-09-29 RX ADMIN — SODIUM CHLORIDE, SODIUM LACTATE, POTASSIUM CHLORIDE, AND CALCIUM CHLORIDE 500 ML: .6; .31; .03; .02 INJECTION, SOLUTION INTRAVENOUS at 08:09

## 2022-09-29 NOTE — CONSULTS
[facility] - [facility]ensive Care  Hematology/Oncology  Consult Note    Patient Name: Joseluis Garner  MRN: [id number]  Admission Date: 9/28/2022  Hospital Length of Stay: 0 days  Code Status: Full Code   Attending Provider: Virginia Sim MD  Consulting Provider: Sergio Thacker MD  Primary Care Physician: Clayton Wise MD  Principal Problem:Seizure    Inpatient consult to Hematology/Oncology  Consult performed by: Sergio Thacker MD  Consult ordered by: Virginia Sim MD  Reason for consult: melanoma        Subjective:     HPI:  51 year-old male with metastatic melanoma was admitted on 9/29/22 for seizures requiring intubation. Consult is for melanoma.      Patient's mother states that she understands his overall prognosis. She states that patient's sister moved to [place] this week, and that Mr. Garner seems to have more seizures when stressed, which she thinks he was due to his sister.      Oncology Treatment Plan:   OP NIVOLUMAB 1 MG/KG IPILIMUMAB 3MG/KG FOLLOWED BY NIVOLUMAB 480MG Q4W    Medications:  Continuous Infusions:   dexmedetomidine (PRECEDEX) infusion 0.6 mcg/kg/hr (09/29/22 0823)    NORepinephrine bitartrate-D5W 0.14 mcg/kg/min (09/29/22 0823)    propofoL 40 mcg/kg/min (09/29/22 0823)     Scheduled Meds:   dexAMETHasone  4 mg Per OG tube Q12H    famotidine (PF)  20 mg Intravenous Q12H    lactated ringers  500 mL Intravenous Once    levetiracetam IV  1,000 mg Intravenous Q12H    magnesium sulfate IVPB  2 g Intravenous Once    mupirocin   Nasal BID    potassium bicarbonate  25 mEq Per OG tube Q4H    vancomycin (VANCOCIN) IVPB  750 mg Intravenous Q12H     PRN Meds:acetaminophen, dextrose 10%, dextrose 10%, glucagon (human recombinant), ondansetron, sodium chloride 0.9%, Pharmacy to dose Vancomycin consult **AND** vancomycin - pharmacy to dose     Review of patient's allergies indicates:  No Known Allergies     Past Medical History:   Diagnosis Date    Seizures      No past surgical history on  file.  Family History    None       Tobacco Use    Smoking status: Every Day    Smokeless tobacco: Never   Substance and Sexual Activity    Alcohol use: Yes    Drug use: Yes     Types: Methamphetamines, Marijuana    Sexual activity: Not on file       Review of Systems   Unable to perform ROS: Intubated   Objective:     Vital Signs (Most Recent):  Temp: 98.1 °F (36.7 °C) (09/29/22 0745)  Pulse: 71 (09/29/22 0823)  Resp: (!) 33 (09/29/22 0823)  BP: (!) 78/59 (09/29/22 0815)  SpO2: 100 % (09/29/22 0823)   Vital Signs (24h Range):  Temp:  [98.1 °F (36.7 °C)-99 °F (37.2 °C)] 98.1 °F (36.7 °C)  Pulse:  [] 71  Resp:  [20-57] 33  SpO2:  [83 %-100 %] 100 %  BP: ()/() 78/59     Weight: 61 kg (134 lb 7.7 oz)  Body mass index is 19.86 kg/m².  Body surface area is 1.72 meters squared.      Intake/Output Summary (Last 24 hours) at 9/29/2022 0847  Last data filed at 9/29/2022 0823  Gross per 24 hour   Intake 2088.27 ml   Output 430 ml   Net 1658.27 ml       Physical Exam  Constitutional:       Appearance: Normal appearance.   HENT:      Head: Atraumatic.      Mouth/Throat:      Comments: intubated  Cardiovascular:      Rate and Rhythm: Normal rate.   Pulmonary:      Comments: intubated  Abdominal:      General: Bowel sounds are normal.      Palpations: Abdomen is soft.   Musculoskeletal:         General: No swelling.   Skin:     General: Skin is warm.   Neurological:      Comments: Intubated/sedated   Psychiatric:      Comments: Unable to assess       Significant Labs:   CBC:   Recent Labs   Lab 09/28/22 2250 09/28/22 2259 09/29/22  0406   WBC  --  23.07* 23.90*   HGB  --  15.2 14.0   HCT 47 45.0 40.3   PLT  --  467* 406    and CMP:   Recent Labs   Lab 09/28/22 2259 09/29/22  0406   * 133*   K 4.1 3.3*   CL 90* 93*   CO2 19* 28   * 68*   BUN 15 13   CREATININE 1.3 1.0   CALCIUM 9.1 8.8   PROT 7.5  --    ALBUMIN 3.7  --    BILITOT 0.4  --    ALKPHOS 59  --    AST 26  --    ALT 9*  --     ANIONGAP 22* 12       Diagnostic Results:  MRI brain (9/23/22): I have personally reviewed his images  Since the prior study there has been the development of numerous small foci of enhancement noted throughout the cerebral hemispheres predominately superficial in location along the cortical margin.  Only a single 3 mm lesion is identified within the right cerebellar hemisphere without other evidence of posterior fossa involvement.  Some of these lesions have associated mild vasogenic edema most notable within the left precentral gyrus and medial right temporal lobe.  There is no significant mass effect with no midline shift or herniation.  Due to superficial location, component CSF dissemination to be considered.     The previously identified lesions appear more prominent than the prior study although some, notable within the left caudate head, also demonstrate some increased mineralization.     No evidence of acute infarct or intracranial hemorrhage.     Normal arterial flow voids are preserved at the skull base.     The visualized sinuses and mastoid air cells are clear.     These findings were communicated to Dr. Jorge at 12:10 on 09/23/2022     Impression:     Increased prominence and multiple new intracranial lesions predominantly in the supratentorial compartment with only a single right cerebellar small lesion.  These lesions are small in size but numerous, some with mild surrounding vasogenic edema but no significant mass effect.  Although neoplastic progression to be considered, in light of the history of immunotherapy, treatment related change/an element of pseudoprogression to also be considered.  This can be re-evaluated on follow-up imaging.  Due to the superficial location of the majority of these lesions, CSF dissemination should be considered.    Assessment/Plan:     Metastatic melanoma  - patient of Dr. Wise and nurse practitioner Kitty Segura  - s/p nivolumab/ipilimumab. Now receiving  "encorafenib/binimitnib/Opdivo  - MRI brain (9/23/22) revealed "increased prominence and multiple new intracranial lesions predominantly in the supratentorial compartment with only a single right cerebellar small lesion.  These lesions are small in size but numerous, some with mild surrounding vasogenic edema but no significant mass effect."  - I discussed his case with Dr. Wise. Overall, his prognosis is very poor. Unclear how the brain lesions influence his current seizure activity, although they likely are increasing his risk of seizures, especially if he has leptomeningeal disease/spinal fluid involvement.  - agree with palliative care consult.  - patient's mother recommends he be made DNR. Please confirm with patient's mother and patient (if he can be safely extubated).  - no role for inpatient chemotherapy at this time.  - depending on clinical recovery, hospice vs short-term follow-up with Dr. Wise is appropriate. Dr. Wise stated he suggested hospice previously.        Thank you for your consult.     Sergio Thacker MD  Hematology/Oncology  [facility] - Intensive Care  "

## 2022-09-29 NOTE — ASSESSMENT & PLAN NOTE
postictal combativeness, intubated, loaded with keppra in ED  -consult neurology  -cont keppra 1g BID  -cont propofol and add precedex, also give 2mg ativan now   -consult pulmonology for vent management

## 2022-09-29 NOTE — ACP (ADVANCE CARE PLANNING)
Discussed code status and overall goals of care with the patient and family (mother and fiancee) at bedside.  Patient awakens to verbal commands, oriented to self and place.  Knows it is the end of September 2022 but does not remember the date.  He is able to tell me that he has cancer and understands that we unfortunately do not have any curative treatment at this time.  He also understands that because of the aggressive cancer, he is not going to get better.  He gets irritable while communicating but affirms that he understands.  He understands CPR likely may not lead to meaningful recovery given his current clinical condition.  He would not want CPR or intubation or shock in the event he naturally passes.  He would still like to continue current level of care but is in agreement with a DNR code status.  Patient's mother also participated in the discussion and is in agreement with DNR.  DNR order placed.  Primary RN aware.  They would like to talk about hospice options tomorrow morning.      I spent 20 minutes of face-to-face discussion regarding code status and overall goals of care.    Virginia Sim MD   Internal Medicine Hospitalist

## 2022-09-29 NOTE — PROCEDURES
ELECTROENCEPHALOGRAM REPORT    DATE OF SERVICE: 9/29/22  EEG NUMBER: OK   REQUESTED BY:   LOCATION OF SERVICE: [facility]    [facility]   Electroencephalographic (EEG) recording is with electrodes placed according to the International 10-20 placement system.  Thirty two (32) channels of digital signal (sampling rate of 512/sec) including T1 and T2 was simultaneously recorded from the scalp and may include  EKG, EMG, and/or eye monitors.  Recording band pass was 0.1 to 512 hz.  Digital video recording of the patient is simultaneously recorded with the EEG.  The patient is instructed report clinical symptoms which may occur during the recording session.  EEG and video recording is stored and archived in digital format. Activation procedures which include photic stimulation, hyperventilation and instructing patients to perform simple task are done in selected patients.    The EEG is displayed on a monitor screen and can be reviewed using different montages.  Computer assisted analysis is employed to detect spike and electrographic seizure activity.   The entire record is submitted for computer analysis.  The entire recording is visually reviewed and the times identified by computer analysis as being spikes or seizures are reviewed again.  Compresses spectral analysis (CSA) is also performed on the activity recorded from each individual channel.  This is displayed as a power display of frequencies from 0 to 30 Hz over time.   The CSA is reviewed looking for asymmetries in power between homologous areas of the scalp and then compared with the original EEG recording.     [employer] software was also utilized in the review of this study.  This software suite analyzes the EEG recording in multiple domains.  Coherence and rhythmicity is computed to identify EEG sections which may contain organized seizures.  Each channel undergoes analysis to detect presence of spike and sharp waves which have special and morphological  characteristic of epileptic activity.  The routine EEG recording is converted from spacial into frequency domain.  This is then displayed comparing homologous areas to identify areas of significant asymmetry.  Algorithm to identify non-cortically generated artifact is used to separate eye movement, EMG and other artifact from the EEG    Indication: 51 year old male hx of stage IV melanoma with brain metastases with generalized tonic clonic seizures.  EEG to further evaluate.     State of Consciousness:   Awake and asleep    Background:   Organization: Mildly disorganized  Symmetry: Asymmetric  Continuity: Continuous  Background frequencies:   There is an anterior posterior gradient with faster beta activity frontally and alpha activity posteriorly, which at times is interrupted by bursts of delta slowing.    Posterior dominant rhythm:   9-10 hz posterior dominant rhythm better sustained in the right hemisphere      Sleep:   Patient transitions to stage 2 sleep with noted sleep spindles and vertex waves.     Non-epileptiform Abnormalities:  Intermittent slow, generalized; there are bursts of generalized intermittent rhythmic delta activity  Asymmetry, decreased amplitude, background rhythm (PDR), left hemisphere: at times the background activity becomes fragmented in the left hemisphere  Intermittent slow, left hemisphere: intermittent bursts of delta slowing over the left hemisphere    Example showing bursts of generalized delta activity    Epileptiform Abnormalities:   None    Benign variants: none    Significant artifacts: none    EKG:   Normal sinus rhythm    Activating procedures:   Hyperventilation not performed  Photic stimulation does not result in epileptiform activity    Events:   Clinical none  Push button none      Impression:   Abnormal routine EEG showing bursts of generalized intermittent rhythmic delta activity, intermittent left hemispheric slowing, and asymmetric of the background rhythm.  There are  no epileptiform discharges recorded.     Clinical interpretation:  This awake and sleep EEG is consistent with a left hemispheric structural lesion and a moderate diffuse encephalopathy.  There are no epileptiform discharges or seizures recorded.     Autumn Pittman MD  [facility]   Department of Neurology

## 2022-09-29 NOTE — PROGRESS NOTES
Pharmacokinetic Initial Assessment: IV Vancomycin    Assessment/Plan:    Initiate intravenous vancomycin with loading dose of 1500 mg once followed by a maintenance dose of vancomycin 750mg IV every 12 hours  Desired empiric serum trough concentration is 15 to 20 mcg/mL  Draw vancomycin trough level 60 min prior to fourth dose on 9/30 at approximately 1200  Pharmacy will continue to follow and monitor vancomycin.      Please contact pharmacy at extension [phone] with any questions regarding this assessment.     Thank you for the consult,   Orquidea Kline       Patient brief summary:  Joseluis Garner is a 51 y.o. male initiated on antimicrobial therapy with IV Vancomycin for treatment of suspected  encephalitis     Drug Allergies:   Review of patient's allergies indicates:  No Known Allergies    Actual Body Weight:   63.5 kg    Renal Function:   Estimated Creatinine Clearance: 59.7 mL/min (based on SCr of 1.3 mg/dL).,     Dialysis Method (if applicable):  N/A    CBC (last 72 hours):  Recent Labs   Lab Result Units 09/28/22  2259   WBC K/uL 23.07*   Hemoglobin g/dL 15.2   Hematocrit % 45.0   Platelets K/uL 467*   Gran % % 89.5*   Lymph % % 5.8*   Mono % % 3.8*   Eosinophil % % 0.0   Basophil % % 0.2   Differential Method  Automated       Metabolic Panel (last 72 hours):  Recent Labs   Lab Result Units 09/28/22  2244 09/28/22  2255 09/28/22  2259   Sodium mmol/L  --   --  131*   Potassium mmol/L  --   --  4.1   Chloride mmol/L  --   --  90*   CO2 mmol/L  --   --  19*   Glucose mg/dL  --   --  164*   Glucose, UA   --  Negative  --    BUN mg/dL  --   --  15   Creatinine mg/dL  --   --  1.3   Creatinine, Urine mg/dL 286.7  --   --    Albumin g/dL  --   --  3.7   Total Bilirubin mg/dL  --   --  0.4   Alkaline Phosphatase U/L  --   --  59   AST U/L  --   --  26   ALT U/L  --   --  9*       Drug levels (last 3 results):  No results for input(s): VANCOMYCINRA, VANCORANDOM, VANCOMYCINPE, VANCOPEAK, VANCOMYCINTR,  [facility] in the last 72 hours.    Microbiologic Results:  Microbiology Results (last 7 days)       Procedure Component Value Units Date/Time    Blood culture #1 **CANNOT BE ORDERED STAT** [[id number]] Collected: 09/28/22 2245    Order Status: Sent Specimen: Blood from Peripheral, Lower Leg, Left Updated: 09/28/22 2304    Blood culture #2 **CANNOT BE ORDERED STAT** [[id number]] Collected: 09/28/22 2250    Order Status: Sent Specimen: Blood from Peripheral, Lower Leg, Right Updated: 09/28/22 2304

## 2022-09-29 NOTE — HPI
Joseluis Garner is a 52 yo male with a pmh of stage IV melanoma with mets to multiple areas including brain who presented to the ED with seizures and altered mental status. His mother reports that he stated he wasn't feeling well earlier today and then she heard him fall. She states he was having continuous seizures. He takes keppra and she is not aware of any missed doses. She states he last had a seizure in January. He is normally combative after a seizure but this was worse. He was just released from main campus a few days ago and was to follow up with palliative care and oncology. On EMS arrival, they noted flexion posturing with leftward fixed gaze followed by generalized tonic-clonic seizures. He was given 12mg versed en route. He was combative on arrival to the ED and intubated. He was loaded with keppra and given vanc and rocephin, 1L NS, ativan 2mg, and Propofol drip. WBC 23, lactic acid 9.4. CT head with no acute changes. UDS positive for benzos, opiates, and THC. He is awake and moving all limbs on exam. Follows commands. Maxed on propofol.

## 2022-09-29 NOTE — ASSESSMENT & PLAN NOTE
"- patient of Dr. Wise and nurse practitioner Kitty Segura  - s/p nivolumab/ipilimumab. Now receiving encorafenib/binimitnib/Opdivo  - MRI brain (9/23/22) revealed "increased prominence and multiple new intracranial lesions predominantly in the supratentorial compartment with only a single right cerebellar small lesion.  These lesions are small in size but numerous, some with mild surrounding vasogenic edema but no significant mass effect."  - I discussed his case with Dr. Wise. Overall, his prognosis is very poor. Unclear how the brain lesions influence his current seizure activity, although they likely are increasing his risk of seizures, especially if he has leptomeningeal disease/spinal fluid involvement.  - agree with palliative care consult.  - patient's mother recommends he be made DNR. Please confirm with patient's mother and patient (if he can be safely extubated).  - no role for inpatient chemotherapy at this time.  - depending on clinical recovery, hospice vs short-term follow-up with Dr. Wise is appropriate. Dr. Wise stated he suggested hospice previously.  "

## 2022-09-29 NOTE — ED PROVIDER NOTES
NAME:  Joseluis Garner  CSN:     [id number]  MRN:    [id number]  ADMIT DATE: 9/28/2022        eMERGENCY dEPARTMENT eNCOUnter    CHIEF COMPLAINT    Chief Complaint   Patient presents with    Seizures     Presents via [facility] with witnesses sz activity by family, then again on scene by EMS that lasted approx 2 minutes. Hx of metastatic melanoma. Pt was given Versed total of 12.5 mg IM en route. Unknown code status       HPI    Joseluis Garner is a 51 y.o. male with a past medical history of  has a past medical history of Seizures.     History limited due to acuity of patient presentation.    HPI       PAST MEDICAL HISTORY  Past Medical History:   Diagnosis Date    Seizures        SURGICAL HISTORY    No past surgical history on file.    FAMILY HISTORY    No family history on file.    SOCIAL HISTORY    Social History     Socioeconomic History    Marital status: Single   Tobacco Use    Smoking status: Every Day    Smokeless tobacco: Never   Substance and Sexual Activity    Alcohol use: Yes    Drug use: Yes     Types: Methamphetamines, Marijuana       ALLERGIES    Review of patient's allergies indicates:  No Known Allergies      REVIEW OF SYSTEMS   Review of Systems   Unable to perform ROS: Mental status change        PHYSICAL EXAM    Reviewed Triage Note    VITAL SIGNS:   ED Triage Vitals [09/28/22 2154]   Enc Vitals Group      BP       Pulse       Resp       Temp       Temp src       SpO2       Weight 140 lb      Height       Head Circumference       Peak Flow       Pain Score       Pain Loc       Pain Edu?       Excl. in GC?        Patient Vitals for the past 24 hrs:   BP Temp Temp src Pulse Resp SpO2 Weight   09/28/22 2350 (!) 188/106 -- -- 102 20 98 % --   09/28/22 2340 (!) 186/102 -- -- (!) 111 20 98 % --   09/28/22 2339 -- 98.2 °F (36.8 °C) Oral -- -- -- --   09/28/22 2330 (!) 199/120 -- -- (!) 113 20 98 % --   09/28/22 2310 -- -- -- (!) 120 20 98 % --   09/28/22 2300 (!) 214/121 -- -- (!) 123 20 98 % --    09/28/22 2250 (!) 204/130 -- -- (!) 137 20 99 % --   09/28/22 2240 -- -- -- (!) 135 20 99 % --   09/28/22 2230 (!) 181/118 -- -- (!) 137 (!) 21 100 % --   09/28/22 2220 -- -- -- (!) 158 (!) 54 99 % --   09/28/22 2210 -- -- -- (!) 161 (!) 52 98 % --   09/28/22 2200 -- -- -- (!) 159 (!) 57 100 % --   09/28/22 2154 -- -- -- -- -- -- 63.5 kg (140 lb)   09/28/22 2150 (!) 149/107 -- -- (!) 152 (!) 51 (!) 91 % --   09/28/22 2140 -- -- -- (!) 114 -- (!) 83 % --       Physical Exam    Constitutional:   Thin habitus   HENT:   Head: Normocephalic.   Superficial abrasion of distal forehead between eyebrows. Supericial abrasion to L lateral tongue from likely biting it.    Eyes: Pupils are equal, round, and reactive to light.   Pupils 4 mm and reactive BL    Neck: Neck supple.   Cardiovascular:  Regular rhythm.   Tachycardia present.         Pulmonary/Chest: Breath sounds normal. No respiratory distress.   Abdominal: Abdomen is soft. There is no abdominal tenderness.   Musculoskeletal:      Cervical back: Neck supple.      Comments: Moving all extremities, thrashing in bed     Neurological: He has normal strength. He is disoriented. GCS eye subscore is 4. GCS verbal subscore is 2. GCS motor subscore is 5.   Psychiatric: He is agitated and aggressive.          EKG     Interpreted by ERP if performed:   Compared to September 23, 2022, new ST depressions are noted in leads V4 and V5 without any other ST changes.  No new T-wave inversions.  QTC of 466.  Sinus tachycardia of 126.              LABS  Pertinent labs reviewed. (See chart for details)   Labs Reviewed   COMPREHENSIVE METABOLIC PANEL - Abnormal; Notable for the following components:       Result Value    Sodium 131 (*)     Chloride 90 (*)     CO2 19 (*)     Glucose 164 (*)     ALT 9 (*)     Anion Gap 22 (*)     All other components within normal limits   CBC W/ AUTO DIFFERENTIAL - Abnormal; Notable for the following components:    WBC 23.07 (*)     Platelets 467 (*)      Immature Granulocytes 0.7 (*)     Gran # (ANC) 20.7 (*)     Immature Grans (Abs) 0.17 (*)     Gran % 89.5 (*)     Lymph % 5.8 (*)     Mono % 3.8 (*)     All other components within normal limits   LACTIC ACID, PLASMA - Abnormal; Notable for the following components:    Lactate (Lactic Acid) 9.4 (*)     All other components within normal limits    Narrative:      LA  critical result(s) called and verbal readback obtained from Shoaib Moreno RN by [facility] 09/28/2022 23:45   ISTAT PROCEDURE - Abnormal; Notable for the following components:    POC PO2 114 (*)     POC Sodium 132 (*)     POC Potassium 3.1 (*)     All other components within normal limits   CULTURE, BLOOD   CULTURE, BLOOD   TSH   DRUG SCREEN PANEL, URINE EMERGENCY   URINALYSIS   URINALYSIS MICROSCOPIC   SARS-COV-2 RDRP GENE    Narrative:     This test utilizes isothermal nucleic acid amplification   technology to detect the SARS-CoV-2 RdRp nucleic acid segment.   The analytical sensitivity (limit of detection) is 125 genome   equivalents/mL.   A POSITIVE result implies infection with the SARS-CoV-2 virus;   the patient is presumed to be contagious.     A NEGATIVE result means that SARS-CoV-2 nucleic acids are not   present above the limit of detection. A NEGATIVE result should be   treated as presumptive. It does not rule out the possibility of   COVID-19 and should not be the sole basis for treatment decisions.   If COVID-19 is strongly suspected based on clinical and exposure   history, re-testing using an alternate molecular assay should be   considered.   This test is only for use under the Food and Drug   Administration s Emergency Use Authorization (EUA).   Commercial kits are provided by [employer].   Performance characteristics of the EUA have been independently   verified by [facility] Department of   Pathology and Laboratory Medicine.   _________________________________________________________________   The authorized Fact Sheet for  Healthcare Providers and the authorized Fact   Sheet for Patients of the ID NOW COVID-19 are available on the FDA   website:     https://www.fda.gov/media/646639/download  https://www.fda.gov/media/582487/download                 RADIOLOGY          Imaging Results              CT Head Without Contrast (Final result)  Result time 09/28/22 23:47:07      Final result by Khoi Herrera MD (09/28/22 23:47:07)                   Impression:      Stable CT of the brain with no new hemorrhage, mass or infarction.    Intubation NG tube placement.      Electronically signed by: Khoi Herrera  Date:    09/28/2022  Time:    23:47               Narrative:    EXAMINATION:  CT HEAD WITHOUT CONTRAST    CLINICAL HISTORY:  Mental status change, unknown cause;    TECHNIQUE:  Low dose axial images were obtained through the head.  Coronal and sagittal reformations were also performed. Contrast was not administered.    COMPARISON:  MRI of the brain, 09/23/2022    FINDINGS:  Area of vague low-density in the left frontal parietal junction is again noted in region of previous enhancement.  The other abnormality seen on MR are not confidently identified as discrete lesions on CT.  No new mass, mass effect or pathologic fluid collection is evident.  There is no evidence of hemorrhage or hydrocephalus no subarachnoid or subdural fluid collection is evident.    The bony structures remain intact.  Opacity in the nasopharynx from NG tube as well as ET tube are noted.                                       XR Non-Rad Performed NG/Gastric Tube Check (Final result)  Result time 09/28/22 23:06:45      Final result by Khoi Herrera MD (09/28/22 23:06:45)                   Impression:      As above      Electronically signed by: Khoi Herrera  Date:    09/28/2022  Time:    23:06               Narrative:    EXAMINATION:  XR NON-RADIOLOGIST PERFORMED NG/GASTRIC TUBE CHECK    CLINICAL HISTORY:  insertion of OGT;    TECHNIQUE:  KUB centered at  the diaphragm    FINDINGS:  NG tube is coiled over the fundus and body of the stomach with tip and side port oriented to the greater curvature.                                       X-Ray Chest AP Portable (Final result)  Result time 09/28/22 23:02:38      Final result by Edwina Ashby MD (09/28/22 23:02:38)                   Impression:      Please see above.      Electronically signed by: Edwina Ashby MD  Date:    09/28/2022  Time:    23:02               Narrative:    EXAMINATION:  XR CHEST AP PORTABLE    CLINICAL HISTORY:  Other specified health status    TECHNIQUE:  Single frontal view of the chest was performed.    COMPARISON:  09/22/2022    FINDINGS:  Cardiac monitoring leads overlie the chest.  Interval intubation with endotracheal tube tip projecting at the inferior margin of the clavicular heads, approximately 5.7 cm above the norah.  Enteric tube courses below the diaphragm, coiling upon itself in the presumed region of the gastric fundus and extending beyond the field of view.  There is apparent gaseous distension of visualized stomach.  The cardiomediastinal silhouette is unchanged in size and configuration and mediastinal structures are midline.  The lungs are symmetrically expanded without interval detrimental change in lung aeration.  No substantial volume of pleural fluid or evidence of new or enlarging pneumothorax.                                        PROCEDURES    Critical Care    Date/Time: 9/28/2022 10:44 PM  Performed by: Elizabeth Manriquez DO  Authorized by: Virginia Sim MD   Direct patient critical care time: 30 minutes  Additional history critical care time: 15 minutes  Ordering / reviewing critical care time: 10 minutes  Documentation critical care time: 10 minutes  Consulting other physicians critical care time: 3 minutes  Consult with family critical care time: 3 minutes  Total critical care time (exclusive of procedural time) : 71 minutes  Critical care time was exclusive of  separately billable procedures and treating other patients.  Critical care was necessary to treat or prevent imminent or life-threatening deterioration of the following conditions: CNS failure or compromise.  Critical care was time spent personally by me on the following activities: ventilator management, review of old charts, re-evaluation of patient's condition, pulse oximetry, ordering and review of laboratory studies, ordering and performing treatments and interventions, obtaining history from patient or surrogate, examination of patient, evaluation of patient's response to treatment and development of treatment plan with patient or surrogate.      Intubation    Date/Time: 9/29/2022 4:05 AM  Location procedure was performed: [facility] EMERGENCY DEPARTMENT  Performed by: Elizabeth Manriquez DO  Authorized by: Virginia Sim MD   Consent Done: Emergent Situation  Indications: airway protection  Intubation method: video-assisted  Patient status: paralyzed (RSI)  Preoxygenation: nasal cannula and BVM  Sedatives: see MAR for details and etomidate  Paralytic: rocuronium  Laryngoscope size: Glide 3  Tube size: 7.5 mm  Tube type: cuffed  Number of attempts: 2  Ventilation between attempts: BVM  Cricoid pressure: no  Cords visualized: yes  Post-procedure assessment: chest rise and CO2 detector  Breath sounds: clear  Cuff inflated: yes  ETT to lip: 25 cm  Tube secured with: ETT shipman  Chest x-ray interpreted by radiologist.  Chest x-ray findings: endotracheal tube in appropriate position  Patient tolerance: Patient tolerated the procedure well with no immediate complications          ED COURSE & MEDICAL DECISION MAKING    Pertinent Labs & Imaging studies reviewed. 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  Discharge as full code.  Patient made aware that cancer is not curative.    Joseluis Garner is a 51 y.o. male presenting with altered mental status.  Per medics he had some flexion posturing with leftward fixed gated is on their arrival follow-up by generalized tonic clonic seizures.  They did give a total of Versed 12.5 mg EN route.  On arrival here he is altered, moving all extremities, pupils equal, 4 mm and reactive.  Decision to intubate was made as he does have a history of metastatic melanoma with Mets to the brain and primary concern was acute bleed versus postictal state.  He was given 5 mg of droperidol as well as 2 mg of Ativan IM as he continued to be very agitated and writhing in the bed.  IV was successfully placed.  We did attempt to give 10 mg of etomidate through IV which did appear to infiltrate.  We then proceeded with an IO to the left tibia which flushed easily.  He did receive a total of 30 of etomidate and 80 of rocuronium through the IO, but after quite a few minutes did not seem to be experiencing optimal sedation or paralysis.  We were able to obtain IV access in the left AC at which time an additional 20 of etomidate and 80 of rocuronium were given and patient was successfully sedated and intubated.  Differential diagnosis additionally includes was not limited to underlying infection, encephalitis.  No additional seizure activity here.  He was loaded with Keppra, treated empirically for meningitis with vanc and Rocephin.  LP held due to his history of brain and spinal cord mets. Mother updated on plan of care. He remains full code in the emergency department, conversation was had in the ED with her and she does anticipate further talks regarding palliative care and future management. Admitted to ICU.         Medications   propofol (DIPRIVAN) 10 mg/mL infusion (15 mcg/kg/min × 63.5 kg Intravenous Rate/Dose Change 9/28/22 1960)   vancomycin - pharmacy to dose (has no  administration in time range)   cefTRIAXone (ROCEPHIN) 2 g/50 mL D5W IVPB (2 g Intravenous New Bag 9/29/22 0000)   vancomycin 1.5 g in dextrose 5 % 250 mL IVPB (ready to mix) (has no administration in time range)   droperidoL injection 5 mg (5 mg Intramuscular Given 9/28/22 2141)   lorazepam injection 2 mg (2 mg Intramuscular Given 9/28/22 2141)   etomidate injection 20 mg (20 mg Intravenous Given 9/28/22 2200)   rocuronium injection 80 mg (80 mg Intravenous Given 9/28/22 2211)   etomidate injection 20 mg (20 mg Intravenous Given 9/28/22 2208)   rocuronium injection 80 mg (80 mg Intravenous Given 9/28/22 2223)   etomidate injection 20 mg (20 mg Intravenous Given 9/28/22 2223)   sodium chloride 0.9% bolus 1,000 mL (0 mLs Intravenous Stopped 9/28/22 2347)   levETIRAcetam injection 1,000 mg (1,000 mg Intravenous Given 9/28/22 2247)       ED Course as of 09/29/22 0404   Wed Sep 28, 2022   2327 WBC(!): 23.07 [HL]      ED Course User Index  [HL] Elizabeth Manriquez DO         FINAL IMPRESSION    1. Seizure    2. Intubation of airway performed without difficulty    3. Altered mental state          DISPOSITION  Patient admitted to ICU in critical condition          DISCLAIMER: This note was prepared with [employer] voice recognition transcription software. Garbled syntax, mangled pronouns, and other bizarre constructions may be attributed to that software system.      Elizabeth Manriquez DO  09/29/2022  10:44 PM     Elizabeth Manriquez DO  09/29/22 0407

## 2022-09-29 NOTE — CONSULTS
"[facility] Consult Note    Primary Attending Physician: Virginia Sim MD  Consultant Attending: Khoi Meza MD  Consultant Fellow: Gregorio Beltrán MD  Consult for: vent management        Subjective:      History of Present Illness:  Joseluis Garner is a 51 y.o.  male who has PMHx of Stage IV melanoma with mets to brain, liver, lungs, axillary lymph nodes s/p immunotherapy, CAD s/p PCI, and seizures who presented to the [facility] on 9/28/2022 with a primary complaint of 2min witnessed seizure and AMS.     Per mother at bedside, pt stated he wasn't feeling well PTA and then she heard him fall at home. She states he was having continuous seizures. He takes keppra and she is not aware of any missed doses. She states he last had a seizure in January.  He is normally combative after a seizure but this was worse. He was just released from main campus three days ago after hospitalization for diarrhea.  Workup at that time noted progression of disease and he was to follow up with palliative care and oncology. On EMS arrival, they noted flexion posturing with leftward fixed gaze followed by generalized tonic-clonic seizures. He was given 12.5mg versed en route. He was combative on arrival to the ED and intubated for imaging. Sedated with propofol and precedex. CT head without hemorrhage.  He was also keppra loaded and given vanc and rocephin in ED.  1L NS, ativan 2mg, and Propofol drip. WBC 23, lactic acid 9.4.  UDS positive for benzos, opiates, and THC.  Per chart review, he was initially following commands.    Upon our initial interview today, patient was intubated and sedated.  Pulling good volumes during SBT.   Performed SAT later this afternoon and patient woke up within a few minutes.  Was very agitated, moving all extremities and trying to climb out of bed.  Was following commands and thus extubated at bedside this afternoon without issue.  He immediately stated "I have to pee".     Past " Medical History:  Past Medical History:   Diagnosis Date    Seizures        Past Surgical History:  No past surgical history on file.    Allergies:  Review of patient's allergies indicates:  No Known Allergies    Medications:   In-Hospital Scheduled Medications:   dexAMETHasone  4 mg Per OG tube Q12H    famotidine (PF)  20 mg Intravenous Q12H    lactated ringers  500 mL Intravenous Once    levetiracetam IV  1,000 mg Intravenous Q12H    magnesium sulfate IVPB  2 g Intravenous Once    mupirocin   Nasal BID    potassium bicarbonate  25 mEq Per OG tube Q4H    vancomycin (VANCOCIN) IVPB  750 mg Intravenous Q12H      In-Hospital PRN Medications:  acetaminophen, dextrose 10%, dextrose 10%, glucagon (human recombinant), ondansetron, sodium chloride 0.9%, Pharmacy to dose Vancomycin consult **AND** vancomycin - pharmacy to dose   In-Hospital IV Infusion Medications:   dexmedetomidine (PRECEDEX) infusion 0.6 mcg/kg/hr (09/29/22 0823)    NORepinephrine bitartrate-D5W 0.14 mcg/kg/min (09/29/22 0823)    propofoL 40 mcg/kg/min (09/29/22 0823)      Home Medications:  Prior to Admission medications    Medication Sig Start Date End Date Taking? Authorizing Provider   ALPRAZolam (XANAX) 1 MG tablet Take 1 mg by mouth 2 (two) times daily as needed. 8/23/22   Historical Provider   buPROPion (WELLBUTRIN SR) 100 MG TBSR 12 hr tablet Take 1 tablet (100 mg total) by mouth 2 (two) times daily. 5/10/22 5/10/23  Kitty Segura NP   clobetasoL (TEMOVATE) 0.05 % cream Apply topically 2 (two) times daily.  Patient taking differently: Apply topically 2 (two) times daily as needed. 7/8/22   Clayton Wise MD   dabrafenib (TAFINLAR) 75 mg Cap Take 150 mg by mouth. 4/4/22   Historical Provider   dexAMETHasone (DECADRON) 4 MG Tab Take 1 tablet (4 mg total) by mouth every 12 (twelve) hours. for 10 days 9/26/22 10/6/22  Greg Jorge MD   famotidine (PEPCID) 20 MG tablet Take 1 tablet (20 mg total) by mouth once daily. 8/22/22 8/22/23  Baltazar  YASMIN Diaz,    hydrOXYzine pamoate (VISTARIL) 25 MG Cap Take 25 mg by mouth every 6 (six) hours as needed. 22   Historical Provider   levETIRAcetam (KEPPRA) 1000 MG tablet Take 1 tablet (1,000 mg total) by mouth 2 (two) times a day. 22  Baltazar Diaz DO   morphine (MS CONTIN) 60 MG 12 hr tablet Take 1 tablet (60 mg total) by mouth 2 (two) times daily. 22   Guerline Crouch MD   OLANZapine (ZYPREXA) 2.5 MG tablet Take 1 tablet (2.5 mg total) by mouth every 6 (six) hours as needed (anxiety). 22   Guerline Crouch MD   ondansetron (ZOFRAN-ODT) 8 MG TbDL Dissolve 1 tablet (8 mg total) by mouth every 12 (twelve) hours as needed. 22  Guerline Crouch MD   oxyCODONE (ROXICODONE) 30 MG Tab Take 1 tablet (30 mg total) by mouth every 4 (four) hours as needed (cancer related pain). 22   Guerline Crouch MD   polyethylene glycol (GLYCOLAX) 17 gram/dose powder Dissolve one capful (17 g) in liquid and take by mouth once daily. 22   Roel Campos MD   prochlorperazine (COMPAZINE) 5 MG tablet Take 1 tablet (5 mg total) by mouth every 6 (six) hours as needed for Nausea. 22  Clayton Wise MD   senna (SENNA) 8.6 mg tablet Take 1 tablet by mouth twice daily 3/22/22      senna-docusate 8.6-50 mg (PERICOLACE) 8.6-50 mg per tablet Take 1 tablet by mouth daily as needed for Constipation. 22   Clayton Wise MD   trametinib (MEKINIST) 2 mg Tab Take 2 mg by mouth. 22   Historical Provider       Family History:  Father- cancer    Social History:  Social History     Tobacco Use    Smoking status: Every Day    Smokeless tobacco: Never   Substance Use Topics    Alcohol use: Yes    Drug use: Yes     Types: Methamphetamines, Marijuana       Review of Systems:  Unable to obtain -- patient intubated.     Objective:   Last 24 Hour Vital Signs:  BP  Min: 70/53  Max: 214/121  Temp  Av.5 °F (36.9 °C)  Min: 98.1 °F (36.7 °C)  Max: 99 °F (37.2  "°C)  Pulse  Av.3  Min: 68  Max: 161  Resp  Av.9  Min: 20  Max: 57  SpO2  Av.6 %  Min: 83 %  Max: 100 %  Height  Av' 9" (175.3 cm)  Min: 5' 9" (175.3 cm)  Max: 5' 9" (175.3 cm)  Weight  Av kg (136 lb 11 oz)  Min: 61 kg (134 lb 7.7 oz)  Max: 63.5 kg (140 lb)  I/O last 3 completed shifts:  In: 934.3 [I.V.:154.3; IV Piggyback:780]  Out: 400 [Urine:400]    Physical Examination:  General appearance:  NAD, intubated and sedated, thin appearing.  During SAT, patient moving all extremeties and following commands  Head: Normocephalic, without obvious abnormality, atraumatic  Eyes: conjunctivae/corneas clear. PERRL, EOM's intact. Fundi benign.  Neck: supple, symmetrical, trachea midline  Lungs:  mechanical lung sounds, normal rise/fall of chest wall  Heart: regular rate and rhythm, S1, S2 normal, no murmur, click, rub or gallop  Abdomen:  soft, ND, +BS  Extremities: extremities normal, atraumatic, no cyanosis or edema and normal ROM  Pulses: 2+ and symmetric  Skin:  warm, dry, intact, pale, tatoos  Neurologic: sedated with precedex. Gag/cough/corneal reflexes intact    Laboratory:  Trended Lab Data:  Recent Labs     22  2250 22  2259 22  0406   WBC  --  23.07* 23.90*   HGB  --  15.2 14.0   HCT 47 45.0 40.3   PLT  --  467* 406   NA  --  131* 133*   K  --  4.1 3.3*   CL  --  90* 93*   CO2  --  19* 28   BUN  --  15 13   CREATININE  --  1.3 1.0   GLU  --  164* 68*   BILITOT  --  0.4  --    AST  --  26  --    ALT  --  9*  --    ALKPHOS  --  59  --    CALCIUM  --  9.1 8.8   ALBUMIN  --  3.7  --    PROT  --  7.5  --    MG  --   --  1.8       Cardiac: No results for input(s): TROPONINI, CKTOTAL, CKMB, BNP in the last 168 hours.    Urinalysis:   Lab Results   Component Value Date    COLORU Yellow 2022    SPECGRAV 1.030 2022    NITRITE Negative 2022    KETONESU Negative 2022    UROBILINOGEN Negative 2022       Microbiology:  Microbiology Results (last 7 days)       " Procedure Component Value Units Date/Time    Blood culture #1 **CANNOT BE ORDERED STAT** [[id number]] Collected: 09/28/22 2245    Order Status: Sent Specimen: Blood from Peripheral, Lower Leg, Left Updated: 09/28/22 2304    Blood culture #2 **CANNOT BE ORDERED STAT** [[id number]] Collected: 09/28/22 2250    Order Status: Sent Specimen: Blood from Peripheral, Lower Leg, Right Updated: 09/28/22 2304            Radiology:  CT Head Without Contrast    Result Date: 9/28/2022  EXAMINATION: CT HEAD WITHOUT CONTRAST CLINICAL HISTORY: Mental status change, unknown cause; TECHNIQUE: Low dose axial images were obtained through the head.  Coronal and sagittal reformations were also performed. Contrast was not administered. COMPARISON: MRI of the brain, 09/23/2022 FINDINGS: Area of vague low-density in the left frontal parietal junction is again noted in region of previous enhancement.  The other abnormality seen on MR are not confidently identified as discrete lesions on CT.  No new mass, mass effect or pathologic fluid collection is evident.  There is no evidence of hemorrhage or hydrocephalus no subarachnoid or subdural fluid collection is evident. The bony structures remain intact.  Opacity in the nasopharynx from NG tube as well as ET tube are noted.     Stable CT of the brain with no new hemorrhage, mass or infarction. Intubation NG tube placement. Electronically signed by: Khoi Herrera Date:    09/28/2022 Time:    23:47    MRI Brain W WO Contrast    Increased prominence and multiple new intracranial lesions predominantly in the supratentorial compartment with only a single right cerebellar small lesion.  These lesions are small in size but numerous, some with mild surrounding vasogenic edema but no significant mass effect.  Although neoplastic progression to be considered, in light of the history of immunotherapy, treatment related change/an element of pseudoprogression to also be considered.  This can be re-evaluated on  follow-up imaging.  Due to the superficial location of the majority of these lesions, CSF dissemination should be considered. Electronically signed by: Zhen Chairez Date:    09/23/2022 Time:    12:18    CT Abdomen Pelvis With Contrast    Result Date: 9/23/2022  EXAMINATION: CT ABDOMEN PELVIS WITH CONTRAST CLINICAL     Marked distention of the rectum with fluid noting proximal loops of sigmoid colon are distended with fluid to a lesser degree.  No significant wall thickening or hyperenhancement.  Correlate clinically for diarrheal illness. The stomach is not fully distended however there is some wall thickening at the greater curvature, nonspecific and could represent gastritis or sequela of possible residual peritoneal carcinomatosis noted on prior study. Stable superior endplate deformities at T12 and L2 without evidence of acute fracture or osseous abnormality.  Visualized lower thoracic and lumbar spine appears similar to prior study. Marked improvement in the intra-abdominal ascites. Additional findings as above. Electronically signed by resident: Aliyah Villaseñor Date:    09/23/2022 Time:    00:58 Electronically signed by: Kwan Kee MD Date:    09/23/2022 Time:    01:53    X-Ray Chest AP Portable    Result Date: 9/28/2022  EXAMINATION: XR CHEST AP PORTABLE CLINICAL HISTORY: Other specified health status TECHNIQUE: Single frontal view of the chest was performed. COMPARISON: 09/22/2022 FINDINGS: Cardiac monitoring leads overlie the chest.  Interval intubation with endotracheal tube tip projecting at the inferior margin of the clavicular heads, approximately 5.7 cm above the norah.  Enteric tube courses below the diaphragm, coiling upon itself in the presumed region of the gastric fundus and extending beyond the field of view.  There is apparent gaseous distension of visualized stomach.  The cardiomediastinal silhouette is unchanged in size and configuration and mediastinal structures are midline.  The  lungs are symmetrically expanded without interval detrimental change in lung aeration.  No substantial volume of pleural fluid or evidence of new or enlarging pneumothorax.     Please see above. Electronically signed by: Edwina Ashby MD Date:    09/28/2022 Time:    23:02        Current Medications:     Infusions:   dexmedetomidine (PRECEDEX) infusion 0.6 mcg/kg/hr (09/29/22 0823)    NORepinephrine bitartrate-D5W 0.14 mcg/kg/min (09/29/22 0823)    propofoL 40 mcg/kg/min (09/29/22 0823)        Scheduled:   dexAMETHasone  4 mg Per OG tube Q12H    famotidine (PF)  20 mg Intravenous Q12H    lactated ringers  500 mL Intravenous Once    levetiracetam IV  1,000 mg Intravenous Q12H    magnesium sulfate IVPB  2 g Intravenous Once    mupirocin   Nasal BID    potassium bicarbonate  25 mEq Per OG tube Q4H    vancomycin (VANCOCIN) IVPB  750 mg Intravenous Q12H        PRN:  acetaminophen, dextrose 10%, dextrose 10%, glucagon (human recombinant), ondansetron, sodium chloride 0.9%, Pharmacy to dose Vancomycin consult **AND** vancomycin - pharmacy to dose     Assessment:     Joseluis Garner is a 51 y.o. male with:  Patient Active Problem List    Diagnosis Date Noted    CAD S/P percutaneous coronary angioplasty 09/23/2022    Lactic acidosis 09/23/2022    Anxiety 09/23/2022    Goals of care, counseling/discussion 08/19/2022    Severe malnutrition 08/19/2022    Agitation 04/19/2022    Ileus 04/18/2022    Hyperkalemia 04/14/2022    Intractable nausea and vomiting 04/14/2022    Ascites 04/04/2022    Metastatic melanoma 04/04/2022    Hyponatremia 04/04/2022    PAULA (acute kidney injury) 04/04/2022    Seizure 06/27/2021    Post-ictal confusion     Migraine 02/18/2021    Leukocytosis 02/18/2021    History of seizure 02/18/2021    Altered mental status 02/18/2021        Plan:     Neuro/Psych:  #Seizure disorder  #Brain Metastases 2/2 Stage IV melanoma  - on home Keppra 1000 BID  - s/p 12.5mg Versed, 2mg Ativan in ED  - Initial lactic  acidosis 2/2 seizures --> improved to 1.3 this morning  - Keppra loaded in ED -- continue home dose  - Weaned propofol this morning -- OK to keep precedex PRN if needed for agitation  - Initial lactic acidosis 2/2 seizures, improved to 1.3 this morning  - Recommend Neurology consult for input on management of seizure disorder given worsening leptomeningeal disease as well as possibility of CSF dissemination, per MRI on previous admission  - EEG pending  - Agree with palliative follow-up     Cardiovascular/Hemodynamics:  #hypotension  - likely 2/2 to hypovolemia given recent diarrheal illness. Fluid resusciated 3L since admit, currently net even fluid balance  - on levo for pressor support -- recommend weaning once patient is adequately fluid resuscitated    Respiratory:   Vent Mode: A/CMV-VC  Oxygen Concentration (%):  [40] 40  Resp Rate Total:  [20 br/min-46 br/min] 21 br/min  Vt Set:  [400 mL] 400 mL  PEEP/CPAP:  [5 cmH20-6.3 cmH20] 5 cmH20  Mean Airway Pressure:  [6.6 cmH20-10.5 cmH20] 7.4 cmH20  - intubated for imaging due to agitation upon arrival  - vent settings as above this morning, passed SBT/SAT  - successfully extubated o  his afternoon    #lung metastases 2/2 melanoma  - see heme/onc  - supplemental oxygen PRN    GI/FEN:  #diarrhea  - recent hospitalization for diarrhea  - stool studies were negative at that time, elevated fecal cary possibly 2/2 colitis from immunotherapy  - one bowel movement today, per nursing   - supportive care PRN    F: received 1L NS in ED, 4 bolus 500c LR -- replete PRN to maintain euvolemia and wean pressors  E: mild hypokalemia, replete PRN  N: NPO with NG in place  Ppx: PPI, SCD    ID:   - de-escalate vanc as there is no known infectious source at this time  - Afebrile  - no acute issues    Renal:   - Cr 1.0  - no acute issues    Heme/Onc:   #Stage IV Melanoma w/mets to brain, lung, liver, axillary lymph nodes  - follows with Dr. Wise at [facility]  - s/p  "nivolumab/ipilimumab. Now receiving encorafenib/binimitnib/Opdivo  - per recent MRI 9/23/22:   "Increased prominence and multiple new intracranial lesions predominantly in the supratentorial compartment with only a single right cerebellar small lesion.  These lesions are small in size but numerous, some with mild surrounding vasogenic edema but no significant mass effect.  Although neoplastic progression to be considered, in light of the history of immunotherapy, treatment related change/an element of pseudoprogression to also be considered.  This can be re-evaluated on follow-up imaging.  Due to the superficial location of the majority of these lesions, CSF dissemination should be considered.  - Heme/Onc consulted, no role for impatiant treatment at this time.   - Recommend Neuro consult  - needs followup with primary oncologist regarding outpatient therapy options  - needs GOC discussion with patient and mother on code status and possible palliative care services    # leukocytosis  - wbc 23, likely 2/2 recent steroids from discharge and seizures  - continue to trend    Endocrine:  - Tsh wnl  - continue taper for stress dose steroids    Musculoskeletal:   - no acute issues at this time    Dispo: OK to step down once off of pressors    Thank you for allowing us to participate in the care of this patient.  We will sign-off at this time.      Jody Marinelli MD  [facility] Fellow    "

## 2022-09-29 NOTE — ASSESSMENT & PLAN NOTE
Noted with worsening metastatic disease on recent admission  -hold home meds  -consult palliative care

## 2022-09-29 NOTE — ED NOTES
Presents with seizures and unresponsive episodes at home, given 12.5 mg of IM versed by EMS, patient became combative when transferred into ED stretcher.  Decided that patient needed to be intubated for effective head CT scan, multiple IV attempts and an IO insertion attempted with difficulty due to combativeness, eventually 20G in left AC.  Medications as charted for RSI    Airway intubated with a 7.5 ETT, 24cm at the teeth  Breathing via ventilator as charted, arterial blood gas attended as charted.  Monitored in sinus tachy, occasional runs of bigeminy noted, hypertensive as charted  Patient sedated with propofol as charted.  Afebrile, nil obvious signs of pain or discomfort.  IDC insitu as charted, draining adequate amounts of straw colored urine.    RS RN

## 2022-09-29 NOTE — PROGRESS NOTES
Therapy with vancomycin complete and/or consult discontinued by provider.  Pharmacy will sign off, please re-consult as needed.    Eileen Garcia, PharmD, BCPS  Clinical Pharmacist  [phone]

## 2022-09-29 NOTE — SUBJECTIVE & OBJECTIVE
Patient's mother states that she understands his overall prognosis. She states that patient's sister moved to [place] this week, and that Mr. Garner seems to have more seizures when stressed, which she thinks he was due to his sister.      Oncology Treatment Plan:   OP NIVOLUMAB 1 MG/KG IPILIMUMAB 3MG/KG FOLLOWED BY NIVOLUMAB 480MG Q4W    Medications:  Continuous Infusions:   dexmedetomidine (PRECEDEX) infusion 0.6 mcg/kg/hr (09/29/22 0823)    NORepinephrine bitartrate-D5W 0.14 mcg/kg/min (09/29/22 0823)    propofoL 40 mcg/kg/min (09/29/22 0823)     Scheduled Meds:   dexAMETHasone  4 mg Per OG tube Q12H    famotidine (PF)  20 mg Intravenous Q12H    lactated ringers  500 mL Intravenous Once    levetiracetam IV  1,000 mg Intravenous Q12H    magnesium sulfate IVPB  2 g Intravenous Once    mupirocin   Nasal BID    potassium bicarbonate  25 mEq Per OG tube Q4H    vancomycin (VANCOCIN) IVPB  750 mg Intravenous Q12H     PRN Meds:acetaminophen, dextrose 10%, dextrose 10%, glucagon (human recombinant), ondansetron, sodium chloride 0.9%, Pharmacy to dose Vancomycin consult **AND** vancomycin - pharmacy to dose     Review of patient's allergies indicates:  No Known Allergies     Past Medical History:   Diagnosis Date    Seizures      No past surgical history on file.  Family History    None       Tobacco Use    Smoking status: Every Day    Smokeless tobacco: Never   Substance and Sexual Activity    Alcohol use: Yes    Drug use: Yes     Types: Methamphetamines, Marijuana    Sexual activity: Not on file       Review of Systems   Unable to perform ROS: Intubated   Objective:     Vital Signs (Most Recent):  Temp: 98.1 °F (36.7 °C) (09/29/22 0745)  Pulse: 71 (09/29/22 0823)  Resp: (!) 33 (09/29/22 0823)  BP: (!) 78/59 (09/29/22 0815)  SpO2: 100 % (09/29/22 0823)   Vital Signs (24h Range):  Temp:  [98.1 °F (36.7 °C)-99 °F (37.2 °C)] 98.1 °F (36.7 °C)  Pulse:  [] 71  Resp:  [20-57] 33  SpO2:  [83 %-100 %] 100 %  BP:  ()/() 78/59     Weight: 61 kg (134 lb 7.7 oz)  Body mass index is 19.86 kg/m².  Body surface area is 1.72 meters squared.      Intake/Output Summary (Last 24 hours) at 9/29/2022 0847  Last data filed at 9/29/2022 0823  Gross per 24 hour   Intake 2088.27 ml   Output 430 ml   Net 1658.27 ml       Physical Exam  Constitutional:       Appearance: Normal appearance.   HENT:      Head: Atraumatic.      Mouth/Throat:      Comments: intubated  Cardiovascular:      Rate and Rhythm: Normal rate.   Pulmonary:      Comments: intubated  Abdominal:      General: Bowel sounds are normal.      Palpations: Abdomen is soft.   Musculoskeletal:         General: No swelling.   Skin:     General: Skin is warm.   Neurological:      Comments: Intubated/sedated   Psychiatric:      Comments: Unable to assess       Significant Labs:   CBC:   Recent Labs   Lab 09/28/22 2250 09/28/22 2259 09/29/22  0406   WBC  --  23.07* 23.90*   HGB  --  15.2 14.0   HCT 47 45.0 40.3   PLT  --  467* 406    and CMP:   Recent Labs   Lab 09/28/22 2259 09/29/22  0406   * 133*   K 4.1 3.3*   CL 90* 93*   CO2 19* 28   * 68*   BUN 15 13   CREATININE 1.3 1.0   CALCIUM 9.1 8.8   PROT 7.5  --    ALBUMIN 3.7  --    BILITOT 0.4  --    ALKPHOS 59  --    AST 26  --    ALT 9*  --    ANIONGAP 22* 12       Diagnostic Results:  MRI brain (9/23/22): I have personally reviewed his images  Since the prior study there has been the development of numerous small foci of enhancement noted throughout the cerebral hemispheres predominately superficial in location along the cortical margin.  Only a single 3 mm lesion is identified within the right cerebellar hemisphere without other evidence of posterior fossa involvement.  Some of these lesions have associated mild vasogenic edema most notable within the left precentral gyrus and medial right temporal lobe.  There is no significant mass effect with no midline shift or herniation.  Due to superficial location,  component CSF dissemination to be considered.     The previously identified lesions appear more prominent than the prior study although some, notable within the left caudate head, also demonstrate some increased mineralization.     No evidence of acute infarct or intracranial hemorrhage.     Normal arterial flow voids are preserved at the skull base.     The visualized sinuses and mastoid air cells are clear.     These findings were communicated to Dr. Jorge at 12:10 on 09/23/2022     Impression:     Increased prominence and multiple new intracranial lesions predominantly in the supratentorial compartment with only a single right cerebellar small lesion.  These lesions are small in size but numerous, some with mild surrounding vasogenic edema but no significant mass effect.  Although neoplastic progression to be considered, in light of the history of immunotherapy, treatment related change/an element of pseudoprogression to also be considered.  This can be re-evaluated on follow-up imaging.  Due to the superficial location of the majority of these lesions, CSF dissemination should be considered.

## 2022-09-29 NOTE — H&P
[facility] Medicine  History & Physical    Patient Name: Joseluis Garner  MRN: [id number]  Patient Class: IP- Inpatient  Admission Date: 9/28/2022  Attending Physician: Virginia Sim MD   Primary Care Provider: Clayton Wise MD         Patient information was obtained from parent, EMS personnel, past medical records and ER records.     Subjective:     Principal Problem:Seizure    Chief Complaint:   Chief Complaint   Patient presents with    Seizures     Presents via [facility] with witnesses sz activity by family, then again on scene by EMS that lasted approx 2 minutes. Hx of metastatic melanoma. Pt was given Versed total of 12.5 mg IM en route. Unknown code status        HPI: Joseluis Garner is a 50 yo male with a pmh of stage IV melanoma with mets to multiple areas including brain who presented to the ED with seizures and altered mental status. His mother reports that he stated he wasn't feeling well earlier today and then she heard him fall. She states he was having continuous seizures. He takes keppra and she is not aware of any missed doses. She states he last had a seizure in January. He is normally combative after a seizure but this was worse. He was just released from [facility] a few days ago and was to follow up with palliative care and oncology. On EMS arrival, they noted flexion posturing with leftward fixed gaze followed by generalized tonic-clonic seizures. He was given 12mg versed en route. He was combative on arrival to the ED and intubated. He was loaded with keppra and given vanc and rocephin, 1L NS, ativan 2mg, and Propofol drip. WBC 23, lactic acid 9.4. CT head with no acute changes. UDS positive for benzos, opiates, and THC. He is awake and moving all limbs on exam. Follows commands. Maxed on propofol.       Past Medical History:   Diagnosis Date    Seizures        No past surgical history on file.    Review of patient's allergies indicates:  No Known  Allergies    No current facility-administered medications on file prior to encounter.     Current Outpatient Medications on File Prior to Encounter   Medication Sig    ALPRAZolam (XANAX) 1 MG tablet Take 1 mg by mouth 2 (two) times daily as needed.    buPROPion (WELLBUTRIN SR) 100 MG TBSR 12 hr tablet Take 1 tablet (100 mg total) by mouth 2 (two) times daily.    clobetasoL (TEMOVATE) 0.05 % cream Apply topically 2 (two) times daily. (Patient taking differently: Apply topically 2 (two) times daily as needed.)    dabrafenib (TAFINLAR) 75 mg Cap Take 150 mg by mouth.    dexAMETHasone (DECADRON) 4 MG Tab Take 1 tablet (4 mg total) by mouth every 12 (twelve) hours. for 10 days    famotidine (PEPCID) 20 MG tablet Take 1 tablet (20 mg total) by mouth once daily.    hydrOXYzine pamoate (VISTARIL) 25 MG Cap Take 25 mg by mouth every 6 (six) hours as needed.    levETIRAcetam (KEPPRA) 1000 MG tablet Take 1 tablet (1,000 mg total) by mouth 2 (two) times a day.    morphine (MS CONTIN) 60 MG 12 hr tablet Take 1 tablet (60 mg total) by mouth 2 (two) times daily.    OLANZapine (ZYPREXA) 2.5 MG tablet Take 1 tablet (2.5 mg total) by mouth every 6 (six) hours as needed (anxiety).    ondansetron (ZOFRAN-ODT) 8 MG TbDL Dissolve 1 tablet (8 mg total) by mouth every 12 (twelve) hours as needed.    oxyCODONE (ROXICODONE) 30 MG Tab Take 1 tablet (30 mg total) by mouth every 4 (four) hours as needed (cancer related pain).    polyethylene glycol (GLYCOLAX) 17 gram/dose powder Dissolve one capful (17 g) in liquid and take by mouth once daily.    prochlorperazine (COMPAZINE) 5 MG tablet Take 1 tablet (5 mg total) by mouth every 6 (six) hours as needed for Nausea.    senna (SENNA) 8.6 mg tablet Take 1 tablet by mouth twice daily    senna-docusate 8.6-50 mg (PERICOLACE) 8.6-50 mg per tablet Take 1 tablet by mouth daily as needed for Constipation.    trametinib (MEKINIST) 2 mg Tab Take 2 mg by mouth.     Family History    None        Tobacco Use    Smoking status: Every Day    Smokeless tobacco: Never   Substance and Sexual Activity    Alcohol use: Yes    Drug use: Yes     Types: Methamphetamines, Marijuana    Sexual activity: Not on file     Review of Systems   Unable to perform ROS: Intubated   Objective:     Vital Signs (Most Recent):  Temp: 98.5 °F (36.9 °C) (09/29/22 0115)  Pulse: (!) 123 (09/29/22 0200)  Resp: (!) 43 (09/29/22 0200)  BP: (!) 149/106 (09/29/22 0145)  SpO2: 99 % (09/29/22 0200)   Vital Signs (24h Range):  Temp:  [98.2 °F (36.8 °C)-98.5 °F (36.9 °C)] 98.5 °F (36.9 °C)  Pulse:  [] 123  Resp:  [20-57] 43  SpO2:  [83 %-100 %] 99 %  BP: (140-214)/() 149/106     Weight: 61.5 kg (135 lb 9.3 oz)  Body mass index is 20.02 kg/m².    Physical Exam  Vitals and nursing note reviewed.   Constitutional:       Appearance: He is underweight. He is ill-appearing and diaphoretic.      Interventions: He is intubated and restrained.   HENT:      Head: Normocephalic.      Comments: swelling to left forehead  Cardiovascular:      Rate and Rhythm: Regular rhythm. Tachycardia present.      Pulses: Normal pulses.      Heart sounds: Normal heart sounds.   Pulmonary:      Effort: He is intubated.      Comments: Tachypnea, vented  Abdominal:      General: Bowel sounds are normal.      Palpations: Abdomen is soft.      Comments: NG tube   Musculoskeletal:         General: Normal range of motion.      Cervical back: Normal range of motion.   Skin:     General: Skin is warm.      Coloration: Skin is pale.   Neurological:      Comments: Follows commands, moving all extremities   Psychiatric:         Behavior: Behavior is agitated.           Significant Labs: All pertinent labs within the past 24 hours have been reviewed.    Significant Imaging: I have reviewed all pertinent imaging results/findings within the past 24 hours.    Assessment/Plan:     * Seizure  postictal combativeness, intubated, loaded with keppra in ED  -consult  neurology  -cont keppra 1g BID  -cont propofol and add precedex, also give 2mg ativan now   -consult pulmonology for vent management        Lactic acidosis  Post seizure  -repeat lactic acid pending      Metastatic melanoma  Noted with worsening metastatic disease on recent admission  -hold home meds  -consult palliative care      Leukocytosis  Post seizure  -received vanc and rocephin in ED  -trend labs            VTE Risk Mitigation (From admission, onward)         Ordered     IP VTE HIGH RISK PATIENT  Once         09/29/22 0043     Place sequential compression device  Until discontinued         09/29/22 0043              Critical care time spent on the evaluation and treatment of severe organ dysfunction, review of pertinent labs and imaging studies, discussions with consulting providers and discussions with patient/family: 90 minutes.     Angela Bae NP  Department of Hospital Medicine   [place] - Intensive Care

## 2022-09-29 NOTE — PROGRESS NOTES
Brief update note    Patient seen after extubation.  Off sedation.  Still somnolent but has nonpurposeful movements.  [facility] Onc and palliative care consult noted.  Patient's mother who is the next of kin understands DNR is in his best interest but would like to talk to the patient if able prior to making a decision.    Neurology recommended increasing dose of Keppra to 1500 mg b.i.d. Dose changed.    Virginia Sim MD   Internal Medicine Hospitalist

## 2022-09-29 NOTE — HPI
51 year-old male with metastatic melanoma was admitted on 9/29/22 for seizures requiring intubation. Consult is for melanoma.

## 2022-09-29 NOTE — SUBJECTIVE & OBJECTIVE
Past Medical History:   Diagnosis Date    Seizures        No past surgical history on file.    Review of patient's allergies indicates:  No Known Allergies    No current facility-administered medications on file prior to encounter.     Current Outpatient Medications on File Prior to Encounter   Medication Sig    ALPRAZolam (XANAX) 1 MG tablet Take 1 mg by mouth 2 (two) times daily as needed.    buPROPion (WELLBUTRIN SR) 100 MG TBSR 12 hr tablet Take 1 tablet (100 mg total) by mouth 2 (two) times daily.    clobetasoL (TEMOVATE) 0.05 % cream Apply topically 2 (two) times daily. (Patient taking differently: Apply topically 2 (two) times daily as needed.)    dabrafenib (TAFINLAR) 75 mg Cap Take 150 mg by mouth.    dexAMETHasone (DECADRON) 4 MG Tab Take 1 tablet (4 mg total) by mouth every 12 (twelve) hours. for 10 days    famotidine (PEPCID) 20 MG tablet Take 1 tablet (20 mg total) by mouth once daily.    hydrOXYzine pamoate (VISTARIL) 25 MG Cap Take 25 mg by mouth every 6 (six) hours as needed.    levETIRAcetam (KEPPRA) 1000 MG tablet Take 1 tablet (1,000 mg total) by mouth 2 (two) times a day.    morphine (MS CONTIN) 60 MG 12 hr tablet Take 1 tablet (60 mg total) by mouth 2 (two) times daily.    OLANZapine (ZYPREXA) 2.5 MG tablet Take 1 tablet (2.5 mg total) by mouth every 6 (six) hours as needed (anxiety).    ondansetron (ZOFRAN-ODT) 8 MG TbDL Dissolve 1 tablet (8 mg total) by mouth every 12 (twelve) hours as needed.    oxyCODONE (ROXICODONE) 30 MG Tab Take 1 tablet (30 mg total) by mouth every 4 (four) hours as needed (cancer related pain).    polyethylene glycol (GLYCOLAX) 17 gram/dose powder Dissolve one capful (17 g) in liquid and take by mouth once daily.    prochlorperazine (COMPAZINE) 5 MG tablet Take 1 tablet (5 mg total) by mouth every 6 (six) hours as needed for Nausea.    senna (SENNA) 8.6 mg tablet Take 1 tablet by mouth twice daily    senna-docusate 8.6-50 mg (PERICOLACE) 8.6-50 mg per tablet Take 1  tablet by mouth daily as needed for Constipation.    trametinib (MEKINIST) 2 mg Tab Take 2 mg by mouth.     Family History    None       Tobacco Use    Smoking status: Every Day    Smokeless tobacco: Never   Substance and Sexual Activity    Alcohol use: Yes    Drug use: Yes     Types: Methamphetamines, Marijuana    Sexual activity: Not on file     Review of Systems   Unable to perform ROS: Intubated   Objective:     Vital Signs (Most Recent):  Temp: 98.5 °F (36.9 °C) (09/29/22 0115)  Pulse: (!) 123 (09/29/22 0200)  Resp: (!) 43 (09/29/22 0200)  BP: (!) 149/106 (09/29/22 0145)  SpO2: 99 % (09/29/22 0200)   Vital Signs (24h Range):  Temp:  [98.2 °F (36.8 °C)-98.5 °F (36.9 °C)] 98.5 °F (36.9 °C)  Pulse:  [] 123  Resp:  [20-57] 43  SpO2:  [83 %-100 %] 99 %  BP: (140-214)/() 149/106     Weight: 61.5 kg (135 lb 9.3 oz)  Body mass index is 20.02 kg/m².    Physical Exam  Vitals and nursing note reviewed.   Constitutional:       Appearance: He is underweight. He is ill-appearing and diaphoretic.      Interventions: He is intubated and restrained.   HENT:      Head: Normocephalic.      Comments: swelling to left forehead  Cardiovascular:      Rate and Rhythm: Regular rhythm. Tachycardia present.      Pulses: Normal pulses.      Heart sounds: Normal heart sounds.   Pulmonary:      Effort: He is intubated.      Comments: Tachypnea, vented  Abdominal:      General: Bowel sounds are normal.      Palpations: Abdomen is soft.      Comments: NG tube   Musculoskeletal:         General: Normal range of motion.      Cervical back: Normal range of motion.   Skin:     General: Skin is warm.      Coloration: Skin is pale.   Neurological:      Comments: Follows commands, moving all extremities   Psychiatric:         Behavior: Behavior is agitated.           Significant Labs: All pertinent labs within the past 24 hours have been reviewed.    Significant Imaging: I have reviewed all pertinent imaging results/findings within the  past 24 hours.

## 2022-09-29 NOTE — PLAN OF CARE
The sw met with the pt and Margarita Laguerre(mother)[phone] who was at bedside during the assessment. The pt was very groggy,so his mother answered the questions. The pt was d/c'd from [facility] 9/26/22. The pt follows with Palliative Care at [facility] b/c he has a hx of stage IV melanoma with mets to the lungs. Prior to this hospital admit the pt was independent with his adl's and didn't use dme. The pt doesn't drive so his mother transports him to dr ryan's and errands. She will transport the pt home at d/c.  The sw offered the pt's mother emotional support. The sw completed the white board in the pt's room and gave his mother a d/c brochure with her name and contact info on it. The sw encouraged her to call if she has any further questions or concerns. The sw will continue to follow the pt throughout his transitions of care and will assist with any d/c needs.        09/29/22 1602   Discharge Assessment   Assessment Type Discharge Planning Assessment   Confirmed/corrected address, phone number and insurance Yes   Confirmed Demographics Correct on Facesheet   Source of Information family   If unable to respond/provide information was family/caregiver contacted? Yes   Contact Name/Number Margarita Laguerre(mother)[phone]   When was your last doctors appointment? 09/16/22   Communicated ELIZ with patient/caregiver Date not available/Unable to determine   Reason For Admission Seizures   Lives With parent(s)   Do you expect to return to your current living situation? Yes   Do you have help at home or someone to help you manage your care at home? Yes   Who are your caregiver(s) and their phone number(s)? Margarita Laguerre(mother)[phone]   Prior to hospitilization cognitive status: Alert/Oriented   Current cognitive status: Coma/Sedated/Intubated   Walking or Climbing Stairs Difficulty none   Dressing/Bathing Difficulty none   Home Accessibility not wheelchair accessible;stairs to enter home   Number of Stairs, Main  Entrance other (see comments)  (13)   Equipment Currently Used at Home none   Readmission within 30 days? Yes  (the pt discharged from [facility] 9/26/22)   Patient currently being followed by outpatient case management? No  (the pt's being followed by Palliative Care at [facility])   Do you currently have service(s) that help you manage your care at home? No   Do you take prescription medications? Yes   Do you have prescription coverage? Yes   Coverage [employer]   Do you have any problems affording any of your prescribed medications? No   Is the patient taking medications as prescribed? yes   Who is going to help you get home at discharge? Margarita Laguerre(mother)[phone]   How do you get to doctors appointments? family or friend will provide   Are you on dialysis? No   Do you take coumadin? No   Discharge Plan A Hospice/home   Discharge Plan B Other  (TBD)   DME Needed Upon Discharge  other (see comments)  (TBD)   Discharge Plan discussed with: Patient;Parent(s)   Name(s) and Number(s) Margarita Laguerre(mother)[phone]   Discharge Barriers Identified None   Relationship/Environment   Name(s) of Who Lives With Patient Margarita Laguerre(mother)[phone]

## 2022-09-29 NOTE — PROGRESS NOTES
Titrated down slightly on sedation due to soft BP's. Pt became very agitated again - NP notified. Ativan 2mg given. BP 70/50's. 500cc LR bolus given. Levo gtt initiated. Report given to E[facility] to assume care.

## 2022-09-29 NOTE — CONSULTS
"NEUROLOGY FLOOR CONSULT    Reason for consult:  Seizures      CC:  "Seizures"    HPI:   Joseluis Garner is a 51 y.o. year old male with PMHx significant for stage IV melanoma with mets to lungs, axillary LNs, liver, peritoneal carcinomatosis, brain- on BRAF/MEK inhibitor with progressive IC lesion, who's brought in to the hospital for the evaluation of generalized tonic clonic activity lasting for less than 10 minutes requiring intubation to protect airway, and sedatives were initiated.    He was Dx with melanoma in January 2022 with biopsy of R axilla. Staging PET/CT with lung and abdominal biopsy. MRI on 4/20/22 with 2 sub cortical foci in L frontal and R parietal white matter, on systemic therapy with continued regression of disease on CT CAP but a mixed response in the brain. MRI brain 9/23/22 showed new numerous foci throughout the cerebral hemispheres. This morning post extubation patient was still on precedex and would respond intermittently to noxious stimuli, moves all 4 extremities.       Histories:     Allergies:  Patient has no known allergies.    Current Medications:    Current Facility-Administered Medications   Medication Dose Route Frequency Provider Last Rate Last Admin    acetaminophen tablet 650 mg  650 mg Oral Q4H PRN Angela Bae NP        dexAMETHasone tablet 4 mg  4 mg Per OG tube Q12H Virginia Sim MD   4 mg at 09/29/22 0857    dextrose 10% bolus 125 mL  12.5 g Intravenous PRN Angela Bae NP        dextrose 10% bolus 250 mL  25 g Intravenous PRN Angela Bae NP   Stopped at 09/29/22 0523    glucagon (human recombinant) injection 1 mg  1 mg Intramuscular PRN Angela Bae NP        levETIRAcetam injection 1,500 mg  1,500 mg Intravenous Q12H Virginia Sim MD        mupirocin 2 % ointment   Nasal BID Virginia Sim MD   Given at 09/29/22 0816    NORepinephrine 4 mg in dextrose 5% 250 mL infusion (premix) (titrating)  0-3 mcg/kg/min Intravenous " Continuous Sirisha Perez MD 18.3 mL/hr at 09/29/22 1544 0.08 mcg/kg/min at 09/29/22 1544    ondansetron injection 4 mg  4 mg Intravenous Q8H PRN Angela Bae NP        sodium chloride 0.9% flush 10 mL  10 mL Intravenous PRN Angela Bae NP           Past Medical/Surgical/Family History:  Medical:   Past Medical History:   Diagnosis Date    Seizures       Surgeries: No past surgical history on file.   Family: No family history on file.,     S  Current Evaluation:     Vital Signs:   Vitals:    09/29/22 1545   BP:    Pulse: 64   Resp: (!) 26   Temp:       Neuro exam  GCS- E2, V4, M5   Opening eyes to noxious stimuli, responds intermittently, localized pain.  Pupils, corneal = +ve   Oculocephalic +ve   No gaze preference  Moving all 4 extremities spontaneously, localizing to pain, no focal deficits.    LABORATORY STUDIES:  Recent Results (from the past 24 hour(s))   Drug screen panel, emergency    Collection Time: 09/28/22 10:44 PM   Result Value Ref Range    Benzodiazepines Presumptive Positive (A) Negative    Methadone metabolites Negative Negative    Cocaine (Metab.) Negative Negative    Opiate Scrn, Ur Presumptive Positive (A) Negative    Barbiturate Screen, Ur Negative Negative    Amphetamine Screen, Ur Negative Negative    THC Presumptive Positive (A) Negative    Phencyclidine Negative Negative    Creatinine, Urine 286.7 23.0 - 375.0 mg/dL    Toxicology Information SEE COMMENT    ISTAT PROCEDURE    Collection Time: 09/28/22 10:50 PM   Result Value Ref Range    POC PH 7.395 7.35 - 7.45    POC PCO2 44.0 35 - 45 mmHg    POC PO2 114 (HH) 40 - 60 mmHg    POC HCO3 27.0 24 - 28 mmol/L    POC BE 2 -2 to 2 mmol/L    POC SATURATED O2 98 95 - 100 %    POC Sodium 132 (L) 136 - 145 mmol/L    POC Potassium 3.1 (L) 3.5 - 5.1 mmol/L    POC TCO2 28 24 - 29 mmol/L    POC Hematocrit 47 36 - 54 %PCV    POC HEMOGLOBIN 16 g/dL    Rate 20     Sample VENOUS     Allens Test N/A     DelSys Adult Vent     Mode AC/PRVC      Vt 400     PEEP 5     FiO2 60    Urinalysis    Collection Time: 09/28/22 10:55 PM   Result Value Ref Range    Specimen UA Urine, Catheterized     Color, UA Yellow Yellow, Straw, Francoise    Appearance, UA Clear Clear    pH, UA 6.0 5.0 - 8.0    Specific Gravity, UA 1.030 1.005 - 1.030    Protein, UA 1+ (A) Negative    Glucose, UA Negative Negative    Ketones, UA Negative Negative    Bilirubin (UA) Negative Negative    Occult Blood UA Negative Negative    Nitrite, UA Negative Negative    Urobilinogen, UA Negative <2.0 EU/dL    Leukocytes, UA Negative Negative   Urinalysis Microscopic    Collection Time: 09/28/22 10:55 PM   Result Value Ref Range    RBC, UA 1 0 - 4 /hpf    WBC, UA 3 0 - 5 /hpf    Bacteria None None-Occ /hpf    Squam Epithel, UA 0 /hpf    Hyaline Casts, UA 29 (A) 0-1/lpf /lpf    Microscopic Comment SEE COMMENT    Comprehensive metabolic panel    Collection Time: 09/28/22 10:59 PM   Result Value Ref Range    Sodium 131 (L) 136 - 145 mmol/L    Potassium 4.1 3.5 - 5.1 mmol/L    Chloride 90 (L) 95 - 110 mmol/L    CO2 19 (L) 23 - 29 mmol/L    Glucose 164 (H) 70 - 110 mg/dL    BUN 15 6 - 20 mg/dL    Creatinine 1.3 0.5 - 1.4 mg/dL    Calcium 9.1 8.7 - 10.5 mg/dL    Total Protein 7.5 6.0 - 8.4 g/dL    Albumin 3.7 3.5 - 5.2 g/dL    Total Bilirubin 0.4 0.1 - 1.0 mg/dL    Alkaline Phosphatase 59 55 - 135 U/L    AST 26 10 - 40 U/L    ALT 9 (L) 10 - 44 U/L    Anion Gap 22 (H) 8 - 16 mmol/L    eGFR >60 >60 mL/min/1.73 m^2   CBC auto differential    Collection Time: 09/28/22 10:59 PM   Result Value Ref Range    WBC 23.07 (H) 3.90 - 12.70 K/uL    RBC 5.32 4.60 - 6.20 M/uL    Hemoglobin 15.2 14.0 - 18.0 g/dL    Hematocrit 45.0 40.0 - 54.0 %    MCV 85 82 - 98 fL    MCH 28.6 27.0 - 31.0 pg    MCHC 33.8 32.0 - 36.0 g/dL    RDW 14.5 11.5 - 14.5 %    Platelets 467 (H) 150 - 450 K/uL    MPV 10.6 9.2 - 12.9 fL    Immature Granulocytes 0.7 (H) 0.0 - 0.5 %    Gran # (ANC) 20.7 (H) 1.8 - 7.7 K/uL    Immature Grans (Abs) 0.17 (H)  0.00 - 0.04 K/uL    Lymph # 1.3 1.0 - 4.8 K/uL    Mono # 0.9 0.3 - 1.0 K/uL    Eos # 0.0 0.0 - 0.5 K/uL    Baso # 0.04 0.00 - 0.20 K/uL    nRBC 0 0 /100 WBC    Gran % 89.5 (H) 38.0 - 73.0 %    Lymph % 5.8 (L) 18.0 - 48.0 %    Mono % 3.8 (L) 4.0 - 15.0 %    Eosinophil % 0.0 0.0 - 8.0 %    Basophil % 0.2 0.0 - 1.9 %    Platelet Estimate Appears normal     Differential Method Automated    Lactic acid, plasma    Collection Time: 09/28/22 10:59 PM   Result Value Ref Range    Lactate (Lactic Acid) 9.4 (HH) 0.5 - 2.2 mmol/L   TSH    Collection Time: 09/28/22 10:59 PM   Result Value Ref Range    TSH 0.453 0.400 - 4.000 uIU/mL   POCT COVID-19 Rapid Screening    Collection Time: 09/28/22 11:39 PM   Result Value Ref Range    POC Rapid COVID Negative Negative     Acceptable Yes    Basic metabolic panel    Collection Time: 09/29/22  4:06 AM   Result Value Ref Range    Sodium 133 (L) 136 - 145 mmol/L    Potassium 3.3 (L) 3.5 - 5.1 mmol/L    Chloride 93 (L) 95 - 110 mmol/L    CO2 28 23 - 29 mmol/L    Glucose 68 (L) 70 - 110 mg/dL    BUN 13 6 - 20 mg/dL    Creatinine 1.0 0.5 - 1.4 mg/dL    Calcium 8.8 8.7 - 10.5 mg/dL    Anion Gap 12 8 - 16 mmol/L    eGFR >60 >60 mL/min/1.73 m^2   Lactic acid, plasma    Collection Time: 09/29/22  4:06 AM   Result Value Ref Range    Lactate (Lactic Acid) 1.3 0.5 - 2.2 mmol/L   Magnesium    Collection Time: 09/29/22  4:06 AM   Result Value Ref Range    Magnesium 1.8 1.6 - 2.6 mg/dL   CBC auto differential    Collection Time: 09/29/22  4:06 AM   Result Value Ref Range    WBC 23.90 (H) 3.90 - 12.70 K/uL    RBC 4.87 4.60 - 6.20 M/uL    Hemoglobin 14.0 14.0 - 18.0 g/dL    Hematocrit 40.3 40.0 - 54.0 %    MCV 83 82 - 98 fL    MCH 28.7 27.0 - 31.0 pg    MCHC 34.7 32.0 - 36.0 g/dL    RDW 14.6 (H) 11.5 - 14.5 %    Platelets 406 150 - 450 K/uL    MPV 9.7 9.2 - 12.9 fL    Immature Granulocytes 0.6 (H) 0.0 - 0.5 %    Gran # (ANC) 19.1 (H) 1.8 - 7.7 K/uL    Immature Grans (Abs) 0.15 (H) 0.00 -  0.04 K/uL    Lymph # 1.9 1.0 - 4.8 K/uL    Mono # 2.6 (H) 0.3 - 1.0 K/uL    Eos # 0.0 0.0 - 0.5 K/uL    Baso # 0.05 0.00 - 0.20 K/uL    nRBC 0 0 /100 WBC    Gran % 80.1 (H) 38.0 - 73.0 %    Lymph % 8.1 (L) 18.0 - 48.0 %    Mono % 11.0 4.0 - 15.0 %    Eosinophil % 0.0 0.0 - 8.0 %    Basophil % 0.2 0.0 - 1.9 %    Differential Method Automated    POCT glucose    Collection Time: 09/29/22  5:08 AM   Result Value Ref Range    POCT Glucose 49 (LL) 70 - 110 mg/dL   POCT glucose    Collection Time: 09/29/22  6:04 AM   Result Value Ref Range    POCT Glucose 166 (H) 70 - 110 mg/dL   POCT glucose    Collection Time: 09/29/22 12:08 PM   Result Value Ref Range    POCT Glucose 116 (H) 70 - 110 mg/dL         RADIOLOGY STUDIES:  I have personally reviewed the images performed.     HEAD CT: 9/29/22  Area of vague low-density in the left frontal parietal junction is again noted in region of previous enhancement.  The other abnormality seen on MR are not confidently identified as discrete lesions on CT.  No new mass, mass effect or pathologic fluid collection is evident.  There is no evidence of hemorrhage or hydrocephalus no subarachnoid or subdural fluid collection is evident.    BRAIN MRI 9/23/22  Increased prominence and multiple new intracranial lesions predominantly in the supratentorial compartment with only a single right cerebellar small lesion.  These lesions are small in size but numerous, some with mild surrounding vasogenic edema but no significant mass effect.  Although neoplastic progression to be considered, in light of the history of immunotherapy, treatment related change/an element of pseudoprogression to also be considered.  This can be re-evaluated on follow-up imaging.  Due to the superficial location of the majority of these lesions, CSF dissemination should be considered.      Assessment:  ANTHONY Garner is a 51 y.o. year old male with PMHx significant for seizures(reported from 2020), stage IV  melanoma with mets to lungs, axillary LNs, liver, peritoneal carcinomatosis, brain- on BRAF/MEK inhibitor with progressive IC lesion, who's brought in to the hospital for the evaluation of generalized tonic clonic seizures in the setting of metastasis to the brain(new lesions on brain from 09/23).     Treatment Plan  - Increase keppra to 1500 mg BID   - Continue PTA Dexa 4 mg   - Seizure precautions   - Follow up with Neuro Onc/Heme Onc   - consult palliative care  - Goals of care discussion     Differential diagnosis was explained to the patient. All questions were answered. Patient understood and agreed to adhere to plan.       Case discussed with Dr. Gamez    We will sign off at this time.  Please call us if you have any questions or concerns.  Thank you.      Appreciate the consult.          Ca Marroquin-PGY IV  [facility]

## 2022-09-29 NOTE — PROGRESS NOTES
Report received from EZE Tejeda in the ED. Will review orders and resume care once pt arrives to the unit.

## 2022-09-29 NOTE — PLAN OF CARE
Pt s vss, nadn, no c/o pain/sob, ext'ed this morning, on RA, off all sedation, drowsy but wakes up now and follows comm's, not pulling out lines/anderson/ngt. Get s agitated easily, slightly restless/impulsive at tiems, but directable. Getting EEG now, almost off levo gtt, goal is to turn off within next 2 hours. No sz act. Noted today. SR noted on monitor. Comfort and emotional support given to pt and his mother, mother at bedside all day.  Goal turn off levo, possible transfer to floor later. See flow sheet for betsy info.

## 2022-09-29 NOTE — PROGRESS NOTES
Pt arrived to unit from ED intubated on propofol gtt. Pt increasingly agitated & tachypneic; propofol titrated accordingly. Pt able to follow simple commands but agitation not redirectable. JEISON Bae NP at bedside. Restraints placed, Precedex gtt initiated and ativan 2mg given. Safety maintained. Mother at bedside updated on plan of care; all questions/concerns addressed.

## 2022-09-29 NOTE — CONSULTS
"Consult Note  Palliative Care    Consult Requested By: Virginia Sim MD  Reason for Consult: Goals of care    SUBJECTIVE:     History of Present Illness:  Disease Process: Cancer    51M with PMH of CAD s/p PCI, seizure d/o, hx substance abuse and metastatic melanoma (lungs, liver, brain, colon) admitted to  for seizure like activity and AMS.    Onc Hx    First noted to have right axillary LAD in 4/2021, derm survey found no suspicious lesions, ultimately underwent punch biopsy of a lymph node on 1/4/22 which was positive for metastatic melanoma. F/u PET 1/28/22 showed widespread soft tissue masses including the right axilla, lungs, liver and colon. No CNS mets.    Started first line immunotherapy with ipilimumab/nivolumab on 3/3/22 and was admitted to [facility] two weeks later for rapid onset of malignant ascites; repeat CT showed extensive underlying carcinomatosis. Pt had repeated admissions over the next week for rapidly accumulating ascites; PleurX placement was discussed but apparently not performed. Subsequently had rapid progression of disease on serial imaging including brain mets and IT was changed to targeted tx with encorafinib/binimitinib/nivolumab with "dramatic clinical response" initially but since that time had has worsening PO tolerance and general functional decline.    Most recently pt was admitted in 8/2022 for 3 days of intractable n/v and abdominal pain unresponsive to meds, underwent LVP with 6L removed.     Pt follows with palliative Dr. Crouch in palliative clinic at [facility].    Chief Complaint Leading to Admission    Pt was BIBEMS late 9/28 after witnessed seizure-like activity at home. Pt had appeared unwell earlier that day and fell down followed by continuous convulsions. Reportedly in flexor posturing with left gaze preference and GTC activity on EMS arrival requiring 12.5mg IM versed by EMS to suppress.    Pt was intubated on arrival to ED, started on vanc/ceftriaxone, fluid bolused, keppra " loaded, given ativan 2mg x 1 and started on propofol gtt. Labs were sig for WBC 23, lactate 9.4 and UDS + for opiates, benzos and THC. NCHCT nonacute. Admitted to ICU.    Interval Hospital Course    9/29: Restraints were placed for agitation overnight. Precedex gtt added. Pt's mother who is next of kin and primary caretaker stated she felt DNR would be in pt's best interest but was hopeful she could discuss code status with pt before ordering DNR. Seen by oncology who reports recent MRI brain shows numerous new brain lesions but without overall significant mass effect, likely contributing to recent repeated seizures and not amenable to chemotherapy with very poor prognosis and hospice appropriate if consistent with caretaker wishes.    Palliative has been consulted for goals of care.    At time of interview pt has been recently extubated but remains unresponsive while sedated on dexmetomadine. Breathing pattern is abnormal with periods of tachypnea and apnea possibly early Cheyne-Mustafa. Does not convincingly respond to voice, makes occasional purposeless movements and grunts.    Pt's mother Margarita is at bedside and confirms she is next of kin. We discussed that per review of recent imaging and onc notes I am concerned that pt's melanoma has developed typical resistance to targeted immunotherapy and there is a high likelihood that pt is no longer a candidate for cancer directed treatments. More acutely pt's mental status remains poor post extubation though he is still receiving sedatives and warrants reassessment in AM after dexmetomadine washout.    Margarita agrees pt is in subacute decline and states that his most recent infusion was held per onc recs. She states pt has been in deep denial about his progression of disease and poor prognosis. She believes pt would not benefit from reintubation however is hopeful that she can discuss next steps with him if his mental status improves. Does not feel comfortable ordering  DNR at this moment.    Discussed with Margarita that there are still reversible factors for pt's decreased alertness including sedation and he may will become alert. If pt does not awaken off sedation or cannot be weaned without agitation then his burden of CNS disease may be overwhelming and unresponsive to dexamethasone. Margarita accepts that he may not be salvageable. Introduced hospice by name as a means to prioritize comfort and provide death with dignity if pt is felt terminally ill and she is not opposed, has experience caring for her late  under home hospice. Advised that at this point pt's symptoms and care needs are more appropriate for inpatient hospice and Margarita expresses adequate understanding.    Will follow closely and reassess in AM. No restrictions on care ordered at this time, pt remains full code.    Past Medical History:   Diagnosis Date    Seizures      No past surgical history on file.  No family history on file.  Social History     Tobacco Use    Smoking status: Every Day    Smokeless tobacco: Never   Substance Use Topics    Alcohol use: Yes    Drug use: Yes     Types: Methamphetamines, Marijuana     Mental Status: Non-responsive    ECOG Performance Status Grade: 4 - Completely disabled    Review of Systems:  Review of systems not obtained due to patient factors nonverbal.    Review of Symptoms      Symptom Assessment (ESAS 0-10 Scale)  Pain:  0  Dyspnea:  0  Anxiety:  0  Nausea:  0  Depression:  0  Anorexia:  0  Fatigue:  0  Insomnia:  0  Restlessness:  0  Agitation:  0  Unable to complete assessment due to Patient nonverbal     Constipation:  Negative  Diarrhea:  Negative      Pain Assessment in Advanced Demential Scale (PAINAD)   Breathing - Independent of vocalization:  2  Negative vocalization:  0  Facial expression:  0  Body language:  0  Consolability:  0  Total:  2    ECOG Performance Status thGthrthathdtheth:th th5th Psychosocial/Cultural: Has good social support network per mother    Spiritual:  F  - Robyn and Belief:  Nonadherent  I - Importance:  N/a  C - Community:  Friends and coworkers in community  A - Address in Care:  Pastoral visits prn, likely hospice support for family     Time-Based Charting:  Yes  Chart Review: 30 minutes  Face to Face: 20 minutes  Symptom Assessment: 10 minutes  Coordination of Care: 5 minutes  Discharge Planning: 10 minutes  Advance Care Plannin minutes  Goals of Care: 10 minutes    Total Time Spent: 93 minutes    Advance Care Planning   Advance Directives:   Living Will: No        Oral Declaration: No    LaPOST: No    Do Not Resuscitate Status: No    Medical Power of : No    Agent's Name:  Margarita Prather (mother)   Agent's Contact Number:  [phone]    Decision Making:  Family answered questions and Patient unable to communicate due to disease severity/cognitive impairment       OBJECTIVE:     Physical Exam  Constitutional:       Appearance: He is underweight.      Interventions: He is restrained.   HENT:      Head: Normocephalic and atraumatic.      Mouth/Throat:      Mouth: Mucous membranes are moist.      Pharynx: Oropharynx is clear.   Eyes:      Pupils: Pupils are equal, round, and reactive to light.      Comments: Pupils pinpoint and sluggishly reactive to light, midline, no spontaneous EOMs   Cardiovascular:      Rate and Rhythm: Normal rate and regular rhythm.      Pulses: Normal pulses.      Heart sounds: Normal heart sounds. No murmur heard.    No friction rub. No gallop.   Pulmonary:      Effort: Pulmonary effort is normal.      Breath sounds: Normal breath sounds. No wheezing or rales.   Abdominal:      General: Abdomen is flat. There is no distension.      Palpations: Abdomen is soft.      Tenderness: There is no abdominal tenderness.   Musculoskeletal:         General: No swelling. Normal range of motion.   Skin:     General: Skin is warm and dry.      Coloration: Skin is pale.   Neurological:      Mental Status: He is unresponsive.      Motor: No  tremor or abnormal muscle tone.     ASSESSMENT/PLAN:     51M with PMH of CAD s/p PCI, seizure d/o, hx substance abuse and metastatic melanoma (lungs, liver, brain, colon) with rapidly progressive disease on salvage immunotherapy admitted to  for seizure like activity and AMS, intubated for airway protection. Seizures have resolved with escalated AEDs and pt has been extubated to room air but remains unresponsive on dexmetomadine. Palliative consulted for goals of care.    Pt's condition remains guarded to poor given little alertness post extubation though needs reassessment after sedation washout. Cancer prognosis is very poor with rapid progression of disease suggesting development of resistance unfortunately typical with targeted treatments. Mother is next of kin and hopeful to discuss next steps with pt including code status. Dexamethasone is the most likely treatment to restore mental status I/s/o CNS mets though without significant cerebral edema odds of response remain relatively low.    Will follow closely and if mental status remains this poor off sedation will further discuss compassionate DNR status and transition to hospice; EEG to r/o nonconvulsive status epilepticus is still appropriate but otherwise do not see other compelling reversible causes of AMS.    Recommendations:  Medical: supportive care per primary  Symptom Management: recommend weaning sedation as tolerated  Psychosocial: mother is devoted but realistic and experienced with hospice care  Legal: mother is legal NOK and appropriate surrogate  Prognosis: barring substantial improvement overnight, assessed as < 1 week and with significant symptoms (seizures, agitation) warranting inpatient hospice; doubt pt's mother can meet his needs via home hospice at this point in pt's disease course    Guilherme Arredondo MD  Hospice and Palliative Medicine  Palliative Care Pager: [phone]    Advance Care Planning     Date: 09/29/2022    [facility]  I engaged the  family in a conversation about advance care planning and we specifically addressed what the goals of care would be moving forward, in light of the patient's change in clinical status, specifically worsening CNS mets with seizures and depressed LOC.  We did specifically address the patient's likely prognosis, which is poor.  We explored the patient's values and preferences for future care.  The family endorses that what is most important right now is to focus on improvement in condition but with limits to invasive therapies and comfort and QOL     Accordingly, we have decided that the best plan to meet the patient's goals includes continuing with treatment    I did explain the role for hospice care at this stage of the patient's illness, including its ability to help the patient live with the best quality of life possible.  We will not be making a hospice referral at this time but will revisit daily.    I spent a total of 18 minutes engaging the patient in this advance care planning discussion.

## 2022-09-30 VITALS
RESPIRATION RATE: 26 BRPM | HEART RATE: 81 BPM | BODY MASS INDEX: 19.92 KG/M2 | DIASTOLIC BLOOD PRESSURE: 93 MMHG | OXYGEN SATURATION: 97 % | SYSTOLIC BLOOD PRESSURE: 122 MMHG | WEIGHT: 134.5 LBS | TEMPERATURE: 98 F | HEIGHT: 69 IN

## 2022-09-30 PROBLEM — G40.901 STATUS EPILEPTICUS: Status: RESOLVED | Noted: 2022-09-29 | Resolved: 2022-09-30

## 2022-09-30 PROBLEM — E87.20 LACTIC ACIDOSIS: Status: RESOLVED | Noted: 2022-09-23 | Resolved: 2022-09-30

## 2022-09-30 PROBLEM — R56.9 SEIZURE: Status: RESOLVED | Noted: 2021-06-27 | Resolved: 2022-09-30

## 2022-09-30 PROBLEM — J96.01 ACUTE HYPOXEMIC RESPIRATORY FAILURE: Status: RESOLVED | Noted: 2022-09-29 | Resolved: 2022-09-30

## 2022-09-30 LAB
ANION GAP SERPL CALC-SCNC: 14 MMOL/L (ref 8–16)
BASOPHILS # BLD AUTO: 0.08 K/UL (ref 0–0.2)
BASOPHILS NFR BLD: 0.4 % (ref 0–1.9)
BUN SERPL-MCNC: 11 MG/DL (ref 6–20)
CALCIUM SERPL-MCNC: 8.6 MG/DL (ref 8.7–10.5)
CHLORIDE SERPL-SCNC: 95 MMOL/L (ref 95–110)
CO2 SERPL-SCNC: 24 MMOL/L (ref 23–29)
CREAT SERPL-MCNC: 0.7 MG/DL (ref 0.5–1.4)
DIFFERENTIAL METHOD: ABNORMAL
EOSINOPHIL # BLD AUTO: 0 K/UL (ref 0–0.5)
EOSINOPHIL NFR BLD: 0.1 % (ref 0–8)
ERYTHROCYTE [DISTWIDTH] IN BLOOD BY AUTOMATED COUNT: 14.6 % (ref 11.5–14.5)
EST. GFR  (NO RACE VARIABLE): >60 ML/MIN/1.73 M^2
GLUCOSE SERPL-MCNC: 101 MG/DL (ref 70–110)
HCT VFR BLD AUTO: 48.2 % (ref 40–54)
HGB BLD-MCNC: 16.1 G/DL (ref 14–18)
IMM GRANULOCYTES # BLD AUTO: 0.14 K/UL (ref 0–0.04)
IMM GRANULOCYTES NFR BLD AUTO: 0.7 % (ref 0–0.5)
LYMPHOCYTES # BLD AUTO: 1.5 K/UL (ref 1–4.8)
LYMPHOCYTES NFR BLD: 7.6 % (ref 18–48)
MAGNESIUM SERPL-MCNC: 2 MG/DL (ref 1.6–2.6)
MCH RBC QN AUTO: 28.1 PG (ref 27–31)
MCHC RBC AUTO-ENTMCNC: 33.4 G/DL (ref 32–36)
MCV RBC AUTO: 84 FL (ref 82–98)
MONOCYTES # BLD AUTO: 1.7 K/UL (ref 0.3–1)
MONOCYTES NFR BLD: 8.5 % (ref 4–15)
NEUTROPHILS # BLD AUTO: 16.1 K/UL (ref 1.8–7.7)
NEUTROPHILS NFR BLD: 82.7 % (ref 38–73)
NRBC BLD-RTO: 0 /100 WBC
PLATELET # BLD AUTO: 476 K/UL (ref 150–450)
PMV BLD AUTO: 10.2 FL (ref 9.2–12.9)
POCT GLUCOSE: 89 MG/DL (ref 70–110)
POTASSIUM SERPL-SCNC: 3.3 MMOL/L (ref 3.5–5.1)
RBC # BLD AUTO: 5.72 M/UL (ref 4.6–6.2)
SODIUM SERPL-SCNC: 133 MMOL/L (ref 136–145)
WBC # BLD AUTO: 19.51 K/UL (ref 3.9–12.7)

## 2022-09-30 PROCEDURE — 25000003 PHARM REV CODE 250: Performed by: NURSE PRACTITIONER

## 2022-09-30 PROCEDURE — 36415 COLL VENOUS BLD VENIPUNCTURE: CPT | Performed by: NURSE PRACTITIONER

## 2022-09-30 PROCEDURE — 25000003 PHARM REV CODE 250: Performed by: STUDENT IN AN ORGANIZED HEALTH CARE EDUCATION/TRAINING PROGRAM

## 2022-09-30 PROCEDURE — 63600175 PHARM REV CODE 636 W HCPCS: Performed by: STUDENT IN AN ORGANIZED HEALTH CARE EDUCATION/TRAINING PROGRAM

## 2022-09-30 PROCEDURE — 80048 BASIC METABOLIC PNL TOTAL CA: CPT | Performed by: NURSE PRACTITIONER

## 2022-09-30 PROCEDURE — 94761 N-INVAS EAR/PLS OXIMETRY MLT: CPT

## 2022-09-30 PROCEDURE — 85025 COMPLETE CBC W/AUTO DIFF WBC: CPT | Performed by: NURSE PRACTITIONER

## 2022-09-30 PROCEDURE — 83735 ASSAY OF MAGNESIUM: CPT | Performed by: NURSE PRACTITIONER

## 2022-09-30 RX ORDER — OXYCODONE HYDROCHLORIDE 5 MG/1
15 TABLET ORAL EVERY 6 HOURS PRN
Status: DISCONTINUED | OUTPATIENT
Start: 2022-09-30 | End: 2022-09-30

## 2022-09-30 RX ORDER — OXYCODONE HYDROCHLORIDE 5 MG/1
15 TABLET ORAL EVERY 4 HOURS PRN
Status: DISCONTINUED | OUTPATIENT
Start: 2022-09-30 | End: 2022-09-30 | Stop reason: HOSPADM

## 2022-09-30 RX ORDER — OLANZAPINE 2.5 MG/1
2.5 TABLET ORAL EVERY 6 HOURS PRN
Status: DISCONTINUED | OUTPATIENT
Start: 2022-09-30 | End: 2022-09-30 | Stop reason: HOSPADM

## 2022-09-30 RX ORDER — LEVETIRACETAM 1000 MG/1
1500 TABLET ORAL 2 TIMES DAILY
Qty: 90 TABLET | Refills: 11 | Status: SHIPPED | OUTPATIENT
Start: 2022-09-30 | End: 2023-09-30

## 2022-09-30 RX ORDER — ALPRAZOLAM 1 MG/1
1 TABLET ORAL 2 TIMES DAILY PRN
Status: DISCONTINUED | OUTPATIENT
Start: 2022-09-30 | End: 2022-09-30 | Stop reason: HOSPADM

## 2022-09-30 RX ORDER — OXYCODONE HYDROCHLORIDE 5 MG/1
15 TABLET ORAL EVERY 4 HOURS PRN
Status: DISCONTINUED | OUTPATIENT
Start: 2022-09-30 | End: 2022-09-30

## 2022-09-30 RX ORDER — MORPHINE SULFATE 15 MG/1
30 TABLET, FILM COATED, EXTENDED RELEASE ORAL 2 TIMES DAILY
Status: DISCONTINUED | OUTPATIENT
Start: 2022-09-30 | End: 2022-09-30 | Stop reason: HOSPADM

## 2022-09-30 RX ADMIN — ALPRAZOLAM 1 MG: 1 TABLET ORAL at 08:09

## 2022-09-30 RX ADMIN — LEVETIRACETAM 1500 MG: 500 INJECTION, SOLUTION INTRAVENOUS at 08:09

## 2022-09-30 RX ADMIN — OXYCODONE HYDROCHLORIDE 15 MG: 5 TABLET ORAL at 01:09

## 2022-09-30 RX ADMIN — OXYCODONE HYDROCHLORIDE 15 MG: 5 TABLET ORAL at 09:09

## 2022-09-30 RX ADMIN — DEXAMETHASONE 4 MG: 4 TABLET ORAL at 08:09

## 2022-09-30 RX ADMIN — MUPIROCIN: 20 OINTMENT TOPICAL at 08:09

## 2022-09-30 RX ADMIN — MORPHINE SULFATE 30 MG: 15 TABLET, EXTENDED RELEASE ORAL at 08:09

## 2022-09-30 NOTE — PROGRESS NOTES
The sw spoke to Meg at [facility] after she met with the pt and he wants to go home with hospice. The pt signed the consents with Meg and he will call their agency once he arrives [place]. The pt declined requiring any dme. The sw spoke to Dr. Sim,informed her of the info mentioned above and she will put the hospice orders in. The pt will d/c today. The sw informed the pt's nurse of this info mentioned above.

## 2022-09-30 NOTE — PLAN OF CARE
The sw met with the pt and his mother who was at bedside. The pt signed the Pt Choice Form and the sw placed it in the pt's chart and gave him a  copy of it. The pt's in agreement with [facility] at home.        09/30/22 1346   Final Note   Assessment Type Final Discharge Note   Anticipated Discharge Disposition [facility]   What phone number can be called within the next 1-3 days to see how you are doing after discharge? [phone]   Hospital Resources/Appts/Education Provided Post-Acute resouces added to AVS   Post-Acute Status   Post-Acute Authorization Hospice   Hospice Status Set-up Complete/Auth obtained  ([facility] at home)   Discharge Delays None known at this time

## 2022-09-30 NOTE — PLAN OF CARE
Problem: Adult Inpatient Plan of Care  Goal: Plan of Care Review  Outcome: Ongoing, Progressing     No acute changes this shift. Pt is AAOx4. VSS. NGT removed this shift. Fent 25mcg IVP given x1 for pain. Safety maintained; bed alarm on, call light within reach, bed in lowest locked position. POC reviewed with pt and significant other at bedside; all questions/concerns addressed.

## 2022-09-30 NOTE — PROGRESS NOTES
Pt restless, c/o NGT and wanting it removed. Pt fully alert and able to swallow safely. KANDICE Bae notified. Per NP, ok to remove NGT.

## 2022-09-30 NOTE — PLAN OF CARE
Recommendations  1) Continue regular diet   2) Add Boost plus BID if pt prefers     Goals: 1) PO Intakes > 75% of meals/supplements  Nutrition Goal Status: new  Communication of RD Recs:  (POC, sticky note)

## 2022-09-30 NOTE — PLAN OF CARE
[facility]  Department of Hospital Medicine  [address] [place] [address]  [phone] after hours  [phone] fax    HOSPICE  ORDERS    09/30/2022    Admit to Hospice:  Home Service     Diagnoses:   Active Hospital Problems    Diagnosis  POA    Malignant melanoma [C43.9]  Yes    Metastatic melanoma [C79.9]  Yes    Leukocytosis [D72.829]  Yes      Resolved Hospital Problems    Diagnosis Date Resolved POA    *Seizure [R56.9] 09/30/2022 Yes    Acute hypoxemic respiratory failure [J96.01] 09/30/2022 Yes    Status epilepticus [G40.901] 09/30/2022 Yes    Lactic acidosis [E87.2] 09/30/2022 Yes       Hospice Qualifying Diagnoses:        Patient has a life expectancy < 6 months due to:  Primary Hospice Diagnosis:  Metastatic melanoma with metastasis to the brain,), axillary lymph nodes, lungs  Comorbid Conditions Contributing to Decline:  Seizures    Vital Signs: Routine per Hospice Protocol.    Code Status:  DNR    Allergies: Review of patient's allergies indicates:  No Known Allergies    Diet:  Comfort feeds    Activities: As tolerated    Goals of Care Treatment Preferences:  Code Status: DNR    Health care agent: Margarita Prather (mother)  Health care agent number: [phone]          What is most important right now is to focus on improvement in condition but with limits to invasive therapies, comfort and QOL .  Accordingly, we have decided that the best plan to meet the patient's goals includes continuing with treatment.      Nursing: Per Hospice Routine.     Routine Skin for Bedridden Patients: Apply moisture barrier cream to all skin folds and   wet areas in perineal area daily and after baths and all bowel movements.           Medication List        CHANGE how you take these medications      levETIRAcetam 1000 MG tablet  Commonly known as: KEPPRA  Take 1.5 tablets (1,500 mg total) by mouth 2 (two) times a day.  What changed: how much to take            CONTINUE  taking these medications      ALPRAZolam 1 MG tablet  Commonly known as: XANAX  Take 1 mg by mouth 2 (two) times daily as needed.     dexAMETHasone 4 MG Tab  Commonly known as: DECADRON  Take 1 tablet (4 mg total) by mouth every 12 (twelve) hours. for 10 days     famotidine 20 MG tablet  Commonly known as: PEPCID  Take 1 tablet (20 mg total) by mouth once daily.     GAVILAX 17 gram/dose powder  Generic drug: polyethylene glycol  Dissolve one capful (17 g) in liquid and take by mouth once daily.     morphine 60 MG 12 hr tablet  Commonly known as: MS CONTIN  Take 1 tablet (60 mg total) by mouth 2 (two) times daily.     OLANZapine 2.5 MG tablet  Commonly known as: ZyPREXA  Take 1 tablet (2.5 mg total) by mouth every 6 (six) hours as needed (anxiety).     ondansetron 8 MG Tbdl  Commonly known as: ZOFRAN-ODT  Dissolve 1 tablet (8 mg total) by mouth every 12 (twelve) hours as needed.     oxyCODONE 30 MG Tab  Commonly known as: ROXICODONE  Take 1 tablet (30 mg total) by mouth every 4 (four) hours as needed (cancer related pain).     prochlorperazine 5 MG tablet  Commonly known as: COMPAZINE  Take 1 tablet (5 mg total) by mouth every 6 (six) hours as needed for Nausea.     senna-docusate 8.6-50 mg 8.6-50 mg per tablet  Commonly known as: PERICOLACE  Take 1 tablet by mouth daily as needed for Constipation.            STOP taking these medications      buPROPion 100 MG TBSR 12 hr tablet  Commonly known as: WELLBUTRIN SR     dabrafenib 75 mg Cap  Commonly known as: TAFINLAR     hydrOXYzine pamoate 25 MG Cap  Commonly known as: VISTARIL     SENNA 8.6 mg tablet  Generic drug: senna     trametinib 2 mg Tab  Commonly known as: MEKINIST            ASK your doctor about these medications      clobetasoL 0.05 % cream  Commonly known as: TEMOVATE  Apply topically 2 (two) times daily.              Future Orders:  Hospice Medical Director may dictate new orders for comfortable care measures & sign death  certificate.    _________________________________  Virginia Sim MD  09/30/2022

## 2022-09-30 NOTE — PROGRESS NOTES
Bottle of xanax found in patient's bed. Denies taking any pills overnight. Inquired about any remaining pill bottles in pt's possession; pt admitted to having all home medications in bag at bedside. All bottles of medication confiscated. JEISON Bae NP notified. Medication includes: xanax 1mg (9 tablets), buspirone 100mg (57 tablets), prochlorperazine 5mg (16 tablets), morphine 60mg (55 tablets), dexamethasone 4mg (17 tablets), oxycodone 30mg (62 ablets), x1 narcan nasal spray 4mg. Security notified.

## 2022-09-30 NOTE — NURSING
D/c instructions given to pt, states understanding, pt going home with home hospice. Transported off unit with transport and mother via wheelchair.

## 2022-09-30 NOTE — ASSESSMENT & PLAN NOTE
Contributing Nutrition Diagnosis  Severe chronic illness related malnutrition    Related to (etiology):   Diarrhea, intermittent N/V and decreased appetite, increased needs d/t mets. CA    Signs and Symptoms (as evidenced by):   1) PO intakes < 75% of needs x > 1 month  2) > 20% weight loss x < 1 year  3) moderate-severe wasting NFPE 8/19/22    Interventions:  General healthful diet and nutrition supplement therapy    Nutrition Diagnosis Status:   continues

## 2022-09-30 NOTE — HOSPITAL COURSE
51-year-old male with PMH of metastatic melanoma with spread to brain, lungs, lymph nodes, peritoneal carcinomatosis presented to the ER with seizures and postictal confusion/agitation for which she was intubated for airway protection to sedate in order to get imaging.  CT head showed no hemorrhage.  He was weaned off sedation and extubated to room air following which his mentation improved over time.  He was recently admitted to [facility] few days ago when he was found to progressively worsening metastatic disease despite treatment.  Oncology, Neurology and palliative Care were consulted.  Oncology discussed case with primary oncologist Dr. Wise who recommended hospice given no curative options.  Neurology recommended increasing dose of Keppra to 1500 mg b.i.d. and continuing dexamethasone 4 mg b.i.d..  Goals of care and code status were discussed with the patient, mother and fiancee at bedside.  DNR order was placed in accordance with patient's wishes.  Given aggressive disease with no curative options, option of hospice was discussed with the patient.  He requested for inpatient hospice but did not meet criteria and therefore was discharged home with home hospice.

## 2022-09-30 NOTE — PROGRESS NOTES
Pt c/o 10/10 generalized pain, requesting pain medication. eICU notified. Fentanyl 25 mcg IVP once ordered.

## 2022-09-30 NOTE — PROGRESS NOTES
[facility] - Intensive Care  Adult Nutrition  Progress Note    SUMMARY       Recommendations  1) Continue regular diet   2) Add Boost plus BID if pt prefers    Goals: 1) PO Intakes > 75% of meals/supplements  Nutrition Goal Status: new  Communication of RD Recs:  (POC, sticky note)    Assessment and Plan    Severe malnutrition  Contributing Nutrition Diagnosis  Severe chronic illness related malnutrition    Related to (etiology):   Diarrhea, intermittent N/V and decreased appetite, increased needs d/t mets. CA    Signs and Symptoms (as evidenced by):   1) PO intakes < 75% of needs x > 1 month  2) > 20% weight loss x < 1 year  3) moderate-severe wasting NFPE 8/19/22    Interventions:  General healthful diet and nutrition supplement therapy    Nutrition Diagnosis Status:   continues           Malnutrition Assessment     Skin (Micronutrient):  (Raul = 18)  Teeth (Micronutrient):  (missing some)   Micronutrient Evaluation: suspected deficiency  Micronutrient Evaluation Comments: K, Na   Weight Loss (Malnutrition): greater than 20% in 1 year  Energy Intake (Malnutrition): less than 75% for greater than or equal to 1 month     8/19/22 NFPE    Orbital Region (Subcutaneous Fat Loss): moderate depletion  Upper Arm Region (Subcutaneous Fat Loss): moderate depletion   Lima Region (Muscle Loss): severe depletion  Clavicle Bone Region (Muscle Loss): severe depletion  Clavicle and Acromion Bone Region (Muscle Loss): severe depletion  Dorsal Hand (Muscle Loss): severe depletion  Anterior Thigh Region (Muscle Loss): severe depletion  Posterior Calf Region (Muscle Loss): severe depletion   Edema (Fluid Accumulation): 0-->no edema present   Subcutaneous Fat Loss (Final Summary): severe protein-calorie malnutrition  Muscle Loss Evaluation (Final Summary): severe protein-calorie malnutrition         Reason for Assessment    Reason For Assessment: identified at risk by screening criteria  Diagnosis:  (seizure)  Relevant Medical History:  "seizures, stage IV melanoma with mets to lungs, lymph, perineal carcinomatosis, brain, CAD, substance abuse, intermittent N/V and diarrhea associated with recent CA treatment  Interdisciplinary Rounds: did not attend (remote)    General Information Comments: 52 y/o male admitted with seizure, was intubated and with NG, but now extubated and NG removed. NPO yesterday, eating 50% today on regular diet. Plan to go home with home hospice.  severe wasting per NFPE 22, not appropriate for repeat NFPE as pt going home with hospice. Significant weight loss and severe malnutrition noted per chart review.    Nutrition Discharge Planning: To be determined- regular liberalized diet + boost plus if pt prefers    Nutrition Risk Screen    Nutrition Risk Screen: no indicators present    Nutrition/Diet History    Patient Reported Diet/Restrictions/Preferences: general  Spiritual, Cultural Beliefs, Roman Catholic Practices, Values that Affect Care: no  Food Allergies: NKFA  Factors Affecting Nutritional Intake: pain    Anthropometrics    Temp: 98.1 °F (36.7 °C)  Height Method: Stated  Height: 5' 9"  Height (inches): 69 in  Weight Method: Bed Scale  Weight: 61 kg (134 lb 7.7 oz)  Weight (lb): 134.48 lb  Ideal Body Weight (IBW), Male: 160 lb  % Ideal Body Weight, Male (lb): 84.05 %  BMI (Calculated): 19.9  BMI Grade: 18.5-24.9 - normal  Usual Body Weight (UBW), k kg (2022 and 21)  Weight Change Amount:  (67.4 kg 22)  % Usual Body Weight: 72.77  % Weight Change From Usual Weight: -27.38 %       Lab/Procedures/Meds    Pertinent Labs Reviewed: reviewed  BMP  Lab Results   Component Value Date     (L) 2022    K 3.3 (L) 2022    CL 95 2022    CO2 24 2022    BUN 11 2022    CREATININE 0.7 2022    CALCIUM 8.6 (L) 2022    ANIONGAP 14 2022    ESTGFRAFRICA >60.0 2022    EGFRNONAA >60.0 2022     Recent Labs   Lab 22  0003   POCTGLUCOSE 89     Lab Results "   Component Value Date    ALBUMIN 3.7 09/28/2022       Pertinent Medications Reviewed: reviewed  Pertinent Medications Comments: dexamethasone, zofran    Estimated/Assessed Needs    Weight Used For Calorie Calculations: 61 kg (134 lb 7.7 oz)  Energy Calorie Requirements (kcal): MSj ( x 1.4-1.5) wt gain = 1933-3337 kcal  Energy Need Method: Sheridan-St Jeor  Protein Requirements: 1.2-1.5 g protein/kg ( wasting) = 73-91 g  Weight Used For Protein Calculations: 61 kg (134 lb 7.7 oz)  Fluid Requirements (mL): 9803-9817 ml or per MD  Estimated Fluid Requirement Method: RDA Method  CHO Requirement: N/A      Nutrition Prescription Ordered    Current Diet Order: regular diet    Evaluation of Received Nutrient/Fluid Intake    Energy Calories Required: not meeting needs  Protein Required: not meeting needs  Fluid Required: not meeting needs  Tolerance: tolerating     Intake/Output Summary (Last 24 hours) at 9/30/2022 1345  Last data filed at 9/30/2022 1207  Gross per 24 hour   Intake 1287.83 ml   Output 597 ml   Net 690.83 ml       % Intake of Estimated Energy Needs: 25 - 50 %  % Meal Intake: 25 - 50 %    Nutrition Risk    Level of Risk/Frequency of Follow-up: comfort care/hospice x 10 days    Monitor and Evaluation    Food and Nutrient Intake: energy intake, food and beverage intake  Food and Nutrient Adminstration: diet order  Anthropometric Measurements: weight  Biochemical Data, Medical Tests and Procedures: electrolyte and renal panel, gastrointestinal profile  Nutrition-Focused Physical Findings: overall appearance     Nutrition Follow-Up    RD Follow-up?: Yes

## 2022-09-30 NOTE — DISCHARGE SUMMARY
[facility] Medicine  Discharge Summary      Patient Name: Joseluis Garner  MRN: [id number]  Patient Class: IP- Inpatient  Admission Date: 9/28/2022  Hospital Length of Stay: 1 days  Discharge Date and Time:  09/30/2022 5:06 PM  Attending Physician: No att. providers found   Discharging Provider: Virginia Sim MD  Primary Care Provider: Clayton Wise MD      HPI:   Joseluis Garner is a 52 yo male with a pmh of stage IV melanoma with mets to multiple areas including brain who presented to the ED with seizures and altered mental status. His mother reports that he stated he wasn't feeling well earlier today and then she heard him fall. She states he was having continuous seizures. He takes keppra and she is not aware of any missed doses. She states he last had a seizure in January. He is normally combative after a seizure but this was worse. He was just released from main campus a few days ago and was to follow up with palliative care and oncology. On EMS arrival, they noted flexion posturing with leftward fixed gaze followed by generalized tonic-clonic seizures. He was given 12mg versed en route. He was combative on arrival to the ED and intubated. He was loaded with keppra and given vanc and rocephin, 1L NS, ativan 2mg, and Propofol drip. WBC 23, lactic acid 9.4. CT head with no acute changes. UDS positive for benzos, opiates, and THC. He is awake and moving all limbs on exam. Follows commands. Maxed on propofol.       * No surgery found *      Hospital Course:   51-year-old male with PMH of metastatic melanoma with spread to brain, lungs, lymph nodes, peritoneal carcinomatosis presented to the ER with seizures and postictal confusion/agitation for which she was intubated for airway protection to sedate in order to get imaging.  CT head showed no hemorrhage.  He was weaned off sedation and extubated to room air following which his mentation improved over time.  He was recently admitted to  [facility] few days ago when he was found to progressively worsening metastatic disease despite treatment.  Oncology, Neurology and palliative Care were consulted.  Oncology discussed case with primary oncologist Dr. Wise who recommended hospice given no curative options.  Neurology recommended increasing dose of Keppra to 1500 mg b.i.d. and continuing dexamethasone 4 mg b.i.d..  Goals of care and code status were discussed with the patient, mother and fiancee at bedside.  DNR order was placed in accordance with patient's wishes.  Given aggressive disease with no curative options, option of hospice was discussed with the patient.  He requested for inpatient hospice but did not meet criteria and therefore was discharged home with home hospice.     Vitals and nursing note reviewed.   Constitutional:       Appearance: He is underweight, diffuse muscle wasting noted       alert, oriented x3  HENT:      Head: Normocephalic.      Comments: swelling to left forehead  Cardiovascular:      Rate and Rhythm: Regular rhythm.  normal rate     Pulses: Normal pulses.      Heart sounds: Normal heart sounds.   Pulmonary:     does not appear in respiratory distress  Abdominal:      General: Bowel sounds are normal.      Palpations: Abdomen is soft.     Musculoskeletal:         General: Normal range of motion.      Cervical back: Normal range of motion.   Skin:     General: Skin is warm.      Coloration: Skin is pale.   Neurological:      Comments: Follows commands, moving all extremities   Psychiatric:      Calmer today    Goals of Care Treatment Preferences:  Code Status: DNR    Health care agent: Margarita Prather (mother)  Health care agent number: [phone]          What is most important right now is to focus on improvement in condition but with limits to invasive therapies, comfort and QOL .  Accordingly, we have decided that the best plan to meet the patient's goals includes continuing with treatment.      Consults:    Consults (From admission, onward)        Status Ordering Provider     Inpatient consult to Hematology/Oncology  Once        Provider:  Sergio Thacker MD    Completed [facility]     Inpatient consult to [facility]  Once        Provider:  (Not yet assigned)    BALDEMAR Rios     Pulmonology  Once        Provider:  (Not yet assigned)    BALDEMAR Rios     Palliative Care  Once        Provider:  (Not yet assigned)    BALDEMAR Rios          Final Active Diagnoses:    Diagnosis Date Noted POA    PRINCIPAL PROBLEM:  Seizure [R56.9] 06/27/2021 Yes    Malignant melanoma [C43.9] 09/29/2022 Yes    Severe malnutrition [E43] 08/19/2022 Yes    Metastatic melanoma [C79.9] 04/04/2022 Yes    Leukocytosis [D72.829] 02/18/2021 Yes      Problems Resolved During this Admission:    Diagnosis Date Noted Date Resolved POA    Acute hypoxemic respiratory failure [J96.01] 09/29/2022 09/30/2022 Yes    Status epilepticus [G40.901] 09/29/2022 09/30/2022 Yes    Lactic acidosis [E87.2] 09/23/2022 09/30/2022 Yes       Discharged Condition: poor    Disposition: Home or Self Care    Follow Up:   Follow-up Information     [facility] Follow up.    Specialty: Hospice Services  Why: The pt was informed to call them when he arrives home. He's going home with home hospice services with them.  Contact information:  [address] [place] [address] [id number] [phone]                       Patient Instructions:   No discharge procedures on file.    Significant Diagnostic Studies: Labs:   BMP:   Recent Labs   Lab 09/28/22 2259 09/29/22 0406 09/30/22  0335   * 68* 101   * 133* 133*   K 4.1 3.3* 3.3*   CL 90* 93* 95   CO2 19* 28 24   BUN 15 13 11   CREATININE 1.3 1.0 0.7   CALCIUM 9.1 8.8 8.6*   MG  --  1.8 2.0    and CBC   Recent Labs   Lab 09/28/22 2259 09/29/22 0406 09/30/22  0335   WBC 23.07* 23.90* 19.51*   HGB 15.2 14.0 16.1   HCT 45.0  40.3 48.2   * 406 476*       Pending Diagnostic Studies:     None         Medications:  Reconciled Home Medications:      Medication List      CHANGE how you take these medications    levETIRAcetam 1000 MG tablet  Commonly known as: KEPPRA  Take 1.5 tablets (1,500 mg total) by mouth 2 (two) times a day.  What changed: how much to take        CONTINUE taking these medications    ALPRAZolam 1 MG tablet  Commonly known as: XANAX  Take 1 mg by mouth 2 (two) times daily as needed.     dexAMETHasone 4 MG Tab  Commonly known as: DECADRON  Take 1 tablet (4 mg total) by mouth every 12 (twelve) hours. for 10 days     famotidine 20 MG tablet  Commonly known as: PEPCID  Take 1 tablet (20 mg total) by mouth once daily.     GAVILAX 17 gram/dose powder  Generic drug: polyethylene glycol  Dissolve one capful (17 g) in liquid and take by mouth once daily.     morphine 60 MG 12 hr tablet  Commonly known as: MS CONTIN  Take 1 tablet (60 mg total) by mouth 2 (two) times daily.     OLANZapine 2.5 MG tablet  Commonly known as: ZyPREXA  Take 1 tablet (2.5 mg total) by mouth every 6 (six) hours as needed (anxiety).     ondansetron 8 MG Tbdl  Commonly known as: ZOFRAN-ODT  Dissolve 1 tablet (8 mg total) by mouth every 12 (twelve) hours as needed.     oxyCODONE 30 MG Tab  Commonly known as: ROXICODONE  Take 1 tablet (30 mg total) by mouth every 4 (four) hours as needed (cancer related pain).     prochlorperazine 5 MG tablet  Commonly known as: COMPAZINE  Take 1 tablet (5 mg total) by mouth every 6 (six) hours as needed for Nausea.     senna-docusate 8.6-50 mg 8.6-50 mg per tablet  Commonly known as: PERICOLACE  Take 1 tablet by mouth daily as needed for Constipation.        STOP taking these medications    buPROPion 100 MG TBSR 12 hr tablet  Commonly known as: WELLBUTRIN SR     dabrafenib 75 mg Cap  Commonly known as: TAFINLAR     hydrOXYzine pamoate 25 MG Cap  Commonly known as: VISTARIL     SENNA 8.6 mg tablet  Generic drug: senna      trametinib 2 mg Tab  Commonly known as: MEKINIST        ASK your doctor about these medications    clobetasoL 0.05 % cream  Commonly known as: TEMOVATE  Apply topically 2 (two) times daily.            Indwelling Lines/Drains at time of discharge:   Lines/Drains/Airways     None                 Time spent on the discharge of patient: 35 minutes      Virginia Sim MD  Department of Hospital Medicine  [facility] Intensive [facility]

## 2022-10-03 ENCOUNTER — PATIENT OUTREACH (OUTPATIENT)
Dept: ADMINISTRATIVE | Facility: CLINIC | Age: 52
End: 2022-10-03
Payer: MEDICAID

## 2022-10-04 LAB
BACTERIA BLD CULT: NORMAL
BACTERIA BLD CULT: NORMAL

## 2022-10-27 ENCOUNTER — TELEPHONE (OUTPATIENT)
Dept: PALLIATIVE MEDICINE | Facility: CLINIC | Age: 52
End: 2022-10-27
Payer: MEDICAID

## 2022-12-26 PROBLEM — N17.9 AKI (ACUTE KIDNEY INJURY): Status: RESOLVED | Noted: 2022-04-04 | Resolved: 2022-12-26
